# Patient Record
Sex: FEMALE | Race: WHITE | NOT HISPANIC OR LATINO | Employment: OTHER | URBAN - METROPOLITAN AREA
[De-identification: names, ages, dates, MRNs, and addresses within clinical notes are randomized per-mention and may not be internally consistent; named-entity substitution may affect disease eponyms.]

---

## 2017-01-16 ENCOUNTER — ALLSCRIPTS OFFICE VISIT (OUTPATIENT)
Dept: OTHER | Facility: OTHER | Age: 72
End: 2017-01-16

## 2017-04-26 ENCOUNTER — ALLSCRIPTS OFFICE VISIT (OUTPATIENT)
Dept: OTHER | Facility: OTHER | Age: 72
End: 2017-04-26

## 2017-06-15 ENCOUNTER — ALLSCRIPTS OFFICE VISIT (OUTPATIENT)
Dept: OTHER | Facility: OTHER | Age: 72
End: 2017-06-15

## 2017-07-21 ENCOUNTER — ALLSCRIPTS OFFICE VISIT (OUTPATIENT)
Dept: OTHER | Facility: OTHER | Age: 72
End: 2017-07-21

## 2017-09-08 ENCOUNTER — ALLSCRIPTS OFFICE VISIT (OUTPATIENT)
Dept: OTHER | Facility: OTHER | Age: 72
End: 2017-09-08

## 2017-09-08 ENCOUNTER — APPOINTMENT (OUTPATIENT)
Dept: RADIOLOGY | Facility: MEDICAL CENTER | Age: 72
End: 2017-09-08
Payer: COMMERCIAL

## 2017-09-08 DIAGNOSIS — M25.552 PAIN IN LEFT HIP: ICD-10-CM

## 2017-09-08 PROCEDURE — 73502 X-RAY EXAM HIP UNI 2-3 VIEWS: CPT

## 2017-10-03 ENCOUNTER — APPOINTMENT (OUTPATIENT)
Dept: LAB | Facility: HOSPITAL | Age: 72
End: 2017-10-03
Payer: COMMERCIAL

## 2017-10-03 DIAGNOSIS — D64.9 ANEMIA: ICD-10-CM

## 2017-10-03 DIAGNOSIS — I10 ESSENTIAL (PRIMARY) HYPERTENSION: ICD-10-CM

## 2017-10-03 DIAGNOSIS — E78.5 HYPERLIPIDEMIA: ICD-10-CM

## 2017-10-03 DIAGNOSIS — E03.9 HYPOTHYROIDISM: ICD-10-CM

## 2017-10-03 DIAGNOSIS — E87.6 HYPOKALEMIA: ICD-10-CM

## 2017-10-03 DIAGNOSIS — E11.21 TYPE 2 DIABETES MELLITUS WITH DIABETIC NEPHROPATHY (HCC): ICD-10-CM

## 2017-10-03 LAB
ALBUMIN SERPL BCP-MCNC: 3.7 G/DL (ref 3.5–5)
ALP SERPL-CCNC: 64 U/L (ref 46–116)
ALT SERPL W P-5'-P-CCNC: 15 U/L (ref 12–78)
ANION GAP SERPL CALCULATED.3IONS-SCNC: 9 MMOL/L (ref 4–13)
AST SERPL W P-5'-P-CCNC: 16 U/L (ref 5–45)
BASOPHILS # BLD AUTO: 0.04 THOUSANDS/ΜL (ref 0–0.1)
BASOPHILS NFR BLD AUTO: 1 % (ref 0–1)
BILIRUB SERPL-MCNC: 0.54 MG/DL (ref 0.2–1)
BUN SERPL-MCNC: 47 MG/DL (ref 5–25)
CALCIUM SERPL-MCNC: 9.2 MG/DL (ref 8.3–10.1)
CHLORIDE SERPL-SCNC: 97 MMOL/L (ref 100–108)
CHOLEST SERPL-MCNC: 172 MG/DL (ref 50–200)
CO2 SERPL-SCNC: 33 MMOL/L (ref 21–32)
CREAT SERPL-MCNC: 1.96 MG/DL (ref 0.6–1.3)
EOSINOPHIL # BLD AUTO: 0.48 THOUSAND/ΜL (ref 0–0.61)
EOSINOPHIL NFR BLD AUTO: 6 % (ref 0–6)
ERYTHROCYTE [DISTWIDTH] IN BLOOD BY AUTOMATED COUNT: 13.5 % (ref 11.6–15.1)
EST. AVERAGE GLUCOSE BLD GHB EST-MCNC: 163 MG/DL
GFR SERPL CREATININE-BSD FRML MDRD: 25 ML/MIN/1.73SQ M
GLUCOSE P FAST SERPL-MCNC: 121 MG/DL (ref 65–99)
HBA1C MFR BLD: 7.3 % (ref 4.2–6.3)
HCT VFR BLD AUTO: 40.8 % (ref 34.8–46.1)
HDLC SERPL-MCNC: 68 MG/DL (ref 40–60)
HGB BLD-MCNC: 13 G/DL (ref 11.5–15.4)
LDLC SERPL CALC-MCNC: 66 MG/DL (ref 0–100)
LYMPHOCYTES # BLD AUTO: 1.85 THOUSANDS/ΜL (ref 0.6–4.47)
LYMPHOCYTES NFR BLD AUTO: 22 % (ref 14–44)
MCH RBC QN AUTO: 30.7 PG (ref 26.8–34.3)
MCHC RBC AUTO-ENTMCNC: 31.9 G/DL (ref 31.4–37.4)
MCV RBC AUTO: 96 FL (ref 82–98)
MONOCYTES # BLD AUTO: 0.38 THOUSAND/ΜL (ref 0.17–1.22)
MONOCYTES NFR BLD AUTO: 5 % (ref 4–12)
NEUTROPHILS # BLD AUTO: 5.57 THOUSANDS/ΜL (ref 1.85–7.62)
NEUTS SEG NFR BLD AUTO: 66 % (ref 43–75)
NRBC BLD AUTO-RTO: 0 /100 WBCS
PLATELET # BLD AUTO: 264 THOUSANDS/UL (ref 149–390)
PMV BLD AUTO: 10.1 FL (ref 8.9–12.7)
POTASSIUM SERPL-SCNC: 3.8 MMOL/L (ref 3.5–5.3)
PROT SERPL-MCNC: 7.1 G/DL (ref 6.4–8.2)
RBC # BLD AUTO: 4.24 MILLION/UL (ref 3.81–5.12)
SODIUM SERPL-SCNC: 139 MMOL/L (ref 136–145)
T4 FREE SERPL-MCNC: 0.79 NG/DL (ref 0.76–1.46)
TRIGL SERPL-MCNC: 189 MG/DL
TSH SERPL DL<=0.05 MIU/L-ACNC: 5.09 UIU/ML (ref 0.36–3.74)
WBC # BLD AUTO: 8.34 THOUSAND/UL (ref 4.31–10.16)

## 2017-10-03 PROCEDURE — 80061 LIPID PANEL: CPT

## 2017-10-03 PROCEDURE — 85025 COMPLETE CBC W/AUTO DIFF WBC: CPT

## 2017-10-03 PROCEDURE — 83036 HEMOGLOBIN GLYCOSYLATED A1C: CPT

## 2017-10-03 PROCEDURE — 36415 COLL VENOUS BLD VENIPUNCTURE: CPT

## 2017-10-03 PROCEDURE — 80053 COMPREHEN METABOLIC PANEL: CPT

## 2017-10-03 PROCEDURE — 84439 ASSAY OF FREE THYROXINE: CPT

## 2017-10-03 PROCEDURE — 84443 ASSAY THYROID STIM HORMONE: CPT

## 2017-10-12 ENCOUNTER — ALLSCRIPTS OFFICE VISIT (OUTPATIENT)
Dept: OTHER | Facility: OTHER | Age: 72
End: 2017-10-12

## 2017-10-12 DIAGNOSIS — Z00.00 ENCOUNTER FOR GENERAL ADULT MEDICAL EXAMINATION WITHOUT ABNORMAL FINDINGS: ICD-10-CM

## 2017-10-12 DIAGNOSIS — E87.6 HYPOKALEMIA: ICD-10-CM

## 2017-10-13 NOTE — PROGRESS NOTES
Assessment  1  Hyperlipidemia (272 4) (E78 5)   2  Uncontrolled diabetes with stage 3 chronic kidney disease GFR 30-59 (250 42,585 3)   (E11 22,E11 65,N18 3)   3  Hypertension (401 9) (I10)   4  Fibromyalgia (729 1) (M79 7)   5  Chronic kidney disease, stage 3 (585 3) (N18 3)   6  Depression (311) (F32 9)   7  Encounter for preventive health examination (V70 0) (Z00 00)   8  Need for vaccination (V05 9) (Z23)   9  Subclinical hypothyroidism (244 8) (E03 9)    Plan  Anemia, Chronic gout, Hyperlipidemia, Hypertension, Hypothyroidism, Uncontrolled  diabetes with stage 3 chronic kidney disease GFR  30-59    · (1) CBC/PLT/DIFF; Status:Hold For - Exact Date; Requested for:After 18PKY3163;    · (1) COMPREHENSIVE METABOLIC PANEL; Status:Hold For - Exact Date; Requested  for:After 99AEB0908;    · (1) HEMOGLOBIN A1C; Status:Hold For - Exact Date; Requested for:After 65QWT3635;    · (1) LIPID PANEL FASTING W DIRECT LDL REFLEX; Status:Hold For - Exact Date; Requested for:After 28ZUS9830;    · (1) MICROALBUMIN CREATININE RATIO, RANDOM URINE; Status:Hold For - Exact Date; Requested for:After 03VAP3234;    · (1) TSH WITH FT4 REFLEX; Status:Hold For - Exact Date; Requested for:After  52EYR7788;    · (1) URIC ACID; Status:Hold For - Exact Date; Requested for:After 77UPI4988;   Depression    · Citalopram Hydrobromide 40 MG Oral Tablet; Take 1 tablet daily  Health Maintenance    · * DXA BONE DENSITY SPINE HIP AND PELVIS; Status:Hold For - Scheduling; Requested  for:12Oct2017;   Need for vaccination    · Fluzone High-Dose 0 5 ML Intramuscular Suspension Prefilled Syringe;  INJECT 0 5  ML Intramuscular; To Be Done: 09YOW3240  PMH: Colon cancer screening    · COLONOSCOPY; Status:Temporary Deferral - Pt requests deferral;    10/12/2018  Subclinical hypothyroidism    · Levothyroxine Sodium 50 MCG Oral Tablet; Take 1 tablet daily   · (1) TSH WITH FT4 REFLEX; Status:Hold For - Exact Date;  Requested for:After  [id number]; Discussion/Summary    --depression: Patient has been more depressed recently  Denies any suicidal ideation  Recommend increasing citalopram from 20 to 40 mg daily  Will also start levothyroxinehypothyroid: New  As above, will start low-dose levothyroxine 50 mcg daily to see if this improves her depressionDiabetic control has been improving, A1c now 7 3%, was 7 6  Patient would like to continue off medication at this time  Will agree to do so, as long as A1cs continue to improve  I also asked her to mention to her rheumatologist to consider reduction of prednisonePatient continues with multiple myalgias  Overall is stable on prednisone 5 mg daily and gabapentin 300 t i d  continue follow-up with rheumatologist, Dr Shu Leon on medication  2 months followed by appointment (labs ordered)6 months, fasting blood work prior (labs ordered)  Recheck TSH   Possible side effects of new medications were reviewed with the patient/guardian today  The treatment plan was reviewed with the patient/guardian  The patient/guardian understands and agrees with the treatment plan      Chief Complaint  Pt presents for a follow-up and to discuss blwk  Pt would like flu shot today  Pt defers colonoscopy  Patient is here today for follow up of chronic conditions described in HPI  History of Present Illness  Patient presents for recheck of chronic medical problems today  Patient has been more depressed recently  Patient has restarted simvastatin for cholesterol  Patient continues to have pain from fibromyalgia  She is still 1 prednisone 5 mg daily and gabapentin  Patient still sees rheumatologist regularly  Review of Systems    Constitutional: No fever, no chills, feels well, no tiredness, no recent weight gain or weight loss  ENT: no complaints of earache, no loss of hearing, no nose bleeds, no nasal discharge, no sore throat, no hoarseness     Cardiovascular: No complaints of slow heart rate, no fast heart rate, no chest pain, no palpitations, no leg claudication, no lower extremity edema  Respiratory: No complaints of shortness of breath, no wheezing, no cough, no SOB on exertion, no orthopnea, no PND  Gastrointestinal: No complaints of abdominal pain, no constipation, no nausea or vomiting, no diarrhea, no bloody stools  Genitourinary: No complaints of dysuria, no incontinence, no pelvic pain, no dysmenorrhea, no vaginal discharge or bleeding  Musculoskeletal: as noted in HPI  Active Problems  1  Anemia (285 9) (D64 9)   2  Chronic gout (274 02) (M1A 9XX0)   3  Depression (311) (F32 9)   4  Edema (782 3) (R60 9)   5  Fibromyalgia (729 1) (M79 7)   6  Homocysteinemia (270 4) (E72 11)   7  Hyperlipidemia (272 4) (E78 5)   8  Hypertension (401 9) (I10)   9  Hypokalemia (276 8) (E87 6)   10  Hypothyroidism (244 9) (E03 9)   11  Lumbago (724 2) (M54 5)   12  Pain of left hip (719 45) (M25 552)   13  Uncontrolled diabetes with stage 3 chronic kidney disease GFR 30-59 (250 42,585 3)    (E11 22,E11 65,N18 3)   14  Vitamin D deficiency (268 9) (E55 9)    Past Medical History  1  History of Abnormal blood chemistry (790 6) (R79 9)   2  History of Acute bronchitis, unspecified organism (466 0) (J20 9)   3  History of Acute low back pain (724 2) (M54 5)   4  History of Acute pharyngitis (462) (J02 9)   5  History of Asthma (493 90) (J45 909)   6  History of Diabetes mellitus with kidney disease (250 40,583 81) (E11 21)   7  History of Dizziness (780 4) (R42)   8  History of Encounter for screening colonoscopy (V76 51) (Z12 11)   9  History of Encounter for screening mammogram for malignant neoplasm of breast   (V76 12) (Z12 31)   10  History of Encounter for screening mammogram for malignant neoplasm of breast    (V76 12) (Z12 31)   11  History of Flu vaccine need (V04 81) (Z23)   12  History of acute sinusitis (V12 69) (Z87 09)   13  History of acute sinusitis (V12 69) (Z87 09)   14   History of angina pectoris (V12 59) (Z86 79)   15  History of angioedema (V13 89) (Z87 898)   16  History of arthritis (V13 4) (Z87 39)   17  History of athlete's foot (V12 09) (Z86 19)   18  History of cataract (V12 49) (Z86 69)   19  History of eczema (V13 3) (Z87 2)   20  History of gait disorder (V13 89) (Z87 898)   21  History of idiopathic thrombocytopenic purpura (V12 3) (Z86 2)   22  History of restless legs syndrome (V12 49) (Z86 69)   23  History of type 2 diabetes mellitus (V12 29) (Z86 39)   24  History of Lower back pain (724 2) (M54 5)   25  History of Nausea (787 02) (R11 0)   26  History of Neck pain (723 1) (M54 2)   27  History of Neck Strain (847 0)   28  History of Need for prophylactic vaccination and inoculation against influenza (V04 81)    (Z23)   29  History of Need for vaccination with 13-polyvalent pneumococcal conjugate vaccine    (V03 82) (Z23)   30  History of Otalgia, unspecified laterality (388 70) (H92 09)   31  History of Overactive bladder (596 51) (N32 81)   32  History of Post menopausal syndrome (627 9) (N95 1)   33  History of Pre-operative clearance (V72 84) (Z01 818)   34  History of Screening for breast cancer (V76 10) (Z12 31)   35  History of Screening for osteoporosis (V82 81) (Z13 820)   36  History of Skin lesion of face (709 9) (L98 9)   37  History of Symptoms involving urinary system (788 99) (R39 9)   38  History of Tinnitus, unspecified laterality (388 30) (H93 19)   39  History of Upper respiratory infection (465 9) (J06 9)   40  History of Urge incontinence of urine (788 31) (N39 41)   41  History of Urinary frequency (788 41) (R35 0)    The active problems and past medical history were reviewed and updated today  Surgical History  1  History of Knee Replacement    Family History  Mother    1  Family history of    2  Denied: Family history of substance abuse   3  Family history of Leukemia   4  Denied: Family history of Mental health problem  Father    5   Denied: Family history of substance abuse   6  Denied: Family history of Mental health problem  Family History    7  Family history of Eczema    Social History   · Being A Social Drinker   · Denied: History of Drug Use   · Former smoker (R62 11) (I10 737)  The social history was reviewed and updated today  The social history was reviewed and is unchanged  Current Meds   1  Amitriptyline HCl - 10 MG Oral Tablet; Therapy: 65MAF4674 to (Evaluate:88Xuy6046) Recorded   2  Aspirin Adult Low Dose 81 MG Oral Tablet Delayed Release; TAKE 1 TABLET DAILY; Therapy: 80DII5492 to Recorded   3  Cetirizine HCl - 10 MG Oral Tablet; TAKE 1 TABLET DAILY; Therapy: 06GWI4780 to Recorded   4  Citalopram Hydrobromide 20 MG Oral Tablet; take one tablet by mouth every day; Therapy: 99BRW7705 to (77 873 135)  Requested for: 83RIU7721; Last   Rx:18Zht9262; Status: ACTIVE - Transmit to Pharmacy - Awaiting Verification Ordered   5  D-400 400 UNIT Oral Tablet; take 2 tablet daily; Therapy: 15XJF4606 to Recorded   6  Furosemide 40 MG Oral Tablet; TAKE ONE TABLET BY MOUTH EVERY DAY AS   DIRECTED; Therapy: 52ZSQ6270 to (Eren Jurist)  Requested for: 53ZAA0473; Last   Rx:47Ksn6478 Ordered   7  Gabapentin 300 MG Oral Capsule; One tab tid Recorded   8  MetOLazone 2 5 MG Oral Tablet; TAKE 1 TABLET DAILY ON MONDAY, WEDNESDAY, AND   FRIDAY; Therapy: 87Zta8972 to (Evaluate:17Xwe6127)  Requested for: 75QHV7305; Last   Rx:75Knk1501; Status: ACTIVE - Transmit to [facility] Verification   Ordered   9  Metoprolol Tartrate 25 MG Oral Tablet; take one tablet by mouth every day; Therapy: 83VPV5895 to (26 774994)  Requested for: 81Kqc3761; Last   Rx:92Qeb6811 Ordered   10  PredniSONE 5 MG Oral Tablet; Take 1 tablet daily; Therapy: 41QNC0558 to Recorded   11  Simvastatin 10 MG Oral Tablet; take one tablet by mouth every day; Therapy: 30ZES9648 to (Evaluate:10Oct2017)  Requested for: 12Jun2017; Last    Rx:12Jun2017 Ordered   12  TraMADol HCl - 50 MG Oral Tablet; take 1 tablet by mouth every 8 hours if needed; Therapy: 71TMG8284 to (Evaluate:09Ioj1653) Recorded   13  Uloric 80 MG Oral Tablet; TAKE 1 TABLET DAILY; Therapy: 98JMB6926 to Recorded    The medication list was reviewed and updated today  Allergies  1  ACE Inhibitors   2  Angiotensin Receptor Blockers   3  Compazine TABS  Denied    4  PredniSONE TABS    Vitals  Vital Signs    Recorded: 99USI9676 10:34AM   Temperature 97 7 F, Tympanic   Heart Rate 78   Pulse Quality Normal   Respiration Quality Normal   Respiration 18   Systolic 532, LUE, Sitting   Diastolic 76, LUE, Sitting   BP CUFF SIZE Large   Height 5 ft 6 in   Weight 252 lb 3 oz   BMI Calculated 40 7   BSA Calculated 2 21   O2 Saturation 97   LMP postmenopausal     Physical Exam    Constitutional   General appearance: No acute distress, well appearing and well nourished  Pulmonary   Respiratory effort: No increased work of breathing or signs of respiratory distress  Auscultation of lungs: Clear to auscultation  Cardiovascular   Palpation of heart: Normal PMI, no thrills  Auscultation of heart: Normal rate and rhythm, normal S1 and S2, without murmurs  Examination of extremities for edema and/or varicosities: Normal     Lymphatic   Palpation of lymph nodes in neck: No lymphadenopathy  Psychiatric   Orientation to person, place, and time: Normal     Mood and affect: Normal        Health Management  History of Diabetes mellitus with kidney disease   (1) HEMOGLOBIN A1C; every 3 months; Last 14PXG3651; Next Due: 34VIC4627; Active  (every) MICROALBUMIN, RANDOM URINE (W/CREATININE); every 1 year; Next Due: 64YXZ8823; Overdue  History of Encounter for screening colonoscopy   COLONOSCOPY; every 10 years; Next Due: 51TFZ6483; Overdue  History of Screening for breast cancer   * MAMMO SCREENING BILATERAL W CAD; every 1 year; Next Due: 85HOA9112;  Overdue  Uncontrolled diabetes with stage 3 chronic kidney disease GFR 30-59   (1) HEMOGLOBIN A1C; every 3 months; Last 10GCD3615; Next Due: 86FXO9567; Active  (every) MICROALBUMIN, RANDOM URINE (W/CREATININE); every 1 year; Next Due: 01YAE1171; Overdue  Health Maintenance   (1) HEMOGLOBIN A1C; every 3 months; Last 40QDH6333; Next Due: 47DCA7082; Active  (every) MICROALBUMIN, RANDOM URINE (W/CREATININE); every 1 year; Next Due: 16DFP2746; Overdue  * MAMMO SCREENING BILATERAL W CAD; every 1 year; Next Due: 75HIV5327; Overdue  COLONOSCOPY; every 10 years; Next Due: 65KAS0327; Overdue  Medicare Annual Wellness Visit; every 1 year; Next Due: 98Cuf4279; Overdue    Signatures   Electronically signed by :  Dagoberto Greenfield DO; Oct 12 2017 11:16AM EST                       (Author)

## 2017-12-04 ENCOUNTER — LAB REQUISITION (OUTPATIENT)
Dept: LAB | Facility: HOSPITAL | Age: 72
End: 2017-12-04
Payer: COMMERCIAL

## 2017-12-04 DIAGNOSIS — E03.9 HYPOTHYROIDISM: ICD-10-CM

## 2017-12-04 PROCEDURE — 84443 ASSAY THYROID STIM HORMONE: CPT | Performed by: FAMILY MEDICINE

## 2017-12-05 LAB — TSH SERPL DL<=0.05 MIU/L-ACNC: 1.16 UIU/ML (ref 0.36–3.74)

## 2017-12-11 ENCOUNTER — ALLSCRIPTS OFFICE VISIT (OUTPATIENT)
Dept: OTHER | Facility: OTHER | Age: 72
End: 2017-12-11

## 2017-12-12 ENCOUNTER — GENERIC CONVERSION - ENCOUNTER (OUTPATIENT)
Dept: OTHER | Facility: OTHER | Age: 72
End: 2017-12-12

## 2017-12-12 LAB
BUN SERPL-MCNC: 39 MG/DL (ref 7–25)
BUN/CREA RATIO (HISTORICAL): 22 (CALC) (ref 6–22)
CALCIUM SERPL-MCNC: 9.4 MG/DL (ref 8.6–10.4)
CHLORIDE SERPL-SCNC: 96 MMOL/L (ref 98–110)
CO2 SERPL-SCNC: 32 MMOL/L (ref 20–31)
CREAT SERPL-MCNC: 1.81 MG/DL (ref 0.6–0.93)
EGFR AFRICAN AMERICAN (HISTORICAL): 31 ML/MIN/1.73M2
EGFR-AMERICAN CALC (HISTORICAL): 27 ML/MIN/1.73M2
GLUCOSE (HISTORICAL): 159 MG/DL (ref 65–99)
POTASSIUM SERPL-SCNC: 3.6 MMOL/L (ref 3.5–5.3)
SODIUM SERPL-SCNC: 139 MMOL/L (ref 135–146)

## 2017-12-12 NOTE — PROGRESS NOTES
Assessment    1  Chronic kidney disease, stage 3 (585 3) (N18 3)   2  Hypokalemia (276 8) (E87 6)   3  Depression (311) (F32 9)   4  Uncontrolled diabetes with stage 3 chronic kidney disease GFR 30-59 (250 42,585 3) (E11 22,E11 65,N18 3)   5  Chronic gout (274 02) (M1A 9XX0)   6  Fibromyalgia (729 1) (M79 7)   7  Hypothyroidism (244 9) (E03 9)    Plan  Chronic kidney disease, stage 3, Fibromyalgia, Hyperlipidemia, Hypertension,Hypokalemia, Hypothyroidism, PMH: Subclinical hypothyroidism, Uncontrolled diabeteswith stage 3 chronic kidney disease GFR 30-59    · (1) CBC/PLT/DIFF; Status:Hold For - Exact Date; Requested for:After 39HOD1099;    · (1) COMPREHENSIVE METABOLIC PANEL; Status:Hold For - Exact Date; Requestedfor:After 76KMW2766;    · (1) HEMOGLOBIN A1C; Status:Hold For - Exact Date; Requested for:After 32YFL4026;    · (1) LIPID PANEL FASTING W DIRECT LDL REFLEX; Status:Hold For - Exact Date; Requested for:After 92ILV6008;    · (1) TSH WITH FT4 REFLEX; Status:Hold For - Exact Date; Requested for:[id number];   Chronic kidney disease, stage 3, Hypokalemia, Uncontrolled diabetes with stage 3chronic kidney disease GFR 30-59    · (1) BASIC METABOLIC PROFILE; Status:Active; Requested for:29Egs2021;   Chronic kidney disease, stage 3, Uncontrolled diabetes with stage 3 chronic kidneydisease GFR 30-59    · Januvia 25 MG Oral Tablet; TAKE 1 TABLET DAILY    Discussion/Summary    --diabetes: A1cs 7 3, was 7 5%  Patient had previously declined medication, but is agreeable to start med at this time  Will start Januvia 25 mg daily (renally adjusted)  kidney disease; last creatinine 1 90  Will repeat BMP today  Consider referral to Nephrology if condition worsensChronic  Last potassium 2 8  Patient was started on potassium chloride 20 mEq daily  Will check electrolytes today  Thyroid normal  Patient doing well since starting levothyroxine 50 mcg dailyImproved since increasing citalopram to 40 mg dailyStable   Patient's rheumatologist, Dr Bert Holly, recently decreased her prednisone to 2 5 mg dailyReasonably controlled on metoprolol 25 mg dailyDoing well on Uloric without any recent flares  today    Will call with resultsmonths, fasting blood work prior  Possible side effects of new medications were reviewed with the patient/guardian today  The treatment plan was reviewed with the patient/guardian  The patient/guardian understands and agrees with the treatment plan      Chief Complaint  Pt presents for 2 month check up and to discuss blood work from 12/4/2017  Pt defers Colonoscopy  Pt will be scheduling eye exam and DEXA scan  Pt is UTD with flu shot and foot exam    Patient is here today for follow up of chronic conditions described in HPI  History of Present Illness  Patient presents for recheck of chronic medical problems today  Patient had labs drawn recently which showed creatinine of 1 9 and potassium of 2 8  She is doing well since starting potassium chloride 20 mEq daily  Depression has improved since increasing citalopram to 40 mg daily  Patient has been trying to watch her diet regarding her diabetes  Her prednisone has now been cut down to 1/2 tablet daily by Rheumatology  She is doing well since starting levothyroxine for underactive thyroid  Patient still sees rheumatologist regularly      Review of Systems   Constitutional: No fever, no chills, feels well, no tiredness, no recent weight gain or weight loss  Cardiovascular: No complaints of slow heart rate, no fast heart rate, no chest pain, no palpitations, no leg claudication, no lower extremity edema  Respiratory: No complaints of shortness of breath, no wheezing, no cough, no SOB on exertion, no orthopnea, no PND  Gastrointestinal: No complaints of abdominal pain, no constipation, no nausea or vomiting, no diarrhea, no bloody stools    Genitourinary: No complaints of dysuria, no incontinence, no pelvic pain, no dysmenorrhea, no vaginal discharge or bleeding  Active Problems    1  Anemia (285 9) (D64 9)   2  Chronic gout (274 02) (M1A 9XX0)   3  Chronic kidney disease, stage 3 (585 3) (N18 3)   4  Depression (311) (F32 9)   5  Depression screening (V79 0) (Z13 89)   6  Edema (782 3) (R60 9)   7  Encounter for screening mammogram for malignant neoplasm of breast (V76 12) (Z12 31)   8  Fibromyalgia (729 1) (M79 7)   9  Homocysteinemia (270 4) (E72 11)   10  Hyperlipidemia (272 4) (E78 5)   11  Hypertension (401 9) (I10)   12  Hypokalemia (276 8) (E87 6)   13  Hypothyroidism (244 9) (E03 9)   14  Lumbago (724 2) (M54 5)   15  Uncontrolled diabetes with stage 3 chronic kidney disease GFR 30-59 (250 42,585 3)  (E11 22,E11 65,N18 3)   16  Vitamin D deficiency (268 9) (E55 9)    Past Medical History    1  History of Abnormal blood chemistry (790 6) (R79 9)   2  History of Acute bronchitis, unspecified organism (466 0) (J20 9)   3  History of Acute low back pain (724 2) (M54 5)   4  History of Acute pharyngitis (462) (J02 9)   5  History of Asthma (493 90) (J45 909)   6  History of Diabetes mellitus with kidney disease (250 40,583 81) (E11 21)   7  History of Dizziness (780 4) (R42)   8  History of Encounter for screening colonoscopy (V76 51) (Z12 11)   9  History of Encounter for screening mammogram for malignant neoplasm of breast (V76 12) (Z12 31)   10  History of Flu vaccine need (V04 81) (Z23)   11  History of acute sinusitis (V12 69) (Z87 09)   12  History of acute sinusitis (V12 69) (Z87 09)   13  History of angina pectoris (V12 59) (Z86 79)   14  History of angioedema (V13 89) (Z87 898)   15  History of arthritis (V13 4) (Z87 39)   16  History of athlete's foot (V12 09) (Z86 19)   17  History of cataract (V12 49) (Z86 69)   18  History of eczema (V13 3) (Z87 2)   19  History of gait disorder (V13 89) (Z87 898)   20  History of idiopathic thrombocytopenic purpura (V12 3) (Z86 2)   21  History of restless legs syndrome (V12 49) (Z86 69)   22   Denied: History of substance abuse   23  History of type 2 diabetes mellitus (V12 29) (Z86 39)   24  History of Lower back pain (724 2) (M54 5)   25  History of Nausea (787 02) (R11 0)   26  History of Neck pain (723 1) (M54 2)   27  History of Neck Strain (847 0)   28  History of Need for prophylactic vaccination and inoculation against influenza (V04 81)  (Z23)   29  History of Need for vaccination (V05 9) (Z23)   30  History of Need for vaccination with 13-polyvalent pneumococcal conjugate vaccine  (V03 82) (Z23)   31  History of Otalgia, unspecified laterality (388 70) (H92 09)   32  History of Overactive bladder (596 51) (N32 81)   33  History of Pain of left hip (719 45) (M25 552)   34  History of Post menopausal syndrome (627 9) (N95 1)   35  History of Pre-operative clearance (V72 84) (Z01 818)   36  History of Screening for breast cancer (V76 10) (Z12 31)   37  History of Screening for osteoporosis (V82 81) (Z13 820)   38  History of Skin lesion of face (709 9) (L98 9)   39  History of Subclinical hypothyroidism (244 8) (E03 9)   40  History of Symptoms involving urinary system (788 99) (R39 9)   41  History of Tinnitus, unspecified laterality (388 30) (H93 19)   42  History of Upper respiratory infection (465 9) (J06 9)   43  History of Urge incontinence of urine (788 31) (N39 41)   44  History of Urinary frequency (788 41) (R35 0)    The active problems and past medical history were reviewed and updated today  Surgical History  1  History of Knee Replacement    Family History  Mother    1  Family history of    2  Denied: Family history of substance abuse   3  Family history of Leukemia   4  Denied: Family history of Mental health problem  Father    5  Denied: Family history of substance abuse   6  Denied: Family history of Mental health problem  Family History    7   Family history of Eczema    Social History     · Being A Social Drinker   · Denied: History of Drug Use   · Former smoker (V15 82) (M03 062)  The social history was reviewed and updated today  The social history was reviewed and is unchanged  Current Meds   1  Amitriptyline HCl - 10 MG Oral Tablet; Therapy: 26YRG9411 to (Evaluate:22Sep2014) Recorded   2  Aspirin Adult Low Dose 81 MG Oral Tablet Delayed Release; TAKE 1 TABLET DAILY; Therapy: 59MKM8482 to Recorded   3  Citalopram Hydrobromide 40 MG Oral Tablet; Take 1 tablet daily; Therapy: 47KCM6037 to (Evaluate:39Tha0182)  Requested for: 66EVN4579; Last Rx:12Oct2017 Ordered   4  D-400 400 UNIT Oral Tablet; take 2 tablet daily; Therapy: 68XBL6546 to Recorded   5  Furosemide 40 MG Oral Tablet; TAKE ONE TABLET BY MOUTH EVERY DAY AS DIRECTED; Therapy: 93FJQ7315 to (Manjula Maple)  Requested for: 26DKJ8660; Last Rx:26Nov2017; Status: ACTIVE - Transmit to Pharmacy - Awaiting Verification Ordered   6  Gabapentin 300 MG Oral Capsule; One tab tid Recorded   7  K-Tab 20 MEQ Oral Tablet Extended Release; Take 1 tablet daily; Therapy: 79BHO9307 to (Last Rx:31Oct2017)  Requested for: 31Oct2017; Status: ACTIVE - Transmit to Pharmacy - Awaiting Verification Ordered   8  Levothyroxine Sodium 50 MCG Oral Tablet; Take 1 tablet daily; Therapy: 28DZE9125 to (Evaluate:10Jan2018)  Requested for: 10RFA9571; Last Rx:12Oct2017 Ordered   9  MetOLazone 2 5 MG Oral Tablet; TAKE 1 TABLET DAILY ON MONDAY, WEDNESDAY, AND FRIDAY; Therapy: 90Oaf1357 to (Evaluate:75Bsn7010)  Requested for: 79RTT2531; Last Rx:74Ytx6232; Status: ACTIVE - Transmit to [facility] Verification Ordered   10  Metoprolol Tartrate 25 MG Oral Tablet; take one tablet by mouth every day; Therapy: 19NYI6446 to (Megan Blood)  Requested for: 00Olo4789; Last  Rx:08Sep2017 Ordered   11  PredniSONE 5 MG Oral Tablet; take 0 5 tablet daily; Therapy: 88KQP0565 to Recorded   12  Simvastatin 10 MG Oral Tablet; take one tablet by mouth every day;   Therapy: 08URV9621 to ()  Requested for: 46CUA6712; Last  958 6660; Status: ACTIVE - Transmit to Pharmacy - Awaiting Verification Ordered   13  TraMADol HCl - 50 MG Oral Tablet; take 1 tablet by mouth every 8 hours if needed; Therapy: 67XQH4384 to (Evaluate:03Kqz3811) Recorded   14  Uloric 80 MG Oral Tablet; TAKE 1 TABLET DAILY; Therapy: 02LIC9883 to Recorded    The medication list was reviewed and updated today  Allergies  1  ACE Inhibitors   2  Angiotensin Receptor Blockers   3  Compazine TABS  Denied    4  PredniSONE TABS    Vitals  Vital Signs    Recorded: 11Toh4972 10:11AM   Temperature 97 5 F, Tympanic   Heart Rate 60   Pulse Quality Normal   Respiration Quality Normal   Respiration 16   Systolic 703, LUE, Sitting   Diastolic 84, LUE, Sitting   BP CUFF SIZE Large   Height 5 ft 5 in   Weight 248 lb 6 oz   BMI Calculated 41 33   BSA Calculated 2 17   O2 Saturation 99, RA   LMP premenopause   Pain Scale 0       Physical Exam   Constitutional  General appearance: No acute distress, well appearing and well nourished  Pulmonary  Respiratory effort: No increased work of breathing or signs of respiratory distress  Auscultation of lungs: Clear to auscultation  Cardiovascular  Palpation of heart: Normal PMI, no thrills  Auscultation of heart: Normal rate and rhythm, normal S1 and S2, without murmurs  Examination of extremities for edema and/or varicosities: Normal    Carotid pulses: Normal    Lymphatic  Palpation of lymph nodes in neck: No lymphadenopathy  Psychiatric  Orientation to person, place, and time: Normal    Mood and affect: Normal          Health Management  History of Diabetes mellitus with kidney disease   (1) HEMOGLOBIN A1C; every 3 months; Last 18ULG6619; Next Due: 60OKQ0322; Active  (every) MICROALBUMIN, RANDOM URINE (W/CREATININE); every 1 year; Next Due: 98NCK8755; Overdue  History of Encounter for screening colonoscopy   COLONOSCOPY; every 10 years; Next Due: 16TGD8533;  Overdue  History of Screening for breast cancer   * MAMMO SCREENING BILATERAL W CAD; every 1 year; Next Due: 70WVU0030; Overdue  Uncontrolled diabetes with stage 3 chronic kidney disease GFR 30-59   (1) HEMOGLOBIN A1C; every 3 months; Last 09BCC5827; Next Due: 43IOJ7981; Active  (every) MICROALBUMIN, RANDOM URINE (W/CREATININE); every 1 year; Next Due: 83PUP6895; Overdue  Health Maintenance   (1) HEMOGLOBIN A1C; every 3 months; Last 02EOS7852; Next Due: 00NFK9725; Active  (every) MICROALBUMIN, RANDOM URINE (W/CREATININE); every 1 year; Next Due: 38HUZ1707; Overdue  * MAMMO SCREENING BILATERAL W CAD; every 1 year; Next Due: 43QFK0213; Overdue  COLONOSCOPY; every 10 years; Next Due: 12TXS6758; Overdue  Medicare Annual Wellness Visit; every 1 year; Next Due: 82Ehv0392; Overdue    Signatures   Electronically signed by :  General Jarret DO; Dec 11 2017 10:38AM EST                       (Author)

## 2017-12-29 ENCOUNTER — ALLSCRIPTS OFFICE VISIT (OUTPATIENT)
Dept: OTHER | Facility: OTHER | Age: 72
End: 2017-12-29

## 2017-12-29 LAB
FLUAV AG SPEC QL IA: POSITIVE
INFLUENZA B AG (HISTORICAL): NEGATIVE

## 2018-01-03 NOTE — PROGRESS NOTES
Assessment   1  Influenza (487 1) (J11 1)   2  Fever (780 60) (R50 9)   3  Cough (786 2) (R05)   4  Uncontrolled diabetes with stage 3 chronic kidney disease GFR 30-59 (250 42,585 3)     (E11 22,E11 65,N18 3)   5  Chronic kidney disease, stage 3 (585 3) (N18 3)   6  Hypertension (401 9) (I10)    Plan   Cough, Fever    · Rapid Flu A and B- POC; Source:Nose; Status:Complete;   Done: 11NPH4704 10:39AM  Cough, Fever, Influenza    · Benzonatate 200 MG Oral Capsule; TAKE 1 CAPSULE 3 TIMES DAILY AS    NEEDED  Cough, Influenza    · Advair Diskus 250-50 MCG/DOSE Inhalation Aerosol Powder Breath Activated;    INHALE 1 PUFF EVERY 12 HOURS   · Albuterol Sulfate (2 5 MG/3ML) 0 083% Inhalation Nebulization Solution   · MINI NEBULIZER- POC; Status:Complete;   Done: 36AWR7305 11:19AM  Influenza    · Oseltamivir Phosphate 75 MG Oral Capsule (Tamiflu); TAKE 1 CAPSULE TWICE    DAILY    Discussion/Summary      66-year-old female here today for severe constitutional symptoms, low-grade temp, harsh cough and runny nose since yesterday  Symptoms highly suspicious for influenza  Patient did have her flu vaccine this season  Rapid influenza test in the office confirms influenza A  Patient to start Tamiflu b i d  x5 days ASAP  Nebulizer with albuterol given in office as point of care with some mild subjective improvement  She remained with coarse breath sounds and coughing induced by deep inspiration though breath sounds were slightly more prominent  She will restart Advil as needed for temp and pain control  Recommended she continue Mucinex and benzonatate cough pills also prescribed for cough control  Advair sample provided today to help with wheezing and chest congestion and cough with 1st dose in office to ensure proper use of the device  She was advised to use 1 puff twice daily for 7 days and should rinse out mouth after every use   Patient unfortunately has multiple comorbid diseases including chronic kidney disease, type 2 diabetes and hypertension  She is to continue all prescription medications as planned and she will continue close follow-up with PCP with last follow-up in December 2017  Patient was advised to avoid syrups that may raise her blood sugar  Her systolic BP is mildly elevated today but could be secondary to the infection and she will continue close follow-ups  She is to call with any concerns or persistent symptoms  Possible side effects of new medications were reviewed with the patient/guardian today  The treatment plan was reviewed with the patient/guardian  The patient/guardian understands and agrees with the treatment plan      Chief Complaint   Pt c/o wheezing, headache, cough, and congestion x 1 day  History of Present Illness   HPI: 77y/o female here today for URI sxs for past day  Reports headache, chest congestion, coughing with yellow mucous, SOB  Body aches and weakness  dizziness  Runny nose, sore throat  Review of Systems        Constitutional: as noted in HPI       ENT: as noted in HPI  Cardiovascular: no complaints of slow or fast heart rate, no chest pain, no palpitations, no leg claudication or lower extremity edema  Respiratory: as noted in HPI  Gastrointestinal: no complaints of abdominal pain, no constipation, no nausea or diarrhea, no vomiting, no bloody stools  Genitourinary: no complaints of dysuria, no incontinence, no pelvic pain, no dysmenorrhea, no vaginal discharge or abnormal vaginal bleeding  Active Problems   1  Anemia (285 9) (D64 9)   2  Chronic gout (274 02) (M1A 9XX0)   3  Chronic kidney disease, stage 3 (585 3) (N18 3)   4  Depression (311) (F32 9)   5  Depression screening (V79 0) (Z13 89)   6  Edema (782 3) (R60 9)   7  Encounter for screening mammogram for malignant neoplasm of breast (V76 12)     (Z12 31)   8  Fibromyalgia (729 1) (M79 7)   9  Homocysteinemia (270 4) (E72 11)   10  Hyperlipidemia (272 4) (E78 5)   11  Hypertension (401 9) (I10)   12  Hypokalemia (276 8) (E87 6)   13  Hypothyroidism (244 9) (E03 9)   14  Lumbago (724 2) (M54 5)   15  Uncontrolled diabetes with stage 3 chronic kidney disease GFR 30-59 (250 42,585 3)      (E11 22,E11 65,N18 3)   16  Vitamin D deficiency (268 9) (E55 9)    Past Medical History   1  History of Abnormal blood chemistry (790 6) (R79 9)   2  History of Acute bronchitis, unspecified organism (466 0) (J20 9)   3  History of Acute low back pain (724 2) (M54 5)   4  History of Acute pharyngitis (462) (J02 9)   5  History of Asthma (493 90) (J45 909)   6  History of Diabetes mellitus with kidney disease (250 40,583 81) (E11 21)   7  History of Dizziness (780 4) (R42)   8  History of Encounter for screening colonoscopy (V76 51) (Z12 11)   9  History of Encounter for screening mammogram for malignant neoplasm of breast     (V76 12) (Z12 31)   10  History of Flu vaccine need (V04 81) (Z23)   11  History of acute sinusitis (V12 69) (Z87 09)   12  History of acute sinusitis (V12 69) (Z87 09)   13  History of angina pectoris (V12 59) (Z86 79)   14  History of angioedema (V13 89) (Z87 898)   15  History of arthritis (V13 4) (Z87 39)   16  History of athlete's foot (V12 09) (Z86 19)   17  History of cataract (V12 49) (Z86 69)   18  History of eczema (V13 3) (Z87 2)   19  History of gait disorder (V13 89) (Z87 898)   20  History of idiopathic thrombocytopenic purpura (V12 3) (Z86 2)   21  History of restless legs syndrome (V12 49) (Z86 69)   22  Denied: History of substance abuse   21  History of type 2 diabetes mellitus (V12 29) (Z86 39)   24  History of Lower back pain (724 2) (M54 5)   25  History of Nausea (787 02) (R11 0)   26  History of Neck pain (723 1) (M54 2)   27  History of Neck Strain (847 0)   28  History of Need for prophylactic vaccination and inoculation against influenza (V04 81)      (Z23)   29  History of Need for vaccination (V05 9) (Z23)   30   History of Need for vaccination with 13-polyvalent pneumococcal conjugate vaccine      (V03 82) (Z23)   31  History of Otalgia, unspecified laterality (388 70) (H92 09)   32  History of Overactive bladder (596 51) (N32 81)   33  History of Pain of left hip (719 45) (M25 552)   34  History of Post menopausal syndrome (627 9) (N95 1)   35  History of Pre-operative clearance (V72 84) (Z01 818)   36  History of Screening for breast cancer (V76 10) (Z12 31)   37  History of Screening for osteoporosis (V82 81) (Z13 820)   38  History of Screening for other and unspecified genitourinary condition (V81 6) (Z13 89)   39  History of Skin lesion of face (709 9) (L98 9)   40  History of Special screening for other neurological conditions (V80 09) (Z13 89)   41  History of Subclinical hypothyroidism (244 8) (E03 9)   42  History of Symptoms involving urinary system (788 99) (R39 9)   43  History of Tinnitus, unspecified laterality (388 30) (H93 19)   44  History of Upper respiratory infection (465 9) (J06 9)   45  History of Urge incontinence of urine (788 31) (N39 41)   46  History of Urinary frequency (788 41) (R35 0)    Family History   Mother    1  Family history of    2  Denied: Family history of substance abuse   3  Family history of Leukemia   4  Denied: Family history of Mental health problem  Father    5  Denied: Family history of substance abuse   6  Denied: Family history of Mental health problem  Family History    7  Family history of Eczema    Social History    · Being A Social Drinker   · Denied: History of Drug Use   · Former smoker (D14 10) (P20 198)  The social history was reviewed and updated today  Surgical History   1  History of Knee Replacement    Current Meds    1  Amitriptyline HCl - 10 MG Oral Tablet; Therapy: 28IDF5509 to (Evaluate:39Dbk3034) Recorded   2  Aspirin Adult Low Dose 81 MG Oral Tablet Delayed Release; TAKE 1 TABLET DAILY; Therapy: 81DKW5051 to Recorded   3  Citalopram Hydrobromide 40 MG Oral Tablet; Take 1 tablet daily;      Therapy: 33HAG3653 to (Evaluate:11Mnk0622)  Requested for: 95ECD9805; Last     Rx:02Rso9659 Ordered   4  D-400 400 UNIT Oral Tablet; take 2 tablet daily; Therapy: 23UKQ6705 to Recorded   5  Furosemide 40 MG Oral Tablet; TAKE ONE TABLET BY MOUTH EVERY DAY AS     DIRECTED; Therapy: 96YRZ8743 to (Vikki Velazquez)  Requested for: 34ZSE5759; Last     Rx:26Nov2017; Status: ACTIVE - Transmit to Pharmacy - Awaiting Verification Ordered   6  Gabapentin 300 MG Oral Capsule; One tab tid Recorded   7  Januvia 25 MG Oral Tablet; TAKE 1 TABLET DAILY; Therapy: 34MPN5742 to (Evaluate:09Jun2018)  Requested for: 07RHV0422; Last     Rx:73Uon3503; Status: ACTIVE - Transmit to Pharmacy - Awaiting Verification Ordered   8  K-Tab 20 MEQ Oral Tablet Extended Release; Take 1 tablet daily; Therapy: 28WBL2112 to (Last Rx:90Gjq8991)  Requested for: 31Oct2017; Status: ACTIVE     - Transmit to [facility] Verification Ordered   9  Levothyroxine Sodium 50 MCG Oral Tablet; Take 1 tablet daily; Therapy: 49BGJ8113 to (Evaluate:10Jan2018)  Requested for: 38DVA1280; Last     Rx:12Oct2017 Ordered   10  MetOLazone 2 5 MG Oral Tablet; TAKE 1 TABLET DAILY ON MONDAY, WEDNESDAY,      AND FRIDAY; Therapy: 85Oar6468 to (Evaluate:43Rai6304)  Requested for: 79YZV3199; Last      Rx:43Lqn6088; Status: ACTIVE - Transmit to [facility] Verification      Ordered   11  Metoprolol Tartrate 25 MG Oral Tablet; take one tablet by mouth every day; Therapy: 05LXW9107 to (Celestina Ortega)  Requested for: 27Sbu9810; Last      Rx:83Uln4862 Ordered   12  PredniSONE 5 MG Oral Tablet; take 0 5 tablet daily; Therapy: 56WBH4404 to Recorded   13  Simvastatin 10 MG Oral Tablet; take one tablet by mouth every day; Therapy: 21WBE9064 to (Evaluate:27Mar2018)  Requested for: 68PGV6430; Last      Rx:27Nov2017; Status: ACTIVE - Transmit to [facility] Verification Ordered   14   TraMADol HCl - 50 MG Oral Tablet; take 1 tablet by mouth every 8 hours if needed; Therapy: 93HAN6489 to (Evaluate:76Vzu5657) Recorded   15  Uloric 80 MG Oral Tablet; TAKE 1 TABLET DAILY; Therapy: 63AZH7455 to Recorded     The medication list was reviewed and updated today  Allergies   1  ACE Inhibitors   2  Angiotensin Receptor Blockers   3  Compazine TABS  Denied    4  PredniSONE TABS    Vitals    Recorded: 62Ksh9836 10:11AM   Temperature 99 6 F, Tympanic   Heart Rate 110   Respiration Quality Normal   Respiration 22   Systolic 205, LUE, Sitting   Diastolic 86, LUE, Sitting   Height 5 ft 5 in   Weight 256 lb    BMI Calculated 42 6   BSA Calculated 2 2   O2 Saturation 96, RA   Pain Scale 8     Physical Exam        Constitutional      General appearance: Abnormal   acutely ill,-- uncomfortable,-- obese,-- appears tired-- and-- appearance reflects stated age  Ears, Nose, Mouth, and Throat      External inspection of ears and nose: Normal        Otoscopic examination: Tympanic membranes translucent with normal light reflex  Canals patent without erythema  Nasal mucosa, septum, and turbinates: Abnormal   normal nasal turbinates  There was clear rhinorrhea from both nares  The bilateral nasal mucosa was boggy, but-- not edematous-- and-- not red  Oropharynx: Abnormal  -- PND  Pulmonary      Respiratory effort: Abnormal  -- excessive loose coughing  Auscultation of lungs: Abnormal  -- moderate decreased BS throughout, coarse  deep inspiration causing cough response  Cardiovascular      Auscultation of heart: Abnormal   The heart rate was tachycardic  Lymphatic      Palpation of lymph nodes in neck: Abnormal   bilateral submandibular node enlargement  Psychiatric      Orientation to person, place, and time: Normal        Mood and affect: Normal        Additional Exam:  vitals reviewed - elevated temp 65 9  systolic BP elevated  oxygen 96% RA           Results/Data   MINI NEBULIZER- POC 85PWZ1966 11:19AM Eulalia Pena [facility]      Test Name Result Flag Reference   MiniNebulizer 72TQW1287        Rapid Flu A and B- POC 99QUP2289 10:39AM She Skiff      Test Name Result Flag Reference   Rapid Flu A Positive     Rapid Flu B Negative          Procedure           Treatment #1 included Albuterol Sulfate 2 5 mg  After treatment #1, the patient noted symptomatic improvement  Air exchange was improved  Wheezes were heard diffusely                          Future Appointments      Date/Time Provider Specialty Site   04/18/2018 11:00 AM Marcos Campos DO Family Medicine [address]    Electronically signed by : Ramila Hurley [facility]; Dec 29 2017 11:03AM EST                       (Author)     Electronically signed by : Collette Lin, DO; Jan 2 2018  8:52PM EST                       (Co-author)

## 2018-01-07 ENCOUNTER — ALLSCRIPTS OFFICE VISIT (OUTPATIENT)
Dept: OTHER | Facility: OTHER | Age: 73
End: 2018-01-07

## 2018-01-07 LAB
BILIRUB UR QL STRIP: NEGATIVE
CLARITY UR: NORMAL
COLOR UR: YELLOW
GLUCOSE (HISTORICAL): NEGATIVE
HGB UR QL STRIP.AUTO: NORMAL
KETONES UR STRIP-MCNC: NEGATIVE MG/DL
LEUKOCYTE ESTERASE UR QL STRIP: NORMAL
NITRITE UR QL STRIP: NEGATIVE
PH UR STRIP.AUTO: 7 [PH]
PROT UR STRIP-MCNC: NEGATIVE MG/DL
SP GR UR STRIP.AUTO: 1.01
UROBILINOGEN UR QL STRIP.AUTO: 0.2

## 2018-01-08 NOTE — PROGRESS NOTES
Assessment   1  Urinary frequency (788 41) (R35 0)   2  Acute upper respiratory infection (465 9) (J06 9)    Plan   Urinary frequency    · LevoFLOXacin 500 MG Oral Tablet; Take 1 tablet daily   · (Q) CULTURE, URINE, SPECIAL; Status:Active; Requested TTY:12JBY3421;    · Urine Dip Automated- POC; Status:Complete;   Done: 84EIK9285 10:43AM    Chief Complaint   Patient presents with c/o urinary frequency and burning during urination that began this morning  History of Present Illness   HPI: Patient complains of urinary frequency and dysuria with suprapubic tenderness over the last 2-3 days getting worse  She also has persistent upper respiratory symptoms with productive cough  Was treated last week for presumptive influenza with Tamiflu but has finished that course of treatment  Review of Systems        Constitutional: feeling poorly-- and-- feeling tired  ENT: as noted in HPI  Cardiovascular: no complaints of slow or fast heart rate, no chest pain, no palpitations, no leg claudication or lower extremity edema  Respiratory: as noted in HPI  Genitourinary: as noted in HPI  Active Problems   1  Anemia (285 9) (D64 9)   2  Chronic gout (274 02) (M1A 9XX0)   3  Chronic kidney disease, stage 3 (585 3) (N18 3)   4  Cough (786 2) (R05)   5  Depression (311) (F32 9)   6  Edema (782 3) (R60 9)   7  Fever (780 60) (R50 9)   8  Fibromyalgia (729 1) (M79 7)   9  Homocysteinemia (270 4) (E72 11)   10  Hyperlipidemia (272 4) (E78 5)   11  Hypertension (401 9) (I10)   12  Hypokalemia (276 8) (E87 6)   13  Hypothyroidism (244 9) (E03 9)   14  Influenza (487 1) (J11 1)   15  Lumbago (724 2) (M54 5)   16  Uncontrolled diabetes with stage 3 chronic kidney disease GFR 30-59 (250 42,585 3)      (E11 22,E11 65,N18 3)   17  Vitamin D deficiency (268 9) (E55 9)    Past Medical History   1  History of Abnormal blood chemistry (790 6) (R79 9)   2   History of Acute bronchitis, unspecified organism (466 0) (J20 9)   3  History of Acute low back pain (724 2) (M54 5)   4  History of Acute pharyngitis (462) (J02 9)   5  History of Asthma (493 90) (J45 909)   6  History of Depression screening (V79 0) (Z13 89)   7  History of Diabetes mellitus with kidney disease (250 40,583 81) (E11 21)   8  History of Dizziness (780 4) (R42)   9  History of Encounter for screening colonoscopy (V76 51) (Z12 11)   10  History of Encounter for screening mammogram for malignant neoplasm of breast      (V76 12) (Z12 31)   11  History of Flu vaccine need (V04 81) (Z23)   12  History of acute sinusitis (V12 69) (Z87 09)   13  History of acute sinusitis (V12 69) (Z87 09)   14  History of angina pectoris (V12 59) (Z86 79)   15  History of angioedema (V13 89) (Z87 898)   16  History of arthritis (V13 4) (Z87 39)   17  History of athlete's foot (V12 09) (Z86 19)   18  History of cataract (V12 49) (Z86 69)   19  History of eczema (V13 3) (Z87 2)   20  History of gait disorder (V13 89) (Z87 898)   21  History of idiopathic thrombocytopenic purpura (V12 3) (Z86 2)   22  History of restless legs syndrome (V12 49) (Z86 69)   23  History of screening mammography (V15 89) (Z92 89)   24  Denied: History of substance abuse   25  History of type 2 diabetes mellitus (V12 29) (Z86 39)   26  History of Lower back pain (724 2) (M54 5)   27  History of Nausea (787 02) (R11 0)   28  History of Neck pain (723 1) (M54 2)   29  History of Neck Strain (847 0)   30  History of Need for prophylactic vaccination and inoculation against influenza (V04 81)      (Z23)   31  History of Need for vaccination (V05 9) (Z23)   32  History of Need for vaccination with 13-polyvalent pneumococcal conjugate vaccine      (V03 82) (Z23)   33  History of Otalgia, unspecified laterality (388 70) (H92 09)   34  History of Overactive bladder (596 51) (N32 81)   35  History of Pain of left hip (719 45) (M25 552)   36  History of Post menopausal syndrome (627 9) (N95 1)   37   History of Pre-operative clearance (V72 84) (Z01 818)   38  History of Screening for breast cancer (V76 10) (Z12 31)   39  History of Screening for osteoporosis (V82 81) (Z13 820)   40  History of Screening for other and unspecified genitourinary condition (V81 6) (Z13 89)   41  History of Skin lesion of face (709 9) (L98 9)   42  History of Special screening for other neurological conditions (V80 09) (Z13 89)   43  History of Subclinical hypothyroidism (244 8) (E03 9)   44  History of Symptoms involving urinary system (788 99) (R39 9)   45  History of Tinnitus, unspecified laterality (388 30) (H93 19)   46  History of Upper respiratory infection (465 9) (J06 9)   47  History of Urge incontinence of urine (788 31) (N39 41)   48  History of Urinary frequency (788 41) (R35 0)    Family History   Mother    1  Family history of    2  Denied: Family history of substance abuse   3  Family history of Leukemia   4  Denied: Family history of Mental health problem  Father    5  Denied: Family history of substance abuse   6  Denied: Family history of Mental health problem  Family History    7  Family history of Eczema    Social History    · Being A Social Drinker   · Denied: History of Drug Use   · Former smoker (M43 03) (P32 973)    Surgical History   1  History of Knee Replacement    Current Meds    1  Amitriptyline HCl - 10 MG Oral Tablet; Therapy: 28IAL0612 to (Evaluate:06Tgv1303) Recorded   2  Aspirin Adult Low Dose 81 MG Oral Tablet Delayed Release; TAKE 1 TABLET DAILY; Therapy: 93KQT3547 to Recorded   3  Benzonatate 200 MG Oral Capsule; TAKE 1 CAPSULE 3 TIMES DAILY AS NEEDED; Therapy: 53NIL0301 to (Evaluate:88Xaa8325)  Requested for: 87Kew0899; Last     Rx:31Lyt2346 Ordered   4  Citalopram Hydrobromide 40 MG Oral Tablet; Take 1 tablet daily; Therapy: 15OAV3403 to (Evaluate:57Vcw7744)  Requested for: 10IED7748; Last     Rx:37Reh7801 Ordered   5  D-400 400 UNIT Oral Tablet; take 2 tablet daily;      Therapy: 75WSE6812 to Recorded   6  Furosemide 40 MG Oral Tablet; TAKE ONE TABLET BY MOUTH EVERY DAY AS     DIRECTED; Therapy: 71YDO2911 to (Rito Sienna)  Requested for: 44NMO7605; Last     Rx:26Nov2017; Status: ACTIVE - Transmit to [facility] Verification Ordered   7  Gabapentin 300 MG Oral Capsule; One tab tid Recorded   8  Januvia 25 MG Oral Tablet; TAKE 1 TABLET DAILY; Therapy: 26XDF4142 to (Evaluate:09Jun2018)  Requested for: 50OKM1287; Last     Rx:74Jpx1646; Status: ACTIVE - Transmit to Pharmacy - Awaiting Verification Ordered   9  K-Tab 20 MEQ Oral Tablet Extended Release; Take 1 tablet daily; Therapy: 86FJY7713 to (Last Rx:31Oct2017)  Requested for: 31Oct2017; Status: ACTIVE     - Transmit to [facility] Verification Ordered   10  Levothyroxine Sodium 50 MCG Oral Tablet; Take 1 tablet daily; Therapy: 58GET0212 to (Evaluate:10Jan2018)  Requested for: 91WSC4445; Last      Rx:12Oct2017 Ordered   11  MetOLazone 2 5 MG Oral Tablet; TAKE 1 TABLET DAILY ON MONDAY, WEDNESDAY,      AND FRIDAY; Therapy: 63Ess7182 to (Evaluate:84Sfn5805)  Requested for: 53JAZ4040; Last      Rx:73Act1757; Status: ACTIVE - Transmit to [facility] Verification      Ordered   12  Metoprolol Tartrate 25 MG Oral Tablet; take one tablet by mouth every day; Therapy: 14LLP4403 to ([facility])  Requested for: 79Nsb7513; Last      Rx:21Ext4781 Ordered   13  PredniSONE 5 MG Oral Tablet; take 0 5 tablet daily; Therapy: 84UDP1134 to Recorded   14  Simvastatin 10 MG Oral Tablet; take one tablet by mouth every day; Therapy: 32BCQ9607 to (Evaluate:27Mar2018)  Requested for: 07ZKV5744; Last      Rx:27Nov2017; Status: ACTIVE - Transmit to [facility] Verification Ordered   15  TraMADol HCl - 50 MG Oral Tablet; take 1 tablet by mouth every 8 hours if needed; Therapy: 24XBF6289 to (Evaluate:18Sep2017) Recorded   16  Uloric 80 MG Oral Tablet; TAKE 1 TABLET DAILY;       Therapy: 37ZOV2714 to Recorded    Allergies   1  ACE Inhibitors   2  Angiotensin Receptor Blockers   3  Compazine TABS  Denied    4  PredniSONE TABS    Vitals    Recorded: 65XNK1104 10:37AM   Temperature 98 3 F, Tympanic   Heart Rate 113   Pulse Quality Normal   Systolic 841, LUE, Sitting   Diastolic 88, LUE, Sitting   BP CUFF SIZE Large   Height 5 ft 5 in   Weight 246 lb 8 oz   BMI Calculated 41 02   BSA Calculated 2 16   O2 Saturation 93, RA     Physical Exam        Ears, Nose, Mouth, and Throat      External inspection of ears and nose: Normal        Otoscopic examination: Tympanic membranes translucent with normal light reflex  Canals patent without erythema  Nasal mucosa, septum, and turbinates: Normal without edema or erythema  Oropharynx: Abnormal  -- Injected pharynx with postnasal drainage  Pulmonary      Respiratory effort: No increased work of breathing or signs of respiratory distress  Auscultation of lungs: Abnormal  -- Coarse breath sounds scattered rhonchi           Results/Data   Urine Dip Automated- POC 55KYR9569 10:43AM Diana Runner      Test Name Result Flag Reference   Color Yellow     Clarity Hazy     Leukocytes 1+     Nitrite NEGATIVE     Blood 2+     Bilirubin NEGATIVE     Urobilinogen 0 2     Protein NEGATIVE     Ph 7 0     Specific Gravity 1 015     Ketone NEGATIVE     Glucose NEGATIVE          Future Appointments      Date/Time Provider Specialty Site   04/18/2018 11:00 AM Jannie Velazquez DO Family Medicine [address]    Electronically signed by : Veronica Smith DO; Jan 7 2018 11:11AM EST                       (Author)

## 2018-01-10 ENCOUNTER — GENERIC CONVERSION - ENCOUNTER (OUTPATIENT)
Dept: OTHER | Facility: OTHER | Age: 73
End: 2018-01-10

## 2018-01-10 NOTE — PROGRESS NOTES
Assessment    1  Hyperlipidemia (272 4) (E78 5)   2  Uncontrolled diabetes with stage 3 chronic kidney disease GFR 30-59 (250 42,585 3)   (E11 22,E11 65,N18 3)   3  Hypertension (401 9) (I10)   4  Fibromyalgia (729 1) (M79 7)   5  Chronic kidney disease, stage 3 (585 3) (N18 3)   6  Depression (311) (F32 9)   7  Encounter for preventive health examination (V70 0) (Z00 00)   8  Need for vaccination (V05 9) (Z23)   9  Subclinical hypothyroidism (244 8) (E03 9)    Plan  Anemia, Chronic gout, Hyperlipidemia, Hypertension, Hypothyroidism, Uncontrolled  diabetes with stage 3 chronic kidney disease GFR  30-59    · (1) CBC/PLT/DIFF; Status:Hold For - Exact Date; Requested for:After 08KWJ7703;    · (1) COMPREHENSIVE METABOLIC PANEL; Status:Hold For - Exact Date; Requested  for:After 00RLV7706;    · (1) HEMOGLOBIN A1C; Status:Hold For - Exact Date; Requested for:After 27ZVZ4602;    · (1) LIPID PANEL FASTING W DIRECT LDL REFLEX; Status:Hold For - Exact Date; Requested for:After 03TWD5450;    · (1) MICROALBUMIN CREATININE RATIO, RANDOM URINE; Status:Hold For - Exact  Date; Requested for:After 78LHI4451;    · (1) TSH WITH FT4 REFLEX; Status:Hold For - Exact Date; Requested for:After  21ZEO5777;    · (1) URIC ACID; Status:Hold For - Exact Date; Requested for:After 08RYN6998;   Depression    · Citalopram Hydrobromide 40 MG Oral Tablet; Take 1 tablet daily  Health Maintenance    · * DXA BONE DENSITY SPINE HIP AND PELVIS; Status:Hold For - Scheduling;  Requested for:12Oct2017;   Need for vaccination    · Fluzone High-Dose 0 5 ML Intramuscular Suspension Prefilled Syringe;  INJECT 0 5  ML Intramuscular; To Be Done: 40JRS3074  PMH: Colon cancer screening    · COLONOSCOPY; Status:Temporary Deferral - Pt requests deferral;    10/12/2018  Subclinical hypothyroidism    · Levothyroxine Sodium 50 MCG Oral Tablet; Take 1 tablet daily   · (1) TSH WITH FT4 REFLEX; Status:Hold For - Exact Date;  Requested for:After  [id number]; Discussion/Summary    74year-old Medicare wellness visit, physical examination, med check today  Patient has been complaining of worsening depression recently (this was addressed during her office visit, see note)  Fall risk in urinary incontinence screens were negative  Patient does not have a living will or healthcare power of   I gave her paperwork so she can obtain these documents as well as a freezer packet today  Flu shot given today  Patient is current with pneumonia vaccine series  Patient declines colonoscopy and mammography at this time  Rx given for DEXA scan  Impression: Initial Annual Wellness Visit  Immunizations: influenza vaccine is up to date this year, the lifetime pneumococcal vaccine has been completed, the risks and benefits of the Zostavax vaccine were discussed with the patient and the risks and benefits of the Td vaccine were discussed with the patient  Advance Directive Planning: complete and up to date  Advice and education were given regarding nutrition (non-diabetic)  History of Present Illness  HPI: Patient presents for 74year-old physical examination, Medicare wellness visit, med check  Patient complains of recent exacerbation of depression, otherwise she is doing well  She is compliant with all prescribed medications without side effects  Welcome to [employer] and Wellness Visits: The patient is being seen for the subsequent annual wellness visit  Medicare Screening and Risk Factors   Hospitalizations: no previous hospitalizations  Medicare Screening Tests Risk Questions   Abdominal aortic aneurysm risk assessment: none indicated  Osteoporosis risk assessment: , female gender and over 48years of age  HIV risk assessment: none indicated  Drug and Alcohol Use: The patient is a former cigarette smoker  The patient reports occasional alcohol use  Alcohol concern:   The patient has no concerns about alcohol abuse   She has never used illicit drugs    Diet and Physical Activity: Current diet includes well balanced meals  She exercises infrequently  Exercise: walking  Mood Disorder and Cognitive Impairment Screening: PHQ-9 Depression Scale   Over the past 2 weeks, how often have you been bothered by the following problems? 1 ) Little interest or pleasure in doing things? Several days  2 ) Feeling down, depressed or hopeless? Several days  3 ) Trouble falling asleep or sleeping too much? Not at all    4 ) Feeling tired or having little energy? Several days  5 ) Poor appetite or overeating? Not at all    6 ) Feeling bad about yourself, or that you are a failure, or have let yourself or your family down? Several days  7 ) Trouble concentrating on things, such as reading a newspaper or watching television? Several days  8 ) Moving or speaking so slowly that other people could have noticed, or the opposite, moving or speaking faster than usual? Not at all    9 ) Thoughts that you would be off dead or of hurting yourself in some way? Not at all  TOTAL SCORE: 5, severity of depression is mild  Advance Directives: Advance directives: living will and durable power of  for health care directives  end of life decisions were reviewed with the patient  Co-Managers and Medical Equipment/Suppliers: See Patient Care Team      Patient Care Team    Care Team Member Role Specialty Office Number   Smitha DAVIS MD  Rheumatology [phone]   Sebastian Ennis MD  Cardiology [phone]   [address] [place] [address]  Hematology Oncology [phone]     Review of Systems    Constitutional: negative  ENT: negative  Cardiovascular: negative  Respiratory: negative  Gastrointestinal: negative  Genitourinary: negative  Active Problems    1  Anemia (285 9) (D64 9)   2  Chronic gout (274 02) (M1A 9XX0)   3  Chronic kidney disease, stage 3 (585 3) (N18 3)   4  Depression (311) (F32 9)   5  Edema (782 3) (R60 9)   6  Fibromyalgia (729 1) (M79 7)   7  Homocysteinemia (270 4) (E72 11)   8  Hyperlipidemia (272 4) (E78 5)   9  Hypertension (401 9) (I10)   10  Hypokalemia (276 8) (E87 6)   11  Hypothyroidism (244 9) (E03 9)   12  Lumbago (724 2) (M54 5)   13  Pain of left hip (719 45) (M25 552)   14  Uncontrolled diabetes with stage 3 chronic kidney disease GFR 30-59 (250 42,585 3)    (E11 22,E11 65,N18 3)   15   Vitamin D deficiency (268 9) (E55 9)    Past Medical History    · History of Abnormal blood chemistry (790 6) (R79 9)   · History of Acute bronchitis, unspecified organism (466 0) (J20 9)   · History of Acute low back pain (724 2) (M54 5)   · History of Acute pharyngitis (462) (J02 9)   · History of Asthma (493 90) (J45 909)   · History of Diabetes mellitus with kidney disease (250 40,583 81) (E11 21)   · History of Dizziness (780 4) (R42)   · History of Encounter for screening colonoscopy (V76 51) (Z12 11)   · History of Encounter for screening mammogram for malignant neoplasm of breast  (V76 12) (Z12 31)   · History of Encounter for screening mammogram for malignant neoplasm of breast  (V76 12) (Z12 31)   · History of Flu vaccine need (V04 81) (Z23)   · History of acute sinusitis (V12 69) (Z87 09)   · History of acute sinusitis (V12 69) (Z87 09)   · History of angina pectoris (V12 59) (Z86 79)   · History of angioedema (V13 89) (E43 692)   · History of arthritis (V13 4) (Z87 39)   · History of athlete's foot (V12 09) (Z86 19)   · History of cataract (V12 49) (Z86 69)   · History of eczema (V13 3) (Z87 2)   · History of gait disorder (V13 89) (N51 635)   · History of idiopathic thrombocytopenic purpura (V12 3) (Z86 2)   · History of restless legs syndrome (V12 49) (Z86 69)   · Denied: History of substance abuse   · History of type 2 diabetes mellitus (V12 29) (Z86 39)   · History of Lower back pain (724 2) (M54 5)   · History of Nausea (787 02) (R11 0)   · History of Neck pain (723 1) (M54 2)   · History of Neck Strain (847 0)   · History of Need for prophylactic vaccination and inoculation against influenza (V04 81)  (Z23)   · History of Need for vaccination with 13-polyvalent pneumococcal conjugate vaccine  (V03 82) (Z23)   · History of Otalgia, unspecified laterality (388 70) (H92 09)   · History of Overactive bladder (596 51) (N32 81)   · History of Post menopausal syndrome (627 9) (N95 1)   · History of Pre-operative clearance (V72 84) (Z01 818)   · History of Screening for breast cancer (V76 10) (Z12 31)   · History of Screening for osteoporosis (V82 81) (P20 716)   · History of Skin lesion of face (709 9) (L98 9)   · History of Symptoms involving urinary system (788 99) (R39 9)   · History of Tinnitus, unspecified laterality (388 30) (H93 19)   · History of Upper respiratory infection (465 9) (J06 9)   · History of Urge incontinence of urine (788 31) (N39 41)   · History of Urinary frequency (788 41) (R35 0)    The active problems and past medical history were reviewed and updated today  Surgical History    · History of Knee Replacement    Family History  Mother    · Family history of    · Denied: Family history of substance abuse   · Family history of Leukemia   · Denied: Family history of Mental health problem  Father    · Denied: Family history of substance abuse   · Denied: Family history of Mental health problem  Family History    · Family history of Eczema    Social History    · Being A Social Drinker   · Denied: History of Drug Use   · Former smoker (U71 12) (F34 718)  The social history was reviewed and updated today  The social history was reviewed and is unchanged  Current Meds   1  Amitriptyline HCl - 10 MG Oral Tablet; Therapy: 51EFF5264 to (Evaluate:94Qfy5033) Recorded   2  Aspirin Adult Low Dose 81 MG Oral Tablet Delayed Release; TAKE 1 TABLET DAILY; Therapy: 61MPG9296 to Recorded   3  Cetirizine HCl - 10 MG Oral Tablet; TAKE 1 TABLET DAILY; Therapy: 81UEB7521 to Recorded   4   Citalopram Hydrobromide 20 MG Oral Tablet; take one tablet by mouth every day; Therapy: 85HFD8950 to (03 17 74 30 53)  Requested for: 56XPT5965; Last   Rx:10Tbk5335; Status: ACTIVE - Transmit to Pharmacy - Awaiting Verification Ordered   5  D-400 400 UNIT Oral Tablet; take 2 tablet daily; Therapy: 20IVD2287 to Recorded   6  Furosemide 40 MG Oral Tablet; TAKE ONE TABLET BY MOUTH EVERY DAY AS   DIRECTED; Therapy: 19PSH4884 to (Jan Bowman)  Requested for: 14NGL0756; Last   Rx:31Usv6420 Ordered   7  Gabapentin 300 MG Oral Capsule; One tab tid Recorded   8  MetOLazone 2 5 MG Oral Tablet; TAKE 1 TABLET DAILY ON MONDAY, WEDNESDAY,   AND FRIDAY; Therapy: 91Dtd8893 to (Evaluate:27Xwq6343)  Requested for: 13YNA5167; Last   Rx:10Rwg2841; Status: ACTIVE - Transmit to [facility] Verification   Ordered   9  Metoprolol Tartrate 25 MG Oral Tablet; take one tablet by mouth every day; Therapy: 68GAU4601 to (Rosalba Lemme)  Requested for: 92Cbt3691; Last   Rx:28Etp6111 Ordered   10  PredniSONE 5 MG Oral Tablet; Take 1 tablet daily; Therapy: 40UMJ1763 to Recorded   11  Simvastatin 10 MG Oral Tablet; take one tablet by mouth every day; Therapy: 93CXE7807 to (Evaluate:10Oct2017)  Requested for: 12Jun2017; Last    Rx:13Tsp7762 Ordered   12  TraMADol HCl - 50 MG Oral Tablet; take 1 tablet by mouth every 8 hours if needed; Therapy: 57XRC0149 to (Evaluate:33Mqf3724) Recorded   13  Uloric 80 MG Oral Tablet; TAKE 1 TABLET DAILY; Therapy: 19ZUH5573 to Recorded    The medication list was reviewed and updated today  Allergies    1  ACE Inhibitors   2  Angiotensin Receptor Blockers   3  Compazine TABS  Denied    4  PredniSONE TABS    Immunizations   ** Printed in Appendix #1 below       Vitals  Signs    Temperature: 97 7 F, Tympanic  Heart Rate: 78  Pulse Quality: Normal  Respiration Quality: Normal  Respiration: 18  Systolic: 736, LUE, Sitting  Diastolic: 76, LUE, Sitting  BP Cuff Size: Large  Height: 5 ft 6 in  Weight: 252 lb 3 oz  BMI Calculated: 40 7  BSA Calculated: 2 21  O2 Saturation: 97  LMP: postmenopausal    Physical Exam    Constitutional   General appearance: No acute distress, well appearing and well nourished  Pulmonary   Respiratory effort: No increased work of breathing or signs of respiratory distress  Auscultation of lungs: Clear to auscultation  Cardiovascular   Palpation of heart: Normal PMI, no thrills  Auscultation of heart: Normal rate and rhythm, normal S1 and S2, without murmurs  Examination of extremities for edema and/or varicosities: Normal     Lymphatic   Palpation of lymph nodes in neck: No lymphadenopathy  Psychiatric   Orientation to person, place, and time: Normal     Mood and affect: Normal        Health Management  History of Diabetes mellitus with kidney disease   (1) HEMOGLOBIN A1C; every 3 months; Last 00TBB0104; Next Due: 25ANU9110; Active  (every) MICROALBUMIN, RANDOM URINE (W/CREATININE); every 1 year; Next Due:  17NPK3707; Overdue  History of Encounter for screening colonoscopy   COLONOSCOPY; every 10 years; Next Due: 76NCU6066; Overdue  History of Screening for breast cancer   * MAMMO SCREENING BILATERAL W CAD; every 1 year; Next Due: 83MWR6013; Overdue  Uncontrolled diabetes with stage 3 chronic kidney disease GFR 30-59   (1) HEMOGLOBIN A1C; every 3 months; Last 76HQM4625; Next Due: 75KKF4212; Active  (every) MICROALBUMIN, RANDOM URINE (W/CREATININE); every 1 year; Next Due:  95DTW9882; Overdue  Health Maintenance   (1) HEMOGLOBIN A1C; every 3 months; Last 37TZR9613; Next Due: 97FJN7591; Active  (every) MICROALBUMIN, RANDOM URINE (W/CREATININE); every 1 year; Next Due:  21DWV4437; Overdue  * MAMMO SCREENING BILATERAL W CAD; every 1 year; Next Due: 57UII9837; Overdue  COLONOSCOPY; every 10 years; Next Due: 46PFF9310; Overdue  Medicare Annual Wellness Visit; every 1 year; Next Due: 92Ktq8547; Overdue    Signatures   Electronically signed by :  Radha Lua DO; Oct 12 2017 11:19AM EST                       (Author)    Appendix #1     Braden Muñiz; : 1945; MRN: [id number]      1 2 3 4 5 6    Influenza  09-Sep-2011 15-Oct-2012 25-Sep-2013 2014 18-Nov-2015 25-Nov-2016    PCV  06-Aug-2015         PPSV  15-Oct-2008

## 2018-01-12 VITALS
SYSTOLIC BLOOD PRESSURE: 114 MMHG | WEIGHT: 252.56 LBS | DIASTOLIC BLOOD PRESSURE: 74 MMHG | OXYGEN SATURATION: 96 % | HEART RATE: 71 BPM | HEIGHT: 66 IN | BODY MASS INDEX: 40.59 KG/M2 | RESPIRATION RATE: 16 BRPM | TEMPERATURE: 98.9 F

## 2018-01-12 NOTE — PROGRESS NOTES
Assessment    1  Acute bronchitis, unspecified organism (466 0) (J20 9)   2  Acute sinusitis, recurrence not specified, unspecified location (461 9) (J01 90)    Plan  Acute bronchitis, unspecified organism, Acute sinusitis, recurrence not specified,  unspecified location    · Cefuroxime Axetil 500 MG Oral Tablet   · Benzonatate 200 MG Oral Capsule; TAKE 1 CAPSULE 3 TIMES DAILY AS NEEDED   · Sulfamethoxazole-Trimethoprim 800-160 MG Oral Tablet; TAKE 1 TABLET TWICE  DAILY UNTIL FINISHED with food   · Symbicort 160-4 5 MCG/ACT Inhalation Aerosol; INHALE 2 PUFFS TWICE DAILY  RINSE MOUTH AFTER USE    Discussion/Summary    Discussed condition with pt  She appears to have a persistent bronchitis and sinusitis  I will D/C Ceftin and switch her to Bactrim DS and add Symbicort as well as Doyce Mae and she may continue with OTC meds  Fever Care, ER instructions given  F/U if sx's continue to persist    Possible side effects of new medications were reviewed with the patient/guardian today  The treatment plan was reviewed with the patient/guardian  The patient/guardian understands and agrees with the treatment plan      Chief Complaint  Patient here for a f/u on her acute bronchitis (seen on 1/21/16) and given abx; c/o still having prod  cough (yellow), wheezing, SOB and intermittent dizziness  History of Present Illness  Pt  presents for F/U from 1/21/16  She is still taking Ceftin but is still having productive cough, SOB, wheezing, and lightheadedness  She is currently taking Delsym for the cough  Denies fever  She reports her nasal congestion is getting better  Review of Systems    Constitutional: No fever, no chills, feels well, no tiredness, no recent weight gain or weight loss  ENT: as noted in HPI  Cardiovascular: No complaints of slow heart rate, no fast heart rate, no chest pain, no palpitations, no leg claudication, no lower extremity edema  Respiratory: as noted in HPI     Gastrointestinal: No complaints of abdominal pain, no constipation, no nausea or vomiting, no diarrhea, no bloody stools  Genitourinary: No complaints of dysuria, no incontinence, no pelvic pain, no dysmenorrhea, no vaginal discharge or bleeding  Active Problems    1  Abnormal gait (781 2) (R26 9)   2  Acute bronchitis, unspecified organism (466 0) (J20 9)   3  Acute sinusitis, recurrence not specified, unspecified location (461 9) (J01 90)   4  Anemia (285 9) (D64 9)   5  Angina pectoris (413 9) (I20 9)   6  Angioedema (995 1) (T78 3XXA)   7  Arthritis (716 90) (M19 90)   8  Cataract (366 9) (H26 9)   9  Chronic gout (274 02) (M1A 9XX0)   10  Depression (311) (F32 9)   11  Diabetes mellitus with kidney disease (250 40,583 81) (E11 21)   12  Edema (782 3) (R60 9)   13  Homocysteinemia (270 4) (E72 11)   14  Hyperlipidemia (272 4) (E78 5)   15  Hypertension (401 9) (I10)   16  Hypokalemia (276 8) (E87 6)   17  Hypothyroidism (244 9) (E03 9)   18  Idiopathic thrombocytopenic purpura (287 31) (D69 3)   19  History of Need for prophylactic vaccination and inoculation against influenza (V04 81)    (Z23)   20  Restless legs syndrome (333 94) (G25 81)   21  Uncontrolled diabetes with stage 3 chronic kidney disease GFR 30-59 (250 42,585 3)    (E11 22,E11 65,N18 3)   22  Vitamin D deficiency (268 9) (E55 9)    Past Medical History    1  History of Abnormal blood chemistry (790 6) (R79 9)   2  History of Acute pharyngitis (462) (J02 9)   3  History of Asthma (493 90) (J45 909)   4  History of Dizziness (780 4) (R42)   5  History of acute sinusitis (V12 69) (Z87 09)   6  History of eczema (V13 3) (Z87 2)   7  History of type 2 diabetes mellitus (V12 29) (Z86 39)   8  History of Lower back pain (724 2) (M54 5)   9  History of Nausea (787 02) (R11 0)   10  History of Neck pain (723 1) (M54 2)   11  History of Neck Strain (847 0)   12  History of Need for prophylactic vaccination and inoculation against influenza (V04 81)    (Z23)   13  History of Overactive bladder (596 51) (N32 81)   14  History of Tinnitus, unspecified laterality (388 30) (H93 19)   15  History of Upper respiratory infection (465 9) (J06 9)   16  History of Urge incontinence of urine (788 31) (N39 41)   17  History of Urinary frequency (788 41) (R35 0)    Surgical History    1  History of Knee Replacement    Family History    1  Family history of    2  Family history of Leukemia    3  Family history of Eczema    Social History    · Being A Social Drinker   · Denied: History of Drug Use   · Former smoker (L34 57) (Z77 037)  The social history was reviewed and is unchanged  Current Meds   1  Amitriptyline HCl - 10 MG Oral Tablet; Therapy: 56RYB0715 to (Evaluate:77Wqy2969) Recorded   2  Cefuroxime Axetil 500 MG Oral Tablet; TAKE 1 TABLET 2 TIMES DAILY AFTER MEALS; Therapy: 2016 to (Complete:2016); Last Rx:2016 Ordered   3  Citalopram Hydrobromide 20 MG Oral Tablet; take one tablet by mouth every day; Therapy: 56DWF7853 to (Evaluate:2016)  Requested for: 11CAQ5383; Last   Rx:2015 Ordered   4  Colcrys 0 6 MG Oral Tablet; Therapy: 27UKV6939 to (Evaluate:2015) Recorded   5  Furosemide 40 MG Oral Tablet; TAKE ONE TABLET BY MOUTH EVERY DAY  AS   DIRECTED; Therapy: 44YFK2430 to (Evaluate:83Rph8185)  Requested for: 14CJY7981; Last   Rx:95Mjp7827 Ordered   6  Gabapentin 300 MG Oral Capsule; One tab tid Recorded   7  Metolazone 2 5 MG Oral Tablet; take 1 tablet daily on monday, wednesday, and friday; Therapy: 58QDN1385 to (Evaluate:2016)  Requested for: 12Yua4220; Last   Rx:63Due6704 Ordered   8  Metoprolol Tartrate 25 MG Oral Tablet; take one tablet by mouth every day; Therapy: 73DWG4127 to (Miranda Ennis)  Requested for: 39Foz4064; Last   Rx:01Wiq3395 Ordered   9  Simvastatin 10 MG Oral Tablet; take one tablet by mouth every day; Therapy: 68XTZ0387 to (Mirandagale Ennis)  Requested for: 26IGE8349;  Last   [id number] [id number] Ordered    The medication list was reviewed and updated today  Allergies    1  ACE Inhibitors   2  Angiotensin Receptor Blockers   3  Compazine TABS   4  PredniSONE TABS    Vitals  Vital Signs [Data Includes: Current Encounter]    Recorded: 03GAD9362 02:09PM   Temperature 98 7 F, Tympanic   Heart Rate 63   Pulse Quality Norm   Respiration 17   Respiration Quality Norm   Systolic 181, RUE, Sitting   Diastolic 88, RUE, Sitting   Height 5 ft 6 in   Weight 254 lb 4 00 oz   BMI Calculated 41 04   BSA Calculated 2 22   O2 Saturation 98     Physical Exam    Constitutional   General appearance: No acute distress, well appearing and well nourished  Ears, Nose, Mouth, and Throat   External inspection of ears and nose: Normal     Otoscopic examination: Tympanic membranes translucent with normal light reflex  Canals patent without erythema  Nasal mucosa, septum, and turbinates: Abnormal   B/L boggy turbinates  Oropharynx: Abnormal   PND; no exudates  Pulmonary   Respiratory effort: No increased work of breathing or signs of respiratory distress  Auscultation of lungs: Abnormal   B/L diffuse coarse decreased breath sounds with trace wheezes  Cardiovascular   Auscultation of heart: Normal rate and rhythm, normal S1 and S2, without murmurs  Lymphatic   Palpation of lymph nodes in neck: No lymphadenopathy  Psychiatric   Orientation to person, place, and time: Normal     Mood and affect: Normal          Health Management  Health Maintenance   Medicare Annual Wellness Visit; every 1 year; Next Due: 73Sdh3681; Overdue    Signatures   Electronically signed by :  Gregory Corado, [facility]; Jan 27 2016  5:23PM EST                       (Author)    Electronically signed by : Melina Morejon DO; Jan 27 2016  6:54PM EST

## 2018-01-13 VITALS
HEART RATE: 78 BPM | WEIGHT: 252.19 LBS | DIASTOLIC BLOOD PRESSURE: 76 MMHG | HEIGHT: 66 IN | SYSTOLIC BLOOD PRESSURE: 118 MMHG | BODY MASS INDEX: 40.53 KG/M2 | TEMPERATURE: 97.7 F | RESPIRATION RATE: 18 BRPM | OXYGEN SATURATION: 97 %

## 2018-01-13 VITALS
OXYGEN SATURATION: 99 % | HEART RATE: 86 BPM | BODY MASS INDEX: 39.7 KG/M2 | SYSTOLIC BLOOD PRESSURE: 118 MMHG | HEIGHT: 66 IN | RESPIRATION RATE: 18 BRPM | TEMPERATURE: 96.9 F | WEIGHT: 247.06 LBS | DIASTOLIC BLOOD PRESSURE: 80 MMHG

## 2018-01-13 VITALS
WEIGHT: 246.44 LBS | OXYGEN SATURATION: 97 % | DIASTOLIC BLOOD PRESSURE: 70 MMHG | TEMPERATURE: 98.3 F | HEIGHT: 66 IN | HEART RATE: 63 BPM | SYSTOLIC BLOOD PRESSURE: 126 MMHG | RESPIRATION RATE: 15 BRPM | BODY MASS INDEX: 39.6 KG/M2

## 2018-01-14 VITALS
WEIGHT: 245.13 LBS | BODY MASS INDEX: 39.4 KG/M2 | TEMPERATURE: 97.7 F | OXYGEN SATURATION: 91 % | HEART RATE: 86 BPM | HEIGHT: 66 IN | RESPIRATION RATE: 16 BRPM | DIASTOLIC BLOOD PRESSURE: 70 MMHG | SYSTOLIC BLOOD PRESSURE: 108 MMHG

## 2018-01-15 NOTE — PROGRESS NOTES
Assessment    1  Acute bronchitis, unspecified organism (466 0) (J20 9)   2  Acute sinusitis, recurrence not specified, unspecified location (461 9) (J01 90)    Plan  Acute bronchitis, unspecified organism, Acute sinusitis, recurrence not specified,  unspecified location    · Cefuroxime Axetil 500 MG Oral Tablet; TAKE 1 TABLET 2 TIMES DAILY AFTER  MEALS    Discussion/Summary    Discussed OTC cold meds (Mucinex, Delsym)  Fever Care, ER instructions given  F/U 5-7 days if not resolved  Possible side effects of new medications were reviewed with the patient/guardian today  The treatment plan was reviewed with the patient/guardian  The patient/guardian understands and agrees with the treatment plan      Chief Complaint    1  Cough   2  Headache  Pt c/o headache and cough yellow mucus noted when coughing for 4 days now  History of Present Illness  HPI: Pt  presents with a 4 day history of chest congestion, productive cough, SOB, head congestion, sinus pain/pressure; denies wheezing, nasal congestion, fever, N/V/D  She has taken Coricedin, Advil, and cough medicine  Denies hx of asthma/allergies  Does not smoke  She has gotten a flu shot  Review of Systems    Constitutional: No fever, no chills, feels well, no tiredness, no recent weight gain or loss  ENT: as noted in HPI  Cardiovascular: no complaints of slow or fast heart rate, no chest pain, no palpitations, no leg claudication or lower extremity edema  Respiratory: as noted in HPI  Gastrointestinal: no complaints of abdominal pain, no constipation, no nausea or diarrhea, no vomiting, no bloody stools  Genitourinary: no complaints of dysuria, no incontinence, no pelvic pain, no dysmenorrhea, no vaginal discharge or abnormal vaginal bleeding  Active Problems    1  Abnormal gait (781 2) (R26 9)   2  Anemia (285 9) (D64 9)   3  Angina pectoris (413 9) (I20 9)   4  Angioedema (995 1) (T78 3XXA)   5  Arthritis (716 90) (M19 90)   6   Cataract (366  9) (H26 9)   7  Chronic gout (274 02) (M1A 9XX0)   8  Depression (311) (F32 9)   9  Diabetes mellitus with kidney disease (250 40,583 81) (E11 21)   10  Edema (782 3) (R60 9)   11  Encounter for screening colonoscopy (V76 51) (Z12 11)   12  Encounter for screening mammogram for malignant neoplasm of breast (V76 12)    (Z12 31)   13  Flu vaccine need (V04 81) (Z23)   14  Homocysteinemia (270 4) (E72 11)   15  Hyperlipidemia (272 4) (E78 5)   16  Hypertension (401 9) (I10)   17  Hypokalemia (276 8) (E87 6)   18  Hypothyroidism (244 9) (E03 9)   19  Idiopathic thrombocytopenic purpura (287 31) (D69 3)   20  History of Need for prophylactic vaccination and inoculation against influenza (V04 81)    (Z23)   21  Need for vaccination with 13-polyvalent pneumococcal conjugate vaccine (V03 82) (Z23)   22  Otalgia, unspecified laterality (388 70) (H92 09)   23  Pre-operative clearance (V72 84) (Z01 818)   24  Restless legs syndrome (333 94) (G25 81)   25  Screening for osteoporosis (V82 81) (Z13 820)   26  Uncontrolled diabetes with stage 3 chronic kidney disease GFR 30-59 (250 42,585 3)    (E11 22,E11 65,N18 3)   27  Vitamin D deficiency (268 9) (E55 9)    Past Medical History    1  History of Abnormal blood chemistry (790 6) (R79 9)   2  History of Acute pharyngitis (462) (J02 9)   3  History of Asthma (493 90) (J45 909)   4  History of Dizziness (780 4) (R42)   5  History of acute sinusitis (V12 69) (Z87 09)   6  History of eczema (V13 3) (Z87 2)   7  History of type 2 diabetes mellitus (V12 29) (Z86 39)   8  History of Lower back pain (724 2) (M54 5)   9  History of Nausea (787 02) (R11 0)   10  History of Neck pain (723 1) (M54 2)   11  History of Neck Strain (847 0)   12  History of Need for prophylactic vaccination and inoculation against influenza (V04 81)    (Z23)   13  History of Overactive bladder (596 51) (N32 81)   14  History of Tinnitus, unspecified laterality (388 30) (H93 19)   15   History of Upper respiratory infection (465 9) (J06 9)   16  History of Urge incontinence of urine (788 31) (N39 41)   17  History of Urinary frequency (788 41) (R35 0)    Family History    1  Family history of    2  Family history of Leukemia    3  Family history of Eczema    Social History    · Being A Social Drinker   · Denied: History of Drug Use   · Former smoker (Y62 55) (B92 237)  The social history was reviewed and is unchanged  Surgical History    1  History of Knee Replacement    Current Meds   1  Amitriptyline HCl - 10 MG Oral Tablet; Therapy: 83BWH8633 to (Evaluate:2014) Recorded   2  Citalopram Hydrobromide 20 MG Oral Tablet; take one tablet by mouth every day; Therapy: 21CSX9215 to (Evaluate:2016)  Requested for: 13NRG3558; Last   Rx:2015 Ordered   3  Colcrys 0 6 MG Oral Tablet; Therapy: 54CBX4943 to (Evaluate:2015) Recorded   4  Furosemide 40 MG Oral Tablet; TAKE ONE TABLET BY MOUTH EVERY DAY  AS   DIRECTED; Therapy: 02KNR9282 to (Evaluate:2016)  Requested for: 95OER1671; Last   Rx:2015 Ordered   5  Gabapentin 300 MG Oral Capsule; One tab tid Recorded   6  Metolazone 2 5 MG Oral Tablet; take 1 tablet daily on monday, wednesday, and friday; Therapy: 26ISU4054 to (Evaluate:2016)  Requested for: 36Hum3563; Last   Rx:60Vco6606 Ordered   7  Metoprolol Tartrate 25 MG Oral Tablet; take one tablet by mouth every day; Therapy: 42KQM0941 to (Jyoti Maradiaga)  Requested for: 68Fpp0169; Last   Rx:18Tpr8941 Ordered   8  Simvastatin 10 MG Oral Tablet; take one tablet by mouth every day; Therapy: 97HUH6456 to (Evelio Marquez)  Requested for: 55NKT1235; Last   Rx:87Kwg9043 Ordered    The medication list was reviewed and updated today  Allergies    1  ACE Inhibitors   2  Angiotensin Receptor Blockers   3  Compazine TABS   4   PredniSONE TABS    Vitals   Recorded: 2016 05:14PM   Temperature 97 7 F   Heart Rate 64   Systolic 163   Diastolic 92   Height 5 ft 6 in   Weight 257 lb 2 08 oz   BMI Calculated 41 5   BSA Calculated 2 23   O2 Saturation 93     Physical Exam    Constitutional   General appearance: No acute distress, well appearing and well nourished  Ears, Nose, Mouth, and Throat   External inspection of ears and nose: Normal     Otoscopic examination: Tympanic membranes translucent with normal light reflex  Canals patent without erythema  Nasal mucosa, septum, and turbinates: Abnormal   B/L boggy pale turbinates  Oropharynx: Abnormal   PND and erythema; no exudates  Pulmonary   Respiratory effort: No increased work of breathing or signs of respiratory distress  Auscultation of lungs: Abnormal   B/L diffuse, mildlly coarse, decreased breath sounds; no wheezes  Cardiovascular   Auscultation of heart: Normal rate and rhythm, normal S1 and S2, without murmurs  Lymphatic   Palpation of lymph nodes in neck: No lymphadenopathy  Psychiatric   Orientation to person, place, and time: Normal     Mood and affect: Normal          Signatures   Electronically signed by : Osiris Restrepo, [facility]; Jan 21 2016  5:32PM EST                       (Author)    Electronically signed by :  Crystal Be DO; Jan 22 2016  7:54AM EST                       (Author)

## 2018-01-17 NOTE — RESULT NOTES
Verified Results  (1) URINE CULTURE [id number] 12:34PM Thomas Luna    Order Number: [id number]     Test Name Result Flag Reference   CLINICAL REPORT (Report)     Test:        Urine culture  Specimen Source:  Urine, Unspecified Source  Specimen Type:   Urine  Specimen Date:   8/22/2016 12:34 PM  Result Date:    8/24/2016 10:13 AM  Result Status:   Final result  Resulting Lab:   [place] [address]            Tel: [phone]      CULTURE                                       ------------------                                   >100,000 cfu/ml Escherichia coli      SUSCEPTIBILITY                                   ------------------                                                       Escherichia coli  METHOD                 SRINIVASAN  -------------------------------------  --------------------------  AMPICILLIN ($$)             >16 00 ug/ml Resistant  AMPICILLIN + SULBACTAM ($)       16/8 ug/ml  Intermediate  AZTREONAM ($$$)             <=8 ug/ml   Susceptible  CEFAZOLIN ($)              <=8 00 ug/ml Susceptible  CIPROFLOXACIN ($)            <=1 00 ug/ml Susceptible  GENTAMICIN ($$)             >8 ug/ml   Resistant  LEVOFLOXACIN ($)            <=2 00 ug/ml Susceptible  NITROFURANTOIN             <=32 ug/ml  Susceptible  PIPERACILLIN + TAZOBACTAM ($$$)     <=16 ug/ml  Susceptible  TETRACYCLINE              >8 ug/ml   Resistant  TOBRAMYCIN ($)             <=4 ug/ml   Susceptible  TRIMETHOPRIM + SULFAMETHOXAZOLE ($$$)  >2/38 ug/ml  Resistant

## 2018-01-22 VITALS
BODY MASS INDEX: 41.07 KG/M2 | DIASTOLIC BLOOD PRESSURE: 88 MMHG | HEART RATE: 113 BPM | OXYGEN SATURATION: 93 % | SYSTOLIC BLOOD PRESSURE: 128 MMHG | HEIGHT: 65 IN | WEIGHT: 246.5 LBS | TEMPERATURE: 98.3 F

## 2018-01-23 VITALS
HEART RATE: 60 BPM | DIASTOLIC BLOOD PRESSURE: 84 MMHG | OXYGEN SATURATION: 99 % | BODY MASS INDEX: 41.38 KG/M2 | RESPIRATION RATE: 16 BRPM | TEMPERATURE: 97.5 F | HEIGHT: 65 IN | SYSTOLIC BLOOD PRESSURE: 126 MMHG | WEIGHT: 248.38 LBS

## 2018-01-23 VITALS
RESPIRATION RATE: 22 BRPM | DIASTOLIC BLOOD PRESSURE: 86 MMHG | SYSTOLIC BLOOD PRESSURE: 150 MMHG | BODY MASS INDEX: 42.65 KG/M2 | WEIGHT: 256 LBS | TEMPERATURE: 99.6 F | HEIGHT: 65 IN | HEART RATE: 110 BPM | OXYGEN SATURATION: 96 %

## 2018-01-23 NOTE — RESULT NOTES
Verified Results  (1) BASIC METABOLIC PROFILE [id number] 12:00AM Bella Ramires     Test Name Result Flag Reference   GLUCOSE 159 mg/dL H 65-99   Fasting reference interval     For someone without known diabetes, a glucose  value >125 mg/dL indicates that they may have  diabetes and this should be confirmed with a  follow-up test    UREA NITROGEN (BUN) 39 mg/dL H 7-25   CREATININE 1 81 mg/dL H 0 60-0 93   For patients >52years of age, the reference limit  for Creatinine is approximately 13% higher for people  identified as -American  eGFR NON-AFR   AMERICAN 27 mL/min/1 73m2 L > OR = 60   eGFR AFRICAN AMERICAN 31 mL/min/1 73m2 L > OR = 60   BUN/CREATININE RATIO 22 (calc)  6-22   SODIUM 139 mmol/L  135-146   POTASSIUM 3 6 mmol/L  3 5-5 3   CHLORIDE 96 mmol/L L    CARBON DIOXIDE 32 mmol/L H 20-31   CALCIUM 9 4 mg/dL  8 6-10 4

## 2018-01-23 NOTE — RESULT NOTES
Verified Results  (Q) CULTURE, URINE, SPECIAL [id number] 12:00AM John Swanson     Test Name Result Flag Reference   CULTURE, URINE, SPECIAL (Report) A    CULTURE, URINE, SPECIAL       MICRO NUMBER:   [id number]   TEST STATUS:    FINAL   SPECIMEN SOURCE:  NOT GIVEN   SPECIMEN QUALITY: ADEQUATE   RESULT:      50,000-100,000 CFU/mL of Gram positive cocci isolated             multiple morphologic types  10,000-50,000 CFU/mL of Gram negative bacilli isolated   COMMENT:      May represent colonizers from external and             internal genitalia  No further testing (including             susceptibility) will be performed

## 2018-02-26 ENCOUNTER — OFFICE VISIT (OUTPATIENT)
Dept: FAMILY MEDICINE CLINIC | Facility: CLINIC | Age: 73
End: 2018-02-26
Payer: COMMERCIAL

## 2018-02-26 VITALS
SYSTOLIC BLOOD PRESSURE: 158 MMHG | DIASTOLIC BLOOD PRESSURE: 100 MMHG | WEIGHT: 238.4 LBS | RESPIRATION RATE: 17 BRPM | BODY MASS INDEX: 39.72 KG/M2 | HEART RATE: 94 BPM | OXYGEN SATURATION: 94 % | TEMPERATURE: 97.1 F | HEIGHT: 65 IN

## 2018-02-26 DIAGNOSIS — J45.21 MILD INTERMITTENT ASTHMATIC BRONCHITIS WITH ACUTE EXACERBATION: Primary | ICD-10-CM

## 2018-02-26 PROBLEM — M54.50 LOW BACK PAIN: Status: ACTIVE | Noted: 2017-06-15

## 2018-02-26 PROBLEM — R05.9 COUGH: Status: ACTIVE | Noted: 2017-12-29

## 2018-02-26 PROBLEM — N18.30 CHRONIC KIDNEY DISEASE, STAGE 3 (HCC): Status: ACTIVE | Noted: 2017-10-12

## 2018-02-26 PROBLEM — R35.0 INCREASED FREQUENCY OF URINATION: Status: ACTIVE | Noted: 2018-01-07

## 2018-02-26 PROBLEM — E03.8 SUBCLINICAL HYPOTHYROIDISM: Status: ACTIVE | Noted: 2017-10-12

## 2018-02-26 PROBLEM — M25.552 PAIN OF LEFT HIP: Status: ACTIVE | Noted: 2017-09-08

## 2018-02-26 PROCEDURE — 99213 OFFICE O/P EST LOW 20 MIN: CPT | Performed by: PHYSICIAN ASSISTANT

## 2018-02-26 PROCEDURE — 3725F SCREEN DEPRESSION PERFORMED: CPT | Performed by: PHYSICIAN ASSISTANT

## 2018-02-26 RX ORDER — METOLAZONE 2.5 MG/1
1 TABLET ORAL
COMMUNITY
Start: 2017-09-08 | End: 2018-04-05 | Stop reason: SDUPTHER

## 2018-02-26 RX ORDER — GABAPENTIN 100 MG/1
1 CAPSULE ORAL 3 TIMES DAILY
COMMUNITY

## 2018-02-26 RX ORDER — CITALOPRAM 40 MG/1
1 TABLET ORAL DAILY
COMMUNITY
Start: 2011-07-16 | End: 2019-07-24 | Stop reason: SDUPTHER

## 2018-02-26 RX ORDER — AMITRIPTYLINE HYDROCHLORIDE 10 MG/1
10 TABLET, FILM COATED ORAL DAILY
COMMUNITY
Start: 2014-03-03 | End: 2019-06-28

## 2018-02-26 RX ORDER — FEBUXOSTAT 80 MG/1
40 TABLET, FILM COATED ORAL DAILY
COMMUNITY
Start: 2017-09-08 | End: 2022-02-09

## 2018-02-26 RX ORDER — FUROSEMIDE 40 MG/1
40 TABLET ORAL
COMMUNITY
Start: 2014-10-06 | End: 2018-05-22 | Stop reason: SDUPTHER

## 2018-02-26 RX ORDER — ASPIRIN 81 MG/1
1 TABLET ORAL DAILY
COMMUNITY
Start: 2017-09-08

## 2018-02-26 RX ORDER — LEVOTHYROXINE SODIUM 0.05 MG/1
1 TABLET ORAL DAILY
COMMUNITY
Start: 2017-10-12 | End: 2018-04-18 | Stop reason: SDUPTHER

## 2018-02-26 RX ORDER — TRAMADOL HYDROCHLORIDE 50 MG/1
1 TABLET ORAL
COMMUNITY
Start: 2017-09-08 | End: 2020-12-08 | Stop reason: HOSPADM

## 2018-02-26 RX ORDER — CITALOPRAM 20 MG/1
20 TABLET ORAL
COMMUNITY
Start: 2014-10-06 | End: 2018-05-13 | Stop reason: SDUPTHER

## 2018-02-26 RX ORDER — ERGOCALCIFEROL (VITAMIN D2) 10 MCG
2 TABLET ORAL
COMMUNITY
Start: 2014-10-06 | End: 2018-10-15 | Stop reason: ALTCHOICE

## 2018-02-26 RX ORDER — BENZONATATE 100 MG/1
100 CAPSULE ORAL 3 TIMES DAILY PRN
Qty: 20 CAPSULE | Refills: 0 | Status: SHIPPED | OUTPATIENT
Start: 2018-02-26 | End: 2018-10-15 | Stop reason: ALTCHOICE

## 2018-02-26 RX ORDER — CEFUROXIME AXETIL 500 MG/1
500 TABLET ORAL EVERY 12 HOURS SCHEDULED
Qty: 20 TABLET | Refills: 0 | Status: SHIPPED | OUTPATIENT
Start: 2018-02-26 | End: 2018-03-08

## 2018-02-26 RX ORDER — PREDNISONE 1 MG/1
2.5 TABLET ORAL DAILY
COMMUNITY
Start: 2017-09-08 | End: 2018-11-20 | Stop reason: ALTCHOICE

## 2018-02-26 RX ORDER — MAGNESIUM GLUCONATE 30 MG(550)
30 TABLET ORAL
COMMUNITY
End: 2018-10-15 | Stop reason: ALTCHOICE

## 2018-02-26 RX ORDER — SIMVASTATIN 10 MG
1 TABLET ORAL DAILY
COMMUNITY
Start: 2011-08-16 | End: 2018-06-11 | Stop reason: SDUPTHER

## 2018-02-26 NOTE — PROGRESS NOTES
Assessment/Plan:         Diagnoses and all orders for this visit:    Mild intermittent asthmatic bronchitis with acute exacerbation  -     cefuroxime (CEFTIN) 500 mg tablet; Take 1 tablet (500 mg total) by mouth every 12 (twelve) hours for 10 days  -     benzonatate (TESSALON PERLES) 100 mg capsule; Take 1 capsule (100 mg total) by mouth 3 (three) times a day as needed for cough    Other orders  -     febuxostat (ULORIC) 80 MG TABS; Take 1 tablet by mouth daily  -     traMADol (ULTRAM) 50 mg tablet; Take 1 tablet by mouth  -     simvastatin (ZOCOR) 10 mg tablet; Take 1 tablet by mouth daily  -     predniSONE 5 mg tablet; Take 0 5 tablets by mouth daily  -     metoprolol tartrate (LOPRESSOR) 25 mg tablet; Take 1 tablet by mouth daily  -     metolazone (ZAROXOLYN) 2 5 mg tablet; Take 1 tablet by mouth  -     Magnesium Gluconate 550 MG TABS; Take 30 mg by mouth  -     levothyroxine 50 mcg tablet; Take 1 tablet by mouth daily  -     sitaGLIPtin (JANUVIA) 25 mg tablet; Take 1 tablet by mouth daily  -     gabapentin (NEURONTIN) 300 mg capsule; Take 1 tablet by mouth 3 (three) times a day  -     furosemide (LASIX) 40 mg tablet; Take 40 mg by mouth  -     citalopram (CeleXA) 40 mg tablet; Take 1 tablet by mouth daily  -     citalopram (CeleXA) 20 mg tablet; Take 20 mg by mouth  -     Cetirizine HCl 10 MG CAPS; Take 10 mg by mouth  -     aspirin (ASPIRIN ADULT LOW DOSE) 81 mg EC tablet; Take 1 tablet by mouth daily  -     amitriptyline (ELAVIL) 10 mg tablet; Take by mouth  -     Vitamin D, Cholecalciferol, 400 units TABS; Take 2 tablets by mouth      Discussed OTC cold meds  Continue Mucinex  Fever Care, ER instructions given  F/U 5-7 days if not resolved  Chief Complaint   Patient presents with    Cough         Subjective:      Patient ID: Momo Melendez is a 74 y o  female  Pt presents with what she reports is persistent cough which goes back to when she was diagnosed with Influenza in December 2017   She reports the cough never resolved  The cough is productive with white and yellow phlegm  She denies nasal congestion, ST   Reports PND and the cough is wearing her out  Denies fever, chills, N/V/D  She is taking cough medicine, Mucinex  Has asthma but no allergies  Does not smoke  The following portions of the patient's history were reviewed and updated as appropriate: allergies, current medications, past family history, past medical history, past social history, past surgical history and problem list     Review of Systems   Constitutional: Positive for fatigue  Negative for fever  HENT: Positive for postnasal drip  Negative for congestion and sore throat  Respiratory: Positive for cough  Cardiovascular: Negative  Gastrointestinal: Negative  Genitourinary: Negative  Objective:      /100 (BP Location: Left arm, Patient Position: Sitting, Cuff Size: Adult)   Pulse 94   Temp (!) 97 1 °F (36 2 °C) (Tympanic)   Resp 17   Ht 5' 5 3" (1 659 m)   Wt 108 kg (238 lb 6 4 oz)   SpO2 94%   BMI 39 31 kg/m²          Physical Exam   Constitutional: She is oriented to person, place, and time  She appears well-developed and well-nourished  No distress  HENT:   Right Ear: Hearing, tympanic membrane, external ear and ear canal normal    Left Ear: Hearing, tympanic membrane, external ear and ear canal normal    Nose: Nose normal    Mouth/Throat: Posterior oropharyngeal erythema (PND) present  No oropharyngeal exudate  Neck: Neck supple  Cardiovascular: Normal rate, regular rhythm and normal heart sounds  Pulmonary/Chest: Effort normal  No respiratory distress  She has no wheezes  She has rhonchi (B/L scattered rhonchi heard throughout jacob on exhalation)  Lymphadenopathy:     She has no cervical adenopathy  Neurological: She is alert and oriented to person, place, and time  Psychiatric: She has a normal mood and affect  Vitals reviewed

## 2018-03-12 DIAGNOSIS — I10 ESSENTIAL HYPERTENSION: Primary | ICD-10-CM

## 2018-04-05 DIAGNOSIS — R60.9 EDEMA, UNSPECIFIED TYPE: Primary | ICD-10-CM

## 2018-04-05 RX ORDER — METOLAZONE 2.5 MG/1
TABLET ORAL
Qty: 30 TABLET | Refills: 1 | Status: SHIPPED | OUTPATIENT
Start: 2018-04-05 | End: 2018-07-10 | Stop reason: SDUPTHER

## 2018-04-15 DIAGNOSIS — E87.6 HYPOKALEMIA: Primary | ICD-10-CM

## 2018-04-15 RX ORDER — POTASSIUM CHLORIDE 20 MEQ/1
20 TABLET, EXTENDED RELEASE ORAL DAILY
Qty: 30 TABLET | Refills: 5 | Status: SHIPPED | OUTPATIENT
Start: 2018-04-15 | End: 2018-10-16 | Stop reason: SDUPTHER

## 2018-04-15 NOTE — PROGRESS NOTES
I spoke to patient - her potassium was low - she has run out of potassium RX and did not realize it  I sent a new rx

## 2018-04-18 ENCOUNTER — OFFICE VISIT (OUTPATIENT)
Dept: FAMILY MEDICINE CLINIC | Facility: CLINIC | Age: 73
End: 2018-04-18
Payer: COMMERCIAL

## 2018-04-18 VITALS
WEIGHT: 248.1 LBS | TEMPERATURE: 97.5 F | SYSTOLIC BLOOD PRESSURE: 134 MMHG | HEART RATE: 75 BPM | HEIGHT: 65 IN | DIASTOLIC BLOOD PRESSURE: 80 MMHG | BODY MASS INDEX: 41.34 KG/M2 | RESPIRATION RATE: 16 BRPM | OXYGEN SATURATION: 90 %

## 2018-04-18 DIAGNOSIS — E03.9 HYPOTHYROIDISM, UNSPECIFIED TYPE: Primary | ICD-10-CM

## 2018-04-18 DIAGNOSIS — M79.7 FIBROMYALGIA: ICD-10-CM

## 2018-04-18 DIAGNOSIS — E11.22 UNCONTROLLED TYPE 2 DIABETES MELLITUS WITH STAGE 3 CHRONIC KIDNEY DISEASE, UNSPECIFIED WHETHER LONG TERM INSULIN USE: ICD-10-CM

## 2018-04-18 DIAGNOSIS — M1A.9XX1 CHRONIC GOUT WITH TOPHUS, UNSPECIFIED CAUSE, UNSPECIFIED SITE: ICD-10-CM

## 2018-04-18 DIAGNOSIS — N18.30 CHRONIC KIDNEY DISEASE, STAGE 3 (HCC): ICD-10-CM

## 2018-04-18 DIAGNOSIS — E11.65 UNCONTROLLED TYPE 2 DIABETES MELLITUS WITH STAGE 3 CHRONIC KIDNEY DISEASE, UNSPECIFIED WHETHER LONG TERM INSULIN USE: ICD-10-CM

## 2018-04-18 DIAGNOSIS — I10 ESSENTIAL HYPERTENSION: ICD-10-CM

## 2018-04-18 DIAGNOSIS — E78.2 MIXED HYPERLIPIDEMIA: ICD-10-CM

## 2018-04-18 DIAGNOSIS — N18.3 UNCONTROLLED TYPE 2 DIABETES MELLITUS WITH STAGE 3 CHRONIC KIDNEY DISEASE, UNSPECIFIED WHETHER LONG TERM INSULIN USE: ICD-10-CM

## 2018-04-18 DIAGNOSIS — F32.A DEPRESSION, UNSPECIFIED DEPRESSION TYPE: ICD-10-CM

## 2018-04-18 PROBLEM — E03.8 SUBCLINICAL HYPOTHYROIDISM: Status: RESOLVED | Noted: 2017-10-12 | Resolved: 2018-04-18

## 2018-04-18 PROCEDURE — 99214 OFFICE O/P EST MOD 30 MIN: CPT | Performed by: FAMILY MEDICINE

## 2018-04-18 PROCEDURE — 3066F NEPHROPATHY DOC TX: CPT | Performed by: FAMILY MEDICINE

## 2018-04-18 RX ORDER — LEVOTHYROXINE SODIUM 0.05 MG/1
50 TABLET ORAL DAILY
Qty: 30 TABLET | Refills: 11 | Status: SHIPPED | OUTPATIENT
Start: 2018-04-18 | End: 2018-10-16 | Stop reason: SDUPTHER

## 2018-04-18 NOTE — ASSESSMENT & PLAN NOTE
Still with ongoing pain     Patient being followed by rheumatologist, Dr Lees Shown    She is still on prednisone 2 5 mg daily

## 2018-04-18 NOTE — PROGRESS NOTES
Assessment/Plan:    Chronic gout  Doing well on Uloric    Chronic kidney disease, stage 3  Creatinine 1 59  Stable  Will continue to monitor    Hyperlipidemia  Cholesterol 207/113  Doing reasonably well  Will continue to monitor    Hypothyroidism  TSH normal   Doing well on levothyroxine 50 mcg daily    Uncontrolled diabetes with stage 3 chronic kidney disease GFR 30-59 ([facility])  Blood sugar 141  Will continue to monitor    Hypertension  Reasonably controlled on metoprolol 25 mg daily    Depression  Doing well on citalopram 40 mg daily    Fibromyalgia  Still with ongoing pain     Patient being followed by rheumatologist, Dr BRYAN HERNANDEZ  She is still on prednisone 2 5 mg daily       Diagnoses and all orders for this visit:    Hypothyroidism, unspecified type  -     levothyroxine 50 mcg tablet; Take 1 tablet (50 mcg total) by mouth daily  -     TSH, 3rd generation with T4 reflex; Future    Chronic kidney disease, stage 3  -     Comprehensive metabolic panel; Future    Chronic gout with tophus, unspecified cause, unspecified site    Depression, unspecified depression type    Mixed hyperlipidemia  -     Lipid Panel with Direct LDL reflex; Future    Essential hypertension    Uncontrolled type 2 diabetes mellitus with stage 3 chronic kidney disease, unspecified whether long term insulin use ([facility])  -     HEMOGLOBIN A1C W/ EAG ESTIMATION; Future    Fibromyalgia      4 months, fasting blood work prior  Next Medicare wellness visit October 2018    Subjective:      Patient ID: James Rueda is a 74 y o  female  Patient presents for recheck of chronic medical problems today  Overall she is feeling well  Doing well on levothyroxine for thyroid  Doing well on citalopram for depression  Patient still taking prednisone 2 5 mg daily but still having pain  She will be seeing her rheumatologist next month  Doing well on metoprolol for hypertension    Patient had labs drawn on April 12        The following portions of the patient's history were reviewed and updated as appropriate: allergies, past family history, past medical history, past social history, past surgical history and problem list     Review of Systems   Respiratory: Negative  Cardiovascular: Negative  Gastrointestinal: Negative  Genitourinary: Negative  Objective:      /80 (BP Location: Left arm, Patient Position: Sitting, Cuff Size: Adult)   Pulse 75   Temp 97 5 °F (36 4 °C) (Tympanic)   Resp 16   Ht 5' 5 16" (1 655 m)   Wt 113 kg (248 lb 1 6 oz)   SpO2 90%   BMI 41 09 kg/m²          Physical Exam   Cardiovascular: Normal rate, regular rhythm, normal heart sounds and intact distal pulses  Carotids: no bruits  Ext: no edema   Pulmonary/Chest: Effort normal  No respiratory distress  She has no wheezes  She has no rales  Psychiatric: She has a normal mood and affect   Her behavior is normal  Thought content normal

## 2018-05-13 DIAGNOSIS — F32.A DEPRESSION, UNSPECIFIED DEPRESSION TYPE: Primary | ICD-10-CM

## 2018-05-13 RX ORDER — CITALOPRAM 20 MG/1
TABLET ORAL
Qty: 90 TABLET | Refills: 0 | Status: SHIPPED | OUTPATIENT
Start: 2018-05-13 | End: 2018-07-17 | Stop reason: SDUPTHER

## 2018-05-22 DIAGNOSIS — R60.9 EDEMA, UNSPECIFIED TYPE: Primary | ICD-10-CM

## 2018-05-22 DIAGNOSIS — E11.9 TYPE 2 DIABETES MELLITUS WITHOUT COMPLICATION, WITHOUT LONG-TERM CURRENT USE OF INSULIN (HCC): Primary | ICD-10-CM

## 2018-05-23 RX ORDER — FUROSEMIDE 40 MG/1
TABLET ORAL
Qty: 30 TABLET | Refills: 4 | Status: SHIPPED | OUTPATIENT
Start: 2018-05-23 | End: 2018-06-11 | Stop reason: SDUPTHER

## 2018-05-24 RX ORDER — SITAGLIPTIN 25 MG/1
TABLET, FILM COATED ORAL
Qty: 30 TABLET | Refills: 0 | Status: SHIPPED | OUTPATIENT
Start: 2018-05-24 | End: 2018-06-13 | Stop reason: SDUPTHER

## 2018-06-11 DIAGNOSIS — R60.9 EDEMA, UNSPECIFIED TYPE: ICD-10-CM

## 2018-06-11 DIAGNOSIS — E78.2 MIXED HYPERLIPIDEMIA: Primary | ICD-10-CM

## 2018-06-11 RX ORDER — FUROSEMIDE 40 MG/1
40 TABLET ORAL DAILY
Qty: 30 TABLET | Refills: 5 | Status: SHIPPED | OUTPATIENT
Start: 2018-06-11 | End: 2018-11-28 | Stop reason: SDUPTHER

## 2018-06-11 RX ORDER — SIMVASTATIN 10 MG
TABLET ORAL
Qty: 30 TABLET | Refills: 2 | Status: SHIPPED | OUTPATIENT
Start: 2018-06-11 | End: 2018-08-20 | Stop reason: SDUPTHER

## 2018-06-13 DIAGNOSIS — E11.9 TYPE 2 DIABETES MELLITUS WITHOUT COMPLICATION, WITHOUT LONG-TERM CURRENT USE OF INSULIN (HCC): ICD-10-CM

## 2018-07-10 DIAGNOSIS — R60.9 EDEMA, UNSPECIFIED TYPE: ICD-10-CM

## 2018-07-10 DIAGNOSIS — I10 ESSENTIAL HYPERTENSION: ICD-10-CM

## 2018-07-10 RX ORDER — METOLAZONE 2.5 MG/1
TABLET ORAL
Qty: 30 TABLET | Refills: 0 | Status: SHIPPED | OUTPATIENT
Start: 2018-07-10 | End: 2018-08-20 | Stop reason: SDUPTHER

## 2018-07-17 DIAGNOSIS — F32.A DEPRESSION, UNSPECIFIED DEPRESSION TYPE: ICD-10-CM

## 2018-07-18 RX ORDER — CITALOPRAM 20 MG/1
20 TABLET ORAL DAILY
Qty: 90 TABLET | Refills: 0 | Status: SHIPPED | OUTPATIENT
Start: 2018-07-18 | End: 2018-10-15 | Stop reason: ALTCHOICE

## 2018-08-16 LAB
ALBUMIN SERPL-MCNC: 4 G/DL (ref 3.6–5.1)
ALBUMIN/GLOB SERPL: 1.4 (CALC) (ref 1–2.5)
ALP SERPL-CCNC: 67 U/L (ref 33–130)
ALT SERPL-CCNC: 9 U/L (ref 6–29)
AST SERPL-CCNC: 17 U/L (ref 10–35)
BILIRUB SERPL-MCNC: 0.6 MG/DL (ref 0.2–1.2)
BUN SERPL-MCNC: 48 MG/DL (ref 7–25)
BUN/CREAT SERPL: 24 (CALC) (ref 6–22)
CALCIUM SERPL-MCNC: 9.4 MG/DL (ref 8.6–10.4)
CHLORIDE SERPL-SCNC: 88 MMOL/L (ref 98–110)
CHOLEST SERPL-MCNC: 155 MG/DL
CHOLEST/HDLC SERPL: 2.6 (CALC)
CO2 SERPL-SCNC: 37 MMOL/L (ref 20–32)
CREAT SERPL-MCNC: 2 MG/DL (ref 0.6–0.93)
EST. AVERAGE GLUCOSE BLD GHB EST-MCNC: 148 (CALC)
EST. AVERAGE GLUCOSE BLD GHB EST-SCNC: 8.2 (CALC)
GLOBULIN SER CALC-MCNC: 2.9 G/DL (CALC) (ref 1.9–3.7)
GLUCOSE SERPL-MCNC: 146 MG/DL (ref 65–99)
HBA1C MFR BLD: 6.8 % OF TOTAL HGB
HDLC SERPL-MCNC: 60 MG/DL
LDLC SERPL CALC-MCNC: 76 MG/DL (CALC)
NONHDLC SERPL-MCNC: 95 MG/DL (CALC)
POTASSIUM SERPL-SCNC: 2.9 MMOL/L (ref 3.5–5.3)
PROT SERPL-MCNC: 6.9 G/DL (ref 6.1–8.1)
SL AMB EGFR AFRICAN AMERICAN: 28 ML/MIN/1.73M2
SL AMB EGFR NON AFRICAN AMERICAN: 24 ML/MIN/1.73M2
SODIUM SERPL-SCNC: 135 MMOL/L (ref 135–146)
TRIGL SERPL-MCNC: 102 MG/DL
TSH SERPL-ACNC: 2.44 MIU/L (ref 0.4–4.5)

## 2018-08-20 ENCOUNTER — OFFICE VISIT (OUTPATIENT)
Dept: FAMILY MEDICINE CLINIC | Facility: CLINIC | Age: 73
End: 2018-08-20
Payer: COMMERCIAL

## 2018-08-20 VITALS
HEART RATE: 60 BPM | BODY MASS INDEX: 40.75 KG/M2 | WEIGHT: 244.6 LBS | OXYGEN SATURATION: 96 % | RESPIRATION RATE: 16 BRPM | HEIGHT: 65 IN | DIASTOLIC BLOOD PRESSURE: 74 MMHG | SYSTOLIC BLOOD PRESSURE: 116 MMHG | TEMPERATURE: 96.8 F

## 2018-08-20 DIAGNOSIS — I83.90 VARICOSITIES OF LEG: ICD-10-CM

## 2018-08-20 DIAGNOSIS — E78.2 MIXED HYPERLIPIDEMIA: ICD-10-CM

## 2018-08-20 DIAGNOSIS — E11.22 UNCONTROLLED TYPE 2 DIABETES MELLITUS WITH STAGE 3 CHRONIC KIDNEY DISEASE, UNSPECIFIED WHETHER LONG TERM INSULIN USE: ICD-10-CM

## 2018-08-20 DIAGNOSIS — F32.A DEPRESSION, UNSPECIFIED DEPRESSION TYPE: Primary | ICD-10-CM

## 2018-08-20 DIAGNOSIS — E03.9 HYPOTHYROIDISM, UNSPECIFIED TYPE: ICD-10-CM

## 2018-08-20 DIAGNOSIS — N18.3 UNCONTROLLED TYPE 2 DIABETES MELLITUS WITH STAGE 3 CHRONIC KIDNEY DISEASE, UNSPECIFIED WHETHER LONG TERM INSULIN USE: ICD-10-CM

## 2018-08-20 DIAGNOSIS — R60.9 EDEMA, UNSPECIFIED TYPE: ICD-10-CM

## 2018-08-20 DIAGNOSIS — I10 ESSENTIAL HYPERTENSION: ICD-10-CM

## 2018-08-20 DIAGNOSIS — E11.65 UNCONTROLLED TYPE 2 DIABETES MELLITUS WITH STAGE 3 CHRONIC KIDNEY DISEASE, UNSPECIFIED WHETHER LONG TERM INSULIN USE: ICD-10-CM

## 2018-08-20 DIAGNOSIS — M79.7 FIBROMYALGIA: ICD-10-CM

## 2018-08-20 DIAGNOSIS — N18.30 CHRONIC KIDNEY DISEASE, STAGE 3 (HCC): ICD-10-CM

## 2018-08-20 LAB
LEFT EYE DIABETIC RETINOPATHY: NORMAL
LEFT EYE DIABETIC RETINOPATHY: NORMAL
LEFT EYE IMAGE QUALITY: NORMAL
RIGHT EYE DIABETIC RETINOPATHY: NORMAL
RIGHT EYE DIABETIC RETINOPATHY: NORMAL
RIGHT EYE IMAGE QUALITY: NORMAL
SEVERITY (EYE EXAM): NORMAL

## 2018-08-20 PROCEDURE — 3072F LOW RISK FOR RETINOPATHY: CPT | Performed by: PHYSICIAN ASSISTANT

## 2018-08-20 PROCEDURE — 92250 FUNDUS PHOTOGRAPHY W/I&R: CPT | Performed by: FAMILY MEDICINE

## 2018-08-20 PROCEDURE — 3074F SYST BP LT 130 MM HG: CPT | Performed by: FAMILY MEDICINE

## 2018-08-20 PROCEDURE — 99214 OFFICE O/P EST MOD 30 MIN: CPT | Performed by: FAMILY MEDICINE

## 2018-08-20 PROCEDURE — 3078F DIAST BP <80 MM HG: CPT | Performed by: FAMILY MEDICINE

## 2018-08-20 RX ORDER — METOLAZONE 2.5 MG/1
2.5 TABLET ORAL 3 TIMES WEEKLY
Qty: 30 TABLET | Refills: 2 | Status: SHIPPED | OUTPATIENT
Start: 2018-08-20 | End: 2019-05-10 | Stop reason: SDUPTHER

## 2018-08-20 RX ORDER — SIMVASTATIN 10 MG
10 TABLET ORAL DAILY
Qty: 30 TABLET | Refills: 5 | Status: SHIPPED | OUTPATIENT
Start: 2018-08-20 | End: 2019-02-26 | Stop reason: SDUPTHER

## 2018-08-20 NOTE — ASSESSMENT & PLAN NOTE
Creatinine has worsened since last visit  Was 1 59, now 2 0  No new urinary symptoms no change in meds    Will refer to [facility] for eval

## 2018-08-20 NOTE — ASSESSMENT & PLAN NOTE
Stable, doing reasonably well on prednisone 2 5 mg daily    This is being managed by her rheumatologist, Dr Lees Shown

## 2018-08-20 NOTE — PROGRESS NOTES
Assessment/Plan:    Chronic kidney disease, stage 3   Creatinine has worsened since last visit  Was 1 59, now 2 0  No new urinary symptoms no change in meds  Will refer to [facility] for eval    Depression   Stable on citalopram 40 mg daily without side effects    Fibromyalgia   Stable, doing reasonably well on prednisone 2 5 mg daily  This is being managed by her rheumatologist, Dr Susi Ellis    Hyperlipidemia   Cholesterol 155/76  Doing reasonably well on simvastatin 10 mg daily  I would still like to see her LDL below 70, but she is close  Will continue to monitor    Hypothyroidism   TSH normal   Doing well on levothyroxine 50 mcg daily    Uncontrolled diabetes with stage 3 chronic kidney disease GFR 30-59 ([facility])  Lab Results   Component Value Date    HGBA1C 6 8 (H) 08/15/2018       No results for input(s): POCGLU in the last 72 hours  Blood Sugar Average: Last 72 hrs:   A1c 6 8%, was 7 3%  Improved on diet control  Will continue to monitor  Will check IRIS exam today  Hypertension  Controlled on metoprolol 25 mg qd  Diagnoses and all orders for this visit:    Depression, unspecified depression type    Mixed hyperlipidemia  -     simvastatin (ZOCOR) 10 mg tablet; Take 1 tablet (10 mg total) by mouth daily  -     Comprehensive metabolic panel; Future  -     Lipid Panel with Direct LDL reflex; Future  -     Comprehensive metabolic panel  -     Lipid Panel with Direct LDL reflex    Edema, unspecified type  -     metolazone (ZAROXOLYN) 2 5 mg tablet; Take 1 tablet (2 5 mg total) by mouth 3 (three) times a week Monday, Wednesday, Friday  -     CBC and differential; Future  -     CBC and differential    Hypothyroidism, unspecified type  -     TSH, 3rd generation with Free T4 reflex; Future  -     TSH, 3rd generation with Free T4 reflex    Fibromyalgia    Chronic kidney disease, stage 3  -     Ambulatory referral to Nephrology; Future  -     Comprehensive metabolic panel;  Future  - Comprehensive metabolic panel    Uncontrolled type 2 diabetes mellitus with stage 3 chronic kidney disease, unspecified whether long term insulin use ([facility])  -     Comprehensive metabolic panel; Future  -     Hemoglobin A1C; Future  -     Microalbumin, Random Urine (W/Creatinine); Future  -     Comprehensive metabolic panel  -     Microalbumin, Random Urine (W/Creatinine)    Varicosities of leg  -     Ambulatory referral to Vascular Surgery; Future    Essential hypertension          Subjective:      Patient ID: Franko Fischer is a 74 y o  female  Recheck chronic medical problems today  Overall doing well  Doing well on thyroid medication levothyroxine  Doing well on metoprolol for hypertension  Doing well on citalopram   Doing reasonably well on low-dose prednisone for fibromyalgia  This is being managed by her rheumatologist   Patient had labs drawn at [facility] recently  The following portions of the patient's history were reviewed and updated as appropriate: allergies, current medications, past family history, past medical history, past social history, past surgical history and problem list     Review of Systems   Respiratory: Negative  Cardiovascular: Negative  Gastrointestinal: Negative  Genitourinary: Negative  Objective:      /74 (BP Location: Left arm, Patient Position: Sitting, Cuff Size: Adult)   Pulse 60   Temp (!) 96 8 °F (36 °C) (Tympanic)   Resp 16   Ht 5' 5 16" (1 655 m)   Wt 111 kg (244 lb 9 6 oz)   SpO2 96%   BMI 40 50 kg/m²          Physical Exam   Cardiovascular: Normal rate, regular rhythm, normal heart sounds and intact distal pulses  Carotids: no bruits  Ext: no edema   Pulmonary/Chest: Effort normal  No respiratory distress  She has no wheezes  She has no rales  Psychiatric: She has a normal mood and affect   Her behavior is normal  Thought content normal

## 2018-08-20 NOTE — ASSESSMENT & PLAN NOTE
Lab Results   Component Value Date    HGBA1C 6 8 (H) 08/15/2018       No results for input(s): POCGLU in the last 72 hours  Blood Sugar Average: Last 72 hrs:   A1c 6 8%, was 7 3%  Improved on diet control  Will continue to monitor  Will check IRIS exam today

## 2018-08-20 NOTE — ASSESSMENT & PLAN NOTE
Cholesterol 155/76  Doing reasonably well on simvastatin 10 mg daily  I would still like to see her LDL below 70, but she is close    Will continue to monitor

## 2018-10-08 ENCOUNTER — OFFICE VISIT (OUTPATIENT)
Dept: FAMILY MEDICINE CLINIC | Facility: CLINIC | Age: 73
End: 2018-10-08
Payer: COMMERCIAL

## 2018-10-08 VITALS
WEIGHT: 248.6 LBS | OXYGEN SATURATION: 94 % | DIASTOLIC BLOOD PRESSURE: 86 MMHG | SYSTOLIC BLOOD PRESSURE: 124 MMHG | BODY MASS INDEX: 41.17 KG/M2 | HEART RATE: 81 BPM | TEMPERATURE: 98.1 F

## 2018-10-08 DIAGNOSIS — Z23 NEED FOR VACCINATION: ICD-10-CM

## 2018-10-08 DIAGNOSIS — L73.9 FOLLICULITIS: Primary | ICD-10-CM

## 2018-10-08 DIAGNOSIS — I10 ESSENTIAL HYPERTENSION: ICD-10-CM

## 2018-10-08 PROCEDURE — G0008 ADMIN INFLUENZA VIRUS VAC: HCPCS

## 2018-10-08 PROCEDURE — 90662 IIV NO PRSV INCREASED AG IM: CPT

## 2018-10-08 PROCEDURE — 99213 OFFICE O/P EST LOW 20 MIN: CPT | Performed by: FAMILY MEDICINE

## 2018-10-08 RX ORDER — CEPHALEXIN 500 MG/1
500 CAPSULE ORAL EVERY 8 HOURS SCHEDULED
Qty: 30 CAPSULE | Refills: 0 | Status: SHIPPED | OUTPATIENT
Start: 2018-10-08 | End: 2018-10-18

## 2018-10-08 NOTE — PROGRESS NOTES
Assessment/Plan:    Folliculitis   Painful rash posterior scalp for 1 week  Upon examination, patient exhibits scattered erythematous papules bilaterally across her posterior scalp  Will give Rx for cephalexin 500 t i d  Times 10 days  Call further problems       Diagnoses and all orders for this visit:    Folliculitis  -     cephalexin (KEFLEX) 500 mg capsule; Take 1 capsule (500 mg total) by mouth every 8 (eight) hours for 10 days    Need for vaccination  -     influenza vaccine, 1460-3080, high-dose, PF 0 5 mL, for patients 65 yr+ (FLUZONE HIGH-DOSE)         flu shot today      Subjective:      Patient ID: Kinga Walls is a 74 y o  female  Rash on post neck x 1 week (painful)  The following portions of the patient's history were reviewed and updated as appropriate: allergies, current medications, past family history, past medical history, past social history, past surgical history and problem list     Review of Systems   Respiratory: Negative  Cardiovascular: Negative  Gastrointestinal: Negative  Genitourinary: Negative  Skin: Positive for rash           Objective:      /86 (BP Location: Left arm, Patient Position: Sitting)   Pulse 81   Temp 98 1 °F (36 7 °C) (Tympanic)   Wt 113 kg (248 lb 9 6 oz)   SpO2 94%   BMI 41 17 kg/m²          Physical Exam   Skin:    Erythematous papules posterior scalp bilateral

## 2018-10-08 NOTE — ASSESSMENT & PLAN NOTE
Painful rash posterior scalp for 1 week  Upon examination, patient exhibits scattered erythematous papules bilaterally across her posterior scalp  Will give Rx for cephalexin 500 t i d  Times 10 days    Call further problems

## 2018-10-15 ENCOUNTER — CONSULT (OUTPATIENT)
Dept: VASCULAR SURGERY | Facility: CLINIC | Age: 73
End: 2018-10-15
Payer: COMMERCIAL

## 2018-10-15 VITALS
SYSTOLIC BLOOD PRESSURE: 126 MMHG | HEART RATE: 72 BPM | DIASTOLIC BLOOD PRESSURE: 70 MMHG | HEIGHT: 66 IN | WEIGHT: 250 LBS | RESPIRATION RATE: 18 BRPM | BODY MASS INDEX: 40.18 KG/M2

## 2018-10-15 DIAGNOSIS — I83.893 SYMPTOMATIC VARICOSE VEINS OF BOTH LOWER EXTREMITIES: Primary | ICD-10-CM

## 2018-10-15 DIAGNOSIS — I83.90 VARICOSITIES OF LEG: ICD-10-CM

## 2018-10-15 DIAGNOSIS — IMO0002 UNCONTROLLED DIABETES WITH STAGE 3 CHRONIC KIDNEY DISEASE GFR 30-59: ICD-10-CM

## 2018-10-15 DIAGNOSIS — I73.9 INTERMITTENT CLAUDICATION (HCC): ICD-10-CM

## 2018-10-15 PROCEDURE — 4040F PNEUMOC VAC/ADMIN/RCVD: CPT | Performed by: NURSE PRACTITIONER

## 2018-10-15 PROCEDURE — 99204 OFFICE O/P NEW MOD 45 MIN: CPT | Performed by: NURSE PRACTITIONER

## 2018-10-15 RX ORDER — MELATONIN
1000 DAILY
COMMUNITY

## 2018-10-15 NOTE — ASSESSMENT & PLAN NOTE
-bilateral lower extremity spider and varicose veins with intermittent swelling and pain  -we discussed the pathophysiology of venous disease  -advised a trial of conservative therapy to include the daily use of 20-30 mmHg knee high gradient compression stockings  -we discussed the benefits of frequent elevation, regular physical activity and weight loss  -after a trial of conservative therapy she will have a venous study to assess for valvular incompetence  -followup with vascular surgeon after imaging for review of symptoms/ discussion of further possible treatment options

## 2018-10-15 NOTE — LETTER
October 15, 2018     DO Chalino Tracy[address]    Patient: Kinga Walls   YOB: 1945   Date of Visit: 10/15/2018       Dear Dr Buck Torres:    Thank you for referring Kinga Walls to me for evaluation  Below are my notes for this consultation  If you have questions, please do not hesitate to call me  I look forward to following your patient along with you           Sincerely,        NIKOLAS Sibley        CC: No Recipients

## 2018-10-15 NOTE — PROGRESS NOTES
Assessment/Plan:    73 y/o F with HTN, HLD, uncontrolled DM II w/ CKD III, hypothyroidism, and fibromyalgia, seen for evaluation of varicose veins  Symptomatic varicose veins of both lower extremities  -bilateral lower extremity spider and varicose veins with intermittent swelling and pain  -we discussed the pathophysiology of venous disease  -advised a trial of conservative therapy to include the daily use of 20-30 mmHg knee high gradient compression stockings  -we discussed the benefits of frequent elevation, regular physical activity and weight loss  -after a trial of conservative therapy she will have a venous study to assess for valvular incompetence  -followup with vascular surgeon after imaging for review of symptoms/ discussion of further possible treatment options    Intermittent claudication ([facility] [address] )  -describes classic claudication  Cramping pain to bilateral legs after 1 block relieved within 5 minutes with rest  -diminished pedal pulses  Risk factors to include uncontrolled diabetes, HTN and HLD  -will check VIKTOR        Diagnoses and all orders for this visit:    Symptomatic varicose veins of both lower extremities  -     Compression Stocking  -     VAS reflux lower limb venous duplex study with reflux assessment, complete bilateral; Future    Varicosities of leg  -     Ambulatory referral to Vascular Surgery    Intermittent claudication ([facility])  -     VAS lower limb arterial duplex, complete bilateral; Future    Uncontrolled diabetes with stage 3 chronic kidney disease GFR 30-59 ([facility])    Other orders  -     cholecalciferol (VITAMIN D3) 1,000 units tablet; Take 1,000 Units by mouth daily          Subjective:      Patient ID: Jeffy Chapman is a 74 y o  female  Patient is new to our practice referred by Dr Will Aguero)  She has n recent testing  Patient has pain DENNIS in her legs  She states it is worse when she lays down  Pain at night is about 4 times a week, she has to get up and walk   She has cramping in her legs thighs worse than her calves  She has swelling DENNIS legs worsening as day progresses  She wears OTC, most nights  Patient takers ASA 81 and Zocor  Patient referred by PCP for evaluation of varicose veins  She has bilateral lower extremity spider and LLE truncal varicosities  She states that her lower legs "always hurt "  She states that upon waking in the AM her legs feel achy, as the day progresses the aching increases  She is also experiencing swelling that worsens throughout the day  Elevating her legs help manage these symptoms  She complains of claudication type symptoms  Bilateral lower extremity cramping when walking more than 1 block  This pain is relieved within 5 minutes with rest   She also complains of cramping to the legs at night, relieved by walking  She has diminished, though palpable pedal pulses  Will check VIKTOR  The following portions of the patient's history were reviewed and updated as appropriate: allergies, current medications, past family history, past medical history, past social history, past surgical history and problem list     Review of Systems   Constitutional: Negative  HENT: Negative  Eyes: Negative  Respiratory: Negative  Cardiovascular: Negative  Gastrointestinal: Negative  Endocrine: Negative  Genitourinary: Negative  Musculoskeletal:        Leg pain   Skin: Negative  Allergic/Immunologic: Negative  Neurological: Negative  Hematological: Negative  Psychiatric/Behavioral: Negative  Objective:       Physical Exam   Constitutional: She is oriented to person, place, and time  No distress  HENT:   Head: Normocephalic and atraumatic  Eyes: EOM are normal  No scleral icterus  Neck: Neck supple  No JVD present  No carotid bruits   Cardiovascular: Normal rate, regular rhythm and normal heart sounds  Pulses:       Dorsalis pedis pulses are 1+ on the right side          Posterior tibial pulses are 0 on the right side, and 2+ on the left side  Bilateral lower extremity spider veins, left lower extremity truncal varicosity   Pulmonary/Chest: Effort normal and breath sounds normal    Abdominal: Soft  There is no tenderness  Obese   Musculoskeletal: She exhibits no edema  Neurological: She is alert and oriented to person, place, and time  Skin: Skin is warm and dry  Psychiatric: She has a normal mood and affect  Vitals:    10/15/18 1602   BP: 126/70   BP Location: Right arm   Patient Position: Sitting   Pulse: 72   Resp: 18   Weight: 113 kg (250 lb)   Height: 5' 6" (1 676 m)       Patient Active Problem List   Diagnosis    Anemia    Chronic gout    [facility] stage 3 [facility]    Combined form of senile cataract of left eye    Cough    Depression    Edema    Fibromyalgia    Homocysteinemia ([address] )    Hyperlipidemia    Hypothyroidism    Hypokalemia    Hypertension    Low back pain    Pain of left hip    Primary osteoarthritis of both hands    Uncontrolled diabetes with stage 3 chronic kidney disease GFR 30-59 ([facility])    Increased frequency of urination    Vitamin D deficiency    Folliculitis    Symptomatic varicose veins of both lower extremities    Intermittent claudication ([facility])       Past Surgical History:   Procedure Laterality Date    REPLACEMENT TOTAL KNEE         Family History   Problem Relation Age of Onset    Leukemia Mother     Eczema Father        Social History     Social History    Marital status: Single     Spouse name: N/A    Number of children: N/A    Years of education: N/A     Occupational History    Not on file       Social History Main Topics    Smoking status: Never Smoker    Smokeless tobacco: Never Used      Comment: per allscripts; former smoker    Alcohol use Yes      Comment: rare; social    Drug use: No    Sexual activity: Not on file     Other Topics Concern    Not on file     Social History Narrative    No narrative on file Allergies   Allergen Reactions    Prochlorperazine Anaphylaxis     lethargy, tongue and throat swelling    Ramipril Anaphylaxis     angioedema    Ace Inhibitors     Angiotensin Receptor Blockers          Current Outpatient Prescriptions:     amitriptyline (ELAVIL) 10 mg tablet, Take by mouth, Disp: , Rfl:     aspirin (ASPIRIN ADULT LOW DOSE) 81 mg EC tablet, Take 1 tablet by mouth daily, Disp: , Rfl:     cephalexin (KEFLEX) 500 mg capsule, Take 1 capsule (500 mg total) by mouth every 8 (eight) hours for 10 days, Disp: 30 capsule, Rfl: 0    Cetirizine HCl 10 MG CAPS, Take 10 mg by mouth, Disp: , Rfl:     cholecalciferol (VITAMIN D3) 1,000 units tablet, Take 1,000 Units by mouth daily, Disp: , Rfl:     citalopram (CeleXA) 40 mg tablet, Take 1 tablet by mouth daily, Disp: , Rfl:     febuxostat (ULORIC) 80 MG TABS, Take 1 tablet by mouth daily, Disp: , Rfl:     furosemide (LASIX) 40 mg tablet, Take 1 tablet (40 mg total) by mouth daily, Disp: 30 tablet, Rfl: 5    gabapentin (NEURONTIN) 300 mg capsule, Take 1 tablet by mouth 3 (three) times a day, Disp: , Rfl:     levothyroxine 50 mcg tablet, Take 1 tablet (50 mcg total) by mouth daily, Disp: 30 tablet, Rfl: 11    metolazone (ZAROXOLYN) 2 5 mg tablet, Take 1 tablet (2 5 mg total) by mouth 3 (three) times a week Monday, Wednesday, Friday, Disp: 30 tablet, Rfl: 2    metoprolol tartrate (LOPRESSOR) 25 mg tablet, TAKE ONE TABLET BY MOUTH EVERY DAY , Disp: 30 tablet, Rfl: 1    potassium chloride (K-DUR,KLOR-CON) 20 mEq tablet, Take 1 tablet (20 mEq total) by mouth daily, Disp: 30 tablet, Rfl: 5    predniSONE 5 mg tablet, Take 2 5 mg by mouth daily  , Disp: , Rfl:     simvastatin (ZOCOR) 10 mg tablet, Take 1 tablet (10 mg total) by mouth daily, Disp: 30 tablet, Rfl: 5    sitaGLIPtin (JANUVIA) 25 mg tablet, Take 1 tablet (25 mg total) by mouth daily, Disp: 30 tablet, Rfl: 3    traMADol (ULTRAM) 50 mg tablet, Take 1 tablet by mouth, Disp: , Rfl:

## 2018-10-15 NOTE — PATIENT INSTRUCTIONS
Symptomatic varicose veins to both legs  -we will initiate a 90 day trial of conservative therapy to include the daily use of 20-30 mm Hg knee-high compression stockings  -other things we discussed that may help manage her symptoms:  Periodic elevation, regular physical activity, and weight management  -after a trial of conservative therapy you will have a venous ultrasound to assess for venous insufficiency  -follow up with vascular surgeon after imaging for recheck of symptoms/to discuss further possible treatment options    Intermittent leg pain when walking  -you will have an ultrasound of the legs to look at your arteries to evaluate for peripheral arterial disease  -remain as active as possible

## 2018-10-15 NOTE — ASSESSMENT & PLAN NOTE
-describes classic claudication  Cramping pain to bilateral legs after 1 block relieved within 5 minutes with rest  -diminished pedal pulses    Risk factors to include uncontrolled diabetes, HTN and HLD  -will check VIKTOR

## 2018-10-16 DIAGNOSIS — F32.A DEPRESSION, UNSPECIFIED DEPRESSION TYPE: ICD-10-CM

## 2018-10-16 DIAGNOSIS — E87.6 HYPOKALEMIA: ICD-10-CM

## 2018-10-16 DIAGNOSIS — E03.9 HYPOTHYROIDISM, UNSPECIFIED TYPE: ICD-10-CM

## 2018-10-16 RX ORDER — CITALOPRAM 20 MG/1
TABLET ORAL
Qty: 90 TABLET | Refills: 0 | Status: SHIPPED | OUTPATIENT
Start: 2018-10-16 | End: 2019-01-14 | Stop reason: SDUPTHER

## 2018-10-16 RX ORDER — POTASSIUM CHLORIDE 20 MEQ/1
TABLET, EXTENDED RELEASE ORAL
Qty: 30 TABLET | Refills: 4 | Status: SHIPPED | OUTPATIENT
Start: 2018-10-16 | End: 2019-03-12 | Stop reason: SDUPTHER

## 2018-10-16 RX ORDER — LEVOTHYROXINE SODIUM 0.05 MG/1
TABLET ORAL
Qty: 30 TABLET | Refills: 10 | Status: SHIPPED | OUTPATIENT
Start: 2018-10-16 | End: 2019-05-10 | Stop reason: SDUPTHER

## 2018-11-05 ENCOUNTER — OFFICE VISIT (OUTPATIENT)
Dept: NEPHROLOGY | Facility: CLINIC | Age: 73
End: 2018-11-05
Payer: COMMERCIAL

## 2018-11-05 VITALS
BODY MASS INDEX: 40.92 KG/M2 | HEIGHT: 66 IN | WEIGHT: 254.6 LBS | DIASTOLIC BLOOD PRESSURE: 80 MMHG | HEART RATE: 70 BPM | SYSTOLIC BLOOD PRESSURE: 138 MMHG

## 2018-11-05 DIAGNOSIS — N18.30 CHRONIC KIDNEY DISEASE, STAGE 3 (HCC): ICD-10-CM

## 2018-11-05 LAB
SL AMB  POCT GLUCOSE, UA: ABNORMAL
SL AMB  POCT GLUCOSE, UA: NEGATIVE
SL AMB LEUKOCYTE ESTERASE,UA: ABNORMAL
SL AMB LEUKOCYTE ESTERASE,UA: NEGATIVE
SL AMB POCT BILIRUBIN,UA: ABNORMAL
SL AMB POCT BILIRUBIN,UA: NEGATIVE
SL AMB POCT BLOOD,UA: ABNORMAL
SL AMB POCT BLOOD,UA: ABNORMAL
SL AMB POCT CLARITY,UA: CLEAR
SL AMB POCT CLARITY,UA: CLEAR
SL AMB POCT COLOR,UA: YELLOW
SL AMB POCT COLOR,UA: YELLOW
SL AMB POCT KETONES,UA: ABNORMAL
SL AMB POCT KETONES,UA: NEGATIVE
SL AMB POCT NITRITE,UA: ABNORMAL
SL AMB POCT NITRITE,UA: NEGATIVE
SL AMB POCT PH,UA: 7.5
SL AMB POCT PH,UA: 7.5
SL AMB POCT SPECIFIC GRAVITY,UA: 1
SL AMB POCT SPECIFIC GRAVITY,UA: 1
SL AMB POCT URINE PROTEIN: ABNORMAL
SL AMB POCT URINE PROTEIN: ABNORMAL
SL AMB POCT UROBILINOGEN: 0.2
SL AMB POCT UROBILINOGEN: 0.2

## 2018-11-05 PROCEDURE — 99204 OFFICE O/P NEW MOD 45 MIN: CPT | Performed by: INTERNAL MEDICINE

## 2018-11-05 PROCEDURE — 3066F NEPHROPATHY DOC TX: CPT | Performed by: INTERNAL MEDICINE

## 2018-11-05 PROCEDURE — 81002 URINALYSIS NONAUTO W/O SCOPE: CPT | Performed by: INTERNAL MEDICINE

## 2018-11-05 NOTE — PROGRESS NOTES
Consultation - Nephrology   Donnell De Los Santos 74 y o  female MRN: [id number]  Unit/Bed#:  Encounter: [id number]      Assessment/Plan     Assessment / Plan:  1  Renal  The patient appears to have chronic renal insufficiency as I reviewed labs going back 3 years and it seems to fluctuate creatinine between 1 5 and 2 2  These fluctuations may be due to fluctuations in effective volume status related to her diuretic use as she is on these not truly for hypervolemic state but just to control peripheral edema  This can lead to some effective volume depletion from time to time  She also used to use a lot of nonsteroidals for aches and pains so she may have a little bit of nephrosclerosis  There was a urinalysis in January that showed no protein and today there was trace but I do not get the sense that she has significant proteinuria  I am going to check a BMP SPEP UPEP and urine protein estimation  I will check a renal ultrasound and then contact the patient with these results  If there is no significant proteinuria the other tests are normal she likely has underlying nephrosclerosis with fluctuations related to diuretic use the we will monitor this  I will contact her with the results  History of Present Illness   Physician Requesting Consult: No att  providers found  Reason for Consult / Principal Problem:   Renal insufficiency  Hx and PE limited by:   HPI: Donnell De Los Santos is a 74y o  year old female who presents for evaluation of elevated creatinine  The patient was in her usual state of health had some routine blood work done with her visit to her family physician was found have a creatinine that was elevated and given the fluctuations she is here for evaluation  History obtained from chart review and the patient  Consults    Review of Systems   Constitutional: Negative for appetite change, chills and fever  HENT: Negative  No history of diabetic retinopathy  Eyes: Negative  Respiratory: Negative  Negative for cough, chest tightness and shortness of breath  Cardiovascular: Negative for chest pain  At times she has leg swelling  Gastrointestinal: Negative  Negative for abdominal distention, abdominal pain, diarrhea, nausea and vomiting  Genitourinary: Negative for difficulty urinating, dysuria and hematuria  Musculoskeletal: Positive for arthralgias and myalgias  Skin: Negative for rash  Neurological: Negative          Historical Information   Patient Active Problem List   Diagnosis    Anemia    Chronic gout    Chronic kidney disease, stage 3 ([facility] [address] )    Combined form of senile cataract of left eye    Cough    Depression    Edema    Fibromyalgia    [facility]    Hyperlipidemia    Hypothyroidism    Hypokalemia    Hypertension    Low back pain    Pain of left hip    Primary osteoarthritis of both hands    Uncontrolled diabetes with stage 3 chronic kidney disease GFR 30-59 ([facility]    Increased frequency of urination    Vitamin D deficiency    Folliculitis    Symptomatic varicose veins of both lower extremities    Intermittent claudication ([facility] )     Past Medical History:   Diagnosis Date    Abnormal blood chemistry     last assessed 11/13/2013    Angina pectoris ([facility] )     last assessed 11/012/13    Arthritis     last assessed 11/13/2013    Asthma     Athlete's foot     resolved 10/11/17    Cataract     last assessed 03/18/2016    Diabetes mellitus with kidney disease ([facility] )     last assessed 10/03/17    Eczema     Gait disorder     last assessed 11/12/2013    Idiopathic thrombocytopenia purpura ([facility] [address] )     last assessed 08/06/15    Overactive bladder     Post menopausal syndrome     resolved 10/11/17    Restless legs syndrome     last assessed 10/24/2013    Subclinical hypothyroidism     last assessed 12/11/17    Urinary frequency     resolved 12/18/2014     Past Surgical History:   Procedure Laterality Date    REPLACEMENT TOTAL KNEE       Social History   History   Alcohol Use    Yes     Comment: rare; social     History   Drug Use No     History   Smoking Status    Never Smoker   Smokeless Tobacco    Never Used     Comment: per allscripts; former smoker     Family History   Problem Relation Age of Onset    Leukemia Mother     Eczema Father        Meds/Allergies   current meds:   No current facility-administered medications for this visit  Allergies   Allergen Reactions    Prochlorperazine Anaphylaxis     lethargy, tongue and throat swelling    Ramipril Anaphylaxis     angioedema    Ace Inhibitors     Angiotensin Receptor Blockers        Objective   [unfilled]  Body mass index is 41 09 kg/m²  Invasive Devices:        PHYSICAL EXAM:  /80 (BP Location: Right arm, Patient Position: Sitting, Cuff Size: Standard)   Pulse 70   Ht 5' 6" (1 676 m)   Wt 115 kg (254 lb 9 6 oz)   BMI 41 09 kg/m²     Physical Exam   Constitutional: She is oriented to person, place, and time  She appears well-nourished  No distress  HENT:   Head: Atraumatic  Mouth/Throat: No oropharyngeal exudate  Eyes: Conjunctivae are normal  No scleral icterus  Neck: Normal range of motion  Neck supple  No JVD present  Cardiovascular: Normal rate and regular rhythm  Exam reveals no friction rub  No significant pretibial edema  Pulmonary/Chest: Effort normal and breath sounds normal  No respiratory distress  She has no wheezes  She has no rales  Abdominal: Soft  Bowel sounds are normal  She exhibits no distension  There is no tenderness  There is no rebound  Neurological: She is alert and oriented to person, place, and time           Current Weight: Weight - Scale: 115 kg (254 lb 9 6 oz)  First Weight: Weight - Scale: 115 kg (254 lb 9 6 oz)    Lab Results:              Invalid input(s): LABGLOM        Invalid input(s): LABALBU

## 2018-11-05 NOTE — PATIENT INSTRUCTIONS
You are here for your initial evaluation for abnormal kidney function  The creatinine which is the blood test for the kidney function seems to fluctuate between 1 5 and 2 over the last 3 years  I do not see signs of aggressive kidney disease lacking protein in the urine and you also have only had diabetes for 2 years so that would not be the cause of this which is a good thing  Your blood pressure is controlled  I suspect the fluctuations are related to the diuretic use for swelling in her legs  We will continue to monitor this  I am going to check a few tests and kidney ultrasound I will contact you if there is any significant abnormality  Obtain lab work and ultrasound as discussed  Follow-up as scheduled

## 2018-11-05 NOTE — LETTER
November 5, 2018     Harjinder AlmarazDO billy  [address]    Patient: Donnell De Los Santos   YOB: 1945   Date of Visit: 11/5/2018       Dear Dr Nathalie Jay:    Thank you for referring Donnell De Los Santos to me for evaluation  Below are my notes for this consultation  If you have questions, please do not hesitate to call me  I look forward to following your patient along with you  Sincerely,        Mirian Fragoso MD        CC: No Recipients  Mirian Fragoso MD  11/5/2018  1:57 PM  Sign at close encounter  Consultation - Nephrology   Donnell De Los Santos 74 y o  female MRN: [id number]  Unit/Bed#:  Encounter: [id number]      Assessment/Plan     Assessment / Plan:  1  Renal  The patient appears to have chronic renal insufficiency as I reviewed labs going back 3 years and it seems to fluctuate creatinine between 1 5 and 2 2  These fluctuations may be due to fluctuations in effective volume status related to her diuretic use as she is on these not truly for hypervolemic state but just to control peripheral edema  This can lead to some effective volume depletion from time to time  She also used to use a lot of nonsteroidals for aches and pains so she may have a little bit of nephrosclerosis  There was a urinalysis in January that showed no protein and today there was trace but I do not get the sense that she has significant proteinuria  I am going to check a BMP SPEP UPEP and urine protein estimation  I will check a renal ultrasound and then contact the patient with these results  If there is no significant proteinuria the other tests are normal she likely has underlying nephrosclerosis with fluctuations related to diuretic use the we will monitor this  I will contact her with the results      History of Present Illness   Physician Requesting Consult: No att  providers found  Reason for Consult / Principal Problem:   Renal insufficiency  Hx and PE limited by:   HPI: [place] Jeannette Haro is a 74y o  year old female who presents for evaluation of elevated creatinine  The patient was in her usual state of health had some routine blood work done with her visit to her family physician was found have a creatinine that was elevated and given the fluctuations she is here for evaluation  History obtained from chart review and the patient  Consults    Review of Systems   Constitutional: Negative for appetite change, chills and fever  HENT: Negative  No history of diabetic retinopathy  Eyes: Negative  Respiratory: Negative  Negative for cough, chest tightness and shortness of breath  Cardiovascular: Negative for chest pain  At times she has leg swelling  Gastrointestinal: Negative  Negative for abdominal distention, abdominal pain, diarrhea, nausea and vomiting  Genitourinary: Negative for difficulty urinating, dysuria and hematuria  Musculoskeletal: Positive for arthralgias and myalgias  Skin: Negative for rash  Neurological: Negative          Historical Information   Patient Active Problem List   Diagnosis    Anemia    Chronic gout    Chronic kidney disease, stage 3 ([address] )    Combined form of senile cataract of left eye    Cough    Depression    Edema    Fibromyalgia    Homocysteinemia ([address] )    Hyperlipidemia    Hypothyroidism    Hypokalemia    Hypertension    Low back pain    Pain of left hip    Primary osteoarthritis of both hands    Uncontrolled diabetes with stage 3 chronic kidney disease GFR 30-59 ([address] )    Increased frequency of urination    Vitamin D deficiency    Folliculitis    Symptomatic varicose veins of both lower extremities    Intermittent claudication ([address] )     Past Medical History:   Diagnosis Date    Abnormal blood chemistry     last assessed 11/13/2013    Angina pectoris ([address] )     last assessed 11/012/13    Arthritis     last assessed 11/13/2013    Asthma     Athlete's foot     resolved 10/11/17    Cataract last assessed 03/18/2016    Diabetes mellitus with kidney disease ([facility] [address] )     last assessed 10/03/17    Eczema     Gait disorder     last assessed 11/12/2013    Idiopathic thrombocytopenia purpura ([facility] [address] )     last assessed 08/06/15    Overactive bladder     Post menopausal syndrome     resolved 10/11/17    Restless legs syndrome     last assessed 10/24/2013    Subclinical hypothyroidism     last assessed 12/11/17    Urinary frequency     resolved 12/18/2014     Past Surgical History:   Procedure Laterality Date    REPLACEMENT TOTAL KNEE       Social History   History   Alcohol Use    Yes     Comment: rare; social     History   Drug Use No     History   Smoking Status    Never Smoker   Smokeless Tobacco    Never Used     Comment: per allscripts; former smoker     Family History   Problem Relation Age of Onset    Leukemia Mother     Eczema Father        Meds/Allergies   current meds:   No current facility-administered medications for this visit  Allergies   Allergen Reactions    Prochlorperazine Anaphylaxis     lethargy, tongue and throat swelling    Ramipril Anaphylaxis     angioedema    Ace Inhibitors     Angiotensin Receptor Blockers        Objective   [unfilled]  Body mass index is 41 09 kg/m²  Invasive Devices:        PHYSICAL EXAM:  /80 (BP Location: Right arm, Patient Position: Sitting, Cuff Size: Standard)   Pulse 70   Ht 5' 6" (1 676 m)   Wt 115 kg (254 lb 9 6 oz)   BMI 41 09 kg/m²      Physical Exam   Constitutional: She is oriented to person, place, and time  She appears well-nourished  No distress  HENT:   Head: Atraumatic  Mouth/Throat: No oropharyngeal exudate  Eyes: Conjunctivae are normal  No scleral icterus  Neck: Normal range of motion  Neck supple  No JVD present  Cardiovascular: Normal rate and regular rhythm  Exam reveals no friction rub  No significant pretibial edema  Pulmonary/Chest: Effort normal and breath sounds normal  No respiratory distress  She has no wheezes  She has no rales  Abdominal: Soft  Bowel sounds are normal  She exhibits no distension  There is no tenderness  There is no rebound  Neurological: She is alert and oriented to person, place, and time           Current Weight: Weight - Scale: 115 kg (254 lb 9 6 oz)  First Weight: Weight - Scale: 115 kg (254 lb 9 6 oz)    Lab Results:              Invalid input(s): LABGLOM        Invalid input(s): LABALBU

## 2018-11-09 DIAGNOSIS — E11.9 TYPE 2 DIABETES MELLITUS WITHOUT COMPLICATION, WITHOUT LONG-TERM CURRENT USE OF INSULIN (HCC): ICD-10-CM

## 2018-11-20 ENCOUNTER — OFFICE VISIT (OUTPATIENT)
Dept: FAMILY MEDICINE CLINIC | Facility: CLINIC | Age: 73
End: 2018-11-20
Payer: COMMERCIAL

## 2018-11-20 VITALS
OXYGEN SATURATION: 98 % | SYSTOLIC BLOOD PRESSURE: 112 MMHG | DIASTOLIC BLOOD PRESSURE: 64 MMHG | TEMPERATURE: 98 F | RESPIRATION RATE: 18 BRPM | WEIGHT: 251.6 LBS | HEART RATE: 68 BPM | BODY MASS INDEX: 40.43 KG/M2 | HEIGHT: 66 IN

## 2018-11-20 DIAGNOSIS — J40 BRONCHITIS: ICD-10-CM

## 2018-11-20 DIAGNOSIS — J01.90 ACUTE SINUSITIS, RECURRENCE NOT SPECIFIED, UNSPECIFIED LOCATION: Primary | ICD-10-CM

## 2018-11-20 DIAGNOSIS — J45.20 MILD INTERMITTENT ASTHMA WITHOUT COMPLICATION: ICD-10-CM

## 2018-11-20 PROCEDURE — 1036F TOBACCO NON-USER: CPT | Performed by: NURSE PRACTITIONER

## 2018-11-20 PROCEDURE — 1160F RVW MEDS BY RX/DR IN RCRD: CPT | Performed by: NURSE PRACTITIONER

## 2018-11-20 PROCEDURE — 99213 OFFICE O/P EST LOW 20 MIN: CPT | Performed by: NURSE PRACTITIONER

## 2018-11-20 PROCEDURE — 3008F BODY MASS INDEX DOCD: CPT | Performed by: NURSE PRACTITIONER

## 2018-11-20 RX ORDER — LEVOFLOXACIN 500 MG/1
500 TABLET, FILM COATED ORAL EVERY 24 HOURS
Qty: 7 TABLET | Refills: 0 | Status: SHIPPED | OUTPATIENT
Start: 2018-11-20 | End: 2018-11-27

## 2018-11-20 RX ORDER — ALBUTEROL SULFATE 90 UG/1
2 AEROSOL, METERED RESPIRATORY (INHALATION) EVERY 6 HOURS PRN
Qty: 8.5 G | Refills: 0 | Status: SHIPPED | OUTPATIENT
Start: 2018-11-20 | End: 2019-06-28 | Stop reason: SDUPTHER

## 2018-11-20 NOTE — PROGRESS NOTES
[facility]    ASSESSMENT AND PLAN     1  Acute sinusitis, recurrence not specified, unspecified location  77-year-old woman presents today with an 8 day history of sinus pain and pressure, congestion and a productive cough  She is generally ill appearing  She has trialed 3 OTC medications but none have been effective  Physical assessment today supports a diagnosis of acute sinusitis/bronchitis  She does have some expiratory wheeze in all fields  She was recently seen by Dr Kassidy Hill and he discontinued her daily prednisone  She was taking 5 mg a day for her arthritis and fibromyalgia  She would prefer to not go back on any steroid taper  She does have asthma by history but does not have a current albuterol inhaler  I will prescribe that today for her to use as needed  Symptom management was reviewed:  increased rest, increase fluids, may use OTC cough suppressant as needed  She is to call the office if her symptoms persist or worsen  - levofloxacin (LEVAQUIN) 500 mg tablet; Take 1 tablet (500 mg total) by mouth every 24 hours for 7 days  Dispense: 7 tablet; Refill: 0    2  Bronchitis  - levofloxacin (LEVAQUIN) 500 mg tablet; Take 1 tablet (500 mg total) by mouth every 24 hours for 7 days  Dispense: 7 tablet; Refill: 0  - albuterol (PROAIR HFA) 90 mcg/act inhaler; Inhale 2 puffs every 6 (six) hours as needed for wheezing  Dispense: 8 5 g; Refill: 0    3  Mild intermittent asthma without complication  - albuterol (PROAIR HFA) 90 mcg/act inhaler; Inhale 2 puffs every 6 (six) hours as needed for wheezing  Dispense: 8 5 g; Refill: 0        SUBJECTIVE       Patient ID: Nadir Montague is a 74 y o  female  Chief Complaint   Patient presents with    Cold Like Symptoms     x 1week, cough, post nasal drip, brown/yellow  sore throat,        HISTORY OF PRESENT ILLNESS    Patient presents today with an 8 day history of sinus pain/pressure, cough and congestion    She has trialed 3 different OTC medications but her symptoms have continued to worsen  She states her cough worsens at night  Is very productive and her mucus is now turning a deep brown  She believe she may have been running a fever, but never took her temperature  States she feels really bad  The following portions of the patient's history were reviewed and updated as appropriate: allergies, current medications, past medical history and problem list     REVIEW OF SYSTEMS  Review of Systems   Constitutional: Positive for activity change, chills, fatigue and fever  HENT: Positive for ear pain, sinus pain, sinus pressure and sore throat  Negative for ear discharge and trouble swallowing  Eyes: Negative  Respiratory: Positive for cough ( productive for thick yellow/brown mucus), chest tightness, shortness of breath and wheezing  Cardiovascular: Negative  Gastrointestinal: Positive for nausea  Negative for abdominal distention, abdominal pain, constipation, diarrhea and vomiting  Endocrine: Negative  Genitourinary: Negative  Musculoskeletal: Positive for myalgias  Psychiatric/Behavioral: Negative  OBJECTIVE      VITAL SIGNS  /64 (BP Location: Left arm, Patient Position: Sitting, Cuff Size: Large)   Pulse 68   Temp 98 °F (36 7 °C) (Tympanic)   Resp 18   Ht 5' 6" (1 676 m)   Wt 114 kg (251 lb 9 6 oz)   SpO2 98%   Breastfeeding? No   BMI 40 61 kg/m²       PHYSICAL EXAMINATION   Physical Exam   Constitutional: She is oriented to person, place, and time  She appears well-developed and well-nourished  She appears ill  HENT:   Head: Normocephalic  Right Ear: Hearing, tympanic membrane, external ear and ear canal normal  No middle ear effusion  Left Ear: Hearing, tympanic membrane, external ear and ear canal normal   No middle ear effusion  Nose: No mucosal edema  Right sinus exhibits maxillary sinus tenderness and frontal sinus tenderness   Left sinus exhibits maxillary sinus tenderness and frontal sinus tenderness  Mouth/Throat: Posterior oropharyngeal erythema present  Mucosa dry   Eyes: Conjunctivae are normal  Right eye exhibits no discharge  Left eye exhibits no discharge  Neck: Normal range of motion  Cardiovascular: Normal rate and regular rhythm  Pulmonary/Chest: Effort normal  No respiratory distress  She has wheezes (Throughout)  Abdominal: Soft  Normal appearance and bowel sounds are normal    Musculoskeletal: Normal range of motion  Lymphadenopathy:        Head (right side): No submental and no submandibular adenopathy present  Head (left side): No submental and no submandibular adenopathy present  She has cervical adenopathy  Neurological: She is alert and oriented to person, place, and time  Skin: Skin is warm, dry and intact  Psychiatric: She has a normal mood and affect  Her behavior is normal  Judgment and thought content normal  Cognition and memory are normal    Nursing note and vitals reviewed

## 2018-11-28 DIAGNOSIS — R60.9 EDEMA, UNSPECIFIED TYPE: ICD-10-CM

## 2018-11-28 RX ORDER — FUROSEMIDE 40 MG/1
TABLET ORAL
Qty: 30 TABLET | Refills: 4 | Status: SHIPPED | OUTPATIENT
Start: 2018-11-28 | End: 2019-05-27 | Stop reason: SDUPTHER

## 2018-12-07 DIAGNOSIS — I10 ESSENTIAL HYPERTENSION: ICD-10-CM

## 2018-12-20 LAB
ALBUMIN SERPL-MCNC: 4.2 G/DL (ref 3.6–5.1)
ALBUMIN/CREAT UR: 3 MCG/MG CREAT
ALBUMIN/GLOB SERPL: 1.6 (CALC) (ref 1–2.5)
ALP SERPL-CCNC: 61 U/L (ref 33–130)
ALT SERPL-CCNC: 8 U/L (ref 6–29)
AST SERPL-CCNC: 15 U/L (ref 10–35)
BASOPHILS # BLD AUTO: 73 CELLS/UL (ref 0–200)
BASOPHILS NFR BLD AUTO: 1 %
BILIRUB SERPL-MCNC: 0.6 MG/DL (ref 0.2–1.2)
BUN SERPL-MCNC: 61 MG/DL (ref 7–25)
BUN/CREAT SERPL: 26 (CALC) (ref 6–22)
CALCIUM SERPL-MCNC: 9.5 MG/DL (ref 8.6–10.4)
CHLORIDE SERPL-SCNC: 94 MMOL/L (ref 98–110)
CHOLEST SERPL-MCNC: 170 MG/DL
CHOLEST/HDLC SERPL: 3.8 (CALC)
CO2 SERPL-SCNC: 32 MMOL/L (ref 20–32)
CREAT SERPL-MCNC: 2.36 MG/DL (ref 0.6–0.93)
CREAT UR-MCNC: 102 MG/DL (ref 20–275)
EOSINOPHIL # BLD AUTO: 891 CELLS/UL (ref 15–500)
EOSINOPHIL NFR BLD AUTO: 12.2 %
ERYTHROCYTE [DISTWIDTH] IN BLOOD BY AUTOMATED COUNT: 12.2 % (ref 11–15)
GLOBULIN SER CALC-MCNC: 2.7 G/DL (CALC) (ref 1.9–3.7)
GLUCOSE SERPL-MCNC: 145 MG/DL (ref 65–99)
HCT VFR BLD AUTO: 38.6 % (ref 35–45)
HDLC SERPL-MCNC: 45 MG/DL
HGB BLD-MCNC: 12.7 G/DL (ref 11.7–15.5)
LDLC SERPL CALC-MCNC: 98 MG/DL (CALC)
LYMPHOCYTES # BLD AUTO: 1285 CELLS/UL (ref 850–3900)
LYMPHOCYTES NFR BLD AUTO: 17.6 %
MCH RBC QN AUTO: 30.4 PG (ref 27–33)
MCHC RBC AUTO-ENTMCNC: 32.9 G/DL (ref 32–36)
MCV RBC AUTO: 92.3 FL (ref 80–100)
MICROALBUMIN UR-MCNC: 0.3 MG/DL
MONOCYTES # BLD AUTO: 504 CELLS/UL (ref 200–950)
MONOCYTES NFR BLD AUTO: 6.9 %
NEUTROPHILS # BLD AUTO: 4548 CELLS/UL (ref 1500–7800)
NEUTROPHILS NFR BLD AUTO: 62.3 %
NONHDLC SERPL-MCNC: 125 MG/DL (CALC)
PLATELET # BLD AUTO: 237 THOUSAND/UL (ref 140–400)
PMV BLD REES-ECKER: 10 FL (ref 7.5–12.5)
POTASSIUM SERPL-SCNC: 3.3 MMOL/L (ref 3.5–5.3)
PROT SERPL-MCNC: 6.9 G/DL (ref 6.1–8.1)
RBC # BLD AUTO: 4.18 MILLION/UL (ref 3.8–5.1)
SL AMB EGFR AFRICAN AMERICAN: 23 ML/MIN/1.73M2
SL AMB EGFR NON AFRICAN AMERICAN: 19 ML/MIN/1.73M2
SODIUM SERPL-SCNC: 139 MMOL/L (ref 135–146)
TRIGL SERPL-MCNC: 171 MG/DL
TSH SERPL-ACNC: 2.43 MIU/L (ref 0.4–4.5)
WBC # BLD AUTO: 7.3 THOUSAND/UL (ref 3.8–10.8)

## 2018-12-27 ENCOUNTER — OFFICE VISIT (OUTPATIENT)
Dept: FAMILY MEDICINE CLINIC | Facility: CLINIC | Age: 73
End: 2018-12-27
Payer: COMMERCIAL

## 2018-12-27 VITALS
WEIGHT: 246.38 LBS | HEART RATE: 74 BPM | TEMPERATURE: 98 F | HEIGHT: 66 IN | BODY MASS INDEX: 39.6 KG/M2 | DIASTOLIC BLOOD PRESSURE: 80 MMHG | SYSTOLIC BLOOD PRESSURE: 126 MMHG | OXYGEN SATURATION: 97 % | RESPIRATION RATE: 16 BRPM

## 2018-12-27 DIAGNOSIS — E03.9 HYPOTHYROIDISM, UNSPECIFIED TYPE: ICD-10-CM

## 2018-12-27 DIAGNOSIS — N18.30 CHRONIC KIDNEY DISEASE, STAGE 3 (HCC): ICD-10-CM

## 2018-12-27 DIAGNOSIS — Z23 NEED FOR VACCINATION: Primary | ICD-10-CM

## 2018-12-27 DIAGNOSIS — E78.2 MIXED HYPERLIPIDEMIA: ICD-10-CM

## 2018-12-27 DIAGNOSIS — IMO0002 UNCONTROLLED DIABETES WITH STAGE 3 CHRONIC KIDNEY DISEASE GFR 30-59: ICD-10-CM

## 2018-12-27 DIAGNOSIS — Z00.00 ENCOUNTER FOR MEDICARE ANNUAL WELLNESS EXAM: ICD-10-CM

## 2018-12-27 DIAGNOSIS — M79.7 FIBROMYALGIA: ICD-10-CM

## 2018-12-27 DIAGNOSIS — F32.A DEPRESSION, UNSPECIFIED DEPRESSION TYPE: ICD-10-CM

## 2018-12-27 LAB — SL AMB POCT HEMOGLOBIN AIC: 7.1

## 2018-12-27 PROCEDURE — 99214 OFFICE O/P EST MOD 30 MIN: CPT | Performed by: FAMILY MEDICINE

## 2018-12-27 PROCEDURE — G0439 PPPS, SUBSEQ VISIT: HCPCS | Performed by: FAMILY MEDICINE

## 2018-12-27 PROCEDURE — 83036 HEMOGLOBIN GLYCOSYLATED A1C: CPT | Performed by: FAMILY MEDICINE

## 2018-12-27 NOTE — PROGRESS NOTES
[facility]      NAME: Indira Osman  AGE: 74 y o  SEX: female  : 1945   MRN: [id number]    DATE: 2018  TIME: 10:01 AM    Assessment and Plan     Problem List Items Addressed This Visit     Chronic kidney disease, stage 3 (HCC)      Creatinine 2 36  Recommend continue regular follow-up with Nephrology as directed         Relevant Orders    Comprehensive metabolic panel    Depression      Doing well on citalopram 40 mg daily without side effects         Fibromyalgia      Patient is still seeing rheumatologist, Dr Lizeth Yusuf regularly  No longer on prednisone  Doing reasonably well on gabapentin 300 t i d  Along with rare use of tramadol  No side effects on med         Hyperlipidemia      Cholesterol 170/98  Discussed LDL goal of less than 70 in diabetics  Patient would prefer to stay at simvastatin 10 mg for now, but is agreeable to increasing dose if LDL is not improved by next visit         Relevant Orders    Comprehensive metabolic panel    Lipid Panel with Direct LDL reflex    Hypothyroidism      Doing well on levothyroxine 50 mcg daily  TSH from  was normal         Relevant Orders    TSH, 3rd generation with Free T4 reflex    Uncontrolled diabetes with stage 3 chronic kidney disease GFR 30-59 (HCC)     Lab Results   Component Value Date    HGBA1C 6 8 (H) 08/15/2018       No results for input(s): POCGLU in the last 72 hours  Blood Sugar Average: Last 72 hrs:    Rapid A1c 7 1% today  Was 6 8%  Will maintain Januvia 25 mg daily for now  Discussed improving diet decreasing carbs increasing exercise    Will continue to monitor and consider additional medication if not improving         Relevant Orders    POCT hemoglobin A1c    Hemoglobin A1c (w/out EAG)    Microalbumin, Random Urine (W/Creatinine)      Other Visit Diagnoses     Need for vaccination    -  Primary    Relevant Medications    Zoster Vac Recomb Adjuvanted ([address]) 50 MCG/0 5ML SUSR Encounter for Medicare annual wellness exam                  Return to office in:  6 months, FB W prior at [facility]     Chief Complaint   Patient presents with    Follow-up     4m check up to chronic conditions and to discuss blood work from 12/19/18    Medicare Wellness Visit     Sub AWV       History of Present Illness      Patient presents for recheck of chronic medical problems today  Overall she is feeling well without complaints  Still sees Nephrology for her kidney problems  Doing well on citalopram for depression  No longer on prednisone for fibromyalgia  She is currently on gabapentin  Still sees Rheumatology  Doing well on simvastatin for cholesterol        The following portions of the patient's history were reviewed and updated as appropriate: allergies, current medications, past family history, past medical history, past social history, past surgical history and problem list     Review of Systems   Review of Systems   Respiratory: Negative  Cardiovascular: Negative  Gastrointestinal: Negative  Genitourinary: Negative          Active Problem List     Patient Active Problem List   Diagnosis    Anemia    Chronic gout    Chronic kidney disease, stage 3 (HCC)    Combined form of senile cataract of left eye    Cough    Depression    Edema    Fibromyalgia    Homocysteinemia ([address] )    Hyperlipidemia    Hypothyroidism    Hypokalemia    Hypertension    Low back pain    Pain of left hip    Primary osteoarthritis of both hands    Uncontrolled diabetes with stage 3 chronic kidney disease GFR 30-59 (HCC)    Increased frequency of urination    Vitamin D deficiency    Folliculitis    Symptomatic varicose veins of both lower extremities    Intermittent claudication (HCC)       Objective   /80 (BP Location: Left arm, Patient Position: Sitting, Cuff Size: Large)   Pulse 74   Temp 98 °F (36 7 °C) (Tympanic)   Resp 16   Ht 5' 6 14" (1 68 m)   Wt 112 kg (246 lb 6 oz)   SpO2 97%   Breastfeeding? No   BMI 39 60 kg/m²     Physical Exam   Cardiovascular: Normal rate, regular rhythm, normal heart sounds and intact distal pulses  Carotids: no bruits  Ext: no edema   Pulmonary/Chest: Effort normal  No respiratory distress  She has no wheezes  She has no rales  Psychiatric: She has a normal mood and affect   Her behavior is normal  Thought content normal        Pertinent Laboratory/Diagnostic Studies:   labs from December 19th    Current Medications     Current Outpatient Prescriptions:     albuterol (PROAIR HFA) 90 mcg/act inhaler, Inhale 2 puffs every 6 (six) hours as needed for wheezing, Disp: 8 5 g, Rfl: 0    amitriptyline (ELAVIL) 10 mg tablet, Take 10 mg by mouth daily  , Disp: , Rfl:     aspirin (ASPIRIN ADULT LOW DOSE) 81 mg EC tablet, Take 1 tablet by mouth daily, Disp: , Rfl:     Cetirizine HCl 10 MG CAPS, Take 10 mg by mouth daily  , Disp: , Rfl:     cholecalciferol (VITAMIN D3) 1,000 units tablet, Take 1,000 Units by mouth daily, Disp: , Rfl:     citalopram (CeleXA) 40 mg tablet, Take 1 tablet by mouth daily, Disp: , Rfl:     febuxostat (ULORIC) 80 MG TABS, Take 1 tablet by mouth daily, Disp: , Rfl:     furosemide (LASIX) 40 mg tablet, TAKE ONE TABLET BY MOUTH EVERY DAY , Disp: 30 tablet, Rfl: 4    gabapentin (NEURONTIN) 300 mg capsule, Take 1 tablet by mouth 3 (three) times a day, Disp: , Rfl:     levothyroxine 50 mcg tablet, TAKE ONE TABLET BY MOUTH EVERY DAY , Disp: 30 tablet, Rfl: 10    metolazone (ZAROXOLYN) 2 5 mg tablet, Take 1 tablet (2 5 mg total) by mouth 3 (three) times a week Monday, Wednesday, Friday, Disp: 30 tablet, Rfl: 2    metoprolol tartrate (LOPRESSOR) 25 mg tablet, TAKE ONE TABLET BY MOUTH EVERY DAY , Disp: 30 tablet, Rfl: 0    potassium chloride (K-DUR,KLOR-CON) 20 mEq tablet, TAKE ONE TABLET BY MOUTH EVERY DAY , Disp: 30 tablet, Rfl: 4    simvastatin (ZOCOR) 10 mg tablet, Take 1 tablet (10 mg total) by mouth daily, Disp: 30 tablet, Rfl: 5    sitaGLIPtin (JANUVIA) 25 mg tablet, Take 1 tablet (25 mg total) by mouth daily, Disp: 30 tablet, Rfl: 5    traMADol (ULTRAM) 50 mg tablet, Take 1 tablet by mouth, Disp: , Rfl:     citalopram (CeleXA) 20 mg tablet, TAKE ONE TABLET BY MOUTH EVERY DAY  (Patient not taking: Reported on 12/27/2018), Disp: 90 tablet, Rfl: 0    Zoster Vac Recomb Adjuvanted (SHINGRIX) 50 MCG/0 5ML SUSR, Inject 1 Syringe into a muscle once for 1 dose, Disp: 1 each, Rfl: 1    Health Maintenance     Health Maintenance   Topic Date Due    Medicare Annual Wellness Visit (AWV)  1945    SLP PLAN OF CARE  1945    Fall Risk  02/09/2008    Urinary Incontinence Screening  02/09/2008    Diabetic Foot Exam  11/11/2016    HEMOGLOBIN A1C  11/15/2018    DM Eye Exam  08/20/2019    URINE MICROALBUMIN  12/19/2019    CRC Screening: Colonoscopy  11/11/2023    DTaP,Tdap,and Td Vaccines (2 - Td) 08/06/2025    INFLUENZA VACCINE  Completed    Pneumococcal PPSV23/PCV13 65+ Years / Low and Medium Risk  Completed     Immunization History   Administered Date(s) Administered    Influenza 11/14/2012, 03/18/2013, 01/01/2014, 03/03/2014, 11/19/2014, 11/18/2015, 11/25/2016, 10/12/2017    Influenza Split High Dose Preservative Free IM 11/18/2015, 11/25/2016, 10/12/2017    Influenza TIV (IM) 09/09/2011, 10/15/2012, 09/25/2013, 01/01/2014    Influenza, high dose seasonal 0 5 mL 10/08/2018    Pneumococcal Conjugate 13-Valent 08/06/2015    Pneumococcal Polysaccharide PPV23 10/15/2008    Tdap 08/06/2015       Crystal Be DO  [facility]

## 2018-12-27 NOTE — ASSESSMENT & PLAN NOTE
Lab Results   Component Value Date    HGBA1C 6 8 (H) 08/15/2018       No results for input(s): POCGLU in the last 72 hours  Blood Sugar Average: Last 72 hrs:    Rapid A1c 7 1% today  Was 6 8%  Will maintain Januvia 25 mg daily for now  Discussed improving diet decreasing carbs increasing exercise    Will continue to monitor and consider additional medication if not improving

## 2018-12-27 NOTE — ASSESSMENT & PLAN NOTE
Cholesterol 170/98  Discussed LDL goal of less than 70 in diabetics     Patient would prefer to stay at simvastatin 10 mg for now, but is agreeable to increasing dose if LDL is not improved by next visit

## 2018-12-27 NOTE — ASSESSMENT & PLAN NOTE
Patient is still seeing rheumatologist, Dr Lizeth Yusuf regularly  No longer on prednisone  Doing reasonably well on gabapentin 300 t i d  Along with rare use of tramadol    No side effects on med

## 2018-12-27 NOTE — PROGRESS NOTES
Assessment and Plan:  Problem List Items Addressed This Visit     Chronic kidney disease, stage 3 ([address] )    Depression    Fibromyalgia    Hyperlipidemia    Hypothyroidism    Uncontrolled diabetes with stage 3 chronic kidney disease GFR 30-59 ([address] )      Other Visit Diagnoses     Need for vaccination    -  Primary    Encounter for Medicare annual wellness exam            Health Maintenance Due   Topic Date Due    Medicare Annual Wellness Visit (AWV)  1945    SLP PLAN OF CARE  1945    Fall Risk  02/09/2008    Urinary Incontinence Screening  02/09/2008    Diabetic Foot Exam  11/11/2016    HEMOGLOBIN A1C  11/15/2018      MEDICARE WELLNESS VISIT  PATIENT HAS A LIVING WILL AND HEALTHCARE POWER OF   DEPRESSION SCREEN WAS NEGATIVE, FALL RISK WAS NEGATIVE  PATIENT DOES HAVE SOME URINARY ISSUES, THESE WERE ADDRESSED DURING HER OFFICE VISIT NOTE TODAY  PATIENT IS CURRENT WITH AGE APPROPRIATE VACCINATIONS  RX GIVEN FOR SHINGRIX VACCINE    PATIENT IS CURRENT WITH COLONOSCOPY, MAMMOGRAPHY, DEXA    HPI:  Patient Active Problem List   Diagnosis    Anemia    Chronic gout    Chronic kidney disease, stage 3 ([address] )    Combined form of senile cataract of left eye    Cough    Depression    Edema    Fibromyalgia    Homocysteinemia ([address] )    Hyperlipidemia    Hypothyroidism    Hypokalemia    Hypertension    Low back pain    Pain of left hip    Primary osteoarthritis of both hands    Uncontrolled diabetes with stage 3 chronic kidney disease GFR 30-59 ([facility]    Increased frequency of urination    Vitamin D deficiency    Folliculitis    Symptomatic varicose veins of both lower extremities    Intermittent claudication ([address] )     Past Medical History:   Diagnosis Date    Abnormal blood chemistry     last assessed 11/13/2013    Angina pectoris ([address] )     last assessed 11/012/13    Arthritis     last assessed 11/13/2013    Asthma     Athlete's foot     resolved 10/11/17    Cataract     last assessed 03/18/2016    Diabetes mellitus with kidney disease ([facility] )     last assessed 10/03/17    Eczema     Gait disorder     last assessed 11/12/2013    Idiopathic thrombocytopenia purpura ([facility] )     last assessed 08/06/15    Overactive bladder     Post menopausal syndrome     resolved 10/11/17    Restless legs syndrome     last assessed 10/24/2013    Subclinical hypothyroidism     last assessed 12/11/17    Urinary frequency     resolved 12/18/2014     Past Surgical History:   Procedure Laterality Date    REPLACEMENT TOTAL KNEE       Family History   Problem Relation Age of Onset    Leukemia Mother     Eczema Father      History   Smoking Status    Never Smoker   Smokeless Tobacco    Never Used     Comment: per allscripts; former smoker     History   Alcohol Use    Yes     Comment: rare; social      History   Drug Use No         Current Outpatient Prescriptions   Medication Sig Dispense Refill    albuterol (PROAIR HFA) 90 mcg/act inhaler Inhale 2 puffs every 6 (six) hours as needed for wheezing 8 5 g 0    amitriptyline (ELAVIL) 10 mg tablet Take 10 mg by mouth daily        aspirin (ASPIRIN ADULT LOW DOSE) 81 mg EC tablet Take 1 tablet by mouth daily      Cetirizine HCl 10 MG CAPS Take 10 mg by mouth daily        cholecalciferol (VITAMIN D3) 1,000 units tablet Take 1,000 Units by mouth daily      citalopram (CeleXA) 40 mg tablet Take 1 tablet by mouth daily      febuxostat (ULORIC) 80 MG TABS Take 1 tablet by mouth daily      furosemide (LASIX) 40 mg tablet TAKE ONE TABLET BY MOUTH EVERY DAY  30 tablet 4    gabapentin (NEURONTIN) 300 mg capsule Take 1 tablet by mouth 3 (three) times a day      levothyroxine 50 mcg tablet TAKE ONE TABLET BY MOUTH EVERY DAY  30 tablet 10    metolazone (ZAROXOLYN) 2 5 mg tablet Take 1 tablet (2 5 mg total) by mouth 3 (three) times a week Monday, Wednesday, Friday 30 tablet 2    metoprolol tartrate (LOPRESSOR) 25 mg tablet TAKE ONE TABLET BY MOUTH EVERY DAY  30 tablet 0    potassium chloride (K-DUR,KLOR-CON) 20 mEq tablet TAKE ONE TABLET BY MOUTH EVERY DAY  30 tablet 4    simvastatin (ZOCOR) 10 mg tablet Take 1 tablet (10 mg total) by mouth daily 30 tablet 5    sitaGLIPtin (JANUVIA) 25 mg tablet Take 1 tablet (25 mg total) by mouth daily 30 tablet 5    traMADol (ULTRAM) 50 mg tablet Take 1 tablet by mouth      citalopram (CeleXA) 20 mg tablet TAKE ONE TABLET BY MOUTH EVERY DAY  (Patient not taking: Reported on 12/27/2018) 90 tablet 0     No current facility-administered medications for this visit  Allergies   Allergen Reactions    Prochlorperazine Anaphylaxis     lethargy, tongue and throat swelling    Ramipril Anaphylaxis     angioedema    Ace Inhibitors     Angiotensin Receptor Blockers      Immunization History   Administered Date(s) Administered    Influenza 11/14/2012, 03/18/2013, 01/01/2014, 03/03/2014, 11/19/2014, 11/18/2015, 11/25/2016, 10/12/2017    Influenza Split High Dose Preservative Free IM 11/18/2015, 11/25/2016, 10/12/2017    Influenza TIV (IM) 09/09/2011, 10/15/2012, 09/25/2013, 01/01/2014    Influenza, high dose seasonal 0 5 mL 10/08/2018    Pneumococcal Conjugate 13-Valent 08/06/2015    Pneumococcal Polysaccharide PPV23 10/15/2008    Tdap 08/06/2015       Patient Care Team:  Rico Domínguez,  as PCP - General  Rebecca Avila DO    Medicare Screening Tests and Risk Assessments:      Health Risk Assessment:  Patient rates overall health as fair  Patient feels that their physical health rating is Same  Eyesight was rated as Same  Hearing was rated as Same  Patient feels that their emotional and mental health rating is Same  Pain experienced by patient in the last 7 days has been None  Patient states that she has experienced no weight loss or gain in last 6 months  Emotional/Mental Health:  Patient has been feeling nervous/anxious  PHQ-9 Depression Screening:    Frequency of the following problems over the past two weeks:      2  Feeling down, depressed, or hopeless: 0 - not at all  PHQ-2 Score: 0          Broken Bones/Falls: Fall Risk Assessment:    In the past year, patient has experienced: No history of falling in past year          Bladder/Bowel:  Patient has leaked urine accidently in the last six months  Patient reports no loss of bowel control  Immunizations:  Patient has had a flu vaccination within the last year  Patient has received a pneumonia shot  Patient has not received a shingles shot  Patient has received tetanus/diphtheria shot  Date of tetanus/diphtheria shot: 8/6/2015    Home Safety:  Patient has trouble with stairs inside or outside of their home  Patient currently reports that there are safety hazards present in home , working smoke alarms, working carbon monoxide detectors  Preventative Screenings:   No breast cancer screening performed, colon cancer screen completed, 11/11/2013  cholesterol screen completed, 12/19/2018  glaucoma eye exam completed, 12/1/2018      Nutrition:  Current diet: Regular with servings of the following:    Medications:  Patient is currently taking over-the-counter supplements  Patient is able to manage medications  Lifestyle Choices:  Patient reports no tobacco use  Patient has not smoked or used tobacco in the past   Patient reports alcohol use  Alcohol use per week: 1  Patient drives a vehicle  Patient wears seat belt  Current level of exercise of physical activity described by patient as: Daily-rides bike  Activities of Daily Living:  Can get out of bed by his or her self, able to dress self, able to make own meals, able to do own shopping, able to bathe self, can do own laundry/housekeeping, can manage own money, pay bills and track expenses    Previous Hospitalizations:  No hospitalization or ED visit in past 12 months        Advanced Directives:  Patient has decided on a power of   Patient has spoken to designated power of     Patient has completed advanced directive  Preventative Screening/Counseling:      Cardiovascular:      General: Risks and Benefits Discussed          Diabetes:      General: Risks and Benefits Discussed          Colorectal Cancer:      General: Risks and Benefits Discussed          Breast Cancer:      General: Risks and Benefits Discussed          Cervical Cancer:      General: Risks and Benefits Discussed          Osteoporosis:      General: Risks and Benefits Discussed          AAA:      General: Risks and Benefits Discussed          Glaucoma:      General: Risks and Benefits Discussed          HIV:      General: Risks and Benefits Discussed          Hepatitis C:      General: Risks and Benefits Discussed        Advanced Directives:   Patient has living will for healthcare, Information on ACP and/or AD provided  End of life assessment reviewed with patient  Immunizations:      Influenza: Risks & Benefits Discussed and Influenza UTD This Year      Pneumococcal: Risks & Benefits Discussed and Lifetime Vaccine Completed      Shingrix: Risks & Benefits Discussed      TD: Risks & Benefits Discussed      Other Preventative Counseling (Non-Medicare):  Nutrition Counseling          No exam data present    Physical Exam:  Review of Systems   Gastrointestinal: Negative for bowel incontinence  Psychiatric/Behavioral: The patient is not nervous/anxious  Vitals:    12/27/18 0922   BP: 126/80   BP Location: Left arm   Patient Position: Sitting   Cuff Size: Large   Pulse: 74   Resp: 16   Temp: 98 °F (36 7 °C)   TempSrc: Tympanic   SpO2: 97%   Weight: 112 kg (246 lb 6 oz)   Height: 5' 6 14" (1 68 m)   Body mass index is 39 6 kg/m²      Physical Exam

## 2019-01-06 DIAGNOSIS — I10 ESSENTIAL HYPERTENSION: ICD-10-CM

## 2019-01-14 DIAGNOSIS — F32.A DEPRESSION, UNSPECIFIED DEPRESSION TYPE: ICD-10-CM

## 2019-01-14 RX ORDER — CITALOPRAM 20 MG/1
TABLET ORAL
Qty: 90 TABLET | Refills: 0 | Status: SHIPPED | OUTPATIENT
Start: 2019-01-14 | End: 2019-03-07 | Stop reason: CLARIF

## 2019-01-15 ENCOUNTER — HOSPITAL ENCOUNTER (OUTPATIENT)
Dept: ULTRASOUND IMAGING | Facility: MEDICAL CENTER | Age: 74
Discharge: HOME | End: 2019-01-15
Payer: COMMERCIAL

## 2019-01-15 DIAGNOSIS — N18.30 CHRONIC KIDNEY DISEASE, STAGE 3 (HCC): ICD-10-CM

## 2019-01-15 PROCEDURE — 76770 US EXAM ABDO BACK WALL COMP: CPT

## 2019-01-23 ENCOUNTER — HOSPITAL ENCOUNTER (OUTPATIENT)
Dept: NON INVASIVE DIAGNOSTICS | Facility: CLINIC | Age: 74
Discharge: HOME | End: 2019-01-23
Payer: COMMERCIAL

## 2019-01-23 DIAGNOSIS — I83.893 SYMPTOMATIC VARICOSE VEINS OF BOTH LOWER EXTREMITIES: ICD-10-CM

## 2019-01-23 PROCEDURE — 93970 EXTREMITY STUDY: CPT

## 2019-01-25 PROCEDURE — 93970 EXTREMITY STUDY: CPT | Performed by: SURGERY

## 2019-01-26 DIAGNOSIS — I10 ESSENTIAL HYPERTENSION: ICD-10-CM

## 2019-02-04 NOTE — PROGRESS NOTES
Symptomatic varicose veins of both lower extremities  75 y/o F with HTN, HLD, uncontrolled DM II, CKD III, hypothyroidism, and fibromyalgia, initially referred for evaluation of varicose veins  -BLE superficial varicosities with calf heaviness and chronic edema  Symptoms L > R  -LEVDR with deep and superficial venous incompetence left popliteal and calf  No RLE deep or superficial venous incompetence  -etiology of symptoms unclear and previously recommended VIKTOR not obtained although palpable femoral and DP pulses  No rest pain or tissue loss  -will obtain VIKTOR and re-evaluated in office final treatment recommendations  -continue compression stockings, lower extremity elevation, low-sodium diet and skin moisturization  -instructed to contact the office in the interim with any questions, concerns or new symptoms    Intermittent claudication ([facility] [address] )  -previously ordered VIKTOR not obtained  -will obtain VIKTOR and re-evaluate in office  -continue statin therapy    Uncontrolled diabetes with stage 3 chronic kidney disease GFR 30-59 ([address] )  Lab Results   Component Value Date    HGBA1C 7 1 12/27/2018   -continue to optimize BS control for prevention of disease  -management per PCP      Assessment/Plan   Diagnoses and all orders for this visit:    Symptomatic varicose veins of both lower extremities    Intermittent claudication ([address] )    Uncontrolled diabetes with stage 3 chronic kidney disease GFR 30-59 ([facility] [address] )        No chief complaint on file  Subjective   Patient ID: Luisa Weiss is a 74 y o  female  Chief complaint: pt is here to review LEVDR done 1/23/19   Pt c/o bilateral VV  Pt c/o cramping at night, heaviness and pain with swelling  L>R  Pt is wearing compression daily  Pt is elevating  Pt denies open wounds or sores  Pt is on asa      75 y/o F with HTN, HLD, uncontrolled DM II, CKD III, hypothyroidism, fibromyalgia and bilateral symptomatic varicose veins who returns for review of LEVDR    At last appointment, the patient also complained of bilateral 1 block calf claudication with palpable pulses and VIKTOR was recommended  No VIKTOR performed  LEVDR demonstrates no evidence of RLE deep or superficial venous incompetence but L popliteal deep venous incompetence and L mid calf GSV incompetence  The patient complains of bilateral lower extremity heaviness with standing or sitting for prolonged periods of times  She also complains of bilateral calf fatigue and heaviness with ambulating short distances  She presently denies calf pain  She denies buttocks or thigh claudication  She denies rest pain or tissue loss  Patient has no prior history of DVT, PE or hypercoagulable disorder  She denies history of recurrent superficial thrombophlebitis, cellulitis, bleeding varicosities or venous ulceration  The following portions of the patient's history were reviewed and updated as appropriate: allergies, current medications, past family history, past medical history, past social history, past surgical history and problem list     Review of Systems   Constitutional: Negative  HENT: Positive for hearing loss  Eyes: Negative  Respiratory: Positive for wheezing  Cardiovascular: Positive for leg swelling  Painful veins     Gastrointestinal: Negative  Endocrine: Negative  Genitourinary: Negative  Musculoskeletal: Negative  Leg cramping   Skin: Positive for color change  Allergic/Immunologic: Negative  Neurological: Negative  Hematological: Bruises/bleeds easily  Psychiatric/Behavioral: Positive for sleep disturbance  I have personally reviewed the ROS entered by MA and agree as documented      Patient Active Problem List   Diagnosis    Anemia    Chronic gout    Chronic kidney disease, stage 3 (HCC)    Combined form of senile cataract of left eye    Cough    Depression    Edema    Fibromyalgia    Homocysteinemia ([facility] [address] )    Hyperlipidemia    Hypothyroidism    Hypokalemia    Hypertension    Low back pain    Pain of left hip    Primary osteoarthritis of both hands    Uncontrolled diabetes with stage 3 chronic kidney disease GFR 30-59 (HCC)    Increased frequency of urination    Vitamin D deficiency    Folliculitis    Symptomatic varicose veins of both lower extremities    Intermittent claudication (HCC)       Past Surgical History:   Procedure Laterality Date    REPLACEMENT TOTAL KNEE         Family History   Problem Relation Age of Onset    Leukemia Mother     Eczema Father        Social History     Social History    Marital status: Single     Spouse name: N/A    Number of children: N/A    Years of education: N/A     Occupational History    Not on file       Social History Main Topics    Smoking status: Never Smoker    Smokeless tobacco: Never Used      Comment: per allscripts; former smoker    Alcohol use Yes      Comment: rare; social    Drug use: No    Sexual activity: Not on file     Other Topics Concern    Not on file     Social History Narrative    No narrative on file       Allergies   Allergen Reactions    Prochlorperazine Anaphylaxis     lethargy, tongue and throat swelling    Ramipril Anaphylaxis     angioedema    Ace Inhibitors     Angiotensin Receptor Blockers          Current Outpatient Prescriptions:     albuterol (PROAIR HFA) 90 mcg/act inhaler, Inhale 2 puffs every 6 (six) hours as needed for wheezing, Disp: 8 5 g, Rfl: 0    amitriptyline (ELAVIL) 10 mg tablet, Take 10 mg by mouth daily  , Disp: , Rfl:     aspirin (ASPIRIN ADULT LOW DOSE) 81 mg EC tablet, Take 1 tablet by mouth daily, Disp: , Rfl:     Cetirizine HCl 10 MG CAPS, Take 10 mg by mouth daily  , Disp: , Rfl:     cholecalciferol (VITAMIN D3) 1,000 units tablet, Take 1,000 Units by mouth daily, Disp: , Rfl:     citalopram (CeleXA) 20 mg tablet, TAKE ONE TABLET BY MOUTH EVERY DAY , Disp: 90 tablet, Rfl: 0    citalopram (CeleXA) 40 mg tablet, Take 1 tablet by mouth daily, Disp: , Rfl:     febuxostat (ULORIC) 80 MG TABS, Take 1 tablet by mouth daily, Disp: , Rfl:     furosemide (LASIX) 40 mg tablet, TAKE ONE TABLET BY MOUTH EVERY DAY , Disp: 30 tablet, Rfl: 4    gabapentin (NEURONTIN) 300 mg capsule, Take 1 tablet by mouth 3 (three) times a day, Disp: , Rfl:     levothyroxine 50 mcg tablet, TAKE ONE TABLET BY MOUTH EVERY DAY , Disp: 30 tablet, Rfl: 10    metolazone (ZAROXOLYN) 2 5 mg tablet, Take 1 tablet (2 5 mg total) by mouth 3 (three) times a week Monday, Wednesday, Friday, Disp: 30 tablet, Rfl: 2    metoprolol tartrate (LOPRESSOR) 25 mg tablet, TAKE ONE TABLET BY MOUTH EVERY DAY , Disp: 30 tablet, Rfl: 5    potassium chloride (K-DUR,KLOR-CON) 20 mEq tablet, TAKE ONE TABLET BY MOUTH EVERY DAY , Disp: 30 tablet, Rfl: 4    simvastatin (ZOCOR) 10 mg tablet, Take 1 tablet (10 mg total) by mouth daily, Disp: 30 tablet, Rfl: 5    sitaGLIPtin (JANUVIA) 25 mg tablet, Take 1 tablet (25 mg total) by mouth daily, Disp: 30 tablet, Rfl: 5    traMADol (ULTRAM) 50 mg tablet, Take 1 tablet by mouth, Disp: , Rfl:     Objective      Imaging study:  LEVDR 1/23/2019:  Imaging study reviewed and as described above    See full report below:   FINDINGS:  Right           Impression       Diameter AP    CFV             Normal (Patent)                 GSV Inguinal                            10 0    GSV Prox Thigh                           5 2    GSV Mid Thigh                            3 9    GSV Dist Thigh                           3 9    GSV Knee                                 4 3    GSV Ankle                                2 5    GSV Mid Calf                             2 1    SSV Mid Calf                             2 3    SSV Knee                                 2 8    SSV Ankle                                1 9       Left            Impression       Diameter AP  Valve   Reflux Time    CFV             Normal (Patent)                                      GSV Inguinal                            10 1 GSV Prox Thigh                           3 8                         GSV Mid Thigh                            4 4                         GSV Dist Thigh                           4 6                         GSV Knee                                 4 6                         GSV Ankle                                2 7                         GSV Mid Calf                             2 2  Reflux      0 36419    SSV Mid Calf                             2 7                         SSV Knee                                 2 9                         SSV Ankle                                2 2                         Popliteal                                     Reflux      1 88008             CONCLUSION:     Impression:  RIGHT LIMB:  No evidence of deep venous incompetence  The great saphenous vein is competent  The great saphenous vein remains within the saphenous compartment in the thigh  The small saphenous vein is competent and does not communicate with the  popliteal vein  There is no evidence of incompetent perforators in the thigh or calf  There is no evidence of deep vein thrombosis in the common femoral vein, the  proximal deep femoral vein, the femoral vein and the popliteal vein  LEFT LIMB:  Deep venous incompetence is noted in the popliteal vein  The great saphenous vein is incompetent at the mid calf level  The great saphenous vein remains within the saphenous compartment in the thigh  The small saphenous vein is competent and does not communicate with the  popliteal vein  There is no evidence of incompetent perforators in the thigh or calf    There is no evidence of deep vein thrombosis in the common femoral vein, the  proximal deep femoral vein, the femoral vein and the popliteal vein    Physical Exam:    General appearance: alert and oriented, in no acute distress  Skin: Skin color, texture, turgor normal  No rashes or lesions  Neurologic: Grossly normal  Head: Normocephalic, without obvious abnormality, atraumatic  Eyes: PERRL, EOMI, sclerae nonicteric  Throat: lips, mucosa, and tongue normal; teeth and gums normal  Neck: no adenopathy, no carotid bruit, no JVD, supple, symmetrical, trachea midline and thyroid not enlarged, symmetric, no tenderness/mass/nodules  Back: symmetric, no curvature  ROM normal  No CVA tenderness  Lungs: clear to auscultation bilaterally  Chest wall: no tenderness  Heart: regular rate and rhythm, S1, S2 normal, no murmur, click, rub or gallop  Abdomen: soft, non-tender; bowel sounds normal; no masses,  no organomegaly and Obese but nondistended  No abdominal bruits  No femoral bruits  Extremities: no ulcers, gangrene or trophic changes, varicose veins noted and Superficial varicosities noted of the anterior tibial area but no evidence of truncal varicosities  Well-healed surgical scar right knee  1+ edema bilateral anterior tibial area  No tissue loss  Bilateral lower extremities warm, pink and well perfused  Bilateral lower extremities motor and sensory intact    Palpable DP pulses    Pulse exam:  Radial: Right: 2+ Left[de-identified] 2+  Femoral: Right: 1+ Left: 1+  DP: Right: 2+ Left: 2+

## 2019-02-06 ENCOUNTER — OFFICE VISIT (OUTPATIENT)
Dept: VASCULAR SURGERY | Facility: CLINIC | Age: 74
End: 2019-02-06
Payer: COMMERCIAL

## 2019-02-06 VITALS
HEIGHT: 66 IN | SYSTOLIC BLOOD PRESSURE: 134 MMHG | DIASTOLIC BLOOD PRESSURE: 82 MMHG | WEIGHT: 243 LBS | BODY MASS INDEX: 39.05 KG/M2 | TEMPERATURE: 97.4 F

## 2019-02-06 DIAGNOSIS — I73.9 INTERMITTENT CLAUDICATION (HCC): ICD-10-CM

## 2019-02-06 DIAGNOSIS — I83.893 SYMPTOMATIC VARICOSE VEINS OF BOTH LOWER EXTREMITIES: Primary | ICD-10-CM

## 2019-02-06 DIAGNOSIS — IMO0002 UNCONTROLLED DIABETES WITH STAGE 3 CHRONIC KIDNEY DISEASE GFR 30-59: ICD-10-CM

## 2019-02-06 PROCEDURE — 99214 OFFICE O/P EST MOD 30 MIN: CPT | Performed by: PHYSICIAN ASSISTANT

## 2019-02-06 NOTE — ASSESSMENT & PLAN NOTE
Lab Results   Component Value Date    HGBA1C 7 1 12/27/2018   -continue to optimize BS control for prevention of disease  -management per PCP

## 2019-02-06 NOTE — ASSESSMENT & PLAN NOTE
73 y/o F with HTN, HLD, uncontrolled DM II, CKD III, hypothyroidism, and fibromyalgia, initially referred for evaluation of varicose veins  -BLE superficial varicosities with calf heaviness and chronic edema  Symptoms L > R  -LEVDR with deep and superficial venous incompetence left popliteal and calf  No RLE deep or superficial venous incompetence  -etiology of symptoms unclear and previously recommended VIKTOR not obtained although palpable femoral and DP pulses    No rest pain or tissue loss  -will obtain VIKTOR and re-evaluated in office final treatment recommendations  -continue compression stockings, lower extremity elevation, low-sodium diet and skin moisturization  -instructed to contact the office in the interim with any questions, concerns or new symptoms

## 2019-02-06 NOTE — PATIENT INSTRUCTIONS
-we will order a lower extremity arterial ultrasound to evaluate the arteries in your legs to rule out arterial source for your symptoms  -your venous reflux study shows normal vein function on the right leg with venous incompetence of the left calf   -continue to wear compression stockings, elevate your legs, low-sodium diet and skin moisturization  -return to office with Dr Silvia Farr after VIKTOR for final role treatment recommendations  -please contact the office in the interim with any questions, concerns or new symptoms

## 2019-02-06 NOTE — ASSESSMENT & PLAN NOTE
-previously ordered VIKTOR not obtained  -will obtain VIKTOR and re-evaluate in office  -continue statin therapy

## 2019-02-26 DIAGNOSIS — E78.2 MIXED HYPERLIPIDEMIA: ICD-10-CM

## 2019-02-26 RX ORDER — SIMVASTATIN 10 MG
TABLET ORAL
Qty: 30 TABLET | Refills: 4 | Status: SHIPPED | OUTPATIENT
Start: 2019-02-26 | End: 2019-08-09 | Stop reason: SDUPTHER

## 2019-02-28 LAB
BUN SERPL-MCNC: 61 MG/DL (ref 7–25)
BUN/CREAT SERPL: 29 (CALC) (ref 6–22)
CALCIUM SERPL-MCNC: 9.4 MG/DL (ref 8.6–10.4)
CHLORIDE SERPL-SCNC: 96 MMOL/L (ref 98–110)
CO2 SERPL-SCNC: 33 MMOL/L (ref 20–32)
CREAT SERPL-MCNC: 2.07 MG/DL (ref 0.6–0.93)
GLUCOSE SERPL-MCNC: 134 MG/DL (ref 65–99)
POTASSIUM SERPL-SCNC: 3.5 MMOL/L (ref 3.5–5.3)
SL AMB EGFR AFRICAN AMERICAN: 26 ML/MIN/1.73M2
SL AMB EGFR NON AFRICAN AMERICAN: 23 ML/MIN/1.73M2
SODIUM SERPL-SCNC: 141 MMOL/L (ref 135–146)

## 2019-03-06 ENCOUNTER — TELEPHONE (OUTPATIENT)
Dept: NEPHROLOGY | Facility: CLINIC | Age: 74
End: 2019-03-06

## 2019-03-07 ENCOUNTER — OFFICE VISIT (OUTPATIENT)
Dept: NEPHROLOGY | Facility: CLINIC | Age: 74
End: 2019-03-07
Payer: COMMERCIAL

## 2019-03-07 VITALS
BODY MASS INDEX: 40.82 KG/M2 | DIASTOLIC BLOOD PRESSURE: 78 MMHG | HEART RATE: 80 BPM | WEIGHT: 254 LBS | SYSTOLIC BLOOD PRESSURE: 120 MMHG | HEIGHT: 66 IN

## 2019-03-07 DIAGNOSIS — I10 ESSENTIAL HYPERTENSION: Primary | ICD-10-CM

## 2019-03-07 DIAGNOSIS — N18.9 CHRONIC RENAL IMPAIRMENT, UNSPECIFIED CKD STAGE: ICD-10-CM

## 2019-03-07 PROCEDURE — 99213 OFFICE O/P EST LOW 20 MIN: CPT | Performed by: INTERNAL MEDICINE

## 2019-03-07 NOTE — LETTER
March 7, 2019     DO [address]  [address] [facility]    Patient: Judi Mixon   YOB: 1945   Date of Visit: 3/7/2019       Dear Dr Juan Jose Monsivais:    Thank you for referring Judi Mixon to me for evaluation  Below are my notes for this consultation  If you have questions, please do not hesitate to call me  I look forward to following your patient along with you  Sincerely,        Eric Friend MD        CC: No Recipients  Eric Friend MD  3/7/2019  9:39 AM  Sign at close encounter  [address] [facility] 66 y o  female MRN: [id number]  Unit/Bed#:  Encounter: [id number]  Reason for Consult:  Chronic renal insufficiency    The patient is here for routine follow-up she states that her health been doing well she was stop from Prednisone she feels she has worse arthritis but other than that no intercurrent illnesses changes in medications or hospitalizations  ASSESSMENT/PLAN:  1  Renal  Patient's chronic renal insufficiency due to nephrosclerosis given lack of any significant proteinuria  Repeatedly microalbumin screens have been negative so this is not diabetic nephropathy  Creatinine remained stable at 2 which is approximately her baseline with no progression  For now blood pressure is under good control I encouraged continued good glycemic control and will monitor every 6 months with current medications  SUBJECTIVE:  Review of Systems   Constitution: Negative  HENT: Negative  Eyes: Negative  Cardiovascular: Negative  Negative for chest pain, leg swelling and orthopnea  Respiratory: Negative  Negative for cough and shortness of breath  Musculoskeletal: Positive for arthritis  Mostly in the hands  Gastrointestinal: Negative  Genitourinary: Negative  Neurological: Negative          OBJECTIVE:  Current Weight: Weight - Scale: 115 kg (254 lb)  [email or web address]     Blood pressure 120/78, pulse 80, height 5' 6" (1 676 m), weight 115 kg (254 lb), not currently breastfeeding  , Body mass index is 41 kg/m²  [unfilled]    Physical Exam: /78 (BP Location: Left arm, Patient Position: Sitting, Cuff Size: Standard)   Pulse 80   Ht 5' 6" (1 676 m)   Wt 115 kg (254 lb)   BMI 41 00 kg/m²    Physical Exam   Constitutional: She is oriented to person, place, and time  She appears well-nourished  No distress  HENT:   Head: Atraumatic  Eyes: No scleral icterus  Neck: Neck supple  No JVD present  Cardiovascular: Normal rate and regular rhythm  Exam reveals no friction rub  Mild edema with compression stockings  Pulmonary/Chest: Effort normal and breath sounds normal  No respiratory distress  She has no wheezes  She has no rales  Abdominal: Soft  Bowel sounds are normal  She exhibits no distension  There is no tenderness  Neurological: She is alert and oriented to person, place, and time         Medications:    Current Outpatient Medications:     albuterol (PROAIR HFA) 90 mcg/act inhaler, Inhale 2 puffs every 6 (six) hours as needed for wheezing, Disp: 8 5 g, Rfl: 0    amitriptyline (ELAVIL) 10 mg tablet, Take 10 mg by mouth daily  , Disp: , Rfl:     aspirin (ASPIRIN ADULT LOW DOSE) 81 mg EC tablet, Take 1 tablet by mouth daily, Disp: , Rfl:     Cetirizine HCl 10 MG CAPS, Take 10 mg by mouth daily  , Disp: , Rfl:     cholecalciferol (VITAMIN D3) 1,000 units tablet, Take 1,000 Units by mouth daily, Disp: , Rfl:     citalopram (CeleXA) 40 mg tablet, Take 1 tablet by mouth daily, Disp: , Rfl:     febuxostat (ULORIC) 80 MG TABS, Take 1 tablet by mouth daily, Disp: , Rfl:     furosemide (LASIX) 40 mg tablet, TAKE ONE TABLET BY MOUTH EVERY DAY , Disp: 30 tablet, Rfl: 4    gabapentin (NEURONTIN) 300 mg capsule, Take 1 tablet by mouth 3 (three) times a day, Disp: , Rfl:     levothyroxine 50 mcg tablet, TAKE ONE TABLET BY MOUTH EVERY DAY , Disp: 30 tablet, Rfl: 10    metolazone (ZAROXOLYN) 2 5 mg tablet, Take 1 tablet (2 5 mg total) by mouth 3 (three) times a week Monday, Wednesday, Friday, Disp: 30 tablet, Rfl: 2    metoprolol tartrate (LOPRESSOR) 25 mg tablet, TAKE ONE TABLET BY MOUTH EVERY DAY , Disp: 30 tablet, Rfl: 5    potassium chloride (K-DUR,KLOR-CON) 20 mEq tablet, TAKE ONE TABLET BY MOUTH EVERY DAY , Disp: 30 tablet, Rfl: 4    simvastatin (ZOCOR) 10 mg tablet, TAKE ONE TABLET BY MOUTH EVERY DAY , Disp: 30 tablet, Rfl: 4    sitaGLIPtin (JANUVIA) 25 mg tablet, Take 1 tablet (25 mg total) by mouth daily, Disp: 30 tablet, Rfl: 5    traMADol (ULTRAM) 50 mg tablet, Take 1 tablet by mouth, Disp: , Rfl:     Laboratory Results:  Lab Results   Component Value Date    WBC 7 3 12/19/2018    HGB 12 7 12/19/2018    HCT 38 6 12/19/2018    MCV 92 3 12/19/2018     12/19/2018     Lab Results   Component Value Date    GLUCOSE 167 (H) 08/06/2015    CALCIUM 9 4 02/27/2019     12/11/2017    K 3 5 02/27/2019    CO2 33 (H) 02/27/2019    CL 96 (L) 02/27/2019    BUN 61 (H) 02/27/2019    CREATININE 1 81 (H) 12/11/2017     Lab Results   Component Value Date    CALCIUM 9 4 02/27/2019     No results found for: LABPROT

## 2019-03-07 NOTE — LETTER
March 7, 2019     DO [address]  [address] [facility]    Patient: Kinga Walls   YOB: 1945   Date of Visit: 3/7/2019       Dear Dr Buck Torres:    Thank you for referring Kinga Walls to me for evaluation  Below are my notes for this consultation  If you have questions, please do not hesitate to call me  I look forward to following your patient along with you  Sincerely,        Siddharth Maldonado MD        CC: No Recipients  Siddharth Maldonado MD  3/7/2019  9:39 AM  Sign at close encounter  [address] [facility] 66 y o  female MRN: [id number]  Unit/Bed#:  Encounter: [id number]  Reason for Consult:  Chronic renal insufficiency    The patient is here for routine follow-up she states that her health been doing well she was stop from Prednisone she feels she has worse arthritis but other than that no intercurrent illnesses changes in medications or hospitalizations  ASSESSMENT/PLAN:  1  Renal  Patient's chronic renal insufficiency due to nephrosclerosis given lack of any significant proteinuria  Repeatedly microalbumin screens have been negative so this is not diabetic nephropathy  Creatinine remained stable at 2 which is approximately her baseline with no progression  For now blood pressure is under good control I encouraged continued good glycemic control and will monitor every 6 months with current medications  SUBJECTIVE:  Review of Systems   Constitution: Negative  HENT: Negative  Eyes: Negative  Cardiovascular: Negative  Negative for chest pain, leg swelling and orthopnea  Respiratory: Negative  Negative for cough and shortness of breath  Musculoskeletal: Positive for arthritis  Mostly in the hands  Gastrointestinal: Negative  Genitourinary: Negative  Neurological: Negative          OBJECTIVE:  Current Weight: Weight - Scale: 115 kg (254 lb)  [email or web address]:     Blood pressure 120/78, pulse 80, height 5' 6" (1 676 m), weight 115 kg (254 lb), not currently breastfeeding  , Body mass index is 41 kg/m²  [unfilled]    Physical Exam: /78 (BP Location: Left arm, Patient Position: Sitting, Cuff Size: Standard)   Pulse 80   Ht 5' 6" (1 676 m)   Wt 115 kg (254 lb)   BMI 41 00 kg/m²    Physical Exam   Constitutional: She is oriented to person, place, and time  She appears well-nourished  No distress  HENT:   Head: Atraumatic  Eyes: No scleral icterus  Neck: Neck supple  No JVD present  Cardiovascular: Normal rate and regular rhythm  Exam reveals no friction rub  Mild edema with compression stockings  Pulmonary/Chest: Effort normal and breath sounds normal  No respiratory distress  She has no wheezes  She has no rales  Abdominal: Soft  Bowel sounds are normal  She exhibits no distension  There is no tenderness  Neurological: She is alert and oriented to person, place, and time         Medications:    Current Outpatient Medications:     albuterol (PROAIR HFA) 90 mcg/act inhaler, Inhale 2 puffs every 6 (six) hours as needed for wheezing, Disp: 8 5 g, Rfl: 0    amitriptyline (ELAVIL) 10 mg tablet, Take 10 mg by mouth daily  , Disp: , Rfl:     aspirin (ASPIRIN ADULT LOW DOSE) 81 mg EC tablet, Take 1 tablet by mouth daily, Disp: , Rfl:     Cetirizine HCl 10 MG CAPS, Take 10 mg by mouth daily  , Disp: , Rfl:     cholecalciferol (VITAMIN D3) 1,000 units tablet, Take 1,000 Units by mouth daily, Disp: , Rfl:     citalopram (CeleXA) 40 mg tablet, Take 1 tablet by mouth daily, Disp: , Rfl:     febuxostat (ULORIC) 80 MG TABS, Take 1 tablet by mouth daily, Disp: , Rfl:     furosemide (LASIX) 40 mg tablet, TAKE ONE TABLET BY MOUTH EVERY DAY , Disp: 30 tablet, Rfl: 4    gabapentin (NEURONTIN) 300 mg capsule, Take 1 tablet by mouth 3 (three) times a day, Disp: , Rfl:     levothyroxine 50 mcg tablet, TAKE ONE TABLET BY MOUTH EVERY DAY , Disp: 30 tablet, Rfl: 10    metolazone (ZAROXOLYN) 2 5 mg tablet, Take 1 tablet (2 5 mg total) by mouth 3 (three) times a week Monday, Wednesday, Friday, Disp: 30 tablet, Rfl: 2    metoprolol tartrate (LOPRESSOR) 25 mg tablet, TAKE ONE TABLET BY MOUTH EVERY DAY , Disp: 30 tablet, Rfl: 5    potassium chloride (K-DUR,KLOR-CON) 20 mEq tablet, TAKE ONE TABLET BY MOUTH EVERY DAY , Disp: 30 tablet, Rfl: 4    simvastatin (ZOCOR) 10 mg tablet, TAKE ONE TABLET BY MOUTH EVERY DAY , Disp: 30 tablet, Rfl: 4    sitaGLIPtin (JANUVIA) 25 mg tablet, Take 1 tablet (25 mg total) by mouth daily, Disp: 30 tablet, Rfl: 5    traMADol (ULTRAM) 50 mg tablet, Take 1 tablet by mouth, Disp: , Rfl:     Laboratory Results:  Lab Results   Component Value Date    WBC 7 3 12/19/2018    HGB 12 7 12/19/2018    HCT 38 6 12/19/2018    MCV 92 3 12/19/2018     12/19/2018     Lab Results   Component Value Date    GLUCOSE 167 (H) 08/06/2015    CALCIUM 9 4 02/27/2019     12/11/2017    K 3 5 02/27/2019    CO2 33 (H) 02/27/2019    CL 96 (L) 02/27/2019    BUN 61 (H) 02/27/2019    CREATININE 1 81 (H) 12/11/2017     Lab Results   Component Value Date    CALCIUM 9 4 02/27/2019     No results found for: LABPROT

## 2019-03-07 NOTE — PROGRESS NOTES
NEPHROLOGY PROGRESS NOTE    Luisa Weiss 66 y o  female MRN: [id number]  Unit/Bed#:  Encounter: [id number]  Reason for Consult:  Chronic renal insufficiency    The patient is here for routine follow-up she states that her health been doing well she was stop from Prednisone she feels she has worse arthritis but other than that no intercurrent illnesses changes in medications or hospitalizations  ASSESSMENT/PLAN:  1  Renal  Patient's chronic renal insufficiency due to nephrosclerosis given lack of any significant proteinuria  Repeatedly microalbumin screens have been negative so this is not diabetic nephropathy  Creatinine remained stable at 2 which is approximately her baseline with no progression  For now blood pressure is under good control I encouraged continued good glycemic control and will monitor every 6 months with current medications  SUBJECTIVE:  Review of Systems   Constitution: Negative  HENT: Negative  Eyes: Negative  Cardiovascular: Negative  Negative for chest pain, leg swelling and orthopnea  Respiratory: Negative  Negative for cough and shortness of breath  Musculoskeletal: Positive for arthritis  Mostly in the hands  Gastrointestinal: Negative  Genitourinary: Negative  Neurological: Negative  OBJECTIVE:  Current Weight: Weight - Scale: 115 kg (254 lb)  [email or web address] com:     Blood pressure 120/78, pulse 80, height 5' 6" (1 676 m), weight 115 kg (254 lb), not currently breastfeeding  , Body mass index is 41 kg/m²  [unfilled]    Physical Exam: /78 (BP Location: Left arm, Patient Position: Sitting, Cuff Size: Standard)   Pulse 80   Ht 5' 6" (1 676 m)   Wt 115 kg (254 lb)   BMI 41 00 kg/m²   Physical Exam   Constitutional: She is oriented to person, place, and time  She appears well-nourished  No distress  HENT:   Head: Atraumatic  Eyes: No scleral icterus  Neck: Neck supple  No JVD present     Cardiovascular: Normal rate and regular rhythm  Exam reveals no friction rub  Mild edema with compression stockings  Pulmonary/Chest: Effort normal and breath sounds normal  No respiratory distress  She has no wheezes  She has no rales  Abdominal: Soft  Bowel sounds are normal  She exhibits no distension  There is no tenderness  Neurological: She is alert and oriented to person, place, and time         Medications:    Current Outpatient Medications:     albuterol (PROAIR HFA) 90 mcg/act inhaler, Inhale 2 puffs every 6 (six) hours as needed for wheezing, Disp: 8 5 g, Rfl: 0    amitriptyline (ELAVIL) 10 mg tablet, Take 10 mg by mouth daily  , Disp: , Rfl:     aspirin (ASPIRIN ADULT LOW DOSE) 81 mg EC tablet, Take 1 tablet by mouth daily, Disp: , Rfl:     Cetirizine HCl 10 MG CAPS, Take 10 mg by mouth daily  , Disp: , Rfl:     cholecalciferol (VITAMIN D3) 1,000 units tablet, Take 1,000 Units by mouth daily, Disp: , Rfl:     citalopram (CeleXA) 40 mg tablet, Take 1 tablet by mouth daily, Disp: , Rfl:     febuxostat (ULORIC) 80 MG TABS, Take 1 tablet by mouth daily, Disp: , Rfl:     furosemide (LASIX) 40 mg tablet, TAKE ONE TABLET BY MOUTH EVERY DAY , Disp: 30 tablet, Rfl: 4    gabapentin (NEURONTIN) 300 mg capsule, Take 1 tablet by mouth 3 (three) times a day, Disp: , Rfl:     levothyroxine 50 mcg tablet, TAKE ONE TABLET BY MOUTH EVERY DAY , Disp: 30 tablet, Rfl: 10    metolazone (ZAROXOLYN) 2 5 mg tablet, Take 1 tablet (2 5 mg total) by mouth 3 (three) times a week Monday, Wednesday, Friday, Disp: 30 tablet, Rfl: 2    metoprolol tartrate (LOPRESSOR) 25 mg tablet, TAKE ONE TABLET BY MOUTH EVERY DAY , Disp: 30 tablet, Rfl: 5    potassium chloride (K-DUR,KLOR-CON) 20 mEq tablet, TAKE ONE TABLET BY MOUTH EVERY DAY , Disp: 30 tablet, Rfl: 4    simvastatin (ZOCOR) 10 mg tablet, TAKE ONE TABLET BY MOUTH EVERY DAY , Disp: 30 tablet, Rfl: 4    sitaGLIPtin (JANUVIA) 25 mg tablet, Take 1 tablet (25 mg total) by mouth daily, Disp: 30 tablet, Rfl: 5    traMADol (ULTRAM) 50 mg tablet, Take 1 tablet by mouth, Disp: , Rfl:     Laboratory Results:  Lab Results   Component Value Date    WBC 7 3 12/19/2018    HGB 12 7 12/19/2018    HCT 38 6 12/19/2018    MCV 92 3 12/19/2018     12/19/2018     Lab Results   Component Value Date    GLUCOSE 167 (H) 08/06/2015    CALCIUM 9 4 02/27/2019     12/11/2017    K 3 5 02/27/2019    CO2 33 (H) 02/27/2019    CL 96 (L) 02/27/2019    BUN 61 (H) 02/27/2019    CREATININE 1 81 (H) 12/11/2017     Lab Results   Component Value Date    CALCIUM 9 4 02/27/2019     No results found for: LABPROT

## 2019-03-07 NOTE — PATIENT INSTRUCTIONS
You are here for follow-up to monitor your kidney function  Your creatinine which is the blood test for the kidney is a 2 which is stable going back close to 2 years  Sometimes it will fluctuate a little bit likely due to the water pill but overall it is stable there is no evidence of aggressive kidney disease including diabetes because there was no protein in the urine  Her blood pressure is excellent continue health maintenance and I will see him in 6 months with repeat labs

## 2019-03-08 ENCOUNTER — HOSPITAL ENCOUNTER (OUTPATIENT)
Dept: NON INVASIVE DIAGNOSTICS | Facility: CLINIC | Age: 74
Discharge: HOME | End: 2019-03-08
Payer: COMMERCIAL

## 2019-03-08 DIAGNOSIS — I73.9 INTERMITTENT CLAUDICATION (HCC): ICD-10-CM

## 2019-03-08 PROCEDURE — 93922 UPR/L XTREMITY ART 2 LEVELS: CPT | Performed by: SURGERY

## 2019-03-08 PROCEDURE — 93925 LOWER EXTREMITY STUDY: CPT

## 2019-03-08 PROCEDURE — 93925 LOWER EXTREMITY STUDY: CPT | Performed by: SURGERY

## 2019-03-08 PROCEDURE — 93923 UPR/LXTR ART STDY 3+ LVLS: CPT

## 2019-03-12 DIAGNOSIS — E87.6 HYPOKALEMIA: ICD-10-CM

## 2019-03-13 ENCOUNTER — OFFICE VISIT (OUTPATIENT)
Dept: VASCULAR SURGERY | Facility: CLINIC | Age: 74
End: 2019-03-13
Payer: COMMERCIAL

## 2019-03-13 VITALS
DIASTOLIC BLOOD PRESSURE: 82 MMHG | TEMPERATURE: 98 F | SYSTOLIC BLOOD PRESSURE: 134 MMHG | HEIGHT: 66 IN | BODY MASS INDEX: 40.02 KG/M2 | WEIGHT: 249 LBS

## 2019-03-13 DIAGNOSIS — I83.893 SYMPTOMATIC VARICOSE VEINS OF BOTH LOWER EXTREMITIES: Primary | ICD-10-CM

## 2019-03-13 DIAGNOSIS — M79.7 FIBROMYALGIA: ICD-10-CM

## 2019-03-13 PROCEDURE — 99214 OFFICE O/P EST MOD 30 MIN: CPT | Performed by: PHYSICIAN ASSISTANT

## 2019-03-13 NOTE — ASSESSMENT & PLAN NOTE
-continue medical regimen  -continue aerobic activity  -likely major etiology of lower extremity symptoms

## 2019-03-13 NOTE — ASSESSMENT & PLAN NOTE
75 y/o F with HTN, HLD, uncontrolled DM II, CKD III, hypothyroidism, and fibromyalgia, initially referred for evaluation of varicose veins  -BLE superficial varicosities with calf heaviness and chronic edema  Symptoms L > R however only deep and superficial venous incompetence left popliteal and calf without RLE venous incompetence    -etiology of symptoms unclear and likely multifactorial to include fibromyalgia  -no vascular intervention recommended  -continue compression stockings, lower extremity elevation, low-sodium diet and skin moisturization  -return to office p r n   -discussed with Dr Mariposa Diaz

## 2019-03-13 NOTE — PROGRESS NOTES
Symptomatic varicose veins of both lower extremities  75 y/o F with HTN, HLD, uncontrolled DM II, CKD III, hypothyroidism, and fibromyalgia, initially referred for evaluation of varicose veins  -BLE superficial varicosities with calf heaviness and chronic edema  Symptoms L > R  however only deep and superficial venous incompetence left popliteal and calf without RLE venous incompetence  -etiology of symptoms unclear and likely multifactorial to include fibromyalgia  -no vascular intervention recommended  -continue compression stockings, lower extremity elevation, low-sodium diet and skin moisturization  -return to office p r n   -discussed with Dr Perez Land  -continue medical regimen  -continue aerobic activity  -likely major etiology of lower extremity symptoms      Assessment/Plan   Diagnoses and all orders for this visit:    Symptomatic varicose veins of both lower extremities    Fibromyalgia        No chief complaint on file  Subjective   Patient ID: Julieth Diaz is a 66 y o  female  Chief complaint: Pt is here to review VIKTOR done 3/4/19  Pt is c/o intermittent bilateral claudication and heaviness after walking one block  Pt is c/o cramping pain at night  Pt denies open wounds or sores  Pt denies numbness and tingling  Pt is on asa and statin  67 y/o F with HTN, HLD, uncontrolled DM II, CKD III, hypothyroidism, fibromyalgia and bilateral symptomatic varicose veins with left popliteal deep venous incompetence and left GSV make calf superficial venous incompetence who returns for review of recent VIKTOR obtain secondary to possible 1 block claudication symptoms  VIKTOR reviewed demonstrates no evidence arterial occlusive disease bilaterally with normal ABIs and healing pressures  The patient continues to complains of bilateral lower extremity heaviness with standing or sitting for prolonged periods of times not particularly relieved with compression stockings    She also complains of bilateral calf fatigue and heaviness with ambulating short distances  She presently denies calf pain  She denies buttocks or thigh claudication  She denies rest pain or tissue loss  Patient has no prior history of DVT, PE or hypercoagulable disorder  She denies history of recurrent superficial thrombophlebitis, cellulitis, bleeding varicosities or venous ulceration  The following portions of the patient's history were reviewed and updated as appropriate: allergies, current medications, past family history, past medical history, past social history, past surgical history and problem list     Review of Systems   Constitutional: Negative  HENT: Negative  Eyes: Negative  Respiratory: Negative  Cardiovascular: Negative  Gastrointestinal: Negative  Endocrine: Negative  Genitourinary: Negative  Musculoskeletal: Positive for back pain  Leg pain   leg cramping   Skin: Negative  Allergic/Immunologic: Negative  Neurological: Negative  Hematological: Negative  Psychiatric/Behavioral: Positive for sleep disturbance  I have personally reviewed the ROS entered by MA and agree as documented      Patient Active Problem List   Diagnosis    Anemia    Chronic gout    [facility] 3 [facility]    Combined form of senile cataract of left eye    Cough    Depression    Edema    Fibromyalgia    Homocysteinemia ([address] )    Hyperlipidemia    Hypothyroidism    Hypokalemia    Hypertension    Low back pain    Pain of left hip    Primary osteoarthritis of both hands    Uncontrolled diabetes with stage 3 chronic kidney disease GFR 30-59 ([facility])    Increased frequency of urination    Vitamin D deficiency    Folliculitis    Symptomatic varicose veins of both lower extremities    Intermittent claudication ([facility])    CRI (chronic renal insufficiency)       Past Surgical History:   Procedure Laterality Date    REPLACEMENT TOTAL KNEE         Family History   Problem Relation Age of Onset    Leukemia Mother     Eczema Father        Social History     Socioeconomic History    Marital status: Single     Spouse name: Not on file    Number of children: Not on file    Years of education: Not on file    Highest education level: Not on file   Occupational History    Not on file   Social Needs    Financial resource strain: Not on file    Food insecurity:     Worry: Not on file     Inability: Not on file    Transportation needs:     Medical: Not on file     Non-medical: Not on file   Tobacco Use    Smoking status: Never Smoker    Smokeless tobacco: Never Used    Tobacco comment: per allscripts; former smoker   Substance and Sexual Activity    Alcohol use: Yes     Comment: rare; social    Drug use: No    Sexual activity: Not on file   Lifestyle    Physical activity:     Days per week: Not on file     Minutes per session: Not on file    Stress: Not on file   Relationships    Social connections:     Talks on phone: Not on file     Gets together: Not on file     Attends L[employer] day service: Not on file     Active member of club or organization: Not on file     Attends meetings of clubs or organizations: Not on file     Relationship status: Not on file    Intimate partner violence:     Fear of current or ex partner: Not on file     Emotionally abused: Not on file     Physically abused: Not on file     Forced sexual activity: Not on file   Other Topics Concern    Not on file   Social History Narrative    Not on file       Allergies   Allergen Reactions    Prochlorperazine Anaphylaxis     lethargy, tongue and throat swelling    Ramipril Anaphylaxis     angioedema    Ace Inhibitors     Angiotensin Receptor Blockers          Current Outpatient Medications:     albuterol (PROAIR HFA) 90 mcg/act inhaler, Inhale 2 puffs every 6 (six) hours as needed for wheezing, Disp: 8 5 g, Rfl: 0    amitriptyline (ELAVIL) 10 mg tablet, Take 10 mg by mouth daily  , Disp: , Rfl:    aspirin (ASPIRIN ADULT LOW DOSE) 81 mg EC tablet, Take 1 tablet by mouth daily, Disp: , Rfl:     Cetirizine HCl 10 MG CAPS, Take 10 mg by mouth daily  , Disp: , Rfl:     cholecalciferol (VITAMIN D3) 1,000 units tablet, Take 1,000 Units by mouth daily, Disp: , Rfl:     citalopram (CeleXA) 40 mg tablet, Take 1 tablet by mouth daily, Disp: , Rfl:     febuxostat (ULORIC) 80 MG TABS, Take 1 tablet by mouth daily, Disp: , Rfl:     furosemide (LASIX) 40 mg tablet, TAKE ONE TABLET BY MOUTH EVERY DAY , Disp: 30 tablet, Rfl: 4    gabapentin (NEURONTIN) 300 mg capsule, Take 1 tablet by mouth 3 (three) times a day, Disp: , Rfl:     levothyroxine 50 mcg tablet, TAKE ONE TABLET BY MOUTH EVERY DAY , Disp: 30 tablet, Rfl: 10    metolazone (ZAROXOLYN) 2 5 mg tablet, Take 1 tablet (2 5 mg total) by mouth 3 (three) times a week Monday, Wednesday, Friday, Disp: 30 tablet, Rfl: 2    metoprolol tartrate (LOPRESSOR) 25 mg tablet, TAKE ONE TABLET BY MOUTH EVERY DAY , Disp: 30 tablet, Rfl: 5    potassium chloride (K-DUR,KLOR-CON) 20 mEq tablet, TAKE ONE TABLET BY MOUTH EVERY DAY , Disp: 30 tablet, Rfl: 4    simvastatin (ZOCOR) 10 mg tablet, TAKE ONE TABLET BY MOUTH EVERY DAY , Disp: 30 tablet, Rfl: 4    sitaGLIPtin (JANUVIA) 25 mg tablet, Take 1 tablet (25 mg total) by mouth daily, Disp: 30 tablet, Rfl: 5    traMADol (ULTRAM) 50 mg tablet, Take 1 tablet by mouth, Disp: , Rfl:     Objective      Imaging studies:  VIKTOR 3/8/2019:  Imaging study reviewed and as described above    No evidence of arterial occlusive disease bilaterally with normal ABIs    Physical Exam:    General appearance: alert and oriented, in no acute distress  Skin: Skin color, texture, turgor normal  No rashes or lesions  Neurologic: Grossly normal  Head: Normocephalic, without obvious abnormality, atraumatic  Eyes: PERRL, EOMI, sclerae nonicteric  Throat: lips, mucosa, and tongue normal; teeth and gums normal  Neck: no adenopathy, no carotid bruit, no JVD, supple, symmetrical, trachea midline and thyroid not enlarged, symmetric, no tenderness/mass/nodules  Back: symmetric, no curvature  ROM normal  No CVA tenderness    Lungs: clear to auscultation bilaterally  Chest wall: no tenderness  Heart: regular rate and rhythm, S1, S2 normal, no murmur, click, rub or gallop  Abdomen: soft, non-tender; bowel sounds normal; no masses,  no organomegaly  Extremities: extremities normal, warm and well-perfused; no cyanosis, clubbing, or edema and Reticular superficial varicosities noted without venous ulceration hemosiderin staining    Pulse exam:  Radial: Right: 2+ Left[de-identified] 2+  DP: Right: 2+ Left: 2+  PT: Right: 2+ Left: 2+

## 2019-03-13 NOTE — PATIENT INSTRUCTIONS
Varicose Veins   WHAT YOU NEED TO KNOW:   What are varicose veins? Varicose veins are veins that become large, twisted, and swollen  They are common on the back of the calves, knees, and thighs  Varicose veins are caused by valves in your veins that do not work properly  This causes blood to collect and increase pressure in the veins of your legs  The increased pressure causes your veins to stretch, get larger, swell, and twist        What increases my risk for varicose veins? · Pregnancy    · A family history of varicose veins    · Being overweight or obese    · Age 48 years or older    · Sitting or standing for long periods of time    · Wearing tight clothing  What are the signs and symptoms of varicose veins? Your symptoms may be worse after you stand or sit for long periods of time  You may have any of the following:  · Blue, purple, or bulging veins in your legs     · Pain, swelling, or muscle cramps in your legs    · Feeling of fatigue or heaviness in your legs  How are varicose veins diagnosed? Your healthcare provider will examine your legs and ask about your medical history  You may need tests, such as a Doppler ultrasound or duplex scan  These tests show your veins and valves, and how your blood is flowing through them  These tests may also show if there is a blockage or blood clot  How are varicose veins treated? The goal of treatment is to decrease symptoms, improve appearance, and prevent further problems  Treatment will depend on which veins are affected and how severe your condition is  You may need procedures to treat or remove your varicose veins  For example, your healthcare provider may inject a solution or use a laser to close the varicose veins  Surgery to remove long veins may also be done  Ask your healthcare provider for more information about procedures used to treat varicose veins  What can I do to manage my symptoms? · Do not sit or stand for long periods of time    This can cause the blood to collect in your legs and make your symptoms worse  Bend or rotate your ankles several times every hour  Walk around for a few minutes every hour to get blood moving in your legs  · Do not cross your legs when you sit  This decreases blood flow to your feet and can make your symptoms worse  · Do not wear tight clothing or shoes  Do not wear high-heeled shoes  Do not wear clothes that are tight around the waist or knees  · Maintain a healthy weight  Being overweight or obese can make your varicose veins worse  Ask your healthcare provider how much you should weigh  Ask him or her to help you create a weight loss plan if you are overweight  · Wear pressure stockings as directed  The stockings are tight and put pressure on your legs  They improve blood flow and help prevent clots  · Elevate your legs  Keep them above the level of your heart for 15 to 30 minutes several times a day  You can also prop the end of your bed up slightly to elevate your legs while you sleep  This will help blood to flow back to your heart  · Get regular exercise  Talk to your healthcare provider about the best exercise plan for you  Exercise can improve blood flow to your legs and feet  When should I seek immediate care? · You have a wound that does not heal or is infected  · You have an injury that has broken your skin and caused your varicose veins to bleed  · Your leg is swollen and hard  · You notice that your legs or feet are turning blue or black  · Your leg feels warm, tender, and painful  It may look swollen and red  When should I contact my healthcare provider? · You have pain in your leg that does not go away or gets worse  · You notice sudden large bruising on your legs  · You have a rash on your leg  · Your symptoms keep you from doing your daily activities  · You have questions or concerns about your condition or care    CARE AGREEMENT:   You have the right to help plan your care  Learn about your health condition and how it may be treated  Discuss treatment options with your caregivers to decide what care you want to receive  You always have the right to refuse treatment  The above information is an  only  It is not intended as medical advice for individual conditions or treatments  Talk to your doctor, nurse or pharmacist before following any medical regimen to see if it is safe and effective for you  © 2017 [facility] Information is for End User's use only and may not be sold, redistributed or otherwise used for commercial purposes  All illustrations and images included in CareNotes® are the copyrighted property of [employer]  or [employer]  -continue use of compression stockings, lower extremity elevation, aerobic activity weight management and skin moisturization  -do not recommend any surgical intervention  -return to office as needed  Please contact the office in future if we can be of any further assistance

## 2019-03-14 RX ORDER — POTASSIUM CHLORIDE 20 MEQ/1
20 TABLET, EXTENDED RELEASE ORAL DAILY
Qty: 30 TABLET | Refills: 4 | Status: SHIPPED | OUTPATIENT
Start: 2019-03-14 | End: 2019-07-24 | Stop reason: SDUPTHER

## 2019-04-02 LAB
LEFT EYE DIABETIC RETINOPATHY: NORMAL
RIGHT EYE DIABETIC RETINOPATHY: NORMAL

## 2019-04-18 DIAGNOSIS — F32.A DEPRESSION, UNSPECIFIED DEPRESSION TYPE: ICD-10-CM

## 2019-04-18 RX ORDER — CITALOPRAM 20 MG/1
TABLET ORAL
Qty: 90 TABLET | Refills: 0 | Status: SHIPPED | OUTPATIENT
Start: 2019-04-18 | End: 2019-06-28

## 2019-05-10 DIAGNOSIS — E11.9 TYPE 2 DIABETES MELLITUS WITHOUT COMPLICATION, WITHOUT LONG-TERM CURRENT USE OF INSULIN (HCC): ICD-10-CM

## 2019-05-10 DIAGNOSIS — E03.9 HYPOTHYROIDISM, UNSPECIFIED TYPE: ICD-10-CM

## 2019-05-10 DIAGNOSIS — R60.9 EDEMA, UNSPECIFIED TYPE: ICD-10-CM

## 2019-05-10 RX ORDER — METOLAZONE 2.5 MG/1
TABLET ORAL
Qty: 30 TABLET | Refills: 1 | Status: SHIPPED | OUTPATIENT
Start: 2019-05-10 | End: 2019-10-07 | Stop reason: SDUPTHER

## 2019-05-10 RX ORDER — LEVOTHYROXINE SODIUM 0.05 MG/1
TABLET ORAL
Qty: 30 TABLET | Refills: 9 | Status: SHIPPED | OUTPATIENT
Start: 2019-05-10 | End: 2019-12-08 | Stop reason: SDUPTHER

## 2019-05-13 RX ORDER — SITAGLIPTIN 25 MG/1
TABLET, FILM COATED ORAL
Qty: 30 TABLET | Refills: 4 | Status: SHIPPED | OUTPATIENT
Start: 2019-05-13 | End: 2019-10-07 | Stop reason: SDUPTHER

## 2019-05-27 DIAGNOSIS — R60.9 EDEMA, UNSPECIFIED TYPE: ICD-10-CM

## 2019-05-28 RX ORDER — FUROSEMIDE 40 MG/1
TABLET ORAL
Qty: 30 TABLET | Refills: 3 | Status: SHIPPED | OUTPATIENT
Start: 2019-05-28 | End: 2019-09-19 | Stop reason: SDUPTHER

## 2019-06-24 PROBLEM — F32.0 MAJOR DEPRESSIVE DISORDER, SINGLE EPISODE, MILD (HCC): Status: ACTIVE | Noted: 2019-06-24

## 2019-06-25 LAB
ALBUMIN SERPL-MCNC: 3.9 G/DL (ref 3.6–5.1)
ALBUMIN/CREAT UR: 3 MCG/MG CREAT
ALBUMIN/GLOB SERPL: 1.5 (CALC) (ref 1–2.5)
ALP SERPL-CCNC: 57 U/L (ref 33–130)
ALT SERPL-CCNC: 10 U/L (ref 6–29)
AST SERPL-CCNC: 17 U/L (ref 10–35)
BILIRUB SERPL-MCNC: 0.5 MG/DL (ref 0.2–1.2)
BUN SERPL-MCNC: 58 MG/DL (ref 7–25)
BUN/CREAT SERPL: 34 (CALC) (ref 6–22)
CALCIUM SERPL-MCNC: 9.1 MG/DL (ref 8.6–10.4)
CHLORIDE SERPL-SCNC: 90 MMOL/L (ref 98–110)
CHOLEST SERPL-MCNC: 175 MG/DL
CHOLEST/HDLC SERPL: 3 (CALC)
CO2 SERPL-SCNC: 32 MMOL/L (ref 20–32)
CREAT SERPL-MCNC: 1.7 MG/DL (ref 0.6–0.93)
CREAT UR-MCNC: 60 MG/DL (ref 20–275)
GLOBULIN SER CALC-MCNC: 2.6 G/DL (CALC) (ref 1.9–3.7)
GLUCOSE SERPL-MCNC: 147 MG/DL (ref 65–99)
HBA1C MFR BLD: 7.5 % OF TOTAL HGB
HDLC SERPL-MCNC: 59 MG/DL
LDLC SERPL CALC-MCNC: 93 MG/DL (CALC)
MICROALBUMIN UR-MCNC: 0.2 MG/DL
NONHDLC SERPL-MCNC: 116 MG/DL (CALC)
POTASSIUM SERPL-SCNC: 2.8 MMOL/L (ref 3.5–5.3)
PROT SERPL-MCNC: 6.5 G/DL (ref 6.1–8.1)
SL AMB EGFR AFRICAN AMERICAN: 33 ML/MIN/1.73M2
SL AMB EGFR NON AFRICAN AMERICAN: 29 ML/MIN/1.73M2
SODIUM SERPL-SCNC: 134 MMOL/L (ref 135–146)
TRIGL SERPL-MCNC: 136 MG/DL
TSH SERPL-ACNC: 1.31 MIU/L (ref 0.4–4.5)

## 2019-06-26 ENCOUNTER — APPOINTMENT (OUTPATIENT)
Dept: RADIOLOGY | Facility: CLINIC | Age: 74
End: 2019-06-26
Payer: COMMERCIAL

## 2019-06-26 ENCOUNTER — OFFICE VISIT (OUTPATIENT)
Dept: FAMILY MEDICINE CLINIC | Facility: CLINIC | Age: 74
End: 2019-06-26
Payer: COMMERCIAL

## 2019-06-26 VITALS
HEART RATE: 76 BPM | TEMPERATURE: 99.1 F | DIASTOLIC BLOOD PRESSURE: 72 MMHG | SYSTOLIC BLOOD PRESSURE: 122 MMHG | RESPIRATION RATE: 16 BRPM | WEIGHT: 250.4 LBS | OXYGEN SATURATION: 91 % | BODY MASS INDEX: 40.42 KG/M2

## 2019-06-26 DIAGNOSIS — J01.90 ACUTE BACTERIAL SINUSITIS: ICD-10-CM

## 2019-06-26 DIAGNOSIS — B96.89 ACUTE BACTERIAL SINUSITIS: ICD-10-CM

## 2019-06-26 DIAGNOSIS — J40 BRONCHITIS: ICD-10-CM

## 2019-06-26 DIAGNOSIS — J40 BRONCHITIS: Primary | ICD-10-CM

## 2019-06-26 PROCEDURE — 99213 OFFICE O/P EST LOW 20 MIN: CPT | Performed by: FAMILY MEDICINE

## 2019-06-26 PROCEDURE — 71046 X-RAY EXAM CHEST 2 VIEWS: CPT

## 2019-06-26 PROCEDURE — 1160F RVW MEDS BY RX/DR IN RCRD: CPT | Performed by: FAMILY MEDICINE

## 2019-06-26 RX ORDER — PREDNISONE 10 MG/1
TABLET ORAL
Qty: 20 TABLET | Refills: 0 | Status: SHIPPED | OUTPATIENT
Start: 2019-06-26 | End: 2019-08-31 | Stop reason: ALTCHOICE

## 2019-06-26 RX ORDER — CEFUROXIME AXETIL 500 MG/1
500 TABLET ORAL EVERY 12 HOURS SCHEDULED
Qty: 20 TABLET | Refills: 0 | Status: SHIPPED | OUTPATIENT
Start: 2019-06-26 | End: 2019-07-06

## 2019-06-26 RX ORDER — BENZONATATE 200 MG/1
200 CAPSULE ORAL 3 TIMES DAILY PRN
Qty: 20 CAPSULE | Refills: 0 | Status: SHIPPED | OUTPATIENT
Start: 2019-06-26 | End: 2020-07-20

## 2019-06-28 ENCOUNTER — OFFICE VISIT (OUTPATIENT)
Dept: FAMILY MEDICINE CLINIC | Facility: CLINIC | Age: 74
End: 2019-06-28
Payer: COMMERCIAL

## 2019-06-28 VITALS
OXYGEN SATURATION: 93 % | DIASTOLIC BLOOD PRESSURE: 80 MMHG | BODY MASS INDEX: 38.89 KG/M2 | WEIGHT: 242 LBS | TEMPERATURE: 99 F | RESPIRATION RATE: 18 BRPM | HEIGHT: 66 IN | SYSTOLIC BLOOD PRESSURE: 120 MMHG | HEART RATE: 69 BPM

## 2019-06-28 DIAGNOSIS — IMO0002 UNCONTROLLED DIABETES WITH STAGE 3 CHRONIC KIDNEY DISEASE GFR 30-59: ICD-10-CM

## 2019-06-28 DIAGNOSIS — F32.A DEPRESSION, UNSPECIFIED DEPRESSION TYPE: ICD-10-CM

## 2019-06-28 DIAGNOSIS — J45.20 MILD INTERMITTENT ASTHMA WITHOUT COMPLICATION: ICD-10-CM

## 2019-06-28 DIAGNOSIS — J40 BRONCHITIS: ICD-10-CM

## 2019-06-28 DIAGNOSIS — M79.7 FIBROMYALGIA: ICD-10-CM

## 2019-06-28 DIAGNOSIS — N18.30 CHRONIC KIDNEY DISEASE, STAGE 3 (HCC): Primary | ICD-10-CM

## 2019-06-28 DIAGNOSIS — E78.2 MIXED HYPERLIPIDEMIA: ICD-10-CM

## 2019-06-28 DIAGNOSIS — E03.9 HYPOTHYROIDISM, UNSPECIFIED TYPE: ICD-10-CM

## 2019-06-28 PROBLEM — F32.0 MAJOR DEPRESSIVE DISORDER, SINGLE EPISODE, MILD (HCC): Status: RESOLVED | Noted: 2019-06-24 | Resolved: 2019-06-28

## 2019-06-28 PROCEDURE — 99214 OFFICE O/P EST MOD 30 MIN: CPT | Performed by: FAMILY MEDICINE

## 2019-06-28 PROCEDURE — 1036F TOBACCO NON-USER: CPT | Performed by: FAMILY MEDICINE

## 2019-06-28 RX ORDER — ALBUTEROL SULFATE 90 UG/1
2 AEROSOL, METERED RESPIRATORY (INHALATION) EVERY 6 HOURS PRN
Qty: 8.5 G | Refills: 3 | Status: SHIPPED | OUTPATIENT
Start: 2019-06-28 | End: 2022-01-05 | Stop reason: SDUPTHER

## 2019-07-02 ENCOUNTER — TELEPHONE (OUTPATIENT)
Dept: NEPHROLOGY | Facility: CLINIC | Age: 74
End: 2019-07-02

## 2019-07-09 ENCOUNTER — TELEPHONE (OUTPATIENT)
Dept: FAMILY MEDICINE CLINIC | Facility: CLINIC | Age: 74
End: 2019-07-09

## 2019-07-09 NOTE — TELEPHONE ENCOUNTER
----- Message from Estrella Villa DO sent at 7/7/2019  5:05 PM EDT -----  Call pt  I was re-reviewing her labs from 6/28  Her potassium level was still low  She has hx of this being low, but it is a little lower than usual  Has she been taking her potassium supplement? If so, rec: increasing to 2 tabs daily

## 2019-07-10 ENCOUNTER — OFFICE VISIT (OUTPATIENT)
Dept: PODIATRY | Facility: CLINIC | Age: 74
End: 2019-07-10
Payer: COMMERCIAL

## 2019-07-10 VITALS
HEART RATE: 70 BPM | HEIGHT: 66 IN | DIASTOLIC BLOOD PRESSURE: 80 MMHG | WEIGHT: 245 LBS | SYSTOLIC BLOOD PRESSURE: 117 MMHG | BODY MASS INDEX: 39.37 KG/M2

## 2019-07-10 DIAGNOSIS — I73.9 PERIPHERAL VASCULAR DISEASE, UNSPECIFIED (HCC): Primary | ICD-10-CM

## 2019-07-10 DIAGNOSIS — L60.3 NAIL DYSTROPHY: ICD-10-CM

## 2019-07-10 PROCEDURE — 99212 OFFICE O/P EST SF 10 MIN: CPT | Performed by: PODIATRIST

## 2019-07-10 NOTE — PROGRESS NOTES
Patient presents for palliative foot care  All toenails are elongated and dystrophic  No palpable pedal pulses  Treatment consisted of nail trimming  Patient is rescheduled in 10 weeks

## 2019-07-23 DIAGNOSIS — E87.6 HYPOKALEMIA: ICD-10-CM

## 2019-07-23 DIAGNOSIS — F32.A DEPRESSION, UNSPECIFIED DEPRESSION TYPE: Primary | ICD-10-CM

## 2019-07-23 NOTE — TELEPHONE ENCOUNTER
Corazon Velasquez needs refills on the following meds:    Potassium Chloride 20 mEq tablet #60  She said Dr Savannah Rubin increased the dose to 2x a day, which is why she needs 60 tablets      Citalopram 20 mg #90    Please call to [facility]    Thank you

## 2019-07-24 DIAGNOSIS — F32.A DEPRESSION, UNSPECIFIED DEPRESSION TYPE: ICD-10-CM

## 2019-07-24 RX ORDER — POTASSIUM CHLORIDE 20 MEQ/1
20 TABLET, EXTENDED RELEASE ORAL 2 TIMES DAILY
Qty: 60 TABLET | Refills: 1 | Status: SHIPPED | OUTPATIENT
Start: 2019-07-24 | End: 2019-10-16 | Stop reason: SDUPTHER

## 2019-07-24 RX ORDER — CITALOPRAM 20 MG/1
TABLET ORAL
Qty: 90 TABLET | Refills: 0 | Status: SHIPPED | OUTPATIENT
Start: 2019-07-24 | End: 2020-01-20

## 2019-07-24 RX ORDER — CITALOPRAM 40 MG/1
40 TABLET ORAL DAILY
Qty: 90 TABLET | Refills: 1 | Status: SHIPPED | OUTPATIENT
Start: 2019-07-24 | End: 2020-01-14 | Stop reason: SDUPTHER

## 2019-07-24 NOTE — TELEPHONE ENCOUNTER
Last follow up 6/28/19    Last refill: Potassium 3/14/19        Citalopram 1/22/18 12/30/19: 6m check up

## 2019-07-30 DIAGNOSIS — I10 ESSENTIAL HYPERTENSION: ICD-10-CM

## 2019-08-09 DIAGNOSIS — E78.2 MIXED HYPERLIPIDEMIA: ICD-10-CM

## 2019-08-09 RX ORDER — SIMVASTATIN 10 MG
TABLET ORAL
Qty: 30 TABLET | Refills: 3 | Status: SHIPPED | OUTPATIENT
Start: 2019-08-09 | End: 2020-01-06 | Stop reason: SDUPTHER

## 2019-08-31 ENCOUNTER — OFFICE VISIT (OUTPATIENT)
Dept: FAMILY MEDICINE CLINIC | Facility: CLINIC | Age: 74
End: 2019-08-31
Payer: COMMERCIAL

## 2019-08-31 VITALS
HEART RATE: 72 BPM | SYSTOLIC BLOOD PRESSURE: 130 MMHG | OXYGEN SATURATION: 96 % | WEIGHT: 257 LBS | TEMPERATURE: 98.2 F | BODY MASS INDEX: 41.3 KG/M2 | HEIGHT: 66 IN | DIASTOLIC BLOOD PRESSURE: 72 MMHG

## 2019-08-31 DIAGNOSIS — S01.01XA LACERATION OF SCALP, INITIAL ENCOUNTER: Primary | ICD-10-CM

## 2019-08-31 PROCEDURE — 99213 OFFICE O/P EST LOW 20 MIN: CPT | Performed by: FAMILY MEDICINE

## 2019-08-31 RX ORDER — AMITRIPTYLINE HYDROCHLORIDE 10 MG/1
TABLET, FILM COATED ORAL
COMMUNITY
Start: 2019-07-30 | End: 2020-12-08 | Stop reason: HOSPADM

## 2019-08-31 NOTE — PROGRESS NOTES
[facility]      NAME: Josephine Charles  AGE: 66 y o  SEX: female  : 1945   MRN: [id number]    DATE: 2019  TIME: 11:15 AM    Assessment and Plan     Problem List Items Addressed This Visit     Scalp laceration - Primary     Patient presents to have 8 staples removed from her scalp today  She tripped on  hitting the corner of her door causing a laceration to her scalp  Denies any loss of consciousness  Patient was seen and evaluated at [facility] Emergency Department  Eight staples were placed at that time  Patient was given a tetanus shot  Patient has been doing well without complaints  Upon examination, wound is well approximated  No signs of infection  Eight staples were removed without problem today  Local wound care instructions given  Call further problems                     Return to office in:prn    Chief Complaint     Chief Complaint   Patient presents with    Suture / Staple Removal     forehead placed 2019 [facility]        History of Present Illness     Patient presents to have 8 staples removed from her scalp today  She tripped on  hitting the corner of her door causing a laceration to her scalp  Denies any loss of consciousness  Patient was seen and evaluated at [facility] Emergency Department  Eight staples were placed at that time  Patient was given a tetanus shot  Patient has been doing well without complaints      The following portions of the patient's history were reviewed and updated as appropriate: allergies, current medications, past family history, past medical history, past social history, past surgical history and problem list     Review of Systems   Review of Systems   Respiratory: Negative  Cardiovascular: Negative  Gastrointestinal: Negative  Genitourinary: Negative  Skin: Positive for wound  Neurological: Negative          Active Problem List     Patient Active Problem List Diagnosis    Anemia    Chronic gout    Chronic kidney disease, stage 3 (HCC)    Combined form of senile cataract of left eye    Cough    Depression    Edema    Fibromyalgia    Homocysteinemia ([address] )    Hyperlipidemia    Hypothyroidism    Hypokalemia    Hypertension    Low back pain    Pain of left hip    Primary osteoarthritis of both hands    Uncontrolled diabetes with stage 3 chronic kidney disease GFR 30-59 ([facility])    Increased frequency of urination    Vitamin D deficiency    Folliculitis    Symptomatic varicose veins of both lower extremities    Intermittent claudication (HCC)    CRI (chronic renal insufficiency)    Scalp laceration       Objective   /72 (BP Location: Left arm, Patient Position: Sitting)   Pulse 72   Temp 98 2 °F (36 8 °C) (Tympanic)   Ht 5' 6" (1 676 m)   Wt 117 kg (257 lb)   SpO2 96%   BMI 41 48 kg/m²     Physical Exam   Constitutional: She is oriented to person, place, and time  Neurological: She is alert and oriented to person, place, and time  She displays normal reflexes  No cranial nerve deficit or sensory deficit  She exhibits normal muscle tone  Coordination normal    Skin:   8 staples in place  Wound appears well healed   No signs of infection       Pertinent Laboratory/Diagnostic Studies:  [facility] ED records    Current Medications     Current Outpatient Medications:     albuterol (PROAIR HFA) 90 mcg/act inhaler, Inhale 2 puffs every 6 (six) hours as needed for wheezing, Disp: 8 5 g, Rfl: 3    amitriptyline (ELAVIL) 10 mg tablet, TAKE ONE TABLET BY MOUTH AT BEDTIME, Disp: , Rfl:     aspirin (ASPIRIN ADULT LOW DOSE) 81 mg EC tablet, Take 1 tablet by mouth daily, Disp: , Rfl:     benzonatate (TESSALON) 200 MG capsule, Take 1 capsule (200 mg total) by mouth 3 (three) times a day as needed for cough, Disp: 20 capsule, Rfl: 0    Cetirizine HCl 10 MG CAPS, Take 10 mg by mouth daily  , Disp: , Rfl:     cholecalciferol (VITAMIN D3) 1,000 units tablet, Take 1,000 Units by mouth daily, Disp: , Rfl:     citalopram (CeleXA) 20 mg tablet, TAKE ONE TABLET BY MOUTH EVERY DAY , Disp: 90 tablet, Rfl: 0    citalopram (CeleXA) 40 mg tablet, Take 1 tablet (40 mg total) by mouth daily, Disp: 90 tablet, Rfl: 1    febuxostat (ULORIC) 80 MG TABS, Take 1 tablet by mouth daily, Disp: , Rfl:     furosemide (LASIX) 40 mg tablet, TAKE ONE TABLET BY MOUTH EVERY DAY , Disp: 30 tablet, Rfl: 3    gabapentin (NEURONTIN) 300 mg capsule, Take 1 tablet by mouth 3 (three) times a day, Disp: , Rfl:     JANUVIA 25 MG tablet, TAKE ONE TABLET BY MOUTH EVERY DAY , Disp: 30 tablet, Rfl: 4    levothyroxine 50 mcg tablet, TAKE ONE TABLET BY MOUTH EVERY DAY , Disp: 30 tablet, Rfl: 9    metolazone (ZAROXOLYN) 2 5 mg tablet, TAKE ONE TABLET BY MOUTH THREE TIMES A WEEK monday, wednesday, friday, Disp: 30 tablet, Rfl: 1    metoprolol tartrate (LOPRESSOR) 25 mg tablet, TAKE ONE TABLET BY MOUTH EVERY DAY , Disp: 30 tablet, Rfl: 4    potassium chloride (K-DUR,KLOR-CON) 20 mEq tablet, Take 1 tablet (20 mEq total) by mouth 2 (two) times a day, Disp: 60 tablet, Rfl: 1    simvastatin (ZOCOR) 10 mg tablet, TAKE ONE TABLET BY MOUTH EVERY DAY , Disp: 30 tablet, Rfl: 3    traMADol (ULTRAM) 50 mg tablet, Take 1 tablet by mouth, Disp: , Rfl:     Health Maintenance     Health Maintenance   Topic Date Due    SLP PLAN OF CARE  1945    BMI: Followup Plan  02/09/1961    HEPATITIS B VACCINES (1 of 3 - Risk 3-dose series) 02/09/1962    Diabetic Foot Exam  11/11/2016    INFLUENZA VACCINE  07/01/2019    HEMOGLOBIN A1C  09/24/2019    Fall Risk  12/27/2019    Urinary Incontinence Screening  12/27/2019    Medicare Annual Wellness Visit (AWV)  12/27/2019    DM Eye Exam  04/02/2020    URINE MICROALBUMIN  06/24/2020    BMI: Adult  08/31/2020    CRC Screening: Colonoscopy  11/11/2023    DTaP,Tdap,and Td Vaccines (3 - Td) 08/23/2029    Pneumococcal Vaccine: 65+ Years  Completed    Pneumococcal Vaccine: Pediatrics (0 to 5 Years) and At-Risk Patients (6 to 59 Years)  Aged [employer] History   Administered Date(s) Administered    INFLUENZA 11/14/2012, 03/18/2013, 01/01/2014, 03/03/2014, 11/19/2014, 11/18/2015, 11/25/2016, 10/12/2017    Influenza Split High Dose Preservative Free IM 11/18/2015, 11/25/2016, 10/12/2017    Influenza TIV (IM) 09/09/2011, 10/15/2012, 09/25/2013, 01/01/2014    Influenza, high dose seasonal 0 5 mL 10/08/2018    Pneumococcal Conjugate 13-Valent 08/06/2015    Pneumococcal Polysaccharide PPV23 10/15/2008    Tdap 08/06/2015, 08/23/2019       Dagoberto Greenfield DO  [facility]

## 2019-08-31 NOTE — ASSESSMENT & PLAN NOTE
Patient presents to have 8 staples removed from her scalp today  She tripped on August 23rd hitting the corner of her door causing a laceration to her scalp  Denies any loss of consciousness  Patient was seen and evaluated at [facility] Emergency Department  Eight staples were placed at that time  Patient was given a tetanus shot  Patient has been doing well without complaints  Upon examination, wound is well approximated  No signs of infection  Eight staples were removed without problem today  Local wound care instructions given    Call further problems

## 2019-09-05 LAB
BUN SERPL-MCNC: 38 MG/DL (ref 7–25)
BUN/CREAT SERPL: 21 (CALC) (ref 6–22)
CALCIUM SERPL-MCNC: 9.3 MG/DL (ref 8.6–10.4)
CHLORIDE SERPL-SCNC: 95 MMOL/L (ref 98–110)
CO2 SERPL-SCNC: 33 MMOL/L (ref 20–32)
CREAT SERPL-MCNC: 1.85 MG/DL (ref 0.6–0.93)
GLUCOSE SERPL-MCNC: 106 MG/DL (ref 65–99)
POTASSIUM SERPL-SCNC: 3.7 MMOL/L (ref 3.5–5.3)
SL AMB EGFR AFRICAN AMERICAN: 30 ML/MIN/1.73M2
SL AMB EGFR NON AFRICAN AMERICAN: 26 ML/MIN/1.73M2
SODIUM SERPL-SCNC: 140 MMOL/L (ref 135–146)

## 2019-09-07 ENCOUNTER — APPOINTMENT (EMERGENCY)
Dept: RADIOLOGY | Facility: HOSPITAL | Age: 74
End: 2019-09-07
Payer: COMMERCIAL

## 2019-09-07 ENCOUNTER — HOSPITAL ENCOUNTER (OUTPATIENT)
Facility: HOSPITAL | Age: 74
Setting detail: OBSERVATION
Discharge: HOME | End: 2019-09-08
Attending: EMERGENCY MEDICINE | Admitting: EMERGENCY MEDICINE
Payer: COMMERCIAL

## 2019-09-07 DIAGNOSIS — R07.9 CHEST PAIN: Primary | ICD-10-CM

## 2019-09-07 PROBLEM — E11.9 TYPE 2 DIABETES MELLITUS WITHOUT COMPLICATION, WITHOUT LONG-TERM CURRENT USE OF INSULIN (HCC): Status: ACTIVE | Noted: 2019-09-07

## 2019-09-07 PROBLEM — D72.829 LEUKOCYTOSIS: Status: ACTIVE | Noted: 2019-09-07

## 2019-09-07 LAB
ALBUMIN SERPL BCP-MCNC: 3.4 G/DL (ref 3.5–5)
ALP SERPL-CCNC: 72 U/L (ref 46–116)
ALT SERPL W P-5'-P-CCNC: 15 U/L (ref 12–78)
ANION GAP SERPL CALCULATED.3IONS-SCNC: 8 MMOL/L (ref 4–13)
APTT PPP: 25 SECONDS (ref 23–37)
AST SERPL W P-5'-P-CCNC: 17 U/L (ref 5–45)
BASOPHILS # BLD AUTO: 0.07 THOUSANDS/ΜL (ref 0–0.1)
BASOPHILS NFR BLD AUTO: 1 % (ref 0–1)
BILIRUB SERPL-MCNC: 0.48 MG/DL (ref 0.2–1)
BUN SERPL-MCNC: 38 MG/DL (ref 5–25)
CALCIUM SERPL-MCNC: 9 MG/DL (ref 8.3–10.1)
CHLORIDE SERPL-SCNC: 99 MMOL/L (ref 100–108)
CO2 SERPL-SCNC: 30 MMOL/L (ref 21–32)
CREAT SERPL-MCNC: 1.68 MG/DL (ref 0.6–1.3)
EOSINOPHIL # BLD AUTO: 0.12 THOUSAND/ΜL (ref 0–0.61)
EOSINOPHIL NFR BLD AUTO: 1 % (ref 0–6)
ERYTHROCYTE [DISTWIDTH] IN BLOOD BY AUTOMATED COUNT: 13 % (ref 11.6–15.1)
GFR SERPL CREATININE-BSD FRML MDRD: 29 ML/MIN/1.73SQ M
GLUCOSE SERPL-MCNC: 139 MG/DL (ref 65–140)
GLUCOSE SERPL-MCNC: 154 MG/DL (ref 65–140)
GLUCOSE SERPL-MCNC: 158 MG/DL (ref 65–140)
HCT VFR BLD AUTO: 39.5 % (ref 34.8–46.1)
HGB BLD-MCNC: 12.3 G/DL (ref 11.5–15.4)
IMM GRANULOCYTES # BLD AUTO: 0.1 THOUSAND/UL (ref 0–0.2)
IMM GRANULOCYTES NFR BLD AUTO: 1 % (ref 0–2)
INR PPP: 0.97 (ref 0.84–1.19)
LYMPHOCYTES # BLD AUTO: 0.98 THOUSANDS/ΜL (ref 0.6–4.47)
LYMPHOCYTES NFR BLD AUTO: 7 % (ref 14–44)
MCH RBC QN AUTO: 30.1 PG (ref 26.8–34.3)
MCHC RBC AUTO-ENTMCNC: 31.1 G/DL (ref 31.4–37.4)
MCV RBC AUTO: 97 FL (ref 82–98)
MONOCYTES # BLD AUTO: 0.65 THOUSAND/ΜL (ref 0.17–1.22)
MONOCYTES NFR BLD AUTO: 5 % (ref 4–12)
NEUTROPHILS # BLD AUTO: 11.88 THOUSANDS/ΜL (ref 1.85–7.62)
NEUTS SEG NFR BLD AUTO: 85 % (ref 43–75)
NRBC BLD AUTO-RTO: 0 /100 WBCS
PLATELET # BLD AUTO: 242 THOUSANDS/UL (ref 149–390)
PMV BLD AUTO: 9.5 FL (ref 8.9–12.7)
POTASSIUM SERPL-SCNC: 4 MMOL/L (ref 3.5–5.3)
PROT SERPL-MCNC: 7.3 G/DL (ref 6.4–8.2)
PROTHROMBIN TIME: 13 SECONDS (ref 11.6–14.5)
RBC # BLD AUTO: 4.08 MILLION/UL (ref 3.81–5.12)
SODIUM SERPL-SCNC: 137 MMOL/L (ref 136–145)
TROPONIN I SERPL-MCNC: <0.02 NG/ML
WBC # BLD AUTO: 13.8 THOUSAND/UL (ref 4.31–10.16)

## 2019-09-07 PROCEDURE — 80053 COMPREHEN METABOLIC PANEL: CPT | Performed by: EMERGENCY MEDICINE

## 2019-09-07 PROCEDURE — 84484 ASSAY OF TROPONIN QUANT: CPT | Performed by: INTERNAL MEDICINE

## 2019-09-07 PROCEDURE — 99285 EMERGENCY DEPT VISIT HI MDM: CPT | Performed by: EMERGENCY MEDICINE

## 2019-09-07 PROCEDURE — 85610 PROTHROMBIN TIME: CPT | Performed by: EMERGENCY MEDICINE

## 2019-09-07 PROCEDURE — 82948 REAGENT STRIP/BLOOD GLUCOSE: CPT

## 2019-09-07 PROCEDURE — 93005 ELECTROCARDIOGRAM TRACING: CPT

## 2019-09-07 PROCEDURE — 99220 PR INITIAL OBSERVATION CARE/DAY 70 MINUTES: CPT | Performed by: INTERNAL MEDICINE

## 2019-09-07 PROCEDURE — 85025 COMPLETE CBC W/AUTO DIFF WBC: CPT | Performed by: EMERGENCY MEDICINE

## 2019-09-07 PROCEDURE — 36415 COLL VENOUS BLD VENIPUNCTURE: CPT | Performed by: EMERGENCY MEDICINE

## 2019-09-07 PROCEDURE — 85730 THROMBOPLASTIN TIME PARTIAL: CPT | Performed by: EMERGENCY MEDICINE

## 2019-09-07 PROCEDURE — 99285 EMERGENCY DEPT VISIT HI MDM: CPT

## 2019-09-07 PROCEDURE — 84484 ASSAY OF TROPONIN QUANT: CPT | Performed by: EMERGENCY MEDICINE

## 2019-09-07 PROCEDURE — 71046 X-RAY EXAM CHEST 2 VIEWS: CPT

## 2019-09-07 RX ORDER — LEVOTHYROXINE SODIUM 0.05 MG/1
50 TABLET ORAL
Status: DISCONTINUED | OUTPATIENT
Start: 2019-09-08 | End: 2019-09-08 | Stop reason: HOSPADM

## 2019-09-07 RX ORDER — ACETAMINOPHEN 325 MG/1
650 TABLET ORAL EVERY 6 HOURS PRN
Status: DISCONTINUED | OUTPATIENT
Start: 2019-09-07 | End: 2019-09-08 | Stop reason: HOSPADM

## 2019-09-07 RX ORDER — GABAPENTIN 100 MG/1
100 CAPSULE ORAL 2 TIMES DAILY
Status: DISCONTINUED | OUTPATIENT
Start: 2019-09-07 | End: 2019-09-08 | Stop reason: HOSPADM

## 2019-09-07 RX ORDER — FEBUXOSTAT 80 MG/1
80 TABLET, FILM COATED ORAL DAILY
Status: DISCONTINUED | OUTPATIENT
Start: 2019-09-08 | End: 2019-09-08 | Stop reason: CLARIF

## 2019-09-07 RX ORDER — POTASSIUM CHLORIDE 20 MEQ/1
20 TABLET, EXTENDED RELEASE ORAL 2 TIMES DAILY
Status: DISCONTINUED | OUTPATIENT
Start: 2019-09-07 | End: 2019-09-08 | Stop reason: HOSPADM

## 2019-09-07 RX ORDER — ASPIRIN 81 MG/1
81 TABLET ORAL DAILY
Status: DISCONTINUED | OUTPATIENT
Start: 2019-09-08 | End: 2019-09-08 | Stop reason: HOSPADM

## 2019-09-07 RX ORDER — ASPIRIN 81 MG/1
324 TABLET, CHEWABLE ORAL ONCE
Status: DISCONTINUED | OUTPATIENT
Start: 2019-09-07 | End: 2019-09-07

## 2019-09-07 RX ORDER — PREDNISONE 1 MG/1
5 TABLET ORAL DAILY
COMMUNITY

## 2019-09-07 RX ORDER — CITALOPRAM 20 MG/1
20 TABLET ORAL DAILY
Status: DISCONTINUED | OUTPATIENT
Start: 2019-09-08 | End: 2019-09-08 | Stop reason: HOSPADM

## 2019-09-07 RX ORDER — FUROSEMIDE 40 MG/1
40 TABLET ORAL DAILY
Status: DISCONTINUED | OUTPATIENT
Start: 2019-09-08 | End: 2019-09-08 | Stop reason: HOSPADM

## 2019-09-07 RX ORDER — HEPARIN SODIUM 5000 [USP'U]/ML
5000 INJECTION, SOLUTION INTRAVENOUS; SUBCUTANEOUS EVERY 8 HOURS SCHEDULED
Status: DISCONTINUED | OUTPATIENT
Start: 2019-09-07 | End: 2019-09-08 | Stop reason: HOSPADM

## 2019-09-07 RX ORDER — PRAVASTATIN SODIUM 10 MG
10 TABLET ORAL
Status: DISCONTINUED | OUTPATIENT
Start: 2019-09-07 | End: 2019-09-08 | Stop reason: HOSPADM

## 2019-09-07 RX ORDER — PREDNISONE 1 MG/1
5 TABLET ORAL DAILY
Status: DISCONTINUED | OUTPATIENT
Start: 2019-09-08 | End: 2019-09-08 | Stop reason: HOSPADM

## 2019-09-07 RX ORDER — AMITRIPTYLINE HYDROCHLORIDE 10 MG/1
10 TABLET, FILM COATED ORAL
Status: DISCONTINUED | OUTPATIENT
Start: 2019-09-07 | End: 2019-09-08 | Stop reason: HOSPADM

## 2019-09-07 RX ORDER — ALBUTEROL SULFATE 90 UG/1
2 AEROSOL, METERED RESPIRATORY (INHALATION) EVERY 6 HOURS PRN
Status: DISCONTINUED | OUTPATIENT
Start: 2019-09-07 | End: 2019-09-08 | Stop reason: HOSPADM

## 2019-09-07 RX ORDER — MELATONIN
1000 DAILY
Status: DISCONTINUED | OUTPATIENT
Start: 2019-09-08 | End: 2019-09-08 | Stop reason: HOSPADM

## 2019-09-07 RX ADMIN — POTASSIUM CHLORIDE 20 MEQ: 1500 TABLET, EXTENDED RELEASE ORAL at 17:14

## 2019-09-07 RX ADMIN — PRAVASTATIN SODIUM 10 MG: 10 TABLET ORAL at 17:14

## 2019-09-07 RX ADMIN — GABAPENTIN 100 MG: 100 CAPSULE ORAL at 17:14

## 2019-09-07 RX ADMIN — HEPARIN SODIUM 5000 UNITS: 5000 INJECTION INTRAVENOUS; SUBCUTANEOUS at 21:31

## 2019-09-07 RX ADMIN — AMITRIPTYLINE HYDROCHLORIDE 10 MG: 10 TABLET, FILM COATED ORAL at 21:31

## 2019-09-07 RX ADMIN — HEPARIN SODIUM 5000 UNITS: 5000 INJECTION INTRAVENOUS; SUBCUTANEOUS at 17:14

## 2019-09-07 NOTE — ED PROVIDER NOTES
History  Chief Complaint   Patient presents with    Chest Pain     Pt reports that she began having CP that was feeling like spasms to the center of the chest radiating into the L side  Reports SOB with ambulation  Reports that pain is worse with movement     C/o substernal chest "spasms" today, associated with sob  No cough, no fevers  Pt  Has no h/o CAD  She has a hx of diabetes, HTN, hypercholesterolemia  Cp is not exertional            Prior to Admission Medications   Prescriptions Last Dose Informant Patient Reported? Taking?    Cetirizine HCl 10 MG CAPS 9/7/2019 at Unknown time Self Yes Yes   Sig: Take 10 mg by mouth daily     Cholecalciferol (D 400) 400 units TABS   Yes Yes   Sig: Take 400 Units by mouth daily 2 tabs   JANUVIA 25 MG tablet   No No   Sig: TAKE ONE TABLET BY MOUTH EVERY DAY    albuterol (PROAIR HFA) 90 mcg/act inhaler   No No   Sig: Inhale 2 puffs every 6 (six) hours as needed for wheezing   amitriptyline (ELAVIL) 10 mg tablet 9/6/2019 at Unknown time  Yes Yes   Sig: TAKE ONE TABLET BY MOUTH AT BEDTIME   aspirin (ASPIRIN ADULT LOW DOSE) 81 mg EC tablet 9/6/2019 at Unknown time Self Yes Yes   Sig: Take 1 tablet by mouth daily   benzonatate (TESSALON) 200 MG capsule   No No   Sig: Take 1 capsule (200 mg total) by mouth 3 (three) times a day as needed for cough   cholecalciferol (VITAMIN D3) 1,000 units tablet  Self Yes No   Sig: Take 1,000 Units by mouth daily   citalopram (CeleXA) 20 mg tablet 9/7/2019 at Unknown time  No Yes   Sig: TAKE ONE TABLET BY MOUTH EVERY DAY    citalopram (CeleXA) 40 mg tablet   No No   Sig: Take 1 tablet (40 mg total) by mouth daily   febuxostat (ULORIC) 80 MG TABS 9/7/2019 at Unknown time Self Yes Yes   Sig: Take 1 tablet by mouth daily   furosemide (LASIX) 40 mg tablet 9/7/2019 at Unknown time  No Yes   Sig: TAKE ONE TABLET BY MOUTH EVERY DAY    gabapentin (NEURONTIN) 300 mg capsule 9/7/2019 at Unknown time Self Yes Yes   Sig: Take 1 tablet by mouth 3 (three) times a day   levothyroxine 50 mcg tablet   No No   Sig: TAKE ONE TABLET BY MOUTH EVERY DAY    metolazone (ZAROXOLYN) 2 5 mg tablet 9/6/2019 at Unknown time  No Yes   Sig: TAKE ONE TABLET BY MOUTH THREE TIMES A WEEK monday, wednesday, friday   metoprolol tartrate (LOPRESSOR) 25 mg tablet 9/7/2019 at Unknown time  No Yes   Sig: TAKE ONE TABLET BY MOUTH EVERY DAY    potassium chloride (K-DUR,KLOR-CON) 20 mEq tablet   No No   Sig: Take 1 tablet (20 mEq total) by mouth 2 (two) times a day   predniSONE 5 mg tablet   Yes Yes   Sig: Take 5 mg by mouth daily   simvastatin (ZOCOR) 10 mg tablet 9/7/2019 at Unknown time  No Yes   Sig: TAKE ONE TABLET BY MOUTH EVERY DAY    traMADol (ULTRAM) 50 mg tablet Past Week at Unknown time Self Yes Yes   Sig: Take 1 tablet by mouth      Facility-Administered Medications: None       Past Medical History:   Diagnosis Date    Abnormal blood chemistry     last assessed 11/13/2013    Angina pectoris ([facility] )     last assessed 11/012/13    Arthritis     last assessed 11/13/2013    Asthma     Athlete's foot     resolved 10/11/17    Cataract     last assessed 03/18/2016    Diabetes mellitus with kidney disease ([facility] )     last assessed 10/03/17    Eczema     Gait disorder     last assessed 11/12/2013    Idiopathic thrombocytopenia purpura ([facility] )     last assessed 08/06/15    Overactive bladder     Post menopausal syndrome     resolved 10/11/17    Restless legs syndrome     last assessed 10/24/2013    Subclinical hypothyroidism     last assessed 12/11/17    Urinary frequency     resolved 12/18/2014       Past Surgical History:   Procedure Laterality Date    REPLACEMENT TOTAL KNEE         Family History   Problem Relation Age of Onset    Leukemia Mother     Eczema Father      I have reviewed and agree with the history as documented      Social History     Tobacco Use    Smoking status: Never Smoker    Smokeless tobacco: Never Used    Tobacco comment: per allscripts; former smoker Substance Use Topics    Alcohol use: Yes     Comment: occasional    Drug use: No        Review of Systems   Constitutional: Negative for appetite change, fatigue and fever  HENT: Negative for rhinorrhea and sore throat  Respiratory: Positive for shortness of breath  Negative for cough and wheezing  Cardiovascular: Positive for chest pain  Negative for leg swelling  Gastrointestinal: Negative for abdominal pain, diarrhea and vomiting  Genitourinary: Negative for dysuria and flank pain  Musculoskeletal: Negative for back pain and neck pain  Skin: Negative for rash  Neurological: Negative for syncope and headaches  Psychiatric/Behavioral:        Mood normal       Physical Exam  Physical Exam   Constitutional: She is oriented to person, place, and time  She appears well-developed and well-nourished  HENT:   Head: Normocephalic and atraumatic  Mouth/Throat: Oropharynx is clear and moist    Neck: Normal range of motion  Neck supple  Cardiovascular: Normal rate and regular rhythm  Pulmonary/Chest: Effort normal and breath sounds normal  No respiratory distress  Abdominal: Soft  There is no tenderness  Musculoskeletal: Normal range of motion  Neurological: She is alert and oriented to person, place, and time  Skin: Skin is warm and dry  Nursing note and vitals reviewed        Vital Signs  ED Triage Vitals   Temperature Pulse Respirations Blood Pressure SpO2   09/07/19 1355 09/07/19 1349 09/07/19 1349 09/07/19 1349 09/07/19 1349   98 2 °F (36 8 °C) 78 18 145/81 96 %      Temp Source Heart Rate Source Patient Position - Orthostatic VS BP Location FiO2 (%)   09/07/19 1355 09/07/19 1349 09/07/19 1349 09/07/19 1349 --   Oral Monitor Lying Left arm       Pain Score       09/07/19 1407       No Pain           Vitals:    09/07/19 1349   BP: 145/81   Pulse: 78   Patient Position - Orthostatic VS: Lying         Visual Acuity      ED Medications  Medications - No data to display    Diagnostic Studies  Results Reviewed     Procedure Component Value Units Date/Time    Protime-INR [[id number]]  (Normal) Collected:  09/07/19 1413    Lab Status:  Final result Specimen:  Blood from Arm, Left Updated:  09/07/19 1437     Protime 13 0 seconds      INR 0 97    APTT [[id number]]  (Normal) Collected:  09/07/19 1413    Lab Status:  Final result Specimen:  Blood from Arm, Left Updated:  09/07/19 1437     PTT 25 seconds     Troponin I [[id number]]  (Normal) Collected:  09/07/19 1403    Lab Status:  Final result Specimen:  Blood from Arm, Left Updated:  09/07/19 1428     Troponin I <0 02 ng/mL     Comprehensive metabolic panel [[id number]]  (Abnormal) Collected:  09/07/19 1403    Lab Status:  Final result Specimen:  Blood from Arm, Left Updated:  09/07/19 1426     Sodium 137 mmol/L      Potassium 4 0 mmol/L      Chloride 99 mmol/L      CO2 30 mmol/L      ANION GAP 8 mmol/L      BUN 38 mg/dL      Creatinine 1 68 mg/dL      Glucose 158 mg/dL      Calcium 9 0 mg/dL      AST 17 U/L      ALT 15 U/L      Alkaline Phosphatase 72 U/L      Total Protein 7 3 g/dL      Albumin 3 4 g/dL      Total Bilirubin 0 48 mg/dL      eGFR 29 ml/min/1 73sq m     Narrative:       [place] guidelines for Chronic Kidney Disease (CKD):     Stage 1 with normal or high GFR (GFR > 90 mL/min/1 73 square meters)    Stage 2 Mild CKD (GFR = 60-89 mL/min/1 73 square meters)    Stage 3A Moderate CKD (GFR = 45-59 mL/min/1 73 square meters)    Stage 3B Moderate CKD (GFR = 30-44 mL/min/1 73 square meters)    Stage 4 Severe CKD (GFR = 15-29 mL/min/1 73 square meters)    Stage 5 End Stage CKD (GFR <15 mL/min/1 73 square meters)  Note: GFR calculation is accurate only with a steady state creatinine    CBC and differential [[id number]]  (Abnormal) Collected:  09/07/19 1403    Lab Status:  Final result Specimen:  Blood from Arm, Left Updated:  09/07/19 1410     WBC 13 80 Thousand/uL      RBC 4 08 Million/uL      Hemoglobin 12 3 g/dL Hematocrit 39 5 %      MCV 97 fL      MCH 30 1 pg      MCHC 31 1 g/dL      RDW 13 0 %      MPV 9 5 fL      Platelets 768 Thousands/uL      nRBC 0 /100 WBCs      Neutrophils Relative 85 %      Immat GRANS % 1 %      Lymphocytes Relative 7 %      Monocytes Relative 5 %      Eosinophils Relative 1 %      Basophils Relative 1 %      Neutrophils Absolute 11 88 Thousands/µL      Immature Grans Absolute 0 10 Thousand/uL      Lymphocytes Absolute 0 98 Thousands/µL      Monocytes Absolute 0 65 Thousand/µL      Eosinophils Absolute 0 12 Thousand/µL      Basophils Absolute 0 07 Thousands/µL                  XR chest 2 views    (Results Pending)              Procedures  ECG 12 Lead Documentation Only  Date/Time: 9/7/2019 1:55 PM  Performed by: Santana Busch MD  Authorized by: Santana Busch MD     Rate:     ECG rate:  71    ECG rate assessment: normal    ST segments:     ST segments:  Non-specific  Comments:      RBBB (old Ekg showed incomplete RBBB)           ED Course                               MDM  Number of Diagnoses or Management Options  Chest pain:      Amount and/or Complexity of Data Reviewed  Clinical lab tests: ordered and reviewed  Tests in the radiology section of CPT®: ordered and reviewed    Risk of Complications, Morbidity, and/or Mortality  Presenting problems: moderate  General comments: CXR - nad    Pt  Had 324mg of asa prehospital    Admitted for further workup/management    No cp on admission        Disposition  Final diagnoses:   Chest pain     Time reflects when diagnosis was documented in both MDM as applicable and the Disposition within this note     Time User Action Codes Description Comment    9/7/2019  3:06 PM Junior Chung Add [R07 9] Chest pain       ED Disposition     ED Disposition Condition Date/Time Comment    Admit Stable Sat Sep 7, 2019  3:07 PM Case was discussed with MARLON  and the patient's admission status was agreed to be obs /tele      Follow-up Information    None         Patient's Medications   Discharge Prescriptions    No medications on file     No discharge procedures on file      ED Provider  Electronically Signed by           Thao Kim MD  09/07/19 8946

## 2019-09-07 NOTE — ASSESSMENT & PLAN NOTE
Atypical chest pain, onset 3 hrs ago  First trop negative, EKG with non specific ST/T changes  No prior cardiac history  Patient is on obs for two more sets of troponin, morning EKG before discharge

## 2019-09-07 NOTE — ASSESSMENT & PLAN NOTE
Mild leukocytosis  Patient on prednisolone 5 mg daily for gout  Leukocytosis most likely related to his prednisone  Denies fever, chills, cough, recent URI

## 2019-09-07 NOTE — ASSESSMENT & PLAN NOTE
Lab Results   Component Value Date    HGBA1C 7 5 (H) 06/24/2019       No results for input(s): POCGLU in the last 72 hours      Blood Sugar Average: Last 72 hrs:    DM 2   Continue home medications  RICC and adjustment Humalog as per protocol

## 2019-09-07 NOTE — PLAN OF CARE
Problem: Potential for Falls  Goal: Patient will remain free of falls  Description  INTERVENTIONS:  - Assess patient frequently for physical needs  -  Identify cognitive and physical deficits and behaviors that affect risk of falls    -  [place] fall precautions as indicated by assessment   - Educate patient/family on patient safety including physical limitations  - Instruct patient to call for assistance with activity based on assessment  - Modify environment to reduce risk of injury  - Consider OT/PT consult to assist with strengthening/mobility  Outcome: Progressing     Problem: CARDIOVASCULAR - ADULT  Goal: Maintains optimal cardiac output and hemodynamic stability  Description  INTERVENTIONS:  - Monitor I/O, vital signs and rhythm  - Monitor for S/S and trends of decreased cardiac output  - Administer and titrate ordered vasoactive medications to optimize hemodynamic stability  - Assess quality of pulses, skin color and temperature  - Assess for signs of decreased coronary artery perfusion  - Instruct patient to report change in severity of symptoms  Outcome: Progressing  Goal: Absence of cardiac dysrhythmias or at baseline rhythm  Description  INTERVENTIONS:  - Continuous cardiac monitoring, vital signs, obtain 12 lead EKG if ordered  - Administer antiarrhythmic and heart rate control medications as ordered  - Monitor electrolytes and administer replacement therapy as ordered  Outcome: Progressing     Problem: SKIN/TISSUE INTEGRITY - ADULT  Goal: Skin integrity remains intact  Description  INTERVENTIONS  - Identify patients at risk for skin breakdown  - Assess and monitor skin integrity  - Assess and monitor nutrition and hydration status  - Monitor labs (i e  albumin)  - Assess for incontinence   - Turn and reposition patient  - Assist with mobility/ambulation  - Relieve pressure over bony prominences  - Avoid friction and shearing  - Provide appropriate hygiene as needed including keeping skin clean and dry  - Evaluate need for skin moisturizer/barrier cream  - Collaborate with interdisciplinary team (i e  Nutrition, Rehabilitation, etc )   - Patient/family teaching  Outcome: Progressing     Problem: PAIN - ADULT  Goal: Verbalizes/displays adequate comfort level or baseline comfort level  Description  Interventions:  - Encourage patient to monitor pain and request assistance  - Assess pain using appropriate pain scale  - Administer analgesics based on type and severity of pain and evaluate response  - Implement non-pharmacological measures as appropriate and evaluate response  - Consider cultural and social influences on pain and pain management  - Notify physician/advanced practitioner if interventions unsuccessful or patient reports new pain  Outcome: Progressing     Problem: SAFETY ADULT  Goal: Maintain or return to baseline ADL function  Description  INTERVENTIONS:  -  Assess patient's ability to carry out ADLs; assess patient's baseline for ADL function and identify physical deficits which impact ability to perform ADLs (bathing, care of mouth/teeth, toileting, grooming, dressing, etc )  - Assess/evaluate cause of self-care deficits   - Assess range of motion  - Assess patient's mobility; develop plan if impaired  - Assess patient's need for assistive devices and provide as appropriate  - Encourage maximum independence but intervene and supervise when necessary  - Involve family in performance of ADLs  - Assess for home care needs following discharge   - Consider OT consult to assist with ADL evaluation and planning for discharge  - Provide patient education as appropriate  Outcome: Progressing  Goal: Maintain or return mobility status to optimal level  Description  INTERVENTIONS:  - Assess patient's baseline mobility status (ambulation, transfers, stairs, etc )    - Identify cognitive and physical deficits and behaviors that affect mobility  - Identify mobility aids required to assist with transfers and/or ambulation (gait belt, sit-to-stand, lift, walker, cane, etc )  - [place] fall precautions as indicated by assessment  - Record patient progress and toleration of activity level on Mobility SBAR; progress patient to next Phase/Stage  - Instruct patient to call for assistance with activity based on assessment  - Consider rehabilitation consult to assist with strengthening/weightbearing, etc   Outcome: Progressing     Problem: Knowledge Deficit  Goal: Patient/family/caregiver demonstrates understanding of disease process, treatment plan, medications, and discharge instructions  Description  Complete learning assessment and assess knowledge base    Interventions:  - Provide teaching at level of understanding  - Provide teaching via preferred learning methods  Outcome: Progressing

## 2019-09-07 NOTE — PROGRESS NOTES
Upon arrival to [place] [id number] patient was placed on telemetry monitoring, patient did have a brief, 15 second bout of chest pain without any alarms or obvious changes  Pain passed without any interventions  Patient will make staff aware of any future chest pain episodes

## 2019-09-07 NOTE — H&P
H&P- Lamont Scott 1945, 66 y o  female MRN: [id number]    Unit/Bed#: E5 -01 Encounter: [id number]    Primary Care Provider: Darrion Austin DO   Date and time admitted to hospital: 9/7/2019  1:47 PM        * Chest pain  Assessment & Plan  Atypical chest pain, onset 3 hrs ago  First trop negative, EKG with non specific ST/T changes  No prior cardiac history  Patient is on obs for two more sets of troponin, morning EKG before discharge    Leukocytosis  Assessment & Plan  Mild leukocytosis  Patient on prednisolone 5 mg daily for gout  Leukocytosis most likely related to his prednisone  Denies fever, chills, cough, recent URI    Chronic kidney disease, stage 3 (HCC)  Assessment & Plan  CKD 3, baseline Cr 1 6-2 0  Stable Cr on admission    Hypertension  Assessment & Plan  BP controlled  Continue home medications    Type 2 diabetes mellitus without complication, without long-term current use of insulin (HCC)  Assessment & Plan  Lab Results   Component Value Date    HGBA1C 7 5 (H) 06/24/2019       No results for input(s): POCGLU in the last 72 hours  Blood Sugar Average: Last 72 hrs:    DM 2   Continue home medications  RICC and adjustment Humalog as per protocol        VTE Prophylaxis: Heparin  / sequential compression device   Code Status: full code  POLST: There is no POLST form on file for this patient (pre-hospital)    Anticipated Length of Stay:  Patient will be admitted on an Observation basis with an anticipated length of stay of  [place] than 2 midnights  Justification for Hospital Stay: chest pain to r/o cardiac origin    Total Time for Visit, including Counseling / Coordination of Care: 45 minutes  Greater than 50% of this total time spent on direct patient counseling and coordination of care  History of Present Illness:    Lamont Scott is a 66 y o  female who presents with chest pain which started 3 hours ago    Chest pain started while patient was sitted in a chair, described as left-sided which comes and goes and stayed for 1 minutes, sharp, no significant radiation  No prior significant cardiac history  Denies cough, shortness of breath, palpitation, fever, chills, epigastric pain, urinary or bowel habit change  PMH is significant for CKD 3, DM, Gout, Fibromyalgia  Review of Systems:    Review of Systems    Past Medical and Surgical History:     Past Medical History:   Diagnosis Date    Abnormal blood chemistry     last assessed 11/13/2013    Angina pectoris ([facility] [address] )     last assessed 11/012/13    Arthritis     last assessed 11/13/2013    Asthma     Athlete's foot     resolved 10/11/17    Cataract     last assessed 03/18/2016    Diabetes mellitus with kidney disease ([facility] [address] )     last assessed 10/03/17    Eczema     Gait disorder     last assessed 11/12/2013    Idiopathic thrombocytopenia purpura ([facility] )     last assessed 08/06/15    Overactive bladder     Post menopausal syndrome     resolved 10/11/17    Restless legs syndrome     last assessed 10/24/2013    Subclinical hypothyroidism     last assessed 12/11/17    Urinary frequency     resolved 12/18/2014       Past Surgical History:   Procedure Laterality Date    REPLACEMENT TOTAL KNEE         Meds/Allergies:    Prior to Admission medications    Medication Sig Start Date End Date Taking?  Authorizing Provider   amitriptyline (ELAVIL) 10 mg tablet TAKE ONE TABLET BY MOUTH AT BEDTIME 7/30/19  Yes Historical Provider, MD   aspirin (ASPIRIN ADULT LOW DOSE) 81 mg EC tablet Take 1 tablet by mouth daily 9/8/17  Yes Historical Provider, MD   Cetirizine HCl 10 MG CAPS Take 10 mg by mouth daily   10/6/14  Yes Historical Provider, MD   Cholecalciferol (D 400) 400 units TABS Take 400 Units by mouth daily 2 tabs   Yes Historical Provider, MD   citalopram (CeleXA) 20 mg tablet TAKE ONE TABLET BY MOUTH EVERY DAY  7/24/19  Yes Cash See, DO   febuxostat (ULORIC) 80 MG TABS Take 1 tablet by mouth daily 9/8/17  Yes Historical Provider, MD furosemide (LASIX) 40 mg tablet TAKE ONE TABLET BY MOUTH EVERY DAY  5/28/19  Yes Zoe Christy DO   gabapentin (NEURONTIN) 300 mg capsule Take 1 tablet by mouth 3 (three) times a day   Yes Historical Provider, MD   metolazone (ZAROXOLYN) 2 5 mg tablet TAKE ONE TABLET BY MOUTH THREE TIMES A WEEK monday, wednesday, friday 5/10/19  Yes Zoe Christy DO   metoprolol tartrate (LOPRESSOR) 25 mg tablet TAKE ONE TABLET BY MOUTH EVERY DAY  7/30/19  Yes Yohannes Beavers,    predniSONE 5 mg tablet Take 5 mg by mouth daily   Yes Historical Provider, MD   simvastatin (ZOCOR) 10 mg tablet TAKE ONE TABLET BY MOUTH EVERY DAY  8/9/19  Yes Zoe Christy DO   traMADol Katie Osvaldo) 50 mg tablet Take 1 tablet by mouth 9/8/17  Yes Historical Provider, MD   albuterol (PROAIR HFA) 90 mcg/act inhaler Inhale 2 puffs every 6 (six) hours as needed for wheezing 6/28/19   Zoe Christy DO   benzonatate (TESSALON) 200 MG capsule Take 1 capsule (200 mg total) by mouth 3 (three) times a day as needed for cough 6/26/19   Julian Zheng, DO   cholecalciferol (VITAMIN D3) 1,000 units tablet Take 1,000 Units by mouth daily    Historical Provider, MD   citalopram (CeleXA) 40 mg tablet Take 1 tablet (40 mg total) by mouth daily 7/24/19   Zoe Christy DO   JANUVIA 25 MG tablet TAKE ONE TABLET BY MOUTH EVERY DAY  5/13/19   Zoe Christy DO   levothyroxine 50 mcg tablet TAKE ONE TABLET BY MOUTH EVERY DAY  5/10/19   Zoe Christy DO   potassium chloride (K-DUR,KLOR-CON) 20 mEq tablet Take 1 tablet (20 mEq total) by mouth 2 (two) times a day 7/24/19   Zoe Christy DO     I have reviewed home medications with patient personally  Allergies:    Allergies   Allergen Reactions    Ace Inhibitors Angioedema    Prochlorperazine Anaphylaxis     lethargy, tongue and throat swelling    Ramipril Anaphylaxis     angioedema    Angiotensin Receptor Blockers        Social History:     Substance Use History:   Social History     Substance and Sexual Activity   Alcohol Use Yes    Comment: occasional     Social History     Tobacco Use   Smoking Status Never Smoker   Smokeless Tobacco Never Used   Tobacco Comment    per allscripts; former smoker     Social History     Substance and Sexual Activity   Drug Use No       Family History:    Family History   Problem Relation Age of Onset    Leukemia Mother     Eczema Father        Physical Exam:     Vitals:   Blood Pressure: 157/70 (09/07/19 1610)  Pulse: 79 (09/07/19 1610)  Temperature: 97 9 °F (36 6 °C) (09/07/19 1610)  Temp Source: Temporal (09/07/19 1610)  Respirations: 18 (09/07/19 1610)  Weight - Scale: 118 kg (260 lb 9 3 oz) (09/07/19 1349)  SpO2: 94 % (09/07/19 1610)    Physical Exam    General appearance: alert, appears stated age and cooperative  Skin: Skin color, texture, turgor normal  No rashes or lesions  Head: Normocephalic, without obvious abnormality, atraumatic  Eyes: conjunctivae/corneas clear  PERRL, EOM's intact  Lungs: clear to auscultation bilaterally  Heart: regular rate and rhythm, S1, S2 normal, no murmur, click, rub or gallop  Abdomen: soft, non-tender; bowel sounds normal; no masses,  no organomegaly  Back: symmetric, no curvature  ROM normal  No CVA tenderness  Extremities: extremities normal, atraumatic, no cyanosis or edema  Neurologic: Grossly normal  Psychiatric: appropriate mood    Additional Data:     Lab Results: I have personally reviewed pertinent reports        Results from last 7 days   Lab Units 09/07/19  1403   WBC Thousand/uL 13 80*   HEMOGLOBIN g/dL 12 3   HEMATOCRIT % 39 5   PLATELETS Thousands/uL 242   NEUTROS PCT % 85*   LYMPHS PCT % 7*   MONOS PCT % 5   EOS PCT % 1     Results from last 7 days   Lab Units 09/07/19  1403   SODIUM mmol/L 137   POTASSIUM mmol/L 4 0   CHLORIDE mmol/L 99*   CO2 mmol/L 30   BUN mg/dL 38*   CREATININE mg/dL 1 68*   ANION GAP mmol/L 8   CALCIUM mg/dL 9 0   ALBUMIN g/dL 3 4*   TOTAL BILIRUBIN mg/dL 0 48   ALK PHOS U/L 72   ALT U/L 15   AST U/L 17   GLUCOSE RANDOM mg/dL 158*     Results from last 7 days   Lab Units 09/07/19  1413   INR  0 97                   Imaging: I have personally reviewed pertinent reports  XR chest 2 views    (Results Pending)       EKG, Pathology, and Other Studies Reviewed on Admission: yes    Allscripts / Epic Records Reviewed: Yes     ** Please Note: This note has been constructed using a voice recognition system   **

## 2019-09-07 NOTE — PROGRESS NOTES
Progress Note - Franko Fischer 1945, 66 y o  female MRN: [id number]    Unit/Bed#: E5 -01 Encounter: [id number]    Primary Care Provider: Arjun Lweis DO   Date and time admitted to hospital: 9/7/2019  1:47 PM        * Chest pain  Assessment & Plan  Atypical chest pain, onset 3 hrs ago  First trop negative, EKG with non specific ST/T changes  No prior cardiac history  Patient is on obs for two more sets of troponin, morning EKG before discharge    Leukocytosis  Assessment & Plan  Mild leukocytosis  Patient on prednisolone 5 mg daily for gout  Leukocytosis most likely related to his prednisone  Denies fever, chills, cough, recent URI    Chronic kidney disease, stage 3 (HCC)  Assessment & Plan  CKD 3, baseline Cr 1 6-2 0  Stable Cr on admission    Hypertension  Assessment & Plan  BP controlled  Continue home medications    Type 2 diabetes mellitus without complication, without long-term current use of insulin (HCC)  Assessment & Plan  Lab Results   Component Value Date    HGBA1C 7 5 (H) 06/24/2019       No results for input(s): POCGLU in the last 72 hours  Blood Sugar Average: Last 72 hrs:    DM 2   Continue home medications  RICC and adjustment Humalog as per protocol      VTE Prophylaxis: Heparin  / sequential compression device   Code Status: full code  POLST: There is no POLST form on file for this patient (pre-hospital)    Anticipated Length of Stay:  Patient will be admitted on an Observation basis with an anticipated length of stay of  Less than 2 midnights  Justification for Hospital Stay: chest pain to r/o cardiac origin    Total Time for Visit, including Counseling / Coordination of Care: 45 minutes  Greater than 50% of this total time spent on direct patient counseling and coordination of care  History of Present Illness:    Franko Fischer is a 66 y o  female who presents with chest pain which started 3 hours ago    Chest pain started while patient was sitted in a chair, described as left-sided which comes and goes and stayed for 1 minutes, sharp, no significant radiation  No prior significant cardiac history  Denies cough, shortness of breath, palpitation, fever, chills, epigastric pain, urinary or bowel habit change  PMH is significant for CKD 3, DM, Gout, Fibromyalgia  Review of Systems:    Review of Systems    Past Medical and Surgical History:     Past Medical History:   Diagnosis Date    Abnormal blood chemistry     last assessed 11/13/2013    Angina pectoris ([facility] )     last assessed 11/012/13    Arthritis     last assessed 11/13/2013    Asthma     Athlete's foot     resolved 10/11/17    Cataract     last assessed 03/18/2016    Diabetes mellitus with kidney disease ([facility] [address] )     last assessed 10/03/17    Eczema     Gait disorder     last assessed 11/12/2013    Idiopathic thrombocytopenia purpura ([facility] )     last assessed 08/06/15    Overactive bladder     Post menopausal syndrome     resolved 10/11/17    Restless legs syndrome     last assessed 10/24/2013    Subclinical hypothyroidism     last assessed 12/11/17    Urinary frequency     resolved 12/18/2014       Past Surgical History:   Procedure Laterality Date    REPLACEMENT TOTAL KNEE         Meds/Allergies:    Prior to Admission medications    Medication Sig Start Date End Date Taking?  Authorizing Provider   amitriptyline (ELAVIL) 10 mg tablet TAKE ONE TABLET BY MOUTH AT BEDTIME 7/30/19  Yes Historical Provider, MD   aspirin (ASPIRIN ADULT LOW DOSE) 81 mg EC tablet Take 1 tablet by mouth daily 9/8/17  Yes Historical Provider, MD   Cetirizine HCl 10 MG CAPS Take 10 mg by mouth daily   10/6/14  Yes Historical Provider, MD   Cholecalciferol (D 400) 400 units TABS Take 400 Units by mouth daily 2 tabs   Yes Historical Provider, MD   citalopram (CeleXA) 20 mg tablet TAKE ONE TABLET BY MOUTH EVERY DAY  7/24/19  Yes Avery Mckeon DO   febuxostat (ULORIC) 80 MG TABS Take 1 tablet by mouth daily 9/8/17  Yes Historical Provider, MD   furosemide (LASIX) 40 mg tablet TAKE ONE TABLET BY MOUTH EVERY DAY  5/28/19  Yes Ryanne Rae DO   gabapentin (NEURONTIN) 300 mg capsule Take 1 tablet by mouth 3 (three) times a day   Yes Historical Provider, MD   metolazone (ZAROXOLYN) 2 5 mg tablet TAKE ONE TABLET BY MOUTH THREE TIMES A WEEK monday, wednesday, friday 5/10/19  Yes Ryanne Rae DO   metoprolol tartrate (LOPRESSOR) 25 mg tablet TAKE ONE TABLET BY MOUTH EVERY DAY  7/30/19  Yes Mustapha Leal,    predniSONE 5 mg tablet Take 5 mg by mouth daily   Yes Historical Provider, MD   simvastatin (ZOCOR) 10 mg tablet TAKE ONE TABLET BY MOUTH EVERY DAY  8/9/19  Yes Ryanne Rae,    traMADol Louis Carbon) 50 mg tablet Take 1 tablet by mouth 9/8/17  Yes Historical Provider, MD   albuterol (PROAIR HFA) 90 mcg/act inhaler Inhale 2 puffs every 6 (six) hours as needed for wheezing 6/28/19   Ryanne Rae DO   benzonatate (TESSALON) 200 MG capsule Take 1 capsule (200 mg total) by mouth 3 (three) times a day as needed for cough 6/26/19   Fariba Coombs, DO   cholecalciferol (VITAMIN D3) 1,000 units tablet Take 1,000 Units by mouth daily    Historical Provider, MD   citalopram (CeleXA) 40 mg tablet Take 1 tablet (40 mg total) by mouth daily 7/24/19   Ryanne Rae DO   JANUVIA 25 MG tablet TAKE ONE TABLET BY MOUTH EVERY DAY  5/13/19   Ryanne Rae DO   levothyroxine 50 mcg tablet TAKE ONE TABLET BY MOUTH EVERY DAY  5/10/19   Ryanne Rae DO   potassium chloride (K-DUR,KLOR-CON) 20 mEq tablet Take 1 tablet (20 mEq total) by mouth 2 (two) times a day 7/24/19   Ryanne Rae DO     I have reviewed home medications with patient personally  Allergies:    Allergies   Allergen Reactions    Prochlorperazine Anaphylaxis     lethargy, tongue and throat swelling    Ramipril Anaphylaxis     angioedema    Ace Inhibitors     Angiotensin Receptor Blockers        Social History:     Substance Use History:   Social History     Substance and Sexual Activity   Alcohol Use Yes    Comment: occasional     Social History     Tobacco Use   Smoking Status Never Smoker   Smokeless Tobacco Never Used   Tobacco Comment    per allscripts; former smoker     Social History     Substance and Sexual Activity   Drug Use No       Family History:    Family History   Problem Relation Age of Onset    Leukemia Mother     Eczema Father        Physical Exam:     Vitals:   Blood Pressure: 127/59 (09/07/19 1508)  Pulse: 65 (09/07/19 1508)  Temperature: 98 2 °F (36 8 °C) (09/07/19 1355)  Temp Source: Oral (09/07/19 1355)  Respirations: 14 (09/07/19 1508)  Weight - Scale: 118 kg (260 lb 9 3 oz) (09/07/19 1349)  SpO2: 93 % (09/07/19 1508)    Physical Exam    General appearance: alert, appears stated age and cooperative  Skin: Skin color, texture, turgor normal  No rashes or lesions  Head: Normocephalic, without obvious abnormality, atraumatic  Eyes: conjunctivae/corneas clear  PERRL, EOM's intact  Lungs: clear to auscultation bilaterally  Heart: S1, S2 normal  Abdomen: soft, non-tender; bowel sounds normal; no masses,  no organomegaly  Back: symmetric, no curvature  ROM normal  No CVA tenderness  Extremities: extremities normal, atraumatic, no cyanosis or edema  Neurologic: Grossly normal  Psychiatric: mood appropriate    Additional Data:     Lab Results: I have personally reviewed pertinent reports        Results from last 7 days   Lab Units 09/07/19  1403   WBC Thousand/uL 13 80*   HEMOGLOBIN g/dL 12 3   HEMATOCRIT % 39 5   PLATELETS Thousands/uL 242   NEUTROS PCT % 85*   LYMPHS PCT % 7*   MONOS PCT % 5   EOS PCT % 1     Results from last 7 days   Lab Units 09/07/19  1403   SODIUM mmol/L 137   POTASSIUM mmol/L 4 0   CHLORIDE mmol/L 99*   CO2 mmol/L 30   BUN mg/dL 38*   CREATININE mg/dL 1 68*   ANION GAP mmol/L 8   CALCIUM mg/dL 9 0   ALBUMIN g/dL 3 4*   TOTAL BILIRUBIN mg/dL 0 48   ALK PHOS U/L 72   ALT U/L 15   AST U/L 17   GLUCOSE RANDOM mg/dL 158*     Results from last 7 days   Lab Units 09/07/19  1413   INR  0 97                   Imaging: I have personally reviewed pertinent reports  XR chest 2 views    (Results Pending)       EKG, Pathology, and Other Studies Reviewed on Admission: yes    Allscripts / Epic Records Reviewed: Yes     ** Please Note: This note has been constructed using a voice recognition system   **

## 2019-09-08 VITALS
TEMPERATURE: 97.3 F | DIASTOLIC BLOOD PRESSURE: 65 MMHG | BODY MASS INDEX: 42.06 KG/M2 | RESPIRATION RATE: 18 BRPM | WEIGHT: 260.58 LBS | HEART RATE: 61 BPM | SYSTOLIC BLOOD PRESSURE: 148 MMHG | OXYGEN SATURATION: 94 %

## 2019-09-08 LAB
ANION GAP SERPL CALCULATED.3IONS-SCNC: 9 MMOL/L (ref 4–13)
ATRIAL RATE: 71 BPM
BASOPHILS # BLD AUTO: 0.06 THOUSANDS/ΜL (ref 0–0.1)
BASOPHILS NFR BLD AUTO: 1 % (ref 0–1)
BUN SERPL-MCNC: 36 MG/DL (ref 5–25)
CALCIUM SERPL-MCNC: 9 MG/DL (ref 8.3–10.1)
CHLORIDE SERPL-SCNC: 101 MMOL/L (ref 100–108)
CO2 SERPL-SCNC: 30 MMOL/L (ref 21–32)
CREAT SERPL-MCNC: 1.48 MG/DL (ref 0.6–1.3)
EOSINOPHIL # BLD AUTO: 0.24 THOUSAND/ΜL (ref 0–0.61)
EOSINOPHIL NFR BLD AUTO: 2 % (ref 0–6)
ERYTHROCYTE [DISTWIDTH] IN BLOOD BY AUTOMATED COUNT: 13.2 % (ref 11.6–15.1)
GFR SERPL CREATININE-BSD FRML MDRD: 34 ML/MIN/1.73SQ M
GLUCOSE SERPL-MCNC: 103 MG/DL (ref 65–140)
GLUCOSE SERPL-MCNC: 107 MG/DL (ref 65–140)
HCT VFR BLD AUTO: 36.7 % (ref 34.8–46.1)
HGB BLD-MCNC: 11.6 G/DL (ref 11.5–15.4)
IMM GRANULOCYTES # BLD AUTO: 0.06 THOUSAND/UL (ref 0–0.2)
IMM GRANULOCYTES NFR BLD AUTO: 1 % (ref 0–2)
LYMPHOCYTES # BLD AUTO: 2.28 THOUSANDS/ΜL (ref 0.6–4.47)
LYMPHOCYTES NFR BLD AUTO: 23 % (ref 14–44)
MCH RBC QN AUTO: 30.9 PG (ref 26.8–34.3)
MCHC RBC AUTO-ENTMCNC: 31.6 G/DL (ref 31.4–37.4)
MCV RBC AUTO: 98 FL (ref 82–98)
MONOCYTES # BLD AUTO: 0.53 THOUSAND/ΜL (ref 0.17–1.22)
MONOCYTES NFR BLD AUTO: 5 % (ref 4–12)
NEUTROPHILS # BLD AUTO: 6.73 THOUSANDS/ΜL (ref 1.85–7.62)
NEUTS SEG NFR BLD AUTO: 68 % (ref 43–75)
NRBC BLD AUTO-RTO: 0 /100 WBCS
P AXIS: 91 DEGREES
PLATELET # BLD AUTO: 214 THOUSANDS/UL (ref 149–390)
PMV BLD AUTO: 9.2 FL (ref 8.9–12.7)
POTASSIUM SERPL-SCNC: 3.3 MMOL/L (ref 3.5–5.3)
PR INTERVAL: 248 MS
QRS AXIS: -69 DEGREES
QRSD INTERVAL: 170 MS
QT INTERVAL: 452 MS
QTC INTERVAL: 491 MS
RBC # BLD AUTO: 3.76 MILLION/UL (ref 3.81–5.12)
SODIUM SERPL-SCNC: 140 MMOL/L (ref 136–145)
T WAVE AXIS: 36 DEGREES
VENTRICULAR RATE: 71 BPM
WBC # BLD AUTO: 9.9 THOUSAND/UL (ref 4.31–10.16)

## 2019-09-08 PROCEDURE — 85025 COMPLETE CBC W/AUTO DIFF WBC: CPT | Performed by: INTERNAL MEDICINE

## 2019-09-08 PROCEDURE — 82948 REAGENT STRIP/BLOOD GLUCOSE: CPT

## 2019-09-08 PROCEDURE — 93010 ELECTROCARDIOGRAM REPORT: CPT | Performed by: INTERNAL MEDICINE

## 2019-09-08 PROCEDURE — 99217 PR OBSERVATION CARE DISCHARGE MANAGEMENT: CPT | Performed by: INTERNAL MEDICINE

## 2019-09-08 PROCEDURE — 80048 BASIC METABOLIC PNL TOTAL CA: CPT | Performed by: INTERNAL MEDICINE

## 2019-09-08 RX ORDER — POTASSIUM CHLORIDE 20 MEQ/1
40 TABLET, EXTENDED RELEASE ORAL ONCE
Status: DISCONTINUED | OUTPATIENT
Start: 2019-09-08 | End: 2019-09-08 | Stop reason: HOSPADM

## 2019-09-08 RX ADMIN — GABAPENTIN 100 MG: 100 CAPSULE ORAL at 08:36

## 2019-09-08 RX ADMIN — POTASSIUM CHLORIDE 20 MEQ: 1500 TABLET, EXTENDED RELEASE ORAL at 08:36

## 2019-09-08 RX ADMIN — METOPROLOL TARTRATE 25 MG: 25 TABLET, FILM COATED ORAL at 08:36

## 2019-09-08 RX ADMIN — SITAGLIPTIN 25 MG: 25 TABLET, FILM COATED ORAL at 08:36

## 2019-09-08 RX ADMIN — VITAMIN D, TAB 1000IU (100/BT) 1000 UNITS: 25 TAB at 08:36

## 2019-09-08 RX ADMIN — PREDNISONE 5 MG: 5 TABLET ORAL at 08:36

## 2019-09-08 RX ADMIN — FUROSEMIDE 40 MG: 40 TABLET ORAL at 08:36

## 2019-09-08 RX ADMIN — LEVOTHYROXINE SODIUM 50 MCG: 50 TABLET ORAL at 05:59

## 2019-09-08 RX ADMIN — HEPARIN SODIUM 5000 UNITS: 5000 INJECTION INTRAVENOUS; SUBCUTANEOUS at 05:59

## 2019-09-08 RX ADMIN — ASPIRIN 81 MG: 81 TABLET, COATED ORAL at 08:36

## 2019-09-08 RX ADMIN — CITALOPRAM HYDROBROMIDE 20 MG: 20 TABLET ORAL at 08:36

## 2019-09-08 NOTE — ASSESSMENT & PLAN NOTE
Atypical chest pain most likely costochondritis responding to pain medication  Take Tylenol 650 mg Q6 hr PRN for pain  Trop 3x neg, EKG with non specific ST/T changes  No prior cardiac history  Patient observed overnight, Telemetry showed no significant abnormality  Patient free of chest pain and has no compliant   Advised to follow up with PCP  Patient is medically stable for discharge

## 2019-09-08 NOTE — UTILIZATION REVIEW
Initial Clinical Review    Admission: Date/Time/Statement:   09-07-19  @ 1506  Orders Placed This Encounter   Procedures    Place in Observation (expected length of stay for this patient is less than two midnights)     Standing Status:   Standing     Number of Occurrences:   1     Order Specific Question:   Admitting Physician     Answer:   Rosalva Ponce [[id number]]     Order Specific Question:   Level of Care     Answer:   Med Surg [[id number]]     ED Arrival Information     Expected Arrival Acuity Means of Arrival Escorted By Service Admission Type    - 9/7/2019 13:47 Urgent [address] [facility] General Medicine Urgent    Arrival Complaint    Chest Pain        Chief Complaint   Patient presents with    Chest Pain     Pt reports that she began having CP that was feeling like spasms to the center of the chest radiating into the L side  Reports SOB with ambulation  Reports that pain is worse with movement     Assessment/Plan: 66 y  o  female who presents with chest pain which started 3 hours ago   Chest pain started while patient was sitted in a chair, described as left-sided which comes and goes and stayed for 1 minutes, sharp, no significant radiation  No prior significant cardiac history  Denies cough, shortness of breath, palpitation, fever, chills, epigastric pain, urinary or bowel habit change   PMH is significant for CKD 3, DM, Gout, Fibromyalgia    ED Triage Vitals   Temperature Pulse Respirations Blood Pressure SpO2   09/07/19 1355 09/07/19 1349 09/07/19 1349 09/07/19 1349 09/07/19 1349   98 2 °F (36 8 °C) 78 18 145/81 96 %      Temp Source Heart Rate Source Patient Position - Orthostatic VS BP Location FiO2 (%)   09/07/19 1355 09/07/19 1349 09/07/19 1349 09/07/19 1349 --   Oral Monitor Lying Left arm       Pain Score       09/07/19 1407       No Pain        Wt Readings from Last 1 Encounters:   09/07/19 118 kg (260 lb 9 3 oz)     Additional Vital Signs:   09/08/19 0715  97 3 °F (36 3 °C)Abnormal   61  18 148/65  94 %    09/07/19 2305  97 1 °F (36 2 °C)Abnormal   57  18  128/63  95 %    09/07/19 1610  97 9 °F (36 6 °C)  79  18  157/70  94 %        Pertinent Labs/Diagnostic Test Results:   Results from last 7 days   Lab Units 09/08/19  0545 09/07/19  1403   WBC Thousand/uL 9 90 13 80*   HEMOGLOBIN g/dL 11 6 12 3   HEMATOCRIT % 36 7 39 5   PLATELETS Thousands/uL 214 242   NEUTROS ABS Thousands/µL 6 73 11 88*     Results from last 7 days   Lab Units 09/08/19  0545 09/07/19  1403 09/04/19  0801   SODIUM mmol/L 140 137 140   POTASSIUM mmol/L 3 3* 4 0 3 7   CHLORIDE mmol/L 101 99* 95*   CO2 mmol/L 30 30 33*   ANION GAP mmol/L 9 8  --    BUN mg/dL 36* 38* 38*   CREATININE mg/dL 1 48* 1 68* 1 85*   EGFR ml/min/1 73sq m 34 29  --    SL AMB CALCIUM mg/dL  --   --  9 3   CALCIUM mg/dL 9 0 9 0  --      Results from last 7 days   Lab Units 09/07/19  1403   AST U/L 17   ALT U/L 15   ALK PHOS U/L 72   TOTAL PROTEIN g/dL 7 3   ALBUMIN g/dL 3 4*   TOTAL BILIRUBIN mg/dL 0 48     Results from last 7 days   Lab Units 09/08/19  0716 09/07/19  2122 09/07/19  1620   POC GLUCOSE mg/dl 103 154* 139     Results from last 7 days   Lab Units 09/08/19  0545 09/07/19  1403 09/04/19  0801   GLUCOSE RANDOM mg/dL 107 158* 106*     Results from last 7 days   Lab Units 09/07/19  2101 09/07/19  1721 09/07/19  1403   TROPONIN I ng/mL <0 02 <0 02 <0 02     Results from last 7 days   Lab Units 09/07/19  1413   PROTIME seconds 13 0   INR  0 97   PTT seconds 25            cxr 09-07-19  nad        ED Treatment:   Medication Administration from 09/07/2019 1347 to 09/07/2019 1608       Date/Time Order Dose Route Action Action by Comments     09/07/2019 1421 aspirin chewable tablet 324 mg   Oral Canceled Entry Stella Burgess         Past Medical History:   Diagnosis Date    Abnormal blood chemistry     last assessed 11/13/2013    [facility] p[facility] [address] [facility]     last assessed 11/012/13    Arthritis     last assessed 11/13/2013    Asthma     Athlete's foot resolved 10/11/17    Cataract     last assessed 03/18/2016    Diabetes mellitus with kidney disease ([facility] [address] )     last assessed 10/03/17    Eczema     Gait disorder     last assessed 11/12/2013    Idiopathic thrombocytopenia purpura (HCC)     last assessed 08/06/15    Overactive bladder     Post menopausal syndrome     resolved 10/11/17    Restless legs syndrome     last assessed 10/24/2013    Subclinical hypothyroidism     last assessed 12/11/17    Urinary frequency     resolved 12/18/2014     Present on Admission:   Chronic kidney disease, stage 3 ([facility]   Leukocytosis   Hypertension   Type 2 diabetes mellitus without complication, without long-term current use of insulin ([facility])      Admitting Diagnosis: Chest pain [R07 9]  Age/Sex: 66 y o  female  Admission Orders:    Current Facility-Administered Medications:  acetaminophen 650 mg Oral Q6H PRN   albuterol 2 puff Inhalation Q6H PRN   amitriptyline 10 mg Oral HS   aspirin 81 mg Oral Daily   cholecalciferol 1,000 Units Oral Daily   citalopram 20 mg Oral Daily   febuxostat 80 mg Oral Daily   furosemide 40 mg Oral Daily   gabapentin 100 mg Oral BID   heparin (porcine) 5,000 Units Subcutaneous Q8H [facility]   insulin lispro 1-5 Units Subcutaneous TID AC   levothyroxine 50 mcg Oral Early Morning   metoprolol tartrate 25 mg Oral Daily   potassium chloride 20 mEq Oral BID   potassium chloride 40 mEq Oral Once   pravastatin 10 mg Oral Daily With Dinner   predniSONE 5 mg Oral Daily   sitaGLIPtin 25 mg Oral Daily       Telemetry  scd  Blood sugars ac        Network Utilization Review Department  Phone: [phone]; Fax [phone]  [email or web address]  ATTENTION: Please call with any questions or concerns to [phone]  and carefully listen to the prompts so that you are directed to the right person  Send all requests for admission clinical reviews, approved or denied determinations and any other requests to fax [phone]   All voicemails are confidential

## 2019-09-08 NOTE — DISCHARGE SUMMARY
Discharge- Abhi Titi 1945, 66 y o  female MRN: [id number]    Unit/Bed#: E5 -01 Encounter: [id number]    Primary Care Provider: Cash Aviles DO   Date and time admitted to hospital: 9/7/2019  1:47 PM        * Chest pain  Assessment & Plan  Atypical chest pain most likely costochondritis responding to pain medication  Take Tylenol 650 mg Q6 hr PRN for pain  Trop 3x neg, EKG with non specific ST/T changes  No prior cardiac history  Patient observed overnight, Telemetry showed no significant abnormality  Patient free of chest pain and has no compliant   Advised to follow up with PCP  Patient is medically stable for discharge    Leukocytosis  Assessment & Plan  Mild leukocytosis which is normal today  Patient on prednisolone 5 mg daily for gout  Leukocytosis most likely related to his prednisone  Denies fever, chills, cough, recent URI    Chronic kidney disease, stage 3 (HCC)  Assessment & Plan  CKD 3, baseline Cr 1 6-2 0  Stable Cr on admission    Hypertension  Assessment & Plan  BP controlled  Continue home medications    Type 2 diabetes mellitus without complication, without long-term current use of insulin [facility])  Assessment & Plan  Lab Results   Component Value Date    HGBA1C 7 5 (H) 06/24/2019       Recent Labs     09/07/19  1620 09/07/19  2122 09/08/19  0716   POCGLU 139 154* 103       Blood Sugar Average: Last 72 hrs:    DM 2   Continue home medications            Discharging Physician / Practitioner: Alivia Machuca MD  PCP: Cash Aviles DO  Admission Date:   Admission Orders (From admission, onward)     Ordered        09/07/19 1506  Place in Observation (expected length of stay for this patient is less than two midnights)  Once                   Discharge Date: 09/08/19    Disposition:      Other: home    For Discharges to [facility]:   · Not Applicable to this Patient - Not Applicable to this Patient    Reason for Admission: chest pain    Discharge Diagnoses:     Please see assessment and plan section above for further details regarding discharge diagnoses  Resolved Problems  Date Reviewed: 9/8/2019    None          Consultations During Hospital Stay:  None     Procedures Performed:   * No surgery found *      Xr Chest 2 Views    Result Date: 9/8/2019  Narrative: CHEST INDICATION:   Chest Pain  COMPARISON:  6/26/2019 EXAM PERFORMED/VIEWS:  XR CHEST PA & LATERAL FINDINGS: Cardiomediastinal silhouette appears unremarkable  The lungs are clear  No pneumothorax or pleural effusion  Osseous structures appear within normal limits for patient age  Impression: No acute cardiopulmonary disease  Workstation performed: [id number]        Medication Adjustments and Discharge Medications:  · Summary of Medication Adjustments made as a result of this hospitalization: none  · Medication Dosing Tapers - Please refer to Discharge Medication List for details on any medication dosing tapers (if applicable to patient)  · Discharge Medication List: See after visit summary for reconciled discharge medications  Wound Care Recommendations:  When applicable, please see wound care section of After Visit Summary  Diet Recommendations at Discharge:  Diet -        Diet Orders   (From admission, onward)             Start     Ordered    09/07/19 1610  Diet Jose Angel/CHO Controlled; Consistent Carbohydrate Diet Level 1 (4 carb servings/60 grams CHO/meal)  Diet effective now     Question Answer Comment   Diet Type Jose Angel/CHO Controlled    Jose Angel/CHO Controlled Consistent Carbohydrate Diet Level 1 (4 carb servings/60 grams CHO/meal)    RD to adjust diet per protocol? Yes        09/07/19 1609                  Incidental Findings:   · none     Test Results Pending at Discharge (will require follow up):   · none         Hospital Course:     Lindsey Esparza is a 66 y o  female patient who originally presented to the hospital on 9/7/2019 due to left-sided chest pain    Chest pain is atypical in nature which is sharp, started at rest and showed short lived recurs every minute  It was observed overnight with telemetry which showed no significant abnormality, troponins 3x negative, EKG showed no significant ST or T abnormalities  On the day of discharge the patient has no complaint free of her symptom and advised to follow up with her primary care physician and suggested she might need stress test along the line  Patient is medically stable for discharge  Condition at Discharge: stable     Discharge Day Visit / Exam:     Subjective:  No compliant  Vitals: Blood Pressure: 148/65 (09/08/19 0715)  Pulse: 61 (09/08/19 0715)  Temperature: (!) 97 3 °F (36 3 °C) (09/08/19 0715)  Temp Source: Tympanic (09/08/19 0715)  Respirations: 18 (09/08/19 0715)  Weight - Scale: 118 kg (260 lb 9 3 oz) (09/07/19 1349)  SpO2: 94 % (09/08/19 0715)  Exam:     General appearance: alert, appears stated age and cooperative  Head: Normocephalic, without obvious abnormality, atraumatic  Lungs: clear to auscultation bilaterally  Heart: regular rate and rhythm and S1, S2 normal  Abdomen: soft, non-tender, positive bowel sounds   Back: negative  Extremities: extremities atraumatic, no cyanosis or edema  Neurologic: Grossly normal      Discharge instructions/Information to patient and family:   See after visit summary section titled Discharge Instructions for information provided to patient and family  Planned Readmission: no      Discharge Statement:  I spent 35 minutes discharging the patient  This time was spent on the day of discharge  I had direct contact with the patient on the day of discharge  Greater than 50% of the total time was spent examining patient, answering all patient questions, arranging and discussing plan of care with patient as well as directly providing post-discharge instructions  Additional time then spent on discharge activities      ** Please Note: This note has been constructed using a voice recognition system **

## 2019-09-08 NOTE — ASSESSMENT & PLAN NOTE
Lab Results   Component Value Date    HGBA1C 7 5 (H) 06/24/2019       Recent Labs     09/07/19  1620 09/07/19  2122 09/08/19  0716   POCGLU 139 154* 103       Blood Sugar Average: Last 72 hrs:    DM 2   Continue home medications

## 2019-09-08 NOTE — NURSING NOTE
Reviewed discharge instructions with patient including appointments and medication times, no new scripts provided  IV sites removed, belongings packed, patient walked out with PCA via wheelchair to be picked up by her daughter

## 2019-09-08 NOTE — ASSESSMENT & PLAN NOTE
Mild leukocytosis which is normal today  Patient on prednisolone 5 mg daily for gout  Leukocytosis most likely related to his prednisone  Denies fever, chills, cough, recent URI

## 2019-09-08 NOTE — PLAN OF CARE
Problem: Potential for Falls  Goal: Patient will remain free of falls  Description  INTERVENTIONS:  - Assess patient frequently for physical needs  -  Identify cognitive and physical deficits and behaviors that affect risk of falls    -  [place] fall precautions as indicated by assessment   - Educate patient/family on patient safety including physical limitations  - Instruct patient to call for assistance with activity based on assessment  - Modify environment to reduce risk of injury  - Consider OT/PT consult to assist with strengthening/mobility  Outcome: Progressing     Problem: CARDIOVASCULAR - ADULT  Goal: Maintains optimal cardiac output and hemodynamic stability  Description  INTERVENTIONS:  - Monitor I/O, vital signs and rhythm  - Monitor for S/S and trends of decreased cardiac output  - Administer and titrate ordered vasoactive medications to optimize hemodynamic stability  - Assess quality of pulses, skin color and temperature  - Assess for signs of decreased coronary artery perfusion  - Instruct patient to report change in severity of symptoms  Outcome: Progressing  Goal: Absence of cardiac dysrhythmias or at baseline rhythm  Description  INTERVENTIONS:  - Continuous cardiac monitoring, vital signs, obtain 12 lead EKG if ordered  - Administer antiarrhythmic and heart rate control medications as ordered  - Monitor electrolytes and administer replacement therapy as ordered  Outcome: Progressing     Problem: SKIN/TISSUE INTEGRITY - ADULT  Goal: Skin integrity remains intact  Description  INTERVENTIONS  - Identify patients at risk for skin breakdown  - Assess and monitor skin integrity  - Assess and monitor nutrition and hydration status  - Monitor labs (i e  albumin)  - Assess for incontinence   - Turn and reposition patient  - Assist with mobility/ambulation  - Relieve pressure over bony prominences  - Avoid friction and shearing  - Provide appropriate hygiene as needed including keeping skin clean and dry  - Evaluate need for skin moisturizer/barrier cream  - Collaborate with interdisciplinary team (i e  Nutrition, Rehabilitation, etc )   - Patient/family teaching  Outcome: Progressing     Problem: PAIN - ADULT  Goal: Verbalizes/displays adequate comfort level or baseline comfort level  Description  Interventions:  - Encourage patient to monitor pain and request assistance  - Assess pain using appropriate pain scale  - Administer analgesics based on type and severity of pain and evaluate response  - Implement non-pharmacological measures as appropriate and evaluate response  - Consider cultural and social influences on pain and pain management  - Notify physician/advanced practitioner if interventions unsuccessful or patient reports new pain  Outcome: Progressing     Problem: SAFETY ADULT  Goal: Maintain or return to baseline ADL function  Description  INTERVENTIONS:  -  Assess patient's ability to carry out ADLs; assess patient's baseline for ADL function and identify physical deficits which impact ability to perform ADLs (bathing, care of mouth/teeth, toileting, grooming, dressing, etc )  - Assess/evaluate cause of self-care deficits   - Assess range of motion  - Assess patient's mobility; develop plan if impaired  - Assess patient's need for assistive devices and provide as appropriate  - Encourage maximum independence but intervene and supervise when necessary  - Involve family in performance of ADLs  - Assess for home care needs following discharge   - Consider OT consult to assist with ADL evaluation and planning for discharge  - Provide patient education as appropriate  Outcome: Progressing  Goal: Maintain or return mobility status to optimal level  Description  INTERVENTIONS:  - Assess patient's baseline mobility status (ambulation, transfers, stairs, etc )    - Identify cognitive and physical deficits and behaviors that affect mobility  - Identify mobility aids required to assist with transfers and/or ambulation (gait belt, sit-to-stand, lift, walker, cane, etc )  - [place] fall precautions as indicated by assessment  - Record patient progress and toleration of activity level on Mobility SBAR; progress patient to next Phase/Stage  - Instruct patient to call for assistance with activity based on assessment  - Consider rehabilitation consult to assist with strengthening/weightbearing, etc   Outcome: Progressing     Problem: Knowledge Deficit  Goal: Patient/family/caregiver demonstrates understanding of disease process, treatment plan, medications, and discharge instructions  Description  Complete learning assessment and assess knowledge base    Interventions:  - Provide teaching at level of understanding  - Provide teaching via preferred learning methods  Outcome: Progressing

## 2019-09-11 ENCOUNTER — OFFICE VISIT (OUTPATIENT)
Dept: NEPHROLOGY | Facility: CLINIC | Age: 74
End: 2019-09-11
Payer: COMMERCIAL

## 2019-09-11 VITALS
BODY MASS INDEX: 41.24 KG/M2 | WEIGHT: 256.6 LBS | SYSTOLIC BLOOD PRESSURE: 120 MMHG | HEIGHT: 66 IN | DIASTOLIC BLOOD PRESSURE: 74 MMHG | HEART RATE: 70 BPM

## 2019-09-11 DIAGNOSIS — I10 ESSENTIAL HYPERTENSION: Primary | ICD-10-CM

## 2019-09-11 DIAGNOSIS — N18.9 CHRONIC RENAL IMPAIRMENT, UNSPECIFIED CKD STAGE: ICD-10-CM

## 2019-09-11 PROCEDURE — 99213 OFFICE O/P EST LOW 20 MIN: CPT | Performed by: INTERNAL MEDICINE

## 2019-09-11 PROCEDURE — 3078F DIAST BP <80 MM HG: CPT | Performed by: INTERNAL MEDICINE

## 2019-09-11 PROCEDURE — 3074F SYST BP LT 130 MM HG: CPT | Performed by: INTERNAL MEDICINE

## 2019-09-11 NOTE — PATIENT INSTRUCTIONS
You are here for follow-up your doing great until a couple weeks ago when he fell and got a cut and then you went in for chest pain and thank Goodness it was not related to her heart  You look great  Your creatinine which is the blood test for the kidney is 1 5 which I showed you is the best level it has been in a while so there has been no worsening  Also there is no signs of aggressive kidney disease cause there is no protein in that is a good thing  You likely have little aging or nephrosclerosis for now just continue current medications with blood pressure control your sugar controls are excellent and again your kidney function is better than it was last visit so continue what you are doing

## 2019-09-11 NOTE — PROGRESS NOTES
NEPHROLOGY PROGRESS NOTE    Lisa Nova 66 y o  female MRN: [id number]  Unit/Bed#:  Encounter: [id number]  Reason for Consult:  Renal insufficiency    Patient is here for routine follow-up she has been doing well the last 6 months until a few weeks ago when she fell and cut her head and needed some stitches then she was admitted with chest pain but was just musculoskeletal so she is now feeling well and here for routine follow-up  ASSESSMENT/PLAN:  1  Renal  The patient has chronic renal insufficiency due to hypertensive nephrosclerosis given lack of proteinuria  Her latest creatinine is 1 5 which looking back a year has been 1 of the lowest it is so there has been no progression and she is likely at her baseline  She does have mild hypokalemia but she is on supplementations with her diuretic and her volume status is well controlled  Continue with blood pressure control on current medications and I told her call me if there is any problems between visits  Her blood pressure is under excellent control no changes  We can monitor for microalbumin yearly but in the past these were negative  SUBJECTIVE:  Review of Systems   Constitution: Negative for chills and fever  HENT: Negative  Eyes: Negative  Cardiovascular: Negative  Negative for chest pain, dyspnea on exertion, leg swelling and orthopnea  Respiratory: Negative  Negative for cough, shortness of breath and wheezing  Gastrointestinal: Negative  Negative for abdominal pain, diarrhea, nausea and vomiting  Genitourinary: Negative for dysuria and hematuria  Neurological: Negative  OBJECTIVE:  Current Weight: Weight - Scale: 116 kg (256 lb 9 6 oz)  [email or web address] com:     Blood pressure 120/74, pulse 70, height 5' 6" (1 676 m), weight 116 kg (256 lb 9 6 oz), not currently breastfeeding  , Body mass index is 41 42 kg/m²      [unfilled]    Physical Exam: /74 (BP Location: Left arm, Patient Position: Sitting, Cuff Size: Standard)   Pulse 70   Ht 5' 6" (1 676 m)   Wt 116 kg (256 lb 9 6 oz)   BMI 41 42 kg/m²   Physical Exam   Constitutional: She is oriented to person, place, and time  No distress  Eyes: No scleral icterus  Neck: Neck supple  No JVD present  Cardiovascular: Normal rate and regular rhythm  Exam reveals no friction rub  Pulmonary/Chest: Effort normal and breath sounds normal  No respiratory distress  She has no wheezes  She has no rales  Abdominal: Soft  Bowel sounds are normal  She exhibits no distension  There is no tenderness  Neurological: She is alert and oriented to person, place, and time         Medications:    Current Outpatient Medications:     albuterol (PROAIR HFA) 90 mcg/act inhaler, Inhale 2 puffs every 6 (six) hours as needed for wheezing, Disp: 8 5 g, Rfl: 3    amitriptyline (ELAVIL) 10 mg tablet, TAKE ONE TABLET BY MOUTH AT BEDTIME, Disp: , Rfl:     aspirin (ASPIRIN ADULT LOW DOSE) 81 mg EC tablet, Take 1 tablet by mouth daily, Disp: , Rfl:     Cetirizine HCl 10 MG CAPS, Take 10 mg by mouth daily  , Disp: , Rfl:     cholecalciferol (VITAMIN D3) 1,000 units tablet, Take 1,000 Units by mouth daily, Disp: , Rfl:     citalopram (CeleXA) 40 mg tablet, Take 1 tablet (40 mg total) by mouth daily, Disp: 90 tablet, Rfl: 1    febuxostat (ULORIC) 80 MG TABS, Take 1 tablet by mouth daily, Disp: , Rfl:     furosemide (LASIX) 40 mg tablet, TAKE ONE TABLET BY MOUTH EVERY DAY , Disp: 30 tablet, Rfl: 3    gabapentin (NEURONTIN) 300 mg capsule, Take 1 tablet by mouth 3 (three) times a day, Disp: , Rfl:     JANUVIA 25 MG tablet, TAKE ONE TABLET BY MOUTH EVERY DAY , Disp: 30 tablet, Rfl: 4    levothyroxine 50 mcg tablet, TAKE ONE TABLET BY MOUTH EVERY DAY , Disp: 30 tablet, Rfl: 9    metolazone (ZAROXOLYN) 2 5 mg tablet, TAKE ONE TABLET BY MOUTH THREE TIMES A WEEK monday, wednesday, friday, Disp: 30 tablet, Rfl: 1    metoprolol tartrate (LOPRESSOR) 25 mg tablet, TAKE ONE TABLET BY MOUTH EVERY DAY , Disp: 30 tablet, Rfl: 4    potassium chloride (K-DUR,KLOR-CON) 20 mEq tablet, Take 1 tablet (20 mEq total) by mouth 2 (two) times a day, Disp: 60 tablet, Rfl: 1    predniSONE 5 mg tablet, Take 5 mg by mouth daily, Disp: , Rfl:     simvastatin (ZOCOR) 10 mg tablet, TAKE ONE TABLET BY MOUTH EVERY DAY , Disp: 30 tablet, Rfl: 3    traMADol (ULTRAM) 50 mg tablet, Take 1 tablet by mouth, Disp: , Rfl:     benzonatate (TESSALON) 200 MG capsule, Take 1 capsule (200 mg total) by mouth 3 (three) times a day as needed for cough (Patient not taking: Reported on 9/11/2019), Disp: 20 capsule, Rfl: 0    Cholecalciferol (D 400) 400 units TABS, Take 400 Units by mouth daily 2 tabs, Disp: , Rfl:     citalopram (CeleXA) 20 mg tablet, TAKE ONE TABLET BY MOUTH EVERY DAY  (Patient not taking: No sig reported), Disp: 90 tablet, Rfl: 0    Laboratory Results:  Lab Results   Component Value Date    WBC 9 90 09/08/2019    HGB 11 6 09/08/2019    HCT 36 7 09/08/2019    MCV 98 09/08/2019     09/08/2019     Lab Results   Component Value Date    SODIUM 140 09/08/2019    K 3 3 (L) 09/08/2019     09/08/2019    CO2 30 09/08/2019    BUN 36 (H) 09/08/2019    CREATININE 1 48 (H) 09/08/2019    GLUC 107 09/08/2019    CALCIUM 9 0 09/08/2019     Lab Results   Component Value Date    CALCIUM 9 0 09/08/2019     No results found for: LABPROT

## 2019-09-19 DIAGNOSIS — R60.9 EDEMA, UNSPECIFIED TYPE: ICD-10-CM

## 2019-09-19 RX ORDER — FUROSEMIDE 40 MG/1
TABLET ORAL
Qty: 30 TABLET | Refills: 2 | Status: SHIPPED | OUTPATIENT
Start: 2019-09-19 | End: 2020-01-14 | Stop reason: SDUPTHER

## 2019-10-07 DIAGNOSIS — E11.9 TYPE 2 DIABETES MELLITUS WITHOUT COMPLICATION, WITHOUT LONG-TERM CURRENT USE OF INSULIN (HCC): ICD-10-CM

## 2019-10-07 DIAGNOSIS — R60.9 EDEMA, UNSPECIFIED TYPE: ICD-10-CM

## 2019-10-07 RX ORDER — SITAGLIPTIN 25 MG/1
TABLET, FILM COATED ORAL
Qty: 30 TABLET | Refills: 3 | Status: SHIPPED | OUTPATIENT
Start: 2019-10-07 | End: 2020-02-12 | Stop reason: SDUPTHER

## 2019-10-07 RX ORDER — METOLAZONE 2.5 MG/1
TABLET ORAL
Qty: 12 TABLET | Refills: 0 | Status: SHIPPED | OUTPATIENT
Start: 2019-10-07 | End: 2019-11-12 | Stop reason: SDUPTHER

## 2019-10-08 ENCOUNTER — IMMUNIZATIONS (OUTPATIENT)
Dept: FAMILY MEDICINE CLINIC | Facility: CLINIC | Age: 74
End: 2019-10-08
Payer: COMMERCIAL

## 2019-10-08 VITALS — TEMPERATURE: 98.1 F

## 2019-10-08 DIAGNOSIS — Z23 NEED FOR IMMUNIZATION AGAINST INFLUENZA: Primary | ICD-10-CM

## 2019-10-08 PROCEDURE — G0008 ADMIN INFLUENZA VIRUS VAC: HCPCS | Performed by: FAMILY MEDICINE

## 2019-10-08 PROCEDURE — 90662 IIV NO PRSV INCREASED AG IM: CPT | Performed by: FAMILY MEDICINE

## 2019-10-16 DIAGNOSIS — E87.6 HYPOKALEMIA: ICD-10-CM

## 2019-10-16 RX ORDER — POTASSIUM CHLORIDE 20 MEQ/1
TABLET, EXTENDED RELEASE ORAL
Qty: 60 TABLET | Refills: 0 | Status: SHIPPED | OUTPATIENT
Start: 2019-10-16 | End: 2019-11-12 | Stop reason: SDUPTHER

## 2019-10-30 ENCOUNTER — OFFICE VISIT (OUTPATIENT)
Dept: PODIATRY | Facility: CLINIC | Age: 74
End: 2019-10-30
Payer: COMMERCIAL

## 2019-10-30 VITALS
SYSTOLIC BLOOD PRESSURE: 117 MMHG | BODY MASS INDEX: 41.85 KG/M2 | HEART RATE: 70 BPM | DIASTOLIC BLOOD PRESSURE: 80 MMHG | WEIGHT: 260.4 LBS | HEIGHT: 66 IN

## 2019-10-30 DIAGNOSIS — I73.9 PERIPHERAL VASCULAR DISEASE, UNSPECIFIED (HCC): Primary | ICD-10-CM

## 2019-10-30 PROCEDURE — 99212 OFFICE O/P EST SF 10 MIN: CPT | Performed by: PODIATRIST

## 2019-10-30 NOTE — PROGRESS NOTES
Patient presents for palliative diabetic foot care  Treatment consisted of nail lesion trimming  No palpable pedal pulses  No acute disorder noted at this time  Patient is rescheduled in 10 weeks

## 2019-11-12 DIAGNOSIS — E87.6 HYPOKALEMIA: ICD-10-CM

## 2019-11-12 DIAGNOSIS — R60.9 EDEMA, UNSPECIFIED TYPE: ICD-10-CM

## 2019-11-13 RX ORDER — POTASSIUM CHLORIDE 20 MEQ/1
TABLET, EXTENDED RELEASE ORAL
Qty: 60 TABLET | Refills: 0 | Status: SHIPPED | OUTPATIENT
Start: 2019-11-13 | End: 2019-12-16 | Stop reason: SDUPTHER

## 2019-11-13 RX ORDER — METOLAZONE 2.5 MG/1
TABLET ORAL
Qty: 12 TABLET | Refills: 0 | Status: SHIPPED | OUTPATIENT
Start: 2019-11-13 | End: 2019-12-17 | Stop reason: SDUPTHER

## 2019-12-08 DIAGNOSIS — E03.9 HYPOTHYROIDISM, UNSPECIFIED TYPE: ICD-10-CM

## 2019-12-08 RX ORDER — LEVOTHYROXINE SODIUM 0.05 MG/1
TABLET ORAL
Qty: 30 TABLET | Refills: 8 | Status: SHIPPED | OUTPATIENT
Start: 2019-12-08 | End: 2020-06-28

## 2019-12-16 DIAGNOSIS — E87.6 HYPOKALEMIA: ICD-10-CM

## 2019-12-16 RX ORDER — POTASSIUM CHLORIDE 20 MEQ/1
20 TABLET, EXTENDED RELEASE ORAL 2 TIMES DAILY
Qty: 60 TABLET | Refills: 2 | Status: SHIPPED | OUTPATIENT
Start: 2019-12-16 | End: 2020-03-10

## 2019-12-17 DIAGNOSIS — R60.9 EDEMA, UNSPECIFIED TYPE: ICD-10-CM

## 2019-12-17 RX ORDER — METOLAZONE 2.5 MG/1
2.5 TABLET ORAL 3 TIMES WEEKLY
Qty: 12 TABLET | Refills: 1 | Status: SHIPPED | OUTPATIENT
Start: 2019-12-18 | End: 2020-02-12 | Stop reason: SDUPTHER

## 2020-01-06 DIAGNOSIS — E78.2 MIXED HYPERLIPIDEMIA: ICD-10-CM

## 2020-01-06 RX ORDER — SIMVASTATIN 10 MG
10 TABLET ORAL DAILY
Qty: 30 TABLET | Refills: 3 | Status: SHIPPED | OUTPATIENT
Start: 2020-01-06 | End: 2020-05-05

## 2020-01-11 DIAGNOSIS — I10 ESSENTIAL HYPERTENSION: ICD-10-CM

## 2020-01-14 DIAGNOSIS — F32.A DEPRESSION, UNSPECIFIED DEPRESSION TYPE: ICD-10-CM

## 2020-01-14 DIAGNOSIS — R60.9 EDEMA, UNSPECIFIED TYPE: ICD-10-CM

## 2020-01-15 RX ORDER — FUROSEMIDE 40 MG/1
40 TABLET ORAL DAILY
Qty: 30 TABLET | Refills: 2 | Status: SHIPPED | OUTPATIENT
Start: 2020-01-15 | End: 2020-04-04

## 2020-01-15 RX ORDER — CITALOPRAM 40 MG/1
40 TABLET ORAL DAILY
Qty: 90 TABLET | Refills: 1 | Status: SHIPPED | OUTPATIENT
Start: 2020-01-15 | End: 2020-07-06

## 2020-01-16 LAB
ALBUMIN SERPL-MCNC: 3.9 G/DL (ref 3.6–5.1)
ALBUMIN/GLOB SERPL: 1.4 (CALC) (ref 1–2.5)
ALP SERPL-CCNC: 61 U/L (ref 33–130)
ALT SERPL-CCNC: 10 U/L (ref 6–29)
AST SERPL-CCNC: 16 U/L (ref 10–35)
BASOPHILS # BLD AUTO: 58 CELLS/UL (ref 0–200)
BASOPHILS NFR BLD AUTO: 0.6 %
BILIRUB SERPL-MCNC: 0.5 MG/DL (ref 0.2–1.2)
BUN SERPL-MCNC: 41 MG/DL (ref 7–25)
BUN/CREAT SERPL: 27 (CALC) (ref 6–22)
CALCIUM SERPL-MCNC: 9.5 MG/DL (ref 8.6–10.4)
CHLORIDE SERPL-SCNC: 99 MMOL/L (ref 98–110)
CO2 SERPL-SCNC: 29 MMOL/L (ref 20–32)
CREAT SERPL-MCNC: 1.54 MG/DL (ref 0.6–0.93)
EOSINOPHIL # BLD AUTO: 250 CELLS/UL (ref 15–500)
EOSINOPHIL NFR BLD AUTO: 2.6 %
ERYTHROCYTE [DISTWIDTH] IN BLOOD BY AUTOMATED COUNT: 12 % (ref 11–15)
GLOBULIN SER CALC-MCNC: 2.7 G/DL (CALC) (ref 1.9–3.7)
GLUCOSE SERPL-MCNC: 121 MG/DL (ref 65–99)
HBA1C MFR BLD: 7.7 % OF TOTAL HGB
HCT VFR BLD AUTO: 39.5 % (ref 35–45)
HGB BLD-MCNC: 12.8 G/DL (ref 11.7–15.5)
LYMPHOCYTES # BLD AUTO: 1978 CELLS/UL (ref 850–3900)
LYMPHOCYTES NFR BLD AUTO: 20.6 %
MCH RBC QN AUTO: 30.1 PG (ref 27–33)
MCHC RBC AUTO-ENTMCNC: 32.4 G/DL (ref 32–36)
MCV RBC AUTO: 92.9 FL (ref 80–100)
MONOCYTES # BLD AUTO: 605 CELLS/UL (ref 200–950)
MONOCYTES NFR BLD AUTO: 6.3 %
NEUTROPHILS # BLD AUTO: 6710 CELLS/UL (ref 1500–7800)
NEUTROPHILS NFR BLD AUTO: 69.9 %
PLATELET # BLD AUTO: 264 THOUSAND/UL (ref 140–400)
PMV BLD REES-ECKER: 9.9 FL (ref 7.5–12.5)
POTASSIUM SERPL-SCNC: 3.5 MMOL/L (ref 3.5–5.3)
PROT SERPL-MCNC: 6.6 G/DL (ref 6.1–8.1)
RBC # BLD AUTO: 4.25 MILLION/UL (ref 3.8–5.1)
SL AMB EGFR AFRICAN AMERICAN: 38 ML/MIN/1.73M2
SL AMB EGFR NON AFRICAN AMERICAN: 32 ML/MIN/1.73M2
SODIUM SERPL-SCNC: 142 MMOL/L (ref 135–146)
WBC # BLD AUTO: 9.6 THOUSAND/UL (ref 3.8–10.8)

## 2020-01-20 ENCOUNTER — OFFICE VISIT (OUTPATIENT)
Dept: FAMILY MEDICINE CLINIC | Facility: CLINIC | Age: 75
End: 2020-01-20
Payer: COMMERCIAL

## 2020-01-20 VITALS
TEMPERATURE: 98 F | DIASTOLIC BLOOD PRESSURE: 86 MMHG | HEART RATE: 83 BPM | RESPIRATION RATE: 18 BRPM | SYSTOLIC BLOOD PRESSURE: 126 MMHG | OXYGEN SATURATION: 97 % | HEIGHT: 66 IN | BODY MASS INDEX: 41.14 KG/M2 | WEIGHT: 256 LBS

## 2020-01-20 DIAGNOSIS — R26.9 GAIT ABNORMALITY: ICD-10-CM

## 2020-01-20 DIAGNOSIS — E03.9 HYPOTHYROIDISM, UNSPECIFIED TYPE: ICD-10-CM

## 2020-01-20 DIAGNOSIS — N18.30 CHRONIC KIDNEY DISEASE, STAGE 3 (HCC): ICD-10-CM

## 2020-01-20 DIAGNOSIS — E78.2 MIXED HYPERLIPIDEMIA: ICD-10-CM

## 2020-01-20 DIAGNOSIS — F32.A DEPRESSION, UNSPECIFIED DEPRESSION TYPE: ICD-10-CM

## 2020-01-20 DIAGNOSIS — Z00.00 ENCOUNTER FOR MEDICARE ANNUAL WELLNESS EXAM: Primary | ICD-10-CM

## 2020-01-20 DIAGNOSIS — M79.7 FIBROMYALGIA: ICD-10-CM

## 2020-01-20 DIAGNOSIS — IMO0002 UNCONTROLLED DIABETES WITH STAGE 3 CHRONIC KIDNEY DISEASE GFR 30-59: ICD-10-CM

## 2020-01-20 DIAGNOSIS — E66.01 MORBID OBESITY WITH BMI OF 40.0-44.9, ADULT (HCC): ICD-10-CM

## 2020-01-20 PROCEDURE — 1101F PT FALLS ASSESS-DOCD LE1/YR: CPT | Performed by: FAMILY MEDICINE

## 2020-01-20 PROCEDURE — 99214 OFFICE O/P EST MOD 30 MIN: CPT | Performed by: FAMILY MEDICINE

## 2020-01-20 PROCEDURE — 1160F RVW MEDS BY RX/DR IN RCRD: CPT | Performed by: FAMILY MEDICINE

## 2020-01-20 PROCEDURE — 1170F FXNL STATUS ASSESSED: CPT | Performed by: FAMILY MEDICINE

## 2020-01-20 PROCEDURE — 3725F SCREEN DEPRESSION PERFORMED: CPT | Performed by: FAMILY MEDICINE

## 2020-01-20 PROCEDURE — G0439 PPPS, SUBSEQ VISIT: HCPCS | Performed by: FAMILY MEDICINE

## 2020-01-20 PROCEDURE — 1125F AMNT PAIN NOTED PAIN PRSNT: CPT | Performed by: FAMILY MEDICINE

## 2020-01-20 NOTE — ASSESSMENT & PLAN NOTE
Continue follow-up with her rheumatologist, Dr Samantha Bowling  No longer on prednisone  Stable on gabapentin 300 t i d  Has tramadol, but has not needed a in a while

## 2020-01-20 NOTE — PATIENT INSTRUCTIONS
Obesity   AMBULATORY CARE:   Obesity  is when your body mass index (BMI) is greater than 30  Your healthcare provider will use your height and weight to measure your BMI  The risks of obesity include  many health problems, such as injuries or physical disability  You may need tests to check for the following:  · Diabetes     · High blood pressure or high cholesterol     · Heart disease     · Gallbladder or liver disease     · Cancer of the colon, breast, prostate, liver, or kidney     · Sleep apnea     · Arthritis or gout  Seek care immediately if:   · You have a severe headache, confusion, or difficulty speaking  · You have weakness on one side of your body  · You have chest pain, sweating, or shortness of breath  Contact your healthcare provider if:   · You have symptoms of gallbladder or liver disease, such as pain in your upper abdomen  · You have knee or hip pain and discomfort while walking  · You have symptoms of diabetes, such as intense hunger and thirst, and frequent urination  · You have symptoms of sleep apnea, such as snoring or daytime sleepiness  · You have questions or concerns about your condition or care  Treatment for obesity  focuses on helping you lose weight to improve your health  Even a small decrease in BMI can reduce the risk for many health problems  Your healthcare provider will help you set a weight-loss goal   · Lifestyle changes  are the first step in treating obesity  These include making healthy food choices and getting regular physical activity  Your healthcare provider may suggest a weight-loss program that involves coaching, education, and therapy  · Medicine  may help you lose weight when it is used with a healthy diet and physical activity  · Surgery  can help you lose weight if you are very obese and have other health problems  There are several types of weight-loss surgery  Ask your healthcare provider for more information    Be successful losing weight:   · Set small, realistic goals  An example of a small goal is to walk for 20 minutes 5 days a week  Anther goal is to lose 5% of your body weight  · Tell friends, family members, and coworkers about your goals  and ask for their support  Ask a friend to lose weight with you, or join a weight-loss support group  · Identify foods or triggers that may cause you to overeat , and find ways to avoid them  Remove tempting high-calorie foods from your home and workplace  Place a bowl of fresh fruit on your kitchen counter  If stress causes you to eat, then find other ways to cope with stress  · Keep a diary to track what you eat and drink  Also write down how many minutes of physical activity you do each day  Weigh yourself once a week and record it in your diary  Eating changes: You will need to eat 500 to 1,000 fewer calories each day than you currently eat to lose 1 to 2 pounds a week  The following changes will help you cut calories:  · Eat smaller portions  Use small plates, no larger than 9 inches in diameter  Fill your plate half full of fruits and vegetables  Measure your food using measuring cups until you know what a serving size looks like  · Eat 3 meals and 1 or 2 snacks each day  Plan your meals in advance  Jeremie Mustache and eat at home most of the time  Eat slowly  · Eat fruits and vegetables at every meal   They are low in calories and high in fiber, which makes you feel full  Do not add butter, margarine, or cream sauce to vegetables  Use herbs to season steamed vegetables  · Eat less fat and fewer fried foods  Eat more baked or grilled chicken and fish  These protein sources are lower in calories and fat than red meat  Limit fast food  Dress your salads with olive oil and vinegar instead of bottled dressing  · Limit the amount of sugar you eat  Do not drink sugary beverages  Limit alcohol  Activity changes:  Physical activity is good for your body in many ways   It helps you burn calories and build strong muscles  It decreases stress and depression, and improves your mood  It can also help you sleep better  Talk to your healthcare provider before you begin an exercise program   · Exercise for at least 30 minutes 5 days a week  Start slowly  Set aside time each day for physical activity that you enjoy and that is convenient for you  It is best to do both weight training and an activity that increases your heart rate, such as walking, bicycling, or swimming  · Find ways to be more active  Do yard work and housecleaning  Walk up the stairs instead of using elevators  Spend your leisure time going to events that require walking, such as outdoor festivals or fairs  This extra physical activity can help you lose weight and keep it off  Follow up with your healthcare provider as directed: You may need to meet with a dietitian  Write down your questions so you remember to ask them during your visits  © 2017 [address] [employer] Information is for End User's use only and may not be sold, redistributed or otherwise used for commercial purposes  All illustrations and images included in CareNotes® are the copyrighted property of [employer] , [employer]  or Syed Aponte  The above information is an  only  It is not intended as medical advice for individual conditions or treatments  Talk to your doctor, nurse or pharmacist before following any medical regimen to see if it is safe and effective for you  Medicare Preventive Visit Patient Instructions  Thank you for completing your Welcome to Medicare Visit or Medicare Annual Wellness Visit today  Your next wellness visit will be due in one year (1/20/2021)  The screening/preventive services that you may require over the next 5-10 years are detailed below  Some tests may not apply to you based off risk factors and/or age  Screening tests ordered at today's visit but not completed yet may show as past due  Also, please note that scanned in results may not display below  Preventive Screenings:  Service Recommendations Previous Testing/Comments   Colorectal Cancer Screening  * Colonoscopy    * Fecal Occult Blood Test (FOBT)/Fecal Immunochemical Test (FIT)  * Fecal DNA/Cologuard Test  * Flexible Sigmoidoscopy Age: 54-65 years old   Colonoscopy: every 10 years (may be performed more frequently if at higher risk)  OR  FOBT/FIT: every 1 year  OR  Cologuard: every 3 years  OR  Sigmoidoscopy: every 5 years  Screening may be recommended earlier than age 48 if at higher risk for colorectal cancer  Also, an individualized decision between you and your healthcare provider will decide whether screening between the ages of 74-80 would be appropriate  Colonoscopy: 11/11/2013  FOBT/FIT: 11/23/2018  Cologuard: Not on file  Sigmoidoscopy: Not on file    Screening Current     Breast Cancer Screening Age: 36 years old  Frequency: every 1-2 years  Not required if history of left and right mastectomy Mammogram: Not on file       Cervical Cancer Screening Between the ages of 21-29, pap smear recommended once every 3 years  Between the ages of 33-67, can perform pap smear with HPV co-testing every 5 years  Recommendations may differ for women with a history of total hysterectomy, cervical cancer, or abnormal pap smears in past  Pap Smear: Not on file    Screening Not Indicated   Hepatitis C Screening Once for adults born between 1945 and 1965  More frequently in patients at high risk for Hepatitis C Hep C Antibody: Not on file       Diabetes Screening 1-2 times per year if you're at risk for diabetes or have pre-diabetes Fasting glucose: 121 mg/dL   A1C: 7 7 % of total Hgb    Screening Not Indicated  History Diabetes   Cholesterol Screening Once every 5 years if you don't have a lipid disorder  May order more often based on risk factors   Lipid panel: 06/24/2019    Screening Not Indicated  History Lipid Disorder     Other Preventive Screenings Covered by Medicare:  1  Abdominal Aortic Aneurysm (AAA) Screening: covered once if your at risk  You're considered to be at risk if you have a family history of AAA  2  Lung Cancer Screening: covers low dose CT scan once per year if you meet all of the following conditions: (1) Age 50-69; (2) No signs or symptoms of lung cancer; (3) Current smoker or have quit smoking within the last 15 years; (4) You have a tobacco smoking history of at least 30 pack years (packs per day multiplied by number of years you smoked); (5) You get a written order from a healthcare provider  3  Glaucoma Screening: covered annually if you're considered high risk: (1) You have diabetes OR (2) Family history of glaucoma OR (3)  aged 48 and older OR (3)  American aged 72 and older  3  Osteoporosis Screening: covered every 2 years if you meet one of the following conditions: (1) You're estrogen deficient and at risk for osteoporosis based off medical history and other findings; (2) Have a vertebral abnormality; (3) On glucocorticoid therapy for more than 3 months; (4) Have primary hyperparathyroidism; (5) On osteoporosis medications and need to assess response to drug therapy  · Last bone density test (DXA Scan): Not on file  5  HIV Screening: covered annually if you're between the age of 12-76  Also covered annually if you are younger than 13 and older than 72 with risk factors for HIV infection  For pregnant patients, it is covered up to 3 times per pregnancy      Immunizations:  Immunization Recommendations   Influenza Vaccine Annual influenza vaccination during flu season is recommended for all persons aged >= 6 months who do not have contraindications   Pneumococcal Vaccine (Prevnar and Pneumovax)  * Prevnar = PCV13  * Pneumovax = PPSV23   Adults 25-60 years old: 1-3 doses may be recommended based on certain risk factors  Adults 72 years old: Prevnar (PCV13) vaccine recommended followed by Pneumovax (PPSV23) vaccine  If already received PPSV23 since turning 65, then PCV13 recommended at least one year after PPSV23 dose  Hepatitis B Vaccine 3 dose series if at intermediate or high risk (ex: diabetes, end stage renal disease, liver disease)   Tetanus (Td) Vaccine - COST NOT COVERED BY MEDICARE PART B Following completion of primary series, a booster dose should be given every 10 years to maintain immunity against tetanus  Td may also be given as tetanus wound prophylaxis  Tdap Vaccine - COST NOT COVERED BY MEDICARE PART B Recommended at least once for all adults  For pregnant patients, recommended with each pregnancy  Shingles Vaccine (Shingrix) - COST NOT COVERED BY MEDICARE PART B  2 shot series recommended in those aged 48 and above     Health Maintenance Due:      Topic Date Due    CRC Screening: Colonoscopy  11/11/2023     Immunizations Due:      Topic Date Due    Hepatitis B Vaccine (1 of 3 - Risk 3-dose series) 02/09/1964     Advance Directives   What are advance directives? Advance directives are legal documents that state your wishes and plans for medical care  These plans are made ahead of time in case you lose your ability to make decisions for yourself  Advance directives can apply to any medical decision, such as the treatments you want, and if you want to donate organs  What are the types of advance directives? There are many types of advance directives, and each state has rules about how to use them  You may choose a combination of any of the following:  · Living will: This is a written record of the treatment you want  You can also choose which treatments you do not want, which to limit, and which to stop at a certain time  This includes surgery, medicine, IV fluid, and tube feedings  · Durable power of  for healthcare [facility]): This is a written record that states who you want to make healthcare choices for you when you are unable to make them for yourself   This person, called a proxy, is usually a family member or a friend  You may choose more than 1 proxy  · Do not resuscitate (DNR) order:  A DNR order is used in case your heart stops beating or you stop breathing  It is a request not to have certain forms of treatment, such as CPR  A DNR order may be included in other types of advance directives  · Medical directive: This covers the care that you want if you are in a coma, near death, or unable to make decisions for yourself  You can list the treatments you want for each condition  Treatment may include pain medicine, surgery, blood transfusions, dialysis, IV or tube feedings, and a ventilator (breathing machine)  · Values history: This document has questions about your views, beliefs, and how you feel and think about life  This information can help others choose the care that you would choose  Why are advance directives important? An advance directive helps you control your care  Although spoken wishes may be used, it is better to have your wishes written down  Spoken wishes can be misunderstood, or not followed  Treatments may be given even if you do not want them  An advance directive may make it easier for your family to make difficult choices about your care  Fall Prevention    Fall prevention  includes ways to make your home and other areas safer  It also includes ways you can move more carefully to prevent a fall  Health conditions that cause changes in your blood pressure, vision, or muscle strength and coordination may increase your risk for falls  Medicines may also increase your risk for falls if they make you dizzy, weak, or sleepy  Fall prevention tips:   · Stand or sit up slowly  · Use assistive devices as directed  · Wear shoes that fit well and have soles that   · Wear a personal alarm  · Stay active  · Manage your medical conditions  Home Safety Tips:  · Add items to prevent falls in the bathroom  · Keep paths clear  · Install bright lights in your home  · Keep items you use often on shelves within reach  · Paint or place reflective tape on the edges of your stairs  Weight Management   Why it is important to manage your weight:  Being overweight increases your risk of health conditions such as heart disease, high blood pressure, type 2 diabetes, and certain types of cancer  It can also increase your risk for osteoarthritis, sleep apnea, and other respiratory problems  Aim for a slow, steady weight loss  Even a small amount of weight loss can lower your risk of health problems  How to lose weight safely:  A safe and healthy way to lose weight is to eat fewer calories and get regular exercise  You can lose up about 1 pound a week by decreasing the number of calories you eat by 500 calories each day  Healthy meal plan for weight management:  A healthy meal plan includes a variety of foods, contains fewer calories, and helps you stay healthy  A healthy meal plan includes the following:  · Eat whole-grain foods more often  A healthy meal plan should contain fiber  Fiber is the part of grains, fruits, and vegetables that is not broken down by your body  Whole-grain foods are healthy and provide extra fiber in your diet  Some examples of whole-grain foods are whole-wheat breads and pastas, oatmeal, brown rice, and bulgur  · Eat a variety of vegetables every day  Include dark, leafy greens such as spinach, kale, thiago greens, and mustard greens  Eat yellow and orange vegetables such as carrots, sweet potatoes, and winter squash  · Eat a variety of fruits every day  Choose fresh or canned fruit (canned in its own juice or light syrup) instead of juice  Fruit juice has very little or no fiber  · Eat low-fat dairy foods  Drink fat-free (skim) milk or 1% milk  Eat fat-free yogurt and low-fat cottage cheese  Try low-fat cheeses such as mozzarella and other reduced-fat cheeses    · Choose meat and other protein foods that are low in fat  Choose beans or other legumes such as split peas or lentils  Choose fish, skinless poultry (chicken or turkey), or lean cuts of red meat (beef or pork)  Before you cook meat or poultry, cut off any visible fat  · Use less fat and oil  Try baking foods instead of frying them  Add less fat, such as margarine, sour cream, regular salad dressing and mayonnaise to foods  Eat fewer high-fat foods  Some examples of high-fat foods include french fries, doughnuts, ice cream, and cakes  · Eat fewer sweets  Limit foods and drinks that are high in sugar  This includes candy, cookies, regular soda, and sweetened drinks  Exercise:  Exercise at least 30 minutes per day on most days of the week  Some examples of exercise include walking, biking, dancing, and swimming  You can also fit in more physical activity by taking the stairs instead of the elevator or parking farther away from stores  Ask your healthcare provider about the best exercise plan for you  © Copyright [employer] 2018 Information is for End User's use only and may not be sold, redistributed or otherwise used for commercial purposes   All illustrations and images included in CareNotes® are the copyrighted property of [employer]  or [address] [place] [address] [facility]

## 2020-01-20 NOTE — ASSESSMENT & PLAN NOTE
Patient states she has had a few falls recently, 1 resulting in sutures  No hospitalizations  Discussed referral to physical therapy for gait training  Patient refuses at this time due to high [employer] pays  I watched her ambulate in the office today  She is stable with the use of a cane    Will continue to monitor

## 2020-01-20 NOTE — ASSESSMENT & PLAN NOTE
Lab Results   Component Value Date    HGBA1C 7 7 (H) 01/15/2020    current A1c 7 7%, was 7 5%  Patient is on reduced dosages of Januvia   ( 25 mg daily)  due to renal function  Will refer to endocrinology at this time

## 2020-01-20 NOTE — PROGRESS NOTES
Assessment and Plan:    PATIENT PRESENTS FOR MEDICARE WELLNESS VISIT  SHE DOES NOT HAVE A LIVING WILL OR HEALTHCARE POWER OF   WE DISCUSSED WAYS FOR HER TO OBTAIN THESE DOCUMENTS  DEPRESSION SCREEN IS NEGATIVE  URINARY INCONTINENCE SCREEN WAS NEGATIVE  PATIENT IS SET 2 FALLS RECENTLY 1 RESULTING IN SUTURES  NO HOSPITALIZATIONS  WE DISCUSSED THIS DURING HER OFFICE VISIT TODAY (SEE OTHER NOTE)  PATIENT CURRENT WITH AGE APPROPRIATE VACCINATIONS  CURRENT WITH COLONOSCOPY, MAMMOGRAPHY, AND DEXA      Problem List Items Addressed This Visit     Chronic kidney disease, stage 3 ([facility] )      Kidney function stable  Creatinine 1 54 with GFR of 32  Will continue to monitor         Depression      Stable on citalopram 40 mg daily         Fibromyalgia      Continue follow-up with her rheumatologist, Dr Zenia Linda  No longer on prednisone  Stable on gabapentin 300 t i d  Has tramadol, but has not needed a in a while  Hyperlipidemia      Cholesterol doing well on simvastatin 10 mg daily  Will continue to monitor         Hypothyroidism      Doing well on levothyroxine 50 mcg daily  Will continue to monitor         Uncontrolled diabetes with stage 3 chronic kidney disease GFR 30-59 (HCC)       Lab Results   Component Value Date    HGBA1C 7 7 (H) 01/15/2020    current A1c 7 7%, was 7 5%  Patient is on reduced dosages of Januvia   ( 25 mg daily)  due to renal function  Will refer to endocrinology at this time  Gait abnormality       Patient states she has had a few falls recently, 1 resulting in sutures  No hospitalizations  Discussed referral to physical therapy for gait training  Patient refuses at this time due to high Co pays  I watched her ambulate in the office today  She is stable with the use of a cane    Will continue to monitor           Other Visit Diagnoses     Encounter for Medicare annual wellness exam    -  Primary    Morbid obesity with BMI of 40 0-44 9, adult ([facility] ) Preventive health issues were discussed with patient, and age appropriate screening tests were ordered as noted in patient's After Visit Summary  Personalized health advice and appropriate referrals for health education or preventive services given if needed, as noted in patient's After Visit Summary  History of Present Illness:     Patient presents for Welcome to Medicare visit       Patient Care Team:  Lashawn Singh DO as PCP - DO Cleve Rice MD (Nephrology)  [facility] (Podiatry)     Review of Systems:     Review of Systems   Problem List:     Patient Active Problem List   Diagnosis    Anemia    Chronic gout    Chronic kidney disease, stage 3 ([address] )    Combined form of senile cataract of left eye    Cough    Depression    Edema    Fibromyalgia    Homocysteinemia ([address] )    Hyperlipidemia    Hypothyroidism    Hypokalemia    Hypertension    Low back pain    Pain of left hip    Primary osteoarthritis of both hands    Uncontrolled diabetes with stage 3 chronic kidney disease GFR 30-59 ([address] )    Increased frequency of urination    Vitamin D deficiency    Folliculitis    Symptomatic varicose veins of both lower extremities    Intermittent claudication ([address] )    CRI (chronic renal insufficiency)    Scalp laceration    Chest pain    Leukocytosis    Type 2 diabetes mellitus without complication, without long-term current use of insulin ([address] )    Gait abnormality      Past Medical and Surgical History:     Past Medical History:   Diagnosis Date    Abnormal blood chemistry     last assessed 11/13/2013    Angina pectoris ([address] )     last assessed 11/012/13    Arthritis     last assessed 11/13/2013    Asthma     Athlete's foot     resolved 10/11/17    Cataract     last assessed 03/18/2016    Diabetes mellitus with kidney disease ([address] )     last assessed 10/03/17    Eczema     Gait disorder     last assessed 11/12/2013    Idiopathic thrombocytopenia purpura ([address] ) last assessed 08/06/15    Overactive bladder     Post menopausal syndrome     resolved 10/11/17    Restless legs syndrome     last assessed 10/24/2013    Subclinical hypothyroidism     last assessed 12/11/17    Urinary frequency     resolved 12/18/2014     Past Surgical History:   Procedure Laterality Date    REPLACEMENT TOTAL KNEE        Family History:     Family History   Problem Relation Age of Onset    Leukemia Mother     Eczema Father       Social History:     Social History     Socioeconomic History    Marital status: Single     Spouse name: None    Number of children: None    Years of education: None    Highest education level: None   Occupational History    None   Social Needs    Financial resource strain: None    Food insecurity:     Worry: None     Inability: None    Transportation needs:     Medical: None     Non-medical: None   Tobacco Use    Smoking status: Never Smoker    Smokeless tobacco: Never Used    Tobacco comment: per allscripts; former smoker   Substance and Sexual Activity    Alcohol use: Yes     Frequency: 2-3 times a week     Drinks per session: 1 or 2     Binge frequency: Never     Comment: occasional    Drug use: No    Sexual activity: None   Lifestyle    Physical activity:     Days per week: None     Minutes per session: None    Stress: None   Relationships    Social connections:     Talks on phone: None     Gets together: None     Attends Taoist service: None     Active member of club or organization: None     Attends meetings of clubs or organizations: None     Relationship status: None    Intimate partner violence:     Fear of current or ex partner: None     Emotionally abused: None     Physically abused: None     Forced sexual activity: None   Other Topics Concern    None   Social History Narrative    None      Medications and Allergies:     Current Outpatient Medications   Medication Sig Dispense Refill    albuterol (PROAIR HFA) 90 mcg/act inhaler Inhale 2 puffs every 6 (six) hours as needed for wheezing 8 5 g 3    amitriptyline (ELAVIL) 10 mg tablet TAKE ONE TABLET BY MOUTH AT BEDTIME      aspirin (ASPIRIN ADULT LOW DOSE) 81 mg EC tablet Take 1 tablet by mouth daily      Cetirizine HCl 10 MG CAPS Take 10 mg by mouth daily        Cholecalciferol (D 400) 400 units TABS Take 400 Units by mouth daily 2 tabs      cholecalciferol (VITAMIN D3) 1,000 units tablet Take 1,000 Units by mouth daily      citalopram (CeleXA) 40 mg tablet Take 1 tablet (40 mg total) by mouth daily 90 tablet 1    febuxostat (ULORIC) 80 MG TABS Take 1 tablet by mouth daily      furosemide (LASIX) 40 mg tablet Take 1 tablet (40 mg total) by mouth daily 30 tablet 2    gabapentin (NEURONTIN) 300 mg capsule Take 1 tablet by mouth 3 (three) times a day      JANUVIA 25 MG tablet TAKE ONE TABLET BY MOUTH EVERY DAY  30 tablet 3    levothyroxine 50 mcg tablet TAKE ONE TABLET BY MOUTH EVERY DAY  30 tablet 8    metolazone (ZAROXOLYN) 2 5 mg tablet Take 1 tablet (2 5 mg total) by mouth 3 (three) times a week Take 1 tablet Monday, Wednesday, and Friday  12 tablet 1    metoprolol tartrate (LOPRESSOR) 25 mg tablet TAKE ONE TABLET BY MOUTH EVERY DAY  30 tablet 0    potassium chloride (K-DUR,KLOR-CON) 20 mEq tablet Take 1 tablet (20 mEq total) by mouth 2 (two) times a day 60 tablet 2    predniSONE 5 mg tablet Take 5 mg by mouth daily      simvastatin (ZOCOR) 10 mg tablet Take 1 tablet (10 mg total) by mouth daily 30 tablet 3    traMADol (ULTRAM) 50 mg tablet Take 1 tablet by mouth      benzonatate (TESSALON) 200 MG capsule Take 1 capsule (200 mg total) by mouth 3 (three) times a day as needed for cough (Patient not taking: Reported on 9/11/2019) 20 capsule 0     No current facility-administered medications for this visit        Allergies   Allergen Reactions    Ace Inhibitors Angioedema    Prochlorperazine Anaphylaxis     lethargy, tongue and throat swelling    Ramipril Anaphylaxis angioedema    Angiotensin Receptor Blockers       Immunizations:     Immunization History   Administered Date(s) Administered    INFLUENZA 11/14/2012, 03/18/2013, 01/01/2014, 03/03/2014, 11/19/2014, 11/18/2015, 11/25/2016, 10/12/2017    Influenza Split High Dose Preservative Free IM 11/18/2015, 11/25/2016, 10/12/2017    Influenza TIV (IM) 09/09/2011, 10/15/2012, 09/25/2013, 01/01/2014    Influenza, high dose seasonal 0 5 mL 10/08/2018, 10/08/2019    Pneumococcal Conjugate 13-Valent 08/06/2015    Pneumococcal Polysaccharide PPV23 10/15/2008    Tdap 08/06/2015, 08/23/2019      Health Maintenance:         Topic Date Due    CRC Screening: Colonoscopy  11/11/2023         Topic Date Due    Hepatitis B Vaccine (1 of 3 - Risk 3-dose series) 02/09/1964      Medicare Screening Tests and Risk Assessments:     Daniel Pugh is here for her Subsequent Wellness visit  Last Medicare Wellness visit information reviewed, patient interviewed and updates made to the record today  Health Risk Assessment:   Patient rates overall health as good  Patient feels that their physical health rating is same  Eyesight was rated as same  Hearing was rated as same  Patient feels that their emotional and mental health rating is same  Pain experienced in the last 7 days has been none  Patient states that she has experienced no weight loss or gain in last 6 months  Depression Screening:   PHQ-2 Score: 0      Fall Risk Screening: In the past year, patient has experienced: history of falling in past year    Number of falls: 2 or more  Injured during fall?: No    Feels unsteady when standing or walking?: Yes    Worried about falling?: Yes      Urinary Incontinence Screening:   Patient has not leaked urine accidently in the last six months  Home Safety:  Patient does not have trouble with stairs inside or outside of their home  Patient has working smoke alarms and has working carbon monoxide detector  Home safety hazards include: none  Nutrition:   Current diet is Regular  Medications:   Patient is currently taking over-the-counter supplements  OTC medications include: see medication list  Patient is able to manage medications  Activities of Daily Living (ADLs)/Instrumental Activities of Daily Living (IADLs):   Walk and transfer into and out of bed and chair?: Yes  Dress and groom yourself?: Yes    Bathe or shower yourself?: Yes    Feed yourself? Yes  Do your laundry/housekeeping?: Yes  Manage your money, pay your bills and track your expenses?: Yes  Make your own meals?: Yes    Do your own shopping?: Yes    Previous Hospitalizations:   Any hospitalizations or ED visits within the last 12 months?: Yes    How many hospitalizations have you had in the last year?: 1-2    Advance Care Planning:   Living will: No    Durable POA for healthcare:  Yes    Advanced directive: Yes    Advanced directive counseling given: Yes    Five wishes given: Yes    End of Life Decisions reviewed with patient: Yes    Provider agrees with end of life decisions: Yes      Cognitive Screening:   Provider or family/friend/caregiver concerned regarding cognition?: No    PREVENTIVE SCREENINGS      Cardiovascular Screening:    General: Screening Not Indicated and History Lipid Disorder      Diabetes Screening:     General: Screening Not Indicated and History Diabetes      Colorectal Cancer Screening:     General: Screening Current      Breast Cancer Screening:     General: Risks and Benefits Discussed      Cervical Cancer Screening:    General: Screening Not Indicated      Osteoporosis Screening:    General: Risks and Benefits Discussed      Abdominal Aortic Aneurysm (AAA) Screening:        General: Risks and Benefits Discussed      Lung Cancer Screening:     General: Risks and Benefits Discussed      Hepatitis C Screening:    General: Risks and Benefits Discussed    No exam data present     Physical Exam:     /86 (BP Location: Left arm, Patient Position: Sitting, Cuff Size: Large)   Pulse 83   Temp 98 °F (36 7 °C) (Tympanic)   Resp 18   Ht 5' 5 75" (1 67 m)   Wt 116 kg (256 lb)   SpO2 97%   BMI 41 64 kg/m²     Physical Exam    Marianna Macdonald, DO

## 2020-02-05 ENCOUNTER — OFFICE VISIT (OUTPATIENT)
Dept: PODIATRY | Facility: CLINIC | Age: 75
End: 2020-02-05
Payer: COMMERCIAL

## 2020-02-05 VITALS
DIASTOLIC BLOOD PRESSURE: 60 MMHG | WEIGHT: 253 LBS | HEIGHT: 66 IN | HEART RATE: 80 BPM | SYSTOLIC BLOOD PRESSURE: 94 MMHG | BODY MASS INDEX: 40.66 KG/M2

## 2020-02-05 DIAGNOSIS — E11.9 TYPE 2 DIABETES MELLITUS WITHOUT COMPLICATION, WITHOUT LONG-TERM CURRENT USE OF INSULIN (HCC): ICD-10-CM

## 2020-02-05 DIAGNOSIS — I73.9 PERIPHERAL VASCULAR DISEASE, UNSPECIFIED (HCC): Primary | ICD-10-CM

## 2020-02-05 PROCEDURE — 3008F BODY MASS INDEX DOCD: CPT | Performed by: PODIATRIST

## 2020-02-05 PROCEDURE — 4040F PNEUMOC VAC/ADMIN/RCVD: CPT | Performed by: PODIATRIST

## 2020-02-05 PROCEDURE — 1160F RVW MEDS BY RX/DR IN RCRD: CPT | Performed by: PODIATRIST

## 2020-02-05 PROCEDURE — 3078F DIAST BP <80 MM HG: CPT | Performed by: PODIATRIST

## 2020-02-05 PROCEDURE — 99212 OFFICE O/P EST SF 10 MIN: CPT | Performed by: PODIATRIST

## 2020-02-05 PROCEDURE — 2022F DILAT RTA XM EVC RTNOPTHY: CPT | Performed by: PODIATRIST

## 2020-02-05 PROCEDURE — 3074F SYST BP LT 130 MM HG: CPT | Performed by: PODIATRIST

## 2020-02-05 PROCEDURE — 1170F FXNL STATUS ASSESSED: CPT | Performed by: PODIATRIST

## 2020-02-05 PROCEDURE — 3066F NEPHROPATHY DOC TX: CPT | Performed by: PODIATRIST

## 2020-02-05 PROCEDURE — 1036F TOBACCO NON-USER: CPT | Performed by: PODIATRIST

## 2020-02-05 NOTE — PROGRESS NOTES
Patient presents for palliative diabetic foot care  Patient has known PVD  No acute disorder noted  All toenails are elongated, thickened dystrophic  No palpable pedal pulses  Treatment consisted of nail trimming

## 2020-02-12 ENCOUNTER — TELEPHONE (OUTPATIENT)
Dept: NEPHROLOGY | Facility: CLINIC | Age: 75
End: 2020-02-12

## 2020-02-12 DIAGNOSIS — R60.9 EDEMA, UNSPECIFIED TYPE: ICD-10-CM

## 2020-02-12 DIAGNOSIS — I10 ESSENTIAL HYPERTENSION: ICD-10-CM

## 2020-02-12 DIAGNOSIS — E11.9 TYPE 2 DIABETES MELLITUS WITHOUT COMPLICATION, WITHOUT LONG-TERM CURRENT USE OF INSULIN (HCC): ICD-10-CM

## 2020-02-12 RX ORDER — METOLAZONE 2.5 MG/1
2.5 TABLET ORAL 3 TIMES WEEKLY
Qty: 12 TABLET | Refills: 5 | Status: SHIPPED | OUTPATIENT
Start: 2020-02-12 | End: 2020-08-24

## 2020-02-27 DIAGNOSIS — E11.9 TYPE 2 DIABETES MELLITUS WITHOUT COMPLICATION, WITHOUT LONG-TERM CURRENT USE OF INSULIN (HCC): Primary | ICD-10-CM

## 2020-02-28 ENCOUNTER — TELEPHONE (OUTPATIENT)
Dept: FAMILY MEDICINE CLINIC | Facility: CLINIC | Age: 75
End: 2020-02-28

## 2020-03-06 DIAGNOSIS — E87.6 HYPOKALEMIA: ICD-10-CM

## 2020-03-06 LAB
BUN SERPL-MCNC: 43 MG/DL (ref 7–25)
BUN/CREAT SERPL: 21 (CALC) (ref 6–22)
CALCIUM SERPL-MCNC: 9.2 MG/DL (ref 8.6–10.4)
CHLORIDE SERPL-SCNC: 99 MMOL/L (ref 98–110)
CO2 SERPL-SCNC: 31 MMOL/L (ref 20–32)
CREAT SERPL-MCNC: 2.09 MG/DL (ref 0.6–0.93)
GLUCOSE SERPL-MCNC: 129 MG/DL (ref 65–99)
POTASSIUM SERPL-SCNC: 3.1 MMOL/L (ref 3.5–5.3)
SL AMB EGFR AFRICAN AMERICAN: 26 ML/MIN/1.73M2
SL AMB EGFR NON AFRICAN AMERICAN: 22 ML/MIN/1.73M2
SODIUM SERPL-SCNC: 142 MMOL/L (ref 135–146)

## 2020-03-10 RX ORDER — POTASSIUM CHLORIDE 20 MEQ/1
TABLET, EXTENDED RELEASE ORAL
Qty: 60 TABLET | Refills: 1 | Status: SHIPPED | OUTPATIENT
Start: 2020-03-10 | End: 2020-05-18

## 2020-03-12 ENCOUNTER — CONSULT (OUTPATIENT)
Dept: ENDOCRINOLOGY | Facility: CLINIC | Age: 75
End: 2020-03-12
Payer: COMMERCIAL

## 2020-03-12 VITALS
SYSTOLIC BLOOD PRESSURE: 140 MMHG | HEART RATE: 69 BPM | WEIGHT: 261.6 LBS | DIASTOLIC BLOOD PRESSURE: 90 MMHG | HEIGHT: 66 IN | BODY MASS INDEX: 42.04 KG/M2

## 2020-03-12 DIAGNOSIS — E66.01 CLASS 3 SEVERE OBESITY DUE TO EXCESS CALORIES WITH SERIOUS COMORBIDITY AND BODY MASS INDEX (BMI) OF 40.0 TO 44.9 IN ADULT (HCC): ICD-10-CM

## 2020-03-12 DIAGNOSIS — E03.9 HYPOTHYROIDISM, UNSPECIFIED TYPE: ICD-10-CM

## 2020-03-12 DIAGNOSIS — E11.9 TYPE 2 DIABETES MELLITUS WITHOUT COMPLICATION, WITHOUT LONG-TERM CURRENT USE OF INSULIN (HCC): ICD-10-CM

## 2020-03-12 DIAGNOSIS — IMO0002 UNCONTROLLED DIABETES WITH STAGE 3 CHRONIC KIDNEY DISEASE GFR 30-59: ICD-10-CM

## 2020-03-12 DIAGNOSIS — E11.65 TYPE 2 DIABETES MELLITUS WITH HYPERGLYCEMIA, WITHOUT LONG-TERM CURRENT USE OF INSULIN (HCC): Primary | ICD-10-CM

## 2020-03-12 PROBLEM — E66.813 CLASS 3 SEVERE OBESITY DUE TO EXCESS CALORIES WITH SERIOUS COMORBIDITY AND BODY MASS INDEX (BMI) OF 40.0 TO 44.9 IN ADULT (HCC): Status: ACTIVE | Noted: 2020-03-12

## 2020-03-12 PROCEDURE — 99204 OFFICE O/P NEW MOD 45 MIN: CPT | Performed by: INTERNAL MEDICINE

## 2020-03-12 NOTE — ASSESSMENT & PLAN NOTE
Lab Results   Component Value Date    HGBA1C 7 7 (H) 01/15/2020   A1C  slightly higher in January - right after the holiday season  Given her age still  in a reasonable range   For for now continue current regimen -I am going to refer for medical nutrition therapy   Have also advised her to check her blood sugar once a day at different times and send me a log in 2 weeks  If needed short-acting secretagogues like Prandin can be added before meals    Given her CKD-options are limited

## 2020-03-12 NOTE — PROGRESS NOTES
Foster Shown 66 y o  female MRN: [id number]    Encounter: [id number]      Assessment/Plan       Problem List Items Addressed This Visit        Endocrine    Hypothyroidism     Continue levothyroxine at current dose          Relevant Orders    TSH, 3rd generation Lab Collect    T4, free Lab Collect    Type 2 diabetes mellitus with hyperglycemia, without long-term current use of insulin (HCC) - Primary       Lab Results   Component Value Date    HGBA1C 7 7 (H) 01/15/2020   A1C  slightly higher in January - right after the holiday season  Given her age still  in a reasonable range   For for now continue current regimen -I am going to refer for medical nutrition therapy   Have also advised her to check her blood sugar once a day at different times and send me a log in 2 weeks  If needed short-acting secretagogues like Prandin can be added before meals    Given her CKD-options are limited         Relevant Orders    Ambulatory referral to Diabetic Education    Comprehensive metabolic panel Lab Collect    HEMOGLOBIN A1C W/ EAG ESTIMATION Lab Collect    Type 2 diabetes mellitus without complication, without long-term current use of insulin ([facility])    Uncontrolled diabetes with stage 3 chronic kidney disease GFR 30-59 ([facility])       Lab Results   Component Value Date    HGBA1C 7 7 (H) 01/15/2020   She has upcoming appointment with Nephrology         Relevant Orders    Ambulatory referral to Diabetic Education       Other    Class 3 severe obesity due to excess calories with serious comorbidity and body mass index (BMI) of 40 0 to 44 9 in [facility])     Counseled her on metabolic complications of obesity-referred for medical nutrition therapy         Relevant Orders    Ambulatory referral to Diabetic Education          CC: Diabetes    History of Present Illness     HPI:70 y/o female referred here for evaluation of uncontrolled type 2 DM    She has had Type 2 DM for 3 years - and has been treated with oral hypoglycemics -she also has CKD for which she follows up with nephrology - CKD felt to be related to hypertensive nephrosclerosis   Currently she is on  januvia 25 mg daily   Checks f s once a day fasting- finger sticks usually range between  120-130s   No polyuria , polydypsia  , no blurry vision , last eye exam - 6 months back, + numbness and tingling left foot  Weight gain 5 lbs in the past few months   No CVD/stroke     On prednisone 5 mg daily for fibromyalgia /gout /arthritis     Review of Systems   Constitutional: Positive for fatigue  Negative for unexpected weight change  Eyes: Negative for visual disturbance  Respiratory: Negative for cough and shortness of breath  Cardiovascular: Negative for palpitations and leg swelling  Gastrointestinal: Negative for constipation, diarrhea, nausea and vomiting  Endocrine: Negative for polydipsia and polyuria  Musculoskeletal: Positive for arthralgias and gait problem  Skin: Negative for pallor, rash and wound  Psychiatric/Behavioral: Positive for sleep disturbance  All other systems reviewed and are negative        Historical Information   Past Medical History:   Diagnosis Date    Abnormal blood chemistry     last assessed 11/13/2013    Angina pectoris ([facility] )     last assessed 11/012/13    Arthritis     last assessed 11/13/2013    Asthma     Athlete's foot     resolved 10/11/17    Cataract     last assessed 03/18/2016    Diabetes mellitus with kidney disease ([facility] )     last assessed 10/03/17    Eczema     Gait disorder     last assessed 11/12/2013    Idiopathic thrombocytopenia purpura ([facility] )     last assessed 08/06/15    Overactive bladder     Post menopausal syndrome     resolved 10/11/17    Restless legs syndrome     last assessed 10/24/2013    Subclinical hypothyroidism     last assessed 12/11/17    Urinary frequency     resolved 12/18/2014     Past Surgical History:   Procedure Laterality Date    ACHILLES TENDON REPAIR      REPLACEMENT TOTAL KNEE       Social History   Social History     Substance and Sexual Activity   Alcohol Use Yes    Frequency: 2-3 times a week    Drinks per session: 1 or 2    Binge frequency: Never    Comment: occasional     Social History     Substance and Sexual Activity   Drug Use No     Social History     Tobacco Use   Smoking Status Never Smoker   Smokeless Tobacco Never Used   Tobacco Comment    per allscripts; former smoker     Family History:   Family History   Problem Relation Age of Onset    Leukemia Mother     Eczema Father     Diabetes type II Father     Diabetes type II Maternal Aunt        Meds/Allergies   Current Outpatient Medications   Medication Sig Dispense Refill    albuterol (PROAIR HFA) 90 mcg/act inhaler Inhale 2 puffs every 6 (six) hours as needed for wheezing 8 5 g 3    amitriptyline (ELAVIL) 10 mg tablet TAKE ONE TABLET BY MOUTH AT BEDTIME      aspirin (ASPIRIN ADULT LOW DOSE) 81 mg EC tablet Take 1 tablet by mouth daily      Cetirizine HCl 10 MG CAPS Take 10 mg by mouth daily        cholecalciferol (VITAMIN D3) 1,000 units tablet Take 1,000 Units by mouth daily      citalopram (CeleXA) 40 mg tablet Take 1 tablet (40 mg total) by mouth daily 90 tablet 1    febuxostat (ULORIC) 80 MG TABS Take 1 tablet by mouth daily      furosemide (LASIX) 40 mg tablet Take 1 tablet (40 mg total) by mouth daily 30 tablet 2    gabapentin (NEURONTIN) 300 mg capsule Take 1 tablet by mouth 3 (three) times a day      levothyroxine 50 mcg tablet TAKE ONE TABLET BY MOUTH EVERY DAY  30 tablet 8    metolazone (ZAROXOLYN) 2 5 mg tablet Take 1 tablet (2 5 mg total) by mouth 3 (three) times a week Take 1 tablet Monday, Wednesday, and Friday   12 tablet 5    metoprolol tartrate (LOPRESSOR) 25 mg tablet Take 1 tablet (25 mg total) by mouth daily 30 tablet 5    potassium chloride (K-DUR,KLOR-CON) 20 mEq tablet TAKE ONE TABLET BY MOUTH TWICE DAILY  60 tablet 1    predniSONE 5 mg tablet Take 5 mg by mouth daily      simvastatin (ZOCOR) 10 mg tablet Take 1 tablet (10 mg total) by mouth daily 30 tablet 3    sitaGLIPtin (JANUVIA) 25 mg tablet Take 1 tablet (25 mg total) by mouth daily 30 tablet 5    traMADol (ULTRAM) 50 mg tablet Take 1 tablet by mouth      benzonatate (TESSALON) 200 MG capsule Take 1 capsule (200 mg total) by mouth 3 (three) times a day as needed for cough (Patient not taking: Reported on 9/11/2019) 20 capsule 0    Cholecalciferol (D 400) 400 units TABS Take 400 Units by mouth daily 2 tabs       No current facility-administered medications for this visit  Allergies   Allergen Reactions    Ace Inhibitors Angioedema    Prochlorperazine Anaphylaxis     lethargy, tongue and throat swelling    Ramipril Anaphylaxis     angioedema    Angiotensin Receptor Blockers        Objective   Vitals: Blood pressure 140/90, pulse 69, height 5' 5 5" (1 664 m), weight 119 kg (261 lb 9 6 oz), not currently breastfeeding  Physical Exam   Constitutional: She is oriented to person, place, and time  She appears well-developed and well-nourished  No distress  HENT:   Head: Atraumatic  Eyes: EOM are normal  No scleral icterus  Neck: Normal range of motion  Neck supple  No thyromegaly present  Cardiovascular: Normal rate, regular rhythm and normal heart sounds  No murmur heard  Pulmonary/Chest: Effort normal and breath sounds normal  No respiratory distress  She has no wheezes  She has no rales  Abdominal: Soft  Bowel sounds are normal  She exhibits no distension  There is no tenderness  There is no guarding  Musculoskeletal: Normal range of motion  She exhibits edema  She exhibits no deformity  Lymphadenopathy:     She has no cervical adenopathy  Neurological: She is alert and oriented to person, place, and time  Skin: Skin is warm and dry  Psychiatric: She has a normal mood and affect  Her behavior is normal  Judgment and thought content normal    Vitals reviewed        The history was obtained from the review of the chart, patient      Lab Results:   Lab Results   Component Value Date/Time    Hemoglobin A1C 7 7 (H) 01/15/2020 09:07 AM    Hemoglobin A1C 7 5 (H) 06/24/2019 10:04 AM    WBC 9 90 09/08/2019 05:45 AM    WBC 13 80 (H) 09/07/2019 02:03 PM    White Blood Cell Count 9 6 01/15/2020 09:07 AM    Hemoglobin 12 8 01/15/2020 09:07 AM    Hemoglobin 11 6 09/08/2019 05:45 AM    Hemoglobin 12 3 09/07/2019 02:03 PM    HCT 39 5 01/15/2020 09:07 AM    Hematocrit 36 7 09/08/2019 05:45 AM    Hematocrit 39 5 09/07/2019 02:03 PM    MCV 92 9 01/15/2020 09:07 AM    MCV 98 09/08/2019 05:45 AM    MCV 97 09/07/2019 02:03 PM    Platelet Count 572 32/71/6215 09:07 AM    Platelets 927 44/76/0185 05:45 AM    Platelets 720 75/70/0760 02:03 PM    BUN 43 (H) 03/05/2020 08:54 AM    BUN 41 (H) 01/15/2020 09:07 AM    BUN 36 (H) 09/08/2019 05:45 AM    BUN 38 (H) 09/07/2019 02:03 PM    BUN 38 (H) 09/04/2019 08:01 AM    Potassium 3 1 (L) 03/05/2020 08:54 AM    Potassium 3 5 01/15/2020 09:07 AM    Potassium 3 3 (L) 09/08/2019 05:45 AM    Potassium 4 0 09/07/2019 02:03 PM    Potassium 3 7 09/04/2019 08:01 AM    Chloride 99 03/05/2020 08:54 AM    Chloride 99 01/15/2020 09:07 AM    Chloride 101 09/08/2019 05:45 AM    Chloride 99 (L) 09/07/2019 02:03 PM    Chloride 95 (L) 09/04/2019 08:01 AM    CO2 31 03/05/2020 08:54 AM    CO2 29 01/15/2020 09:07 AM    CO2 30 09/08/2019 05:45 AM    CO2 30 09/07/2019 02:03 PM    CO2 33 (H) 09/04/2019 08:01 AM    Creatinine 2 09 (H) 03/05/2020 08:54 AM    Creatinine 1 54 (H) 01/15/2020 09:07 AM    Creatinine 1 48 (H) 09/08/2019 05:45 AM    Creatinine 1 68 (H) 09/07/2019 02:03 PM    Creatinine 1 85 (H) 09/04/2019 08:01 AM    AST 16 01/15/2020 09:07 AM    AST 17 09/07/2019 02:03 PM    AST 17 06/24/2019 10:04 AM    ALT 10 01/15/2020 09:07 AM    ALT 15 09/07/2019 02:03 PM    ALT 10 06/24/2019 10:04 AM    Albumin 3 9 01/15/2020 09:07 AM    Albumin 3 4 (L) 09/07/2019 02:03 PM    Albumin 3 9 06/24/2019 10:04 AM Globulin 2 7 01/15/2020 09:07 AM    Globulin 2 6 06/24/2019 10:04 AM    HDL 59 06/24/2019 10:04 AM    Triglycerides 136 06/24/2019 10:04 AM           Portions of the record may have been created with voice recognition software  Occasional wrong word or "sound a like" substitutions may have occurred due to the inherent limitations of voice recognition software  Read the chart carefully and recognize, using context, where substitutions have occurred

## 2020-03-12 NOTE — ASSESSMENT & PLAN NOTE
Lab Results   Component Value Date    HGBA1C 7 7 (H) 01/15/2020   She has upcoming appointment with Nephrology

## 2020-03-23 ENCOUNTER — TELEPHONE (OUTPATIENT)
Dept: FAMILY MEDICINE CLINIC | Facility: CLINIC | Age: 75
End: 2020-03-23

## 2020-03-23 ENCOUNTER — APPOINTMENT (OUTPATIENT)
Dept: RADIOLOGY | Facility: CLINIC | Age: 75
End: 2020-03-23
Payer: COMMERCIAL

## 2020-03-23 ENCOUNTER — OFFICE VISIT (OUTPATIENT)
Dept: FAMILY MEDICINE CLINIC | Facility: CLINIC | Age: 75
End: 2020-03-23
Payer: COMMERCIAL

## 2020-03-23 VITALS
TEMPERATURE: 97.8 F | WEIGHT: 253 LBS | DIASTOLIC BLOOD PRESSURE: 80 MMHG | HEIGHT: 65 IN | BODY MASS INDEX: 42.15 KG/M2 | HEART RATE: 71 BPM | OXYGEN SATURATION: 96 % | RESPIRATION RATE: 18 BRPM | SYSTOLIC BLOOD PRESSURE: 120 MMHG

## 2020-03-23 DIAGNOSIS — M89.8X6 PAIN IN LEFT TIBIA: Primary | ICD-10-CM

## 2020-03-23 DIAGNOSIS — M89.8X6 PAIN IN LEFT TIBIA: ICD-10-CM

## 2020-03-23 PROCEDURE — 4040F PNEUMOC VAC/ADMIN/RCVD: CPT | Performed by: FAMILY MEDICINE

## 2020-03-23 PROCEDURE — 1036F TOBACCO NON-USER: CPT | Performed by: FAMILY MEDICINE

## 2020-03-23 PROCEDURE — 3008F BODY MASS INDEX DOCD: CPT | Performed by: FAMILY MEDICINE

## 2020-03-23 PROCEDURE — 2022F DILAT RTA XM EVC RTNOPTHY: CPT | Performed by: FAMILY MEDICINE

## 2020-03-23 PROCEDURE — 73590 X-RAY EXAM OF LOWER LEG: CPT

## 2020-03-23 PROCEDURE — 3074F SYST BP LT 130 MM HG: CPT | Performed by: FAMILY MEDICINE

## 2020-03-23 PROCEDURE — 3079F DIAST BP 80-89 MM HG: CPT | Performed by: FAMILY MEDICINE

## 2020-03-23 PROCEDURE — 99213 OFFICE O/P EST LOW 20 MIN: CPT | Performed by: FAMILY MEDICINE

## 2020-03-23 PROCEDURE — 1160F RVW MEDS BY RX/DR IN RCRD: CPT | Performed by: FAMILY MEDICINE

## 2020-03-23 PROCEDURE — 3066F NEPHROPATHY DOC TX: CPT | Performed by: FAMILY MEDICINE

## 2020-03-23 NOTE — PROGRESS NOTES
[facility]      NAME: Lashell Raya  AGE: 66 y o  SEX: female  : 1945   MRN: [id number]    DATE: 3/23/2020  TIME: 5:37 PM    Assessment and Plan     Problem List Items Addressed This Visit     Pain in left tibia - Primary     2 day hx of pain L ant lower leg  No recent trauma  No edema  No fevers/chills  No hx of DVT  Examination reveals exquisite point tenderness along tibial shaft anteriorly  No significant calf edema or tenderness  No erythema  No Homans sign  Distal pulses intact  Will sent for x-rays of left tibia/fibula  If x-rays negative, consider circulation studies           Relevant Orders    XR tibia fibula 2 vw left              Return to office in: will call w/ xray results    Chief Complaint     Chief Complaint   Patient presents with    Leg Pain     left leg pain x 2 days       History of Present Illness     2 day hx of pain L ant lower leg  No recent trauma  No edema  No fevers/chills  No hx of DVT  The following portions of the patient's history were reviewed and updated as appropriate: allergies, current medications, past family history, past medical history, past social history, past surgical history and problem list     Review of Systems   Review of Systems   Respiratory: Negative  Cardiovascular: Negative  Gastrointestinal: Negative  Genitourinary: Negative      Musculoskeletal:        L lower leg pain       Active Problem List     Patient Active Problem List   Diagnosis    Anemia    Chronic gout    Chronic kidney disease, stage 3 (HCC)    Combined form of senile cataract of left eye    Cough    Depression    Edema    Fibromyalgia    Homocysteinemia ([address] )    Hyperlipidemia    Hypothyroidism    Hypokalemia    Hypertension    Low back pain    Pain of left hip    Primary osteoarthritis of both hands    Uncontrolled diabetes with stage 3 chronic kidney disease GFR 30-59 ([address] )    Increased frequency of urination    Vitamin D deficiency    Folliculitis    Symptomatic varicose veins of both lower extremities    Intermittent claudication (HCC)    CRI (chronic renal insufficiency)    Scalp laceration    Chest pain    Leukocytosis    Type 2 diabetes mellitus without complication, without long-term current use of insulin (HCC)    Gait abnormality    Class 3 severe obesity due to excess calories with serious comorbidity and body mass index (BMI) of 40 0 to 44 9 in adult [facility])    Type 2 diabetes mellitus with hyperglycemia, without long-term current use of insulin (HCC)    Pain in left tibia       Objective   /80 (BP Location: Left arm, Patient Position: Sitting, Cuff Size: Large)   Pulse 71   Temp 97 8 °F (36 6 °C) (Tympanic)   Resp 18   Ht 5' 5 35" (1 66 m)   Wt 115 kg (253 lb)   SpO2 96%   BMI 41 65 kg/m²     Physical Exam    Pertinent Laboratory/Diagnostic Studies:  none    Current Medications     Current Outpatient Medications:     albuterol (PROAIR HFA) 90 mcg/act inhaler, Inhale 2 puffs every 6 (six) hours as needed for wheezing, Disp: 8 5 g, Rfl: 3    amitriptyline (ELAVIL) 10 mg tablet, TAKE ONE TABLET BY MOUTH AT BEDTIME, Disp: , Rfl:     aspirin (ASPIRIN ADULT LOW DOSE) 81 mg EC tablet, Take 1 tablet by mouth daily, Disp: , Rfl:     Cetirizine HCl 10 MG CAPS, Take 10 mg by mouth daily  , Disp: , Rfl:     Cholecalciferol (D 400) 400 units TABS, Take 400 Units by mouth daily 2 tabs, Disp: , Rfl:     cholecalciferol (VITAMIN D3) 1,000 units tablet, Take 1,000 Units by mouth daily, Disp: , Rfl:     citalopram (CeleXA) 40 mg tablet, Take 1 tablet (40 mg total) by mouth daily, Disp: 90 tablet, Rfl: 1    febuxostat (ULORIC) 80 MG TABS, Take 1 tablet by mouth daily, Disp: , Rfl:     furosemide (LASIX) 40 mg tablet, Take 1 tablet (40 mg total) by mouth daily, Disp: 30 tablet, Rfl: 2    gabapentin (NEURONTIN) 300 mg capsule, Take 1 tablet by mouth 3 (three) times a day, Disp: , Rfl:     levothyroxine 50 mcg tablet, TAKE ONE TABLET BY MOUTH EVERY DAY , Disp: 30 tablet, Rfl: 8    metolazone (ZAROXOLYN) 2 5 mg tablet, Take 1 tablet (2 5 mg total) by mouth 3 (three) times a week Take 1 tablet Monday, Wednesday, and Friday , Disp: 12 tablet, Rfl: 5    metoprolol tartrate (LOPRESSOR) 25 mg tablet, Take 1 tablet (25 mg total) by mouth daily, Disp: 30 tablet, Rfl: 5    potassium chloride (K-DUR,KLOR-CON) 20 mEq tablet, TAKE ONE TABLET BY MOUTH TWICE DAILY , Disp: 60 tablet, Rfl: 1    predniSONE 5 mg tablet, Take 5 mg by mouth daily, Disp: , Rfl:     simvastatin (ZOCOR) 10 mg tablet, Take 1 tablet (10 mg total) by mouth daily, Disp: 30 tablet, Rfl: 3    sitaGLIPtin (JANUVIA) 25 mg tablet, Take 1 tablet (25 mg total) by mouth daily, Disp: 30 tablet, Rfl: 5    traMADol (ULTRAM) 50 mg tablet, Take 1 tablet by mouth, Disp: , Rfl:     benzonatate (TESSALON) 200 MG capsule, Take 1 capsule (200 mg total) by mouth 3 (three) times a day as needed for cough (Patient not taking: Reported on 9/11/2019), Disp: 20 capsule, Rfl: 0    Health Maintenance     Health Maintenance   Topic Date Due    SLP PLAN OF CARE  1945    Falls: Plan of Care  02/09/2008    Diabetic Foot Exam  01/21/2021 (Originally 11/11/2016)    DM Eye Exam  04/02/2020    HEMOGLOBIN A1C  04/15/2020    URINE MICROALBUMIN  06/24/2020    Medicare Annual Wellness Visit (AWV)  01/20/2021    BMI: Followup Plan  01/20/2021    Fall Risk  01/21/2021    BMI: Adult  03/12/2021    CRC Screening: Colonoscopy  11/11/2023    DTaP,Tdap,and Td Vaccines (3 - Td) 08/23/2029    Influenza Vaccine  Completed    Pneumococcal Vaccine: 65+ Years  Completed    Pneumococcal Vaccine: Pediatrics (0 to 5 Years) and At-Risk Patients (6 to 59 Years)  Aged Out    HIB Vaccine  Aged Out    Hepatitis B Vaccine  Aged Out    IPV Vaccine  Aged Out    Hepatitis A Vaccine  Aged Out    Meningococcal ACWY Vaccine  Aged Out    HPV Vaccine  Aged [employer] History Administered Date(s) Administered    INFLUENZA 11/14/2012, 03/18/2013, 01/01/2014, 03/03/2014, 11/19/2014, 11/18/2015, 11/25/2016, 10/12/2017    Influenza Split High Dose Preservative Free IM 11/18/2015, 11/25/2016, 10/12/2017    Influenza TIV (IM) 09/09/2011, 10/15/2012, 09/25/2013, 01/01/2014    Influenza, high dose seasonal 0 5 mL 10/08/2018, 10/08/2019    Pneumococcal Conjugate 13-Valent 08/06/2015    Pneumococcal Polysaccharide PPV23 10/15/2008    Tdap 08/06/2015, 08/23/2019       Cori Varner DO  [facility]

## 2020-03-23 NOTE — ASSESSMENT & PLAN NOTE
2 day hx of pain L ant lower leg  No recent trauma  No edema  No fevers/chills  No hx of DVT  Examination reveals exquisite point tenderness along tibial shaft anteriorly  No significant calf edema or tenderness  No erythema  No Homans sign  Distal pulses intact  Will sent for x-rays of left tibia/fibula    If x-rays negative, consider circulation studies

## 2020-03-25 ENCOUNTER — TELEMEDICINE (OUTPATIENT)
Dept: DIABETES SERVICES | Facility: CLINIC | Age: 75
End: 2020-03-25
Payer: COMMERCIAL

## 2020-03-25 DIAGNOSIS — E11.65 TYPE 2 DIABETES MELLITUS WITH HYPERGLYCEMIA, WITHOUT LONG-TERM CURRENT USE OF INSULIN (HCC): Primary | ICD-10-CM

## 2020-03-25 PROCEDURE — 97802 MEDICAL NUTRITION INDIV IN: CPT | Performed by: DIETITIAN, REGISTERED

## 2020-03-25 NOTE — PATIENT INSTRUCTIONS
1  Follow Meal plan (coming via email and hard copy in the mail)  2  Limit half and half in your coffee to 2 Tbsp per day, add more milk if needed  3  Include carbohydrates at every meal   4  Include a source of protein with breakfast and/or midday snack daily (yogurt, peanut butter, egg, cheese, meat)  5   Get daily exercise, walk as much as you safely can

## 2020-03-25 NOTE — PROGRESS NOTES
Virtual Regular Visit    Problem List Items Addressed This Visit     None               Reason for visit is   E11 65 (ICD-10-CM) - Type 2 diabetes mellitus with hyperglycemia, without long-term current use of insulin ([facility])   E11 22, N18 3, E11 65 (ICD-10-CM) - Uncontrolled diabetes with stage 3 chronic kidney disease GFR 30-59 ([facility])   E66 01, Z68 41 (ICD-10-CM) - Class 3 severe obesity due to excess calories with serious comorbidity and body mass index (BMI) of 40 0 to 44 9 in adult [facility])         Encounter provider Orion Waldrop    Provider located at [address] [id number]      Recent Visits  Date Type Provider Dept   03/23/20 Office Visit Marianna Macdonald DO [address] [id number] [address] recent visits within past 7 days and meeting all other requirements     Future Appointments  No visits were found meeting these conditions  Showing future appointments within next 150 days and meeting all other requirements        After connecting through [employer], the patient was identified by name and date of birth  Cleveland Barton was informed that this is a telemedicine visit and that the visit is being conducted through telephone which may not be secure and therefore, might not be HIPAA-compliant  My office door was closed  No one else was in the room  She acknowledged consent and understanding of privacy and security of the video platform  The patient has agreed to participate and understands they can discontinue the visit at any time  Subjective  Cleveland Barton is a 66 y o  female , please see below for details of MNT visit        Past Medical History:   Diagnosis Date    Abnormal blood chemistry     last assessed 11/13/2013    Angina pectoris ([address] )     last assessed 11/012/13    Arthritis     last assessed 11/13/2013    Asthma     Athlete's foot     resolved 10/11/17    Cataract     last assessed 03/18/2016    Diabetes mellitus with kidney disease ([facility] )     last assessed 10/03/17    Eczema     Gait disorder     last assessed 11/12/2013    Idiopathic thrombocytopenia purpura ([facility] )     last assessed 08/06/15    Overactive bladder     Post menopausal syndrome     resolved 10/11/17    Restless legs syndrome     last assessed 10/24/2013    Subclinical hypothyroidism     last assessed 12/11/17    Urinary frequency     resolved 12/18/2014       Past Surgical History:   Procedure Laterality Date    ACHILLES TENDON REPAIR      REPLACEMENT TOTAL KNEE         Current Outpatient Medications   Medication Sig Dispense Refill    sitaGLIPtin (JANUVIA) 25 mg tablet Take 1 tablet (25 mg total) by mouth daily 30 tablet 5    albuterol (PROAIR HFA) 90 mcg/act inhaler Inhale 2 puffs every 6 (six) hours as needed for wheezing 8 5 g 3    amitriptyline (ELAVIL) 10 mg tablet TAKE ONE TABLET BY MOUTH AT BEDTIME      aspirin (ASPIRIN ADULT LOW DOSE) 81 mg EC tablet Take 1 tablet by mouth daily      benzonatate (TESSALON) 200 MG capsule Take 1 capsule (200 mg total) by mouth 3 (three) times a day as needed for cough (Patient not taking: Reported on 9/11/2019) 20 capsule 0    Cetirizine HCl 10 MG CAPS Take 10 mg by mouth daily        Cholecalciferol (D 400) 400 units TABS Take 400 Units by mouth daily 2 tabs      cholecalciferol (VITAMIN D3) 1,000 units tablet Take 1,000 Units by mouth daily      citalopram (CeleXA) 40 mg tablet Take 1 tablet (40 mg total) by mouth daily 90 tablet 1    febuxostat (ULORIC) 80 MG TABS Take 1 tablet by mouth daily      furosemide (LASIX) 40 mg tablet Take 1 tablet (40 mg total) by mouth daily 30 tablet 2    gabapentin (NEURONTIN) 300 mg capsule Take 1 tablet by mouth 3 (three) times a day      levothyroxine 50 mcg tablet TAKE ONE TABLET BY MOUTH EVERY DAY  30 tablet 8    metolazone (ZAROXOLYN) 2 5 mg tablet Take 1 tablet (2 5 mg total) by mouth 3 (three) times a week Take 1 tablet Monday, Wednesday, and Friday  12 tablet 5    metoprolol tartrate (LOPRESSOR) 25 mg tablet Take 1 tablet (25 mg total) by mouth daily 30 tablet 5    potassium chloride (K-DUR,KLOR-CON) 20 mEq tablet TAKE ONE TABLET BY MOUTH TWICE DAILY  60 tablet 1    predniSONE 5 mg tablet Take 5 mg by mouth daily      simvastatin (ZOCOR) 10 mg tablet Take 1 tablet (10 mg total) by mouth daily 30 tablet 3    traMADol (ULTRAM) 50 mg tablet Take 1 tablet by mouth       No current facility-administered medications for this visit  Allergies   Allergen Reactions    Ace Inhibitors Angioedema    Prochlorperazine Anaphylaxis     lethargy, tongue and throat swelling    Ramipril Anaphylaxis     angioedema    Angiotensin Receptor Blockers        Review of Systems      I spent 30 minutes with the patient during this visit  Medical Nutrition Therapy        Assessment    Visit Type: Initial visit    Chief complaint Type 2 diabetes for at least 3 years, she has never had diabetes education before  HPI: Yolandes diet history reveals that she eats 2 meals daily and snacks usually only in the evening  She does not want to eat first thing in the morning  She has been very generous with the cream in her coffee and is willing to measure to 2 Tbsp and cut back if needed  Normally, she goes to the gym for an hour 3 mornings per week but is unable to go at this time as the gyms are closed due to COVID-19  Currently meals range from 20 to 50 grams of carbohydrate  Explained basic pathophysiology of diabetes and impact of diet on blood glucose levels  Together we discussed what foods contain CHO, reading a food label, and serving sizes  Explained about the portion booklet to teach Alberto Aleman more about food groups and basic carbohydrate counting  She has not opened her email with the portion book and food diary  We discussed how to use it  I will send a hard copy in the mail   Created an individualized meal plan for Alberto Aleman with 2 meals and 2 snacks providing 30-45 g carb per meal and 20-30 g carb per snack  This plan will help promote weight loss  Put together sample meals for Yolande's reference  Angelica Bob demonstrated fair understanding of education overall and did not have any questions at this time  She will be provided with my number, Angelica Bob will call with questions or for more education  Ht Readings from Last 1 Encounters:   03/23/20 5' 5 35" (1 66 m)     Wt Readings from Last 2 Encounters:   03/23/20 115 kg (253 lb)   03/12/20 119 kg (261 lb 9 6 oz)     Weight Change: Yes stable per patient    Medical Diagnosis/ICD10:   E11 65 (ICD-10-CM) - Type 2 diabetes mellitus with hyperglycemia, without long-term current use of insulin ([facility])   E11 22, N18 3, E11 65 (ICD-10-CM) - Uncontrolled diabetes with stage 3 chronic kidney disease GFR 30-59 ([facility])   E66 01, Z68 41 (ICD-10-CM) - Class 3 severe obesity due to excess calories with serious comorbidity and body mass index (BMI) of 40 0 to 44 9 in [facility])     Barriers to Learning: no barriers    Do you follow any special diet presently?: Yes - has cut back on eating     Who shops: patient and sister  Who cooks: patient    Food Log: Completed via the method of food recall  Usually up at 8am, water, coffee w/ half and half  Breakfast:11am, green salad w/ red wine vinegar dressing, craisins, lettuce, homemade salsa  Afternoon Snack: seldom has an apple  Dinner: 5pm, pork chop, diced potatoes, applesauce, no fresh veggies but usually has broccoli and [place] sprouts  Evening Snack:apple or pickles  Beverages: water only besides coffee in the morning  Eating out/Take out:not usually  Exercise has been going to the gym 3 times a week, gets up at 5, at the gym 6-7am  Walks outside, takes path around community most days    Calorie needs 1220 kcals/day Carbs: 30-45 g/meal, 20-30 g/snack         Nutrition Diagnosis:  Food and nutrition related knowledge deficit  related to Lack of prior exposure to accurate nutrition related information as evidenced by Verbalizes inaccurate or incomplete information    Intervention: reduced fat intake, carbohydrate counting, increased protein intake, meal planning, individualized meal plan, monitoring portion control and exercise guidelines     Treatment Goals: Patient understands education and recommendations, Patient will monitor fat intake and Patient will count carbohydrates    Monitoring and evaluation:    Term code indicator  FH 1 6 1 Fat and Cholesterol Intake Criteria: limit added fat to 5 servings per day (2 T  half and half is one serving)  Term code indicator  FH 1 6 3 Carbohydrate Intake Criteria: 30-45 per meal    Patients Response to Instruction:  Comprehensiongood  Motivationgood  Expected Compliancegood    Thank you for coming to the [facility] for education today  Please feel free to call with any questions or concerns      [facility]  [id number] [facility]  [address] [facility] [id number]

## 2020-04-04 DIAGNOSIS — R60.9 EDEMA, UNSPECIFIED TYPE: ICD-10-CM

## 2020-04-04 RX ORDER — FUROSEMIDE 40 MG/1
TABLET ORAL
Qty: 30 TABLET | Refills: 0 | Status: SHIPPED | OUTPATIENT
Start: 2020-04-04 | End: 2020-05-19 | Stop reason: SDUPTHER

## 2020-04-23 ENCOUNTER — TELEPHONE (OUTPATIENT)
Dept: NEPHROLOGY | Facility: CLINIC | Age: 75
End: 2020-04-23

## 2020-04-30 ENCOUNTER — TELEPHONE (OUTPATIENT)
Dept: ENDOCRINOLOGY | Facility: CLINIC | Age: 75
End: 2020-04-30

## 2020-05-05 DIAGNOSIS — E78.2 MIXED HYPERLIPIDEMIA: ICD-10-CM

## 2020-05-05 RX ORDER — SIMVASTATIN 10 MG
TABLET ORAL
Qty: 30 TABLET | Refills: 0 | Status: SHIPPED | OUTPATIENT
Start: 2020-05-05 | End: 2020-06-12 | Stop reason: SDUPTHER

## 2020-05-06 ENCOUNTER — TELEMEDICINE (OUTPATIENT)
Dept: NEPHROLOGY | Facility: CLINIC | Age: 75
End: 2020-05-06
Payer: COMMERCIAL

## 2020-05-06 DIAGNOSIS — N18.9 CHRONIC RENAL IMPAIRMENT, UNSPECIFIED CKD STAGE: ICD-10-CM

## 2020-05-06 DIAGNOSIS — I10 ESSENTIAL HYPERTENSION: Primary | ICD-10-CM

## 2020-05-06 DIAGNOSIS — I12.9 NEPHROSCLEROSIS, STAGE 1-4 OR UNSPECIFIED CHRONIC KIDNEY DISEASE: ICD-10-CM

## 2020-05-06 PROBLEM — N18.30 CHRONIC KIDNEY DISEASE, STAGE 3 (HCC): Status: RESOLVED | Noted: 2017-10-12 | Resolved: 2020-05-06

## 2020-05-06 PROCEDURE — 99214 OFFICE O/P EST MOD 30 MIN: CPT | Performed by: INTERNAL MEDICINE

## 2020-05-18 ENCOUNTER — TELEMEDICINE (OUTPATIENT)
Dept: ENDOCRINOLOGY | Facility: CLINIC | Age: 75
End: 2020-05-18
Payer: COMMERCIAL

## 2020-05-18 DIAGNOSIS — E11.9 TYPE 2 DIABETES MELLITUS WITHOUT COMPLICATION, WITHOUT LONG-TERM CURRENT USE OF INSULIN (HCC): Primary | ICD-10-CM

## 2020-05-18 DIAGNOSIS — E03.9 HYPOTHYROIDISM, UNSPECIFIED TYPE: ICD-10-CM

## 2020-05-18 DIAGNOSIS — E78.2 MIXED HYPERLIPIDEMIA: ICD-10-CM

## 2020-05-18 DIAGNOSIS — I10 ESSENTIAL HYPERTENSION: ICD-10-CM

## 2020-05-18 DIAGNOSIS — E87.6 HYPOKALEMIA: ICD-10-CM

## 2020-05-18 PROCEDURE — 99214 OFFICE O/P EST MOD 30 MIN: CPT | Performed by: PHYSICIAN ASSISTANT

## 2020-05-18 RX ORDER — POTASSIUM CHLORIDE 20 MEQ/1
TABLET, EXTENDED RELEASE ORAL
Qty: 60 TABLET | Refills: 0 | Status: SHIPPED | OUTPATIENT
Start: 2020-05-18 | End: 2020-06-12 | Stop reason: SDUPTHER

## 2020-05-19 DIAGNOSIS — R60.9 EDEMA, UNSPECIFIED TYPE: ICD-10-CM

## 2020-05-20 RX ORDER — FUROSEMIDE 40 MG/1
40 TABLET ORAL DAILY
Qty: 30 TABLET | Refills: 1 | Status: SHIPPED | OUTPATIENT
Start: 2020-05-20 | End: 2020-07-12

## 2020-05-27 ENCOUNTER — OFFICE VISIT (OUTPATIENT)
Dept: PODIATRY | Facility: CLINIC | Age: 75
End: 2020-05-27
Payer: COMMERCIAL

## 2020-05-27 VITALS — BODY MASS INDEX: 41.82 KG/M2 | HEIGHT: 65 IN | WEIGHT: 251 LBS

## 2020-05-27 DIAGNOSIS — E11.9 TYPE 2 DIABETES MELLITUS WITHOUT COMPLICATION, WITHOUT LONG-TERM CURRENT USE OF INSULIN (HCC): Primary | ICD-10-CM

## 2020-05-27 PROCEDURE — 3074F SYST BP LT 130 MM HG: CPT | Performed by: PODIATRIST

## 2020-05-27 PROCEDURE — 3066F NEPHROPATHY DOC TX: CPT | Performed by: PODIATRIST

## 2020-05-27 PROCEDURE — 1036F TOBACCO NON-USER: CPT | Performed by: PODIATRIST

## 2020-05-27 PROCEDURE — 4040F PNEUMOC VAC/ADMIN/RCVD: CPT | Performed by: PODIATRIST

## 2020-05-27 PROCEDURE — 2022F DILAT RTA XM EVC RTNOPTHY: CPT | Performed by: PODIATRIST

## 2020-05-27 PROCEDURE — 3008F BODY MASS INDEX DOCD: CPT | Performed by: PODIATRIST

## 2020-05-27 PROCEDURE — 3079F DIAST BP 80-89 MM HG: CPT | Performed by: PODIATRIST

## 2020-05-27 PROCEDURE — 99213 OFFICE O/P EST LOW 20 MIN: CPT | Performed by: PODIATRIST

## 2020-05-27 PROCEDURE — 1160F RVW MEDS BY RX/DR IN RCRD: CPT | Performed by: PODIATRIST

## 2020-06-12 DIAGNOSIS — E78.2 MIXED HYPERLIPIDEMIA: ICD-10-CM

## 2020-06-12 DIAGNOSIS — E87.6 HYPOKALEMIA: ICD-10-CM

## 2020-06-13 RX ORDER — SIMVASTATIN 10 MG
10 TABLET ORAL DAILY
Qty: 30 TABLET | Refills: 0 | Status: SHIPPED | OUTPATIENT
Start: 2020-06-13 | End: 2020-07-15

## 2020-06-13 RX ORDER — POTASSIUM CHLORIDE 20 MEQ/1
20 TABLET, EXTENDED RELEASE ORAL 2 TIMES DAILY
Qty: 60 TABLET | Refills: 0 | Status: SHIPPED | OUTPATIENT
Start: 2020-06-13 | End: 2020-07-15

## 2020-06-28 DIAGNOSIS — E03.9 HYPOTHYROIDISM, UNSPECIFIED TYPE: ICD-10-CM

## 2020-06-28 RX ORDER — LEVOTHYROXINE SODIUM 0.05 MG/1
TABLET ORAL
Qty: 30 TABLET | Refills: 0 | Status: SHIPPED | OUTPATIENT
Start: 2020-06-28 | End: 2020-09-19

## 2020-07-04 DIAGNOSIS — F32.A DEPRESSION, UNSPECIFIED DEPRESSION TYPE: ICD-10-CM

## 2020-07-06 RX ORDER — CITALOPRAM 40 MG/1
TABLET ORAL
Qty: 90 TABLET | Refills: 0 | Status: SHIPPED | OUTPATIENT
Start: 2020-07-06 | End: 2020-10-28 | Stop reason: SDUPTHER

## 2020-07-09 ENCOUNTER — TELEMEDICINE (OUTPATIENT)
Dept: FAMILY MEDICINE CLINIC | Facility: CLINIC | Age: 75
End: 2020-07-09
Payer: COMMERCIAL

## 2020-07-09 DIAGNOSIS — Z20.828 EXPOSURE TO SARS-ASSOCIATED CORONAVIRUS: ICD-10-CM

## 2020-07-09 DIAGNOSIS — Z20.828 EXPOSURE TO SARS-ASSOCIATED CORONAVIRUS: Primary | ICD-10-CM

## 2020-07-09 PROCEDURE — U0003 INFECTIOUS AGENT DETECTION BY NUCLEIC ACID (DNA OR RNA); SEVERE ACUTE RESPIRATORY SYNDROME CORONAVIRUS 2 (SARS-COV-2) (CORONAVIRUS DISEASE [COVID-19]), AMPLIFIED PROBE TECHNIQUE, MAKING USE OF HIGH THROUGHPUT TECHNOLOGIES AS DESCRIBED BY CMS-2020-01-R: HCPCS

## 2020-07-09 PROCEDURE — 1160F RVW MEDS BY RX/DR IN RCRD: CPT | Performed by: FAMILY MEDICINE

## 2020-07-09 PROCEDURE — 99213 OFFICE O/P EST LOW 20 MIN: CPT | Performed by: FAMILY MEDICINE

## 2020-07-09 NOTE — PROGRESS NOTES
COVID-19 Virtual Visit     Assessment/Plan:    Problem List Items Addressed This Visit     None        This virtual check-in was done via [employer] and patient was informed that this is a secure, HIPAA-compliant platform  She agrees to proceed     Disposition:    Patient and her sister were recently exposed to her next door neighbor who was diagnosed with COVID-19 yesterday  They have both had close contact with neighbor for the past few days helping her due to her illness  Currently patient complains   Of mild cough and shortness of breath  Denies any fevers, chills, myalgias     Will send for COVID-19 testing  Patient instructed to self quarantine until results are available  She was also instructed to contact me if her symptoms should worsen in the meantime  I referred Nadege Garcia to one of our centralized sites for a COVID-19 swab    I spent Ten minutes directly with the patient during this visit    Encounter provider Cece Arauz DO    Provider located at [address] [facility] [address] [facility] [address]    Recent Visits  No visits were found meeting these conditions  Showing recent visits within past 7 days and meeting all other requirements     Future Appointments  No visits were found meeting these conditions  Showing future appointments within next 150 days and meeting all other requirements        Patient agrees to participate in a virtual check in via telephone or video visit instead of presenting to the office to address urgent/immediate medical needs  Patient is aware this is a billable service  After connecting through telephone, the patient was identified by name and date of birth  Indira Osman was informed that this was a telemedicine visit and that the exam was being conducted confidentially over secure lines  My office door was closed  No one else was in the room    Indira Osman acknowledged consent and understanding of privacy and security of the telemedicine visit  I informed the patient that I have reviewed her record in Epic and presented the opportunity for her to ask any questions regarding the visit today  The patient agreed to participate  Subjective  Angel Wong is a 66 y o  female who is concerned about COVID-19  She reports cough and shortness of breath  She has not experienced fever, chills and myalgias She has had contact with a person who is under investigation for or who is positive for COVID-19 within the last 14 days  She has not been hospitalized recently for fever and/or lower respiratory symptoms      Past Medical History:   Diagnosis Date    Abnormal blood chemistry     last assessed 11/13/2013    Angina pectoris ([facility] )     last assessed 11/012/13    Arthritis     last assessed 11/13/2013    Asthma     Athlete's foot     resolved 10/11/17    Cataract     last assessed 03/18/2016    Diabetes mellitus with kidney disease [facility] )     last assessed 10/03/17    Eczema     Gait disorder     last assessed 11/12/2013    Idiopathic thrombocytopenia purpura ([facility] )     last assessed 08/06/15    Overactive bladder     Post menopausal syndrome     resolved 10/11/17    Restless legs syndrome     last assessed 10/24/2013    Subclinical hypothyroidism     last assessed 12/11/17    Urinary frequency     resolved 12/18/2014       Past Surgical History:   Procedure Laterality Date    ACHILLES TENDON REPAIR      REPLACEMENT TOTAL KNEE         Current Outpatient Medications   Medication Sig Dispense Refill    albuterol (PROAIR HFA) 90 mcg/act inhaler Inhale 2 puffs every 6 (six) hours as needed for wheezing 8 5 g 3    amitriptyline (ELAVIL) 10 mg tablet TAKE ONE TABLET BY MOUTH AT BEDTIME      aspirin (ASPIRIN ADULT LOW DOSE) 81 mg EC tablet Take 1 tablet by mouth daily      benzonatate (TESSALON) 200 MG capsule Take 1 capsule (200 mg total) by mouth 3 (three) times a day as needed for cough 20 capsule 0    Cetirizine HCl 10 MG CAPS Take 10 mg by mouth daily        Cholecalciferol (D 400) 400 units TABS Take 400 Units by mouth daily 2 tabs      cholecalciferol (VITAMIN D3) 1,000 units tablet Take 1,000 Units by mouth daily      citalopram (CeleXA) 40 mg tablet TAKE ONE TABLET BY MOUTH EVERY DAY  90 tablet 0    febuxostat (ULORIC) 80 MG TABS Take 1 tablet by mouth daily      furosemide (LASIX) 40 mg tablet Take 1 tablet (40 mg total) by mouth daily 30 tablet 1    gabapentin (NEURONTIN) 300 mg capsule Take 1 tablet by mouth 3 (three) times a day      levothyroxine 50 mcg tablet TAKE ONE TABLET BY MOUTH EVERY DAY  30 tablet 0    metolazone (ZAROXOLYN) 2 5 mg tablet Take 1 tablet (2 5 mg total) by mouth 3 (three) times a week Take 1 tablet Monday, Wednesday, and Friday  12 tablet 5    metoprolol tartrate (LOPRESSOR) 25 mg tablet Take 1 tablet (25 mg total) by mouth daily 30 tablet 5    potassium chloride (K-DUR,KLOR-CON) 20 mEq tablet Take 1 tablet (20 mEq total) by mouth 2 (two) times a day 60 tablet 0    predniSONE 5 mg tablet Take 5 mg by mouth daily      simvastatin (ZOCOR) 10 mg tablet Take 1 tablet (10 mg total) by mouth daily 30 tablet 0    sitaGLIPtin (JANUVIA) 25 mg tablet Take 1 tablet (25 mg total) by mouth daily 30 tablet 5    traMADol (ULTRAM) 50 mg tablet Take 1 tablet by mouth       No current facility-administered medications for this visit  Allergies   Allergen Reactions    Ace Inhibitors Angioedema    Prochlorperazine Anaphylaxis     lethargy, tongue and throat swelling    Ramipril Anaphylaxis     angioedema    Angiotensin Receptor Blockers        Review of Systems    Video Exam    There were no vitals filed for this visit  Raffi appears healthy, alert, no distress  Physical Exam     VIRTUAL VISIT DISCLAIMER    Thresa Saint acknowledges that she has consented to an online visit or consultation   She understands that the online visit is based solely on information provided by her, and that, in the absence of a face-to-face physical evaluation by the physician, the diagnosis she receives is both limited and provisional in terms of accuracy and completeness  This is not intended to replace a full medical face-to-face evaluation by the physician  Crow Bhandari understands and accepts these terms

## 2020-07-12 DIAGNOSIS — R60.9 EDEMA, UNSPECIFIED TYPE: ICD-10-CM

## 2020-07-12 LAB — SARS-COV-2 RNA SPEC QL NAA+PROBE: NOT DETECTED

## 2020-07-12 RX ORDER — FUROSEMIDE 40 MG/1
TABLET ORAL
Qty: 30 TABLET | Refills: 0 | Status: SHIPPED | OUTPATIENT
Start: 2020-07-12 | End: 2020-08-17 | Stop reason: SDUPTHER

## 2020-07-14 DIAGNOSIS — E87.6 HYPOKALEMIA: ICD-10-CM

## 2020-07-14 DIAGNOSIS — E78.2 MIXED HYPERLIPIDEMIA: ICD-10-CM

## 2020-07-15 RX ORDER — SIMVASTATIN 10 MG
TABLET ORAL
Qty: 30 TABLET | Refills: 0 | Status: SHIPPED | OUTPATIENT
Start: 2020-07-15 | End: 2020-08-17 | Stop reason: SDUPTHER

## 2020-07-15 RX ORDER — POTASSIUM CHLORIDE 20 MEQ/1
TABLET, EXTENDED RELEASE ORAL
Qty: 60 TABLET | Refills: 0 | Status: SHIPPED | OUTPATIENT
Start: 2020-07-15 | End: 2020-07-20 | Stop reason: SDUPTHER

## 2020-07-20 ENCOUNTER — OFFICE VISIT (OUTPATIENT)
Dept: FAMILY MEDICINE CLINIC | Facility: CLINIC | Age: 75
End: 2020-07-20
Payer: COMMERCIAL

## 2020-07-20 VITALS — SYSTOLIC BLOOD PRESSURE: 114 MMHG | DIASTOLIC BLOOD PRESSURE: 62 MMHG

## 2020-07-20 DIAGNOSIS — B35.4 TINEA CORPORIS: ICD-10-CM

## 2020-07-20 DIAGNOSIS — M79.7 FIBROMYALGIA: ICD-10-CM

## 2020-07-20 DIAGNOSIS — E78.2 MIXED HYPERLIPIDEMIA: ICD-10-CM

## 2020-07-20 DIAGNOSIS — E03.9 HYPOTHYROIDISM, UNSPECIFIED TYPE: ICD-10-CM

## 2020-07-20 DIAGNOSIS — R26.9 GAIT ABNORMALITY: ICD-10-CM

## 2020-07-20 DIAGNOSIS — F32.A DEPRESSION, UNSPECIFIED DEPRESSION TYPE: ICD-10-CM

## 2020-07-20 DIAGNOSIS — E87.6 HYPOKALEMIA: ICD-10-CM

## 2020-07-20 DIAGNOSIS — E11.65 TYPE 2 DIABETES MELLITUS WITH HYPERGLYCEMIA, WITHOUT LONG-TERM CURRENT USE OF INSULIN (HCC): ICD-10-CM

## 2020-07-20 DIAGNOSIS — N18.30 STAGE 3 CHRONIC KIDNEY DISEASE (HCC): Primary | ICD-10-CM

## 2020-07-20 PROCEDURE — 3078F DIAST BP <80 MM HG: CPT | Performed by: FAMILY MEDICINE

## 2020-07-20 PROCEDURE — 1036F TOBACCO NON-USER: CPT | Performed by: FAMILY MEDICINE

## 2020-07-20 PROCEDURE — 3066F NEPHROPATHY DOC TX: CPT | Performed by: FAMILY MEDICINE

## 2020-07-20 PROCEDURE — 3288F FALL RISK ASSESSMENT DOCD: CPT | Performed by: FAMILY MEDICINE

## 2020-07-20 PROCEDURE — 3074F SYST BP LT 130 MM HG: CPT | Performed by: FAMILY MEDICINE

## 2020-07-20 PROCEDURE — 99214 OFFICE O/P EST MOD 30 MIN: CPT | Performed by: FAMILY MEDICINE

## 2020-07-20 PROCEDURE — 1101F PT FALLS ASSESS-DOCD LE1/YR: CPT | Performed by: FAMILY MEDICINE

## 2020-07-20 PROCEDURE — 1160F RVW MEDS BY RX/DR IN RCRD: CPT | Performed by: FAMILY MEDICINE

## 2020-07-20 PROCEDURE — 2022F DILAT RTA XM EVC RTNOPTHY: CPT | Performed by: FAMILY MEDICINE

## 2020-07-20 PROCEDURE — 4040F PNEUMOC VAC/ADMIN/RCVD: CPT | Performed by: FAMILY MEDICINE

## 2020-07-20 RX ORDER — POTASSIUM CHLORIDE 20 MEQ/1
20 TABLET, EXTENDED RELEASE ORAL 2 TIMES DAILY
Qty: 180 TABLET | Refills: 3 | Status: SHIPPED | OUTPATIENT
Start: 2020-07-20 | End: 2020-12-08 | Stop reason: HOSPADM

## 2020-07-20 RX ORDER — CLOTRIMAZOLE AND BETAMETHASONE DIPROPIONATE 10; .64 MG/G; MG/G
CREAM TOPICAL 2 TIMES DAILY
Qty: 30 G | Refills: 0 | Status: SHIPPED | OUTPATIENT
Start: 2020-07-20 | End: 2020-12-08 | Stop reason: HOSPADM

## 2020-07-20 NOTE — ASSESSMENT & PLAN NOTE
Patient has erythematous scaly patch posterior scalp  Will give Rx for Lotrisone cream b i d  P r n

## 2020-07-20 NOTE — ASSESSMENT & PLAN NOTE
Lab Results   Component Value Date    HGBA1C 7 7 (H) 01/15/2020    he last A1c 7 7%  Patient currently seeing endocrinology  Doing well on Januvia 25 mg daily

## 2020-07-20 NOTE — PROGRESS NOTES
[facility]      NAME: Judi Mixon  AGE: 66 y o  SEX: female  : 1945   MRN: [id number]    DATE: 2020  TIME: 2:28 PM    Assessment and Plan     Problem List Items Addressed This Visit     Stage 3 chronic kidney disease ([facility] ) - Primary      GFR has been stable  Patient labs are ordered by endocrinology         Depression      Doing well on citalopram 40 mg daily  Fibromyalgia       Patient no longer on prednisone  Stable on gabapentin 300 t i d   Patient has tramadol, but only uses it sparingly  Continue follow-up with her rheumatologist         Hyperlipidemia       Doing well on simvastatin 10 mg daily  Will continue to monitor         Hypothyroidism      Doing well on levothyroxine 50 mcg daily  Labs ordered by her endocrinologist         Hypokalemia    Relevant Medications    potassium chloride (K-DUR,KLOR-CON) 20 mEq tablet    Gait abnormality      Has been stable  No recent falls  Patient ambulating with the use of a cane  Type 2 diabetes mellitus with hyperglycemia, without long-term current use of insulin ([facility])       Lab Results   Component Value Date    HGBA1C 7 7 (H) 01/15/2020    he last A1c 7 7%  Patient currently seeing endocrinology  Doing well on Januvia 25 mg daily  Tinea corporis      Patient has erythematous scaly patch posterior scalp  Will give Rx for Lotrisone cream b i d  P r n  Relevant Medications    clotrimazole-betamethasone (LOTRISONE) 1-0 05 % cream              Return to office in:    Labs ordered her endocrinologist        6 months, AWV    Chief Complaint     Chief Complaint   Patient presents with    Follow-up     6 months check up       History of Present Illness      Patient presents for recheck chronic medical problems today  Overall she is doing well  Doing well on citalopram for depression  Doing well on gabapentin  Still sees rheumatologist for fibromyalgia    Doing well on simvastatin for cholesterol  Doing well levothyroxine for hypothyroid  Doing well on Januvia for diabetes  Patient still has not gotten her blood work done yet      The following portions of the patient's history were reviewed and updated as appropriate: allergies, current medications, past family history, past medical history, past social history, past surgical history and problem list     Review of Systems   Review of Systems   Respiratory: Negative  Cardiovascular: Negative  Gastrointestinal: Negative  Genitourinary: Negative  Active Problem List     Patient Active Problem List   Diagnosis    Anemia    Chronic gout    Stage 3 chronic kidney disease (HCC)    Combined form of senile cataract of left eye    Cough    Depression    Edema    Fibromyalgia    [facility]ocysteinemia [facility] )    Hyperlipidemia    Hypothyroidism    Hypokalemia    Hypertension    Low back pain    Pain of left hip    Primary osteoarthritis of both hands    Increased frequency of urination    Vitamin D deficiency    Folliculitis    Symptomatic varicose veins of both lower extremities    Intermittent claudication ([facility])    Scalp laceration    Chest pain    Leukocytosis    Type 2 diabetes mellitus without complication, without long-term current use of insulin (HCC)    Gait abnormality    Class 3 severe obesity due to excess calories with serious comorbidity and body mass index (BMI) of 40 0 to 44 9 in adult [facility])    Type 2 diabetes mellitus with hyperglycemia, without long-term current use of insulin ([facility])    Pain in left tibia    Nephrosclerosis, stage 1-4 or unspecified chronic kidney disease    Tinea corporis       Objective   /62     Physical Exam   Cardiovascular: Normal rate, regular rhythm, normal heart sounds and intact distal pulses  Carotids: no bruits  Ext: no edema   Pulmonary/Chest: Effort normal  No respiratory distress  She has no wheezes  She has no rales     Psychiatric: She has a normal mood and affect   Her behavior is normal  Thought content normal        Pertinent Laboratory/Diagnostic Studies:   labs due    Current Medications     Current Outpatient Medications:     albuterol (PROAIR HFA) 90 mcg/act inhaler, Inhale 2 puffs every 6 (six) hours as needed for wheezing, Disp: 8 5 g, Rfl: 3    amitriptyline (ELAVIL) 10 mg tablet, TAKE ONE TABLET BY MOUTH AT BEDTIME, Disp: , Rfl:     aspirin (ASPIRIN ADULT LOW DOSE) 81 mg EC tablet, Take 1 tablet by mouth daily, Disp: , Rfl:     Cetirizine HCl 10 MG CAPS, Take 10 mg by mouth daily  , Disp: , Rfl:     Cholecalciferol (D 400) 400 units TABS, Take 400 Units by mouth daily 2 tabs, Disp: , Rfl:     cholecalciferol (VITAMIN D3) 1,000 units tablet, Take 1,000 Units by mouth daily, Disp: , Rfl:     citalopram (CeleXA) 40 mg tablet, TAKE ONE TABLET BY MOUTH EVERY DAY , Disp: 90 tablet, Rfl: 0    febuxostat (ULORIC) 80 MG TABS, Take 1 tablet by mouth daily, Disp: , Rfl:     furosemide (LASIX) 40 mg tablet, TAKE ONE TABLET BY MOUTH EVERY DAY , Disp: 30 tablet, Rfl: 0    gabapentin (NEURONTIN) 300 mg capsule, Take 1 tablet by mouth 3 (three) times a day, Disp: , Rfl:     levothyroxine 50 mcg tablet, TAKE ONE TABLET BY MOUTH EVERY DAY , Disp: 30 tablet, Rfl: 0    metolazone (ZAROXOLYN) 2 5 mg tablet, Take 1 tablet (2 5 mg total) by mouth 3 (three) times a week Take 1 tablet Monday, Wednesday, and Friday , Disp: 12 tablet, Rfl: 5    metoprolol tartrate (LOPRESSOR) 25 mg tablet, Take 1 tablet (25 mg total) by mouth daily, Disp: 30 tablet, Rfl: 5    potassium chloride (K-DUR,KLOR-CON) 20 mEq tablet, Take 1 tablet (20 mEq total) by mouth 2 (two) times a day, Disp: 180 tablet, Rfl: 3    predniSONE 5 mg tablet, Take 5 mg by mouth daily, Disp: , Rfl:     simvastatin (ZOCOR) 10 mg tablet, TAKE ONE TABLET BY MOUTH EVERY DAY , Disp: 30 tablet, Rfl: 0    sitaGLIPtin (JANUVIA) 25 mg tablet, Take 1 tablet (25 mg total) by mouth daily, Disp: 30 tablet, Rfl: 5   traMADol (ULTRAM) 50 mg tablet, Take 1 tablet by mouth, Disp: , Rfl:     clotrimazole-betamethasone (LOTRISONE) 1-0 05 % cream, Apply topically 2 (two) times a day, Disp: 30 g, Rfl: 0    Health Maintenance     Health Maintenance   Topic Date Due    SLP PLAN OF CARE  1945    Falls: Plan of Care  02/09/2008    URINE MICROALBUMIN  06/24/2020    Influenza Vaccine  07/01/2020    HEMOGLOBIN A1C  07/15/2020    BMI: Followup Plan  01/20/2021    Medicare Annual Wellness Visit (AWV)  01/20/2021    DM Eye Exam  04/02/2021    BMI: Adult  05/27/2021    Diabetic Foot Exam  05/27/2021    Fall Risk  07/20/2021    CRC Screening: Colonoscopy  11/11/2023    DTaP,Tdap,and Td Vaccines (3 - Td) 08/23/2029    Pneumococcal Vaccine: 65+ Years  Completed    Pneumococcal Vaccine: Pediatrics (0 to 5 Years) and At-Risk Patients (6 to 59 Years)  Aged Out    HIB Vaccine  Aged Out    Hepatitis B Vaccine  Aged Out    IPV Vaccine  Aged Out    Hepatitis A Vaccine  Aged Out    Meningococcal ACWY Vaccine  Aged Out    HPV Vaccine  Aged [employer] History   Administered Date(s) Administered    INFLUENZA 11/14/2012, 03/18/2013, 01/01/2014, 03/03/2014, 11/19/2014, 11/18/2015, 11/25/2016, 10/12/2017    Influenza Split High Dose Preservative Free IM 11/18/2015, 11/25/2016, 10/12/2017    Influenza TIV (IM) 09/09/2011, 10/15/2012, 09/25/2013, 01/01/2014    Influenza, high dose seasonal 0 5 mL 10/08/2018, 10/08/2019    Pneumococcal Conjugate 13-Valent 08/06/2015    Pneumococcal Polysaccharide PPV23 10/15/2008    Tdap 08/06/2015, 08/23/2019       Kyle Barrientos DO  [facility]

## 2020-07-20 NOTE — ASSESSMENT & PLAN NOTE
Patient no longer on prednisone  Stable on gabapentin 300 t i d   Patient has tramadol, but only uses it sparingly    Continue follow-up with her rheumatologist

## 2020-08-08 DIAGNOSIS — E11.9 TYPE 2 DIABETES MELLITUS WITHOUT COMPLICATION, WITHOUT LONG-TERM CURRENT USE OF INSULIN (HCC): ICD-10-CM

## 2020-08-09 RX ORDER — SITAGLIPTIN 25 MG/1
TABLET, FILM COATED ORAL
Qty: 30 TABLET | Refills: 0 | Status: SHIPPED | OUTPATIENT
Start: 2020-08-09 | End: 2020-08-17 | Stop reason: SDUPTHER

## 2020-08-17 DIAGNOSIS — E11.9 TYPE 2 DIABETES MELLITUS WITHOUT COMPLICATION, WITHOUT LONG-TERM CURRENT USE OF INSULIN (HCC): ICD-10-CM

## 2020-08-17 DIAGNOSIS — R60.9 EDEMA, UNSPECIFIED TYPE: ICD-10-CM

## 2020-08-17 DIAGNOSIS — E78.2 MIXED HYPERLIPIDEMIA: ICD-10-CM

## 2020-08-17 RX ORDER — FUROSEMIDE 40 MG/1
40 TABLET ORAL DAILY
Qty: 30 TABLET | Refills: 5 | Status: SHIPPED | OUTPATIENT
Start: 2020-08-17 | End: 2021-02-22 | Stop reason: SDUPTHER

## 2020-08-17 RX ORDER — SIMVASTATIN 10 MG
10 TABLET ORAL DAILY
Qty: 30 TABLET | Refills: 5 | Status: SHIPPED | OUTPATIENT
Start: 2020-08-17 | End: 2020-10-28 | Stop reason: SDUPTHER

## 2020-08-24 DIAGNOSIS — R60.9 EDEMA, UNSPECIFIED TYPE: ICD-10-CM

## 2020-08-24 RX ORDER — METOLAZONE 2.5 MG/1
TABLET ORAL
Qty: 12 TABLET | Refills: 0 | Status: SHIPPED | OUTPATIENT
Start: 2020-08-24 | End: 2020-10-05 | Stop reason: SDUPTHER

## 2020-08-26 ENCOUNTER — TELEPHONE (OUTPATIENT)
Dept: NEPHROLOGY | Facility: CLINIC | Age: 75
End: 2020-08-26

## 2020-08-26 DIAGNOSIS — I10 ESSENTIAL HYPERTENSION: ICD-10-CM

## 2020-09-14 DIAGNOSIS — I10 ESSENTIAL HYPERTENSION: ICD-10-CM

## 2020-09-14 DIAGNOSIS — E11.9 TYPE 2 DIABETES MELLITUS WITHOUT COMPLICATION, WITHOUT LONG-TERM CURRENT USE OF INSULIN (HCC): ICD-10-CM

## 2020-09-14 LAB — HBA1C MFR BLD HPLC: 6.8 %

## 2020-09-18 ENCOUNTER — IMMUNIZATIONS (OUTPATIENT)
Dept: FAMILY MEDICINE CLINIC | Facility: CLINIC | Age: 75
End: 2020-09-18
Payer: COMMERCIAL

## 2020-09-18 VITALS — TEMPERATURE: 97.3 F

## 2020-09-18 DIAGNOSIS — E03.9 HYPOTHYROIDISM, UNSPECIFIED TYPE: ICD-10-CM

## 2020-09-18 DIAGNOSIS — Z23 NEED FOR VACCINATION: Primary | ICD-10-CM

## 2020-09-18 PROCEDURE — G0008 ADMIN INFLUENZA VIRUS VAC: HCPCS | Performed by: FAMILY MEDICINE

## 2020-09-18 PROCEDURE — 90662 IIV NO PRSV INCREASED AG IM: CPT | Performed by: FAMILY MEDICINE

## 2020-09-19 RX ORDER — LEVOTHYROXINE SODIUM 0.05 MG/1
TABLET ORAL
Qty: 30 TABLET | Refills: 0 | Status: SHIPPED | OUTPATIENT
Start: 2020-09-19 | End: 2020-09-21

## 2020-09-21 ENCOUNTER — TELEMEDICINE (OUTPATIENT)
Dept: ENDOCRINOLOGY | Facility: CLINIC | Age: 75
End: 2020-09-21
Payer: COMMERCIAL

## 2020-09-21 ENCOUNTER — TELEPHONE (OUTPATIENT)
Dept: ENDOCRINOLOGY | Facility: CLINIC | Age: 75
End: 2020-09-21

## 2020-09-21 DIAGNOSIS — I10 ESSENTIAL HYPERTENSION: ICD-10-CM

## 2020-09-21 DIAGNOSIS — N18.30 STAGE 3 CHRONIC KIDNEY DISEASE (HCC): ICD-10-CM

## 2020-09-21 DIAGNOSIS — E03.9 HYPOTHYROIDISM, UNSPECIFIED TYPE: ICD-10-CM

## 2020-09-21 DIAGNOSIS — E11.65 TYPE 2 DIABETES MELLITUS WITH HYPERGLYCEMIA, WITHOUT LONG-TERM CURRENT USE OF INSULIN (HCC): Primary | ICD-10-CM

## 2020-09-21 PROCEDURE — 99214 OFFICE O/P EST MOD 30 MIN: CPT | Performed by: INTERNAL MEDICINE

## 2020-09-21 RX ORDER — LEVOTHYROXINE SODIUM 0.05 MG/1
TABLET ORAL
Qty: 30 TABLET | Refills: 0 | Status: SHIPPED | OUTPATIENT
Start: 2020-09-21 | End: 2020-10-28 | Stop reason: SDUPTHER

## 2020-09-21 NOTE — ASSESSMENT & PLAN NOTE
Lab Results   Component Value Date    HGBA1C 6 8 (H) 09/14/2020   Well controlled-continue current medications

## 2020-09-21 NOTE — PROGRESS NOTES
Virtual Brief Visit    Assessment/Plan:    Problem List Items Addressed This Visit        Endocrine    Hypothyroidism     Continue levothyroxine at current dose         Type 2 diabetes mellitus with hyperglycemia, without long-term current use of insulin ([facility] ) - Primary       Lab Results   Component Value Date    HGBA1C 6 8 (H) 09/14/2020   Well controlled-continue current medications         Relevant Orders    Comprehensive metabolic panel Lab Collect    HEMOGLOBIN A1C W/ EAG ESTIMATION Lab Collect       Cardiovascular and Mediastinum    Hypertension    Relevant Orders    Comprehensive metabolic panel Lab Collect       Genitourinary    Stage 3 chronic kidney disease ([facility] [address] )     She has upcoming follow-up with nephrologist                     Reason for visit is   Chief Complaint   Patient presents with    Virtual Brief Visit        Encounter provider Rosalind Lemons MD    Provider located at [address] [place] [id number]    Recent Visits  No visits were found meeting these conditions  Showing recent visits within past 7 days and meeting all other requirements     Today's Visits  Date Type Provider Dept   09/21/20 Telephone 6 [facility] Endocrinology [facility] 23   09/21/20 Telemedicine Rosalind Lemons MD [facility] 23   Showing today's visits and meeting all other requirements     Future Appointments  No visits were found meeting these conditions  Showing future appointments within next 150 days and meeting all other requirements        After connecting through telephone, the patient was identified by name and date of birth  Kinga Walls was informed that this is a telemedicine visit and that the visit is being conducted through telephone  My office door was closed  No one else was in the room    She acknowledged consent and understanding of privacy and security of the platform  The patient has agreed to participate and understands she can discontinue the visit at any time  Patient is aware this is a billable service  Subjective    Julieth Diaz is a 66 y o  female with type 2 diabetes, chronic kidney disease, hypothyroidism       HPI type 2 diabetes is treated with oral hypoglycemics that she has been tolerating without any side effects  Checks f s fasting and bedtime and most sugars between 100-120s   No polyuria , polydypsia , no blurry vision , no numbness and tingling in feet   No nausea , vomiting , diarrhea      Past Medical History:   Diagnosis Date    Abnormal blood chemistry     last assessed 11/13/2013    Angina pectoris ([facility] )     last assessed 11/012/13    Arthritis     last assessed 11/13/2013    Asthma     Athlete's foot     resolved 10/11/17    Cataract     last assessed 03/18/2016    Diabetes mellitus with kidney disease ([facility] )     last assessed 10/03/17    Eczema     Gait disorder     last assessed 11/12/2013    Idiopathic thrombocytopenia purpura ([facility] )     last assessed 08/06/15    Overactive bladder     Post menopausal syndrome     resolved 10/11/17    Restless legs syndrome     last assessed 10/24/2013    Subclinical hypothyroidism     last assessed 12/11/17    Urinary frequency     resolved 12/18/2014       Past Surgical History:   Procedure Laterality Date    ACHILLES TENDON REPAIR      REPLACEMENT TOTAL KNEE         Current Outpatient Medications   Medication Sig Dispense Refill    albuterol (PROAIR HFA) 90 mcg/act inhaler Inhale 2 puffs every 6 (six) hours as needed for wheezing 8 5 g 3    amitriptyline (ELAVIL) 10 mg tablet TAKE ONE TABLET BY MOUTH AT BEDTIME      aspirin (ASPIRIN ADULT LOW DOSE) 81 mg EC tablet Take 1 tablet by mouth daily      Cetirizine HCl 10 MG CAPS Take 10 mg by mouth daily        Cholecalciferol (D 400) 400 units TABS Take 400 Units by mouth daily 2 tabs      cholecalciferol (VITAMIN D3) 1,000 units tablet Take 1,000 Units by mouth daily      citalopram (CeleXA) 40 mg tablet TAKE ONE TABLET BY MOUTH EVERY DAY  90 tablet 0    clotrimazole-betamethasone (LOTRISONE) 1-0 05 % cream Apply topically 2 (two) times a day 30 g 0    febuxostat (ULORIC) 80 MG TABS Take 1 tablet by mouth daily      furosemide (LASIX) 40 mg tablet Take 1 tablet (40 mg total) by mouth daily 30 tablet 5    gabapentin (NEURONTIN) 300 mg capsule Take 1 tablet by mouth 3 (three) times a day      levothyroxine 50 mcg tablet TAKE ONE TABLET BY MOUTH EVERY DAY  30 tablet 0    metolazone (ZAROXOLYN) 2 5 mg tablet TAKE ONE TABLET BY MOUTH THREE TIMES A WEEK  ( take one tablet on monday, wednesday and friday ) 12 tablet 0    metoprolol tartrate (LOPRESSOR) 25 mg tablet Take 1 tablet (25 mg total) by mouth daily 30 tablet 2    potassium chloride (K-DUR,KLOR-CON) 20 mEq tablet Take 1 tablet (20 mEq total) by mouth 2 (two) times a day 180 tablet 3    predniSONE 5 mg tablet Take 5 mg by mouth daily      simvastatin (ZOCOR) 10 mg tablet Take 1 tablet (10 mg total) by mouth daily 30 tablet 5    sitaGLIPtin (Januvia) 25 mg tablet Take 1 tablet (25 mg total) by mouth daily 30 tablet 5    traMADol (ULTRAM) 50 mg tablet Take 1 tablet by mouth       No current facility-administered medications for this visit  Allergies   Allergen Reactions    Ace Inhibitors Angioedema    Prochlorperazine Anaphylaxis     lethargy, tongue and throat swelling    Ramipril Anaphylaxis     angioedema    Angiotensin Receptor Blockers        Review of Systems    There were no vitals filed for this visit  I spent 15 minutes directly with the patient during this visit    VIRTUAL VISIT DISCLAIMER    Jese Lima acknowledges that she has consented to an online visit or consultation   She understands that the online visit is based solely on information provided by her, and that, in the absence of a face-to-face physical evaluation by the physician, the diagnosis she receives is both limited and provisional in terms of accuracy and completeness  This is not intended to replace a full medical face-to-face evaluation by the physician  Julieth Diaz understands and accepts these terms

## 2020-09-21 NOTE — TELEPHONE ENCOUNTER
----- Message from Jeremy Smith MD sent at 9/20/2020 11:03 PM EDT -----  Will discuss labs on upcoming visit

## 2020-10-05 DIAGNOSIS — R60.9 EDEMA, UNSPECIFIED TYPE: ICD-10-CM

## 2020-10-05 RX ORDER — METOLAZONE 2.5 MG/1
TABLET ORAL
Qty: 12 TABLET | Refills: 0 | Status: SHIPPED | OUTPATIENT
Start: 2020-10-05 | End: 2020-11-11

## 2020-10-12 ENCOUNTER — TELEMEDICINE (OUTPATIENT)
Dept: FAMILY MEDICINE CLINIC | Facility: CLINIC | Age: 75
End: 2020-10-12
Payer: COMMERCIAL

## 2020-10-12 DIAGNOSIS — Z20.822 EXPOSURE TO COVID-19 VIRUS: ICD-10-CM

## 2020-10-12 DIAGNOSIS — J02.9 SORE THROAT: ICD-10-CM

## 2020-10-12 DIAGNOSIS — R19.7 DIARRHEA, UNSPECIFIED TYPE: ICD-10-CM

## 2020-10-12 DIAGNOSIS — Z20.822 EXPOSURE TO COVID-19 VIRUS: Primary | ICD-10-CM

## 2020-10-12 PROCEDURE — U0003 INFECTIOUS AGENT DETECTION BY NUCLEIC ACID (DNA OR RNA); SEVERE ACUTE RESPIRATORY SYNDROME CORONAVIRUS 2 (SARS-COV-2) (CORONAVIRUS DISEASE [COVID-19]), AMPLIFIED PROBE TECHNIQUE, MAKING USE OF HIGH THROUGHPUT TECHNOLOGIES AS DESCRIBED BY CMS-2020-01-R: HCPCS | Performed by: FAMILY MEDICINE

## 2020-10-12 PROCEDURE — 99214 OFFICE O/P EST MOD 30 MIN: CPT | Performed by: FAMILY MEDICINE

## 2020-10-13 LAB — SARS-COV-2 RNA SPEC QL NAA+PROBE: NOT DETECTED

## 2020-10-14 ENCOUNTER — TELEPHONE (OUTPATIENT)
Dept: FAMILY MEDICINE CLINIC | Facility: CLINIC | Age: 75
End: 2020-10-14

## 2020-10-14 DIAGNOSIS — J40 BRONCHITIS: Primary | ICD-10-CM

## 2020-10-14 RX ORDER — CEFUROXIME AXETIL 500 MG/1
500 TABLET ORAL EVERY 12 HOURS SCHEDULED
Qty: 20 TABLET | Refills: 0 | Status: SHIPPED | OUTPATIENT
Start: 2020-10-14 | End: 2020-10-24

## 2020-10-21 ENCOUNTER — OFFICE VISIT (OUTPATIENT)
Dept: NEPHROLOGY | Facility: CLINIC | Age: 75
End: 2020-10-21
Payer: COMMERCIAL

## 2020-10-21 VITALS
SYSTOLIC BLOOD PRESSURE: 120 MMHG | TEMPERATURE: 97.8 F | HEIGHT: 65 IN | HEART RATE: 70 BPM | WEIGHT: 240 LBS | BODY MASS INDEX: 39.99 KG/M2 | DIASTOLIC BLOOD PRESSURE: 78 MMHG

## 2020-10-21 DIAGNOSIS — N18.9 CHRONIC KIDNEY DISEASE, UNSPECIFIED CKD STAGE: Primary | ICD-10-CM

## 2020-10-21 DIAGNOSIS — I10 ESSENTIAL HYPERTENSION: ICD-10-CM

## 2020-10-21 DIAGNOSIS — N18.30 STAGE 3 CHRONIC KIDNEY DISEASE, UNSPECIFIED WHETHER STAGE 3A OR 3B CKD (HCC): ICD-10-CM

## 2020-10-21 PROCEDURE — 3078F DIAST BP <80 MM HG: CPT | Performed by: INTERNAL MEDICINE

## 2020-10-21 PROCEDURE — 3074F SYST BP LT 130 MM HG: CPT | Performed by: INTERNAL MEDICINE

## 2020-10-21 PROCEDURE — 1036F TOBACCO NON-USER: CPT | Performed by: INTERNAL MEDICINE

## 2020-10-21 PROCEDURE — 99214 OFFICE O/P EST MOD 30 MIN: CPT | Performed by: INTERNAL MEDICINE

## 2020-10-21 PROCEDURE — 1160F RVW MEDS BY RX/DR IN RCRD: CPT | Performed by: INTERNAL MEDICINE

## 2020-10-28 DIAGNOSIS — E03.9 HYPOTHYROIDISM, UNSPECIFIED TYPE: ICD-10-CM

## 2020-10-28 DIAGNOSIS — F32.A DEPRESSION, UNSPECIFIED DEPRESSION TYPE: ICD-10-CM

## 2020-10-28 DIAGNOSIS — E78.2 MIXED HYPERLIPIDEMIA: ICD-10-CM

## 2020-10-28 RX ORDER — LEVOTHYROXINE SODIUM 0.05 MG/1
50 TABLET ORAL DAILY
Qty: 30 TABLET | Refills: 5 | Status: SHIPPED | OUTPATIENT
Start: 2020-10-28 | End: 2021-04-28

## 2020-10-28 RX ORDER — CITALOPRAM 40 MG/1
40 TABLET ORAL DAILY
Qty: 90 TABLET | Refills: 1 | Status: SHIPPED | OUTPATIENT
Start: 2020-10-28 | End: 2021-06-01 | Stop reason: SDUPTHER

## 2020-10-28 RX ORDER — SIMVASTATIN 10 MG
10 TABLET ORAL DAILY
Qty: 30 TABLET | Refills: 5 | Status: SHIPPED | OUTPATIENT
Start: 2020-10-28 | End: 2021-05-14

## 2020-11-04 ENCOUNTER — OFFICE VISIT (OUTPATIENT)
Dept: PODIATRY | Facility: CLINIC | Age: 75
End: 2020-11-04
Payer: COMMERCIAL

## 2020-11-04 VITALS — BODY MASS INDEX: 41.15 KG/M2 | WEIGHT: 247 LBS | HEIGHT: 65 IN

## 2020-11-04 DIAGNOSIS — L60.3 NAIL DYSTROPHY: ICD-10-CM

## 2020-11-04 DIAGNOSIS — E11.9 TYPE 2 DIABETES MELLITUS WITHOUT COMPLICATION, WITHOUT LONG-TERM CURRENT USE OF INSULIN (HCC): Primary | ICD-10-CM

## 2020-11-04 DIAGNOSIS — I73.9 PERIPHERAL VASCULAR DISEASE, UNSPECIFIED (HCC): ICD-10-CM

## 2020-11-04 PROCEDURE — 99212 OFFICE O/P EST SF 10 MIN: CPT | Performed by: PODIATRIST

## 2020-11-04 PROCEDURE — 1036F TOBACCO NON-USER: CPT | Performed by: PODIATRIST

## 2020-11-04 PROCEDURE — 1160F RVW MEDS BY RX/DR IN RCRD: CPT | Performed by: PODIATRIST

## 2020-11-11 DIAGNOSIS — R60.9 EDEMA, UNSPECIFIED TYPE: ICD-10-CM

## 2020-11-11 LAB
LEFT EYE DIABETIC RETINOPATHY: NORMAL
RIGHT EYE DIABETIC RETINOPATHY: NORMAL

## 2020-11-11 RX ORDER — METOLAZONE 2.5 MG/1
TABLET ORAL
Qty: 12 TABLET | Refills: 0 | Status: SHIPPED | OUTPATIENT
Start: 2020-11-11 | End: 2022-06-03

## 2020-12-01 ENCOUNTER — OFFICE VISIT (OUTPATIENT)
Dept: FAMILY MEDICINE CLINIC | Facility: CLINIC | Age: 75
End: 2020-12-01
Payer: COMMERCIAL

## 2020-12-01 ENCOUNTER — HOSPITAL ENCOUNTER (INPATIENT)
Facility: HOSPITAL | Age: 75
LOS: 6 days | Discharge: NURSING, REHAB OR LONG-TERM CARE | DRG: 184 | End: 2020-12-08
Attending: SURGERY | Admitting: SURGERY
Payer: COMMERCIAL

## 2020-12-01 ENCOUNTER — APPOINTMENT (EMERGENCY)
Dept: CT IMAGING | Facility: HOSPITAL | Age: 75
DRG: 184 | End: 2020-12-01
Payer: COMMERCIAL

## 2020-12-01 ENCOUNTER — HOSPITAL ENCOUNTER (EMERGENCY)
Facility: HOSPITAL | Age: 75
DRG: 184 | End: 2020-12-01
Attending: EMERGENCY MEDICINE | Admitting: EMERGENCY MEDICINE
Payer: COMMERCIAL

## 2020-12-01 VITALS
RESPIRATION RATE: 17 BRPM | SYSTOLIC BLOOD PRESSURE: 124 MMHG | BODY MASS INDEX: 42.04 KG/M2 | DIASTOLIC BLOOD PRESSURE: 75 MMHG | OXYGEN SATURATION: 97 % | HEART RATE: 64 BPM | TEMPERATURE: 98.3 F | WEIGHT: 252.65 LBS

## 2020-12-01 VITALS
OXYGEN SATURATION: 97 % | HEART RATE: 65 BPM | SYSTOLIC BLOOD PRESSURE: 118 MMHG | TEMPERATURE: 98 F | DIASTOLIC BLOOD PRESSURE: 78 MMHG | RESPIRATION RATE: 20 BRPM

## 2020-12-01 DIAGNOSIS — R06.02 SOB (SHORTNESS OF BREATH): ICD-10-CM

## 2020-12-01 DIAGNOSIS — G89.11 ACUTE PAIN DUE TO TRAUMA: ICD-10-CM
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
## 2020-12-01 DIAGNOSIS — S22.42XA MULTIPLE CLOSED FRACTURES OF RIBS OF LEFT SIDE: Primary | ICD-10-CM

## 2020-12-01 DIAGNOSIS — R07.89 LEFT-SIDED CHEST WALL PAIN: ICD-10-CM

## 2020-12-01 DIAGNOSIS — E87.1 HYPONATREMIA: ICD-10-CM

## 2020-12-01 DIAGNOSIS — M54.6 THORACIC SPINE PAIN: ICD-10-CM

## 2020-12-01 DIAGNOSIS — S22.42XA CLOSED FRACTURE OF MULTIPLE RIBS OF LEFT SIDE, INITIAL ENCOUNTER: Primary | ICD-10-CM
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
## 2020-12-01 PROBLEM — M15.9 OSTEOARTHRITIS OF MULTIPLE JOINTS: Status: ACTIVE | Noted: 2019-05-09

## 2020-12-01 PROBLEM — N95.1 POST MENOPAUSAL SYNDROME: Status: ACTIVE | Noted: 2020-12-01

## 2020-12-01 PROBLEM — H26.9 CATARACT: Status: ACTIVE | Noted: 2020-12-01

## 2020-12-01 PROBLEM — M1A.00X1 IDIOPATHIC CHRONIC GOUT WITH TOPHUS: Status: ACTIVE | Noted: 2019-05-09

## 2020-12-01 PROBLEM — M19.90 ARTHRITIS: Status: ACTIVE | Noted: 2019-11-07

## 2020-12-01 PROBLEM — D69.3 IDIOPATHIC THROMBOCYTOPENIC PURPURA (HCC): Status: ACTIVE | Noted: 2020-12-01

## 2020-12-01 PROBLEM — G25.81 RESTLESS LEGS SYNDROME: Status: ACTIVE | Noted: 2020-12-01

## 2020-12-01 LAB
ANION GAP SERPL CALCULATED.3IONS-SCNC: 8 MMOL/L (ref 4–13)
BASOPHILS # BLD AUTO: 0.08 THOUSANDS/ΜL (ref 0–0.1)
BASOPHILS NFR BLD AUTO: 1 % (ref 0–1)
BUN SERPL-MCNC: 45 MG/DL (ref 5–25)
CALCIUM SERPL-MCNC: 9 MG/DL (ref 8.3–10.1)
CHLORIDE SERPL-SCNC: 90 MMOL/L (ref 100–108)
CO2 SERPL-SCNC: 31 MMOL/L (ref 21–32)
CREAT SERPL-MCNC: 2.21 MG/DL (ref 0.6–1.3)
EOSINOPHIL # BLD AUTO: 0.12 THOUSAND/ΜL (ref 0–0.61)
EOSINOPHIL NFR BLD AUTO: 1 % (ref 0–6)
ERYTHROCYTE [DISTWIDTH] IN BLOOD BY AUTOMATED COUNT: 13.5 % (ref 11.6–15.1)
GFR SERPL CREATININE-BSD FRML MDRD: 21 ML/MIN/1.73SQ M
GLUCOSE SERPL-MCNC: 121 MG/DL (ref 65–140)
HCT VFR BLD AUTO: 37.5 % (ref 34.8–46.1)
HGB BLD-MCNC: 12 G/DL (ref 11.5–15.4)
IMM GRANULOCYTES # BLD AUTO: 0.14 THOUSAND/UL (ref 0–0.2)
IMM GRANULOCYTES NFR BLD AUTO: 1 % (ref 0–2)
LYMPHOCYTES # BLD AUTO: 0.7 THOUSANDS/ΜL (ref 0.6–4.47)
LYMPHOCYTES NFR BLD AUTO: 5 % (ref 14–44)
MCH RBC QN AUTO: 30.4 PG (ref 26.8–34.3)
MCHC RBC AUTO-ENTMCNC: 32 G/DL (ref 31.4–37.4)
MCV RBC AUTO: 95 FL (ref 82–98)
MONOCYTES # BLD AUTO: 0.93 THOUSAND/ΜL (ref 0.17–1.22)
MONOCYTES NFR BLD AUTO: 7 % (ref 4–12)
NEUTROPHILS # BLD AUTO: 12.26 THOUSANDS/ΜL (ref 1.85–7.62)
NEUTS SEG NFR BLD AUTO: 85 % (ref 43–75)
NRBC BLD AUTO-RTO: 0 /100 WBCS
PLATELET # BLD AUTO: 264 THOUSANDS/UL (ref 149–390)
PMV BLD AUTO: 10 FL (ref 8.9–12.7)
POTASSIUM SERPL-SCNC: 4.5 MMOL/L (ref 3.5–5.3)
RBC # BLD AUTO: 3.95 MILLION/UL (ref 3.81–5.12)
SODIUM SERPL-SCNC: 129 MMOL/L (ref 136–145)
WBC # BLD AUTO: 14.23 THOUSAND/UL (ref 4.31–10.16)

## 2020-12-01 PROCEDURE — 3288F FALL RISK ASSESSMENT DOCD: CPT | Performed by: FAMILY MEDICINE

## 2020-12-01 PROCEDURE — 96375 TX/PRO/DX INJ NEW DRUG ADDON: CPT

## 2020-12-01 PROCEDURE — 36415 COLL VENOUS BLD VENIPUNCTURE: CPT | Performed by: EMERGENCY MEDICINE

## 2020-12-01 PROCEDURE — G1004 CDSM NDSC: HCPCS

## 2020-12-01 PROCEDURE — 99219 PR INITIAL OBSERVATION CARE/DAY 50 MINUTES: CPT | Performed by: SURGERY

## 2020-12-01 PROCEDURE — 99285 EMERGENCY DEPT VISIT HI MDM: CPT

## 2020-12-01 PROCEDURE — 96376 TX/PRO/DX INJ SAME DRUG ADON: CPT

## 2020-12-01 PROCEDURE — 70450 CT HEAD/BRAIN W/O DYE: CPT

## 2020-12-01 PROCEDURE — 1160F RVW MEDS BY RX/DR IN RCRD: CPT | Performed by: FAMILY MEDICINE

## 2020-12-01 PROCEDURE — 85025 COMPLETE CBC W/AUTO DIFF WBC: CPT | Performed by: EMERGENCY MEDICINE

## 2020-12-01 PROCEDURE — 1036F TOBACCO NON-USER: CPT | Performed by: FAMILY MEDICINE

## 2020-12-01 PROCEDURE — 96361 HYDRATE IV INFUSION ADD-ON: CPT

## 2020-12-01 PROCEDURE — 99214 OFFICE O/P EST MOD 30 MIN: CPT | Performed by: FAMILY MEDICINE

## 2020-12-01 PROCEDURE — 96374 THER/PROPH/DIAG INJ IV PUSH: CPT

## 2020-12-01 PROCEDURE — 99285 EMERGENCY DEPT VISIT HI MDM: CPT | Performed by: EMERGENCY MEDICINE

## 2020-12-01 PROCEDURE — 1100F PTFALLS ASSESS-DOCD GE2>/YR: CPT | Performed by: FAMILY MEDICINE

## 2020-12-01 PROCEDURE — 72125 CT NECK SPINE W/O DYE: CPT

## 2020-12-01 PROCEDURE — 74176 CT ABD & PELVIS W/O CONTRAST: CPT

## 2020-12-01 PROCEDURE — 80048 BASIC METABOLIC PNL TOTAL CA: CPT | Performed by: EMERGENCY MEDICINE

## 2020-12-01 PROCEDURE — 3074F SYST BP LT 130 MM HG: CPT | Performed by: FAMILY MEDICINE

## 2020-12-01 PROCEDURE — 71250 CT THORAX DX C-: CPT

## 2020-12-01 PROCEDURE — 3078F DIAST BP <80 MM HG: CPT | Performed by: FAMILY MEDICINE

## 2020-12-01 RX ORDER — FUROSEMIDE 40 MG/1
40 TABLET ORAL DAILY
Status: DISCONTINUED | OUTPATIENT
Start: 2020-12-02 | End: 2020-12-08 | Stop reason: HOSPADM

## 2020-12-01 RX ORDER — ACETAMINOPHEN 325 MG/1
975 TABLET ORAL EVERY 8 HOURS SCHEDULED
Status: DISCONTINUED | OUTPATIENT
Start: 2020-12-01 | End: 2020-12-08 | Stop reason: HOSPADM

## 2020-12-01 RX ORDER — PRAVASTATIN SODIUM 20 MG
20 TABLET ORAL
Status: DISCONTINUED | OUTPATIENT
Start: 2020-12-02 | End: 2020-12-08 | Stop reason: HOSPADM

## 2020-12-01 RX ORDER — AMITRIPTYLINE HYDROCHLORIDE 10 MG/1
10 TABLET, FILM COATED ORAL
Status: DISCONTINUED | OUTPATIENT
Start: 2020-12-01 | End: 2020-12-02

## 2020-12-01 RX ORDER — MELATONIN
1000 DAILY
Status: DISCONTINUED | OUTPATIENT
Start: 2020-12-02 | End: 2020-12-08 | Stop reason: HOSPADM

## 2020-12-01 RX ORDER — FENTANYL CITRATE 50 UG/ML
50 INJECTION, SOLUTION INTRAMUSCULAR; INTRAVENOUS ONCE
Status: COMPLETED | OUTPATIENT
Start: 2020-12-01 | End: 2020-12-01

## 2020-12-01 RX ORDER — ASPIRIN 81 MG/1
81 TABLET ORAL DAILY
Status: DISCONTINUED | OUTPATIENT
Start: 2020-12-02 | End: 2020-12-08 | Stop reason: HOSPADM

## 2020-12-01 RX ORDER — HYDROMORPHONE HCL/PF 1 MG/ML
0.5 SYRINGE (ML) INJECTION ONCE
Status: COMPLETED | OUTPATIENT
Start: 2020-12-01 | End: 2020-12-01

## 2020-12-01 RX ORDER — OXYCODONE HYDROCHLORIDE 5 MG/1
5 TABLET ORAL EVERY 4 HOURS PRN
Status: DISCONTINUED | OUTPATIENT
Start: 2020-12-01 | End: 2020-12-05

## 2020-12-01 RX ORDER — OXYCODONE HYDROCHLORIDE 5 MG/1
2.5 TABLET ORAL EVERY 4 HOURS PRN
Status: DISCONTINUED | OUTPATIENT
Start: 2020-12-01 | End: 2020-12-05

## 2020-12-01 RX ORDER — HYDROMORPHONE HCL/PF 1 MG/ML
0.2 SYRINGE (ML) INJECTION EVERY 4 HOURS PRN
Status: DISCONTINUED | OUTPATIENT
Start: 2020-12-01 | End: 2020-12-08

## 2020-12-01 RX ORDER — ALLOPURINOL 100 MG/1
200 TABLET ORAL DAILY
Status: DISCONTINUED | OUTPATIENT
Start: 2020-12-02 | End: 2020-12-08 | Stop reason: HOSPADM

## 2020-12-01 RX ORDER — LIDOCAINE 50 MG/G
1 PATCH TOPICAL ONCE
Status: DISCONTINUED | OUTPATIENT
Start: 2020-12-01 | End: 2020-12-01 | Stop reason: HOSPADM

## 2020-12-01 RX ORDER — GABAPENTIN 300 MG/1
300 CAPSULE ORAL 3 TIMES DAILY
Status: DISCONTINUED | OUTPATIENT
Start: 2020-12-01 | End: 2020-12-04

## 2020-12-01 RX ORDER — LEVOTHYROXINE SODIUM 0.05 MG/1
50 TABLET ORAL DAILY
Status: DISCONTINUED | OUTPATIENT
Start: 2020-12-02 | End: 2020-12-08 | Stop reason: HOSPADM

## 2020-12-01 RX ORDER — CITALOPRAM 20 MG/1
40 TABLET ORAL DAILY
Status: DISCONTINUED | OUTPATIENT
Start: 2020-12-02 | End: 2020-12-08 | Stop reason: HOSPADM

## 2020-12-01 RX ORDER — PREDNISONE 1 MG/1
5 TABLET ORAL DAILY
Status: DISCONTINUED | OUTPATIENT
Start: 2020-12-02 | End: 2020-12-08 | Stop reason: HOSPADM

## 2020-12-01 RX ORDER — ACETAMINOPHEN 325 MG/1
975 TABLET ORAL ONCE
Status: COMPLETED | OUTPATIENT
Start: 2020-12-01 | End: 2020-12-01

## 2020-12-01 RX ORDER — METOLAZONE 2.5 MG/1
2.5 TABLET ORAL DAILY
Status: DISCONTINUED | OUTPATIENT
Start: 2020-12-02 | End: 2020-12-02

## 2020-12-01 RX ORDER — ALBUTEROL SULFATE 90 UG/1
2 AEROSOL, METERED RESPIRATORY (INHALATION) EVERY 6 HOURS PRN
Status: DISCONTINUED | OUTPATIENT
Start: 2020-12-01 | End: 2020-12-08 | Stop reason: HOSPADM

## 2020-12-01 RX ADMIN — HYDROMORPHONE HYDROCHLORIDE 0.2 MG: 1 INJECTION, SOLUTION INTRAMUSCULAR; INTRAVENOUS; SUBCUTANEOUS at 22:30

## 2020-12-01 RX ADMIN — FENTANYL CITRATE 50 MCG: 50 INJECTION INTRAMUSCULAR; INTRAVENOUS at 11:32

## 2020-12-01 RX ADMIN — GABAPENTIN 300 MG: 300 CAPSULE ORAL at 21:28

## 2020-12-01 RX ADMIN — HYDROMORPHONE HYDROCHLORIDE 0.5 MG: 1 INJECTION, SOLUTION INTRAMUSCULAR; INTRAVENOUS; SUBCUTANEOUS at 13:12

## 2020-12-01 RX ADMIN — AMITRIPTYLINE HYDROCHLORIDE 10 MG: 10 TABLET, FILM COATED ORAL at 21:28

## 2020-12-01 RX ADMIN — HYDROMORPHONE HYDROCHLORIDE 0.2 MG: 1 INJECTION, SOLUTION INTRAMUSCULAR; INTRAVENOUS; SUBCUTANEOUS at 17:42

## 2020-12-01 RX ADMIN — LIDOCAINE 1 PATCH: 50 PATCH CUTANEOUS at 13:41

## 2020-12-01 RX ADMIN — ACETAMINOPHEN 975 MG: 325 TABLET, FILM COATED ORAL at 21:28

## 2020-12-01 RX ADMIN — SODIUM CHLORIDE 1000 ML: 0.9 INJECTION, SOLUTION INTRAVENOUS at 11:30

## 2020-12-01 RX ADMIN — OXYCODONE HYDROCHLORIDE 5 MG: 5 TABLET ORAL at 19:46

## 2020-12-01 RX ADMIN — ACETAMINOPHEN 975 MG: 325 TABLET, FILM COATED ORAL at 11:30

## 2020-12-01 RX ADMIN — HYDROMORPHONE HYDROCHLORIDE 0.5 MG: 1 INJECTION, SOLUTION INTRAMUSCULAR; INTRAVENOUS; SUBCUTANEOUS at 15:04

## 2020-12-02 PROBLEM — S22.42XA CLOSED FRACTURE OF MULTIPLE RIBS OF LEFT SIDE: Status: ACTIVE | Noted: 2020-12-02

## 2020-12-02 LAB
APTT PPP: 27 SECONDS (ref 23–37)
INR PPP: 1.08 (ref 0.84–1.19)
PLATELET # BLD AUTO: 229 THOUSANDS/UL (ref 149–390)
PMV BLD AUTO: 10.1 FL (ref 8.9–12.7)
PROTHROMBIN TIME: 14 SECONDS (ref 11.6–14.5)

## 2020-12-02 PROCEDURE — 94760 N-INVAS EAR/PLS OXIMETRY 1: CPT

## 2020-12-02 PROCEDURE — 99222 1ST HOSP IP/OBS MODERATE 55: CPT | Performed by: INTERNAL MEDICINE

## 2020-12-02 PROCEDURE — 85730 THROMBOPLASTIN TIME PARTIAL: CPT | Performed by: STUDENT IN AN ORGANIZED HEALTH CARE EDUCATION/TRAINING PROGRAM

## 2020-12-02 PROCEDURE — 97163 PT EVAL HIGH COMPLEX 45 MIN: CPT

## 2020-12-02 PROCEDURE — 85610 PROTHROMBIN TIME: CPT | Performed by: STUDENT IN AN ORGANIZED HEALTH CARE EDUCATION/TRAINING PROGRAM

## 2020-12-02 PROCEDURE — 97167 OT EVAL HIGH COMPLEX 60 MIN: CPT

## 2020-12-02 PROCEDURE — 99233 SBSQ HOSP IP/OBS HIGH 50: CPT | Performed by: SURGERY

## 2020-12-02 PROCEDURE — 85049 AUTOMATED PLATELET COUNT: CPT | Performed by: EMERGENCY MEDICINE

## 2020-12-02 RX ORDER — METOLAZONE 2.5 MG/1
2.5 TABLET ORAL
Status: DISCONTINUED | OUTPATIENT
Start: 2020-12-04 | End: 2020-12-08 | Stop reason: HOSPADM

## 2020-12-02 RX ADMIN — OXYCODONE HYDROCHLORIDE 5 MG: 5 TABLET ORAL at 12:00

## 2020-12-02 RX ADMIN — PRAVASTATIN SODIUM 20 MG: 20 TABLET ORAL at 17:28

## 2020-12-02 RX ADMIN — OXYCODONE HYDROCHLORIDE 5 MG: 5 TABLET ORAL at 17:28

## 2020-12-02 RX ADMIN — ACETAMINOPHEN 975 MG: 325 TABLET, FILM COATED ORAL at 05:11

## 2020-12-02 RX ADMIN — ALLOPURINOL 200 MG: 100 TABLET ORAL at 08:51

## 2020-12-02 RX ADMIN — LEVOTHYROXINE SODIUM 50 MCG: 50 TABLET ORAL at 08:50

## 2020-12-02 RX ADMIN — GABAPENTIN 300 MG: 300 CAPSULE ORAL at 08:50

## 2020-12-02 RX ADMIN — HYDROMORPHONE HYDROCHLORIDE 0.2 MG: 1 INJECTION, SOLUTION INTRAMUSCULAR; INTRAVENOUS; SUBCUTANEOUS at 08:50

## 2020-12-02 RX ADMIN — FUROSEMIDE 40 MG: 40 TABLET ORAL at 08:50

## 2020-12-02 RX ADMIN — GABAPENTIN 300 MG: 300 CAPSULE ORAL at 15:28

## 2020-12-02 RX ADMIN — OXYCODONE HYDROCHLORIDE 5 MG: 5 TABLET ORAL at 05:11

## 2020-12-02 RX ADMIN — PREDNISONE 5 MG: 10 TABLET ORAL at 08:50

## 2020-12-02 RX ADMIN — METOPROLOL TARTRATE 25 MG: 25 TABLET, FILM COATED ORAL at 08:50

## 2020-12-02 RX ADMIN — GABAPENTIN 300 MG: 300 CAPSULE ORAL at 22:01

## 2020-12-02 RX ADMIN — ACETAMINOPHEN 975 MG: 325 TABLET, FILM COATED ORAL at 22:01

## 2020-12-02 RX ADMIN — ACETAMINOPHEN 975 MG: 325 TABLET, FILM COATED ORAL at 15:28

## 2020-12-02 RX ADMIN — OXYCODONE HYDROCHLORIDE 5 MG: 5 TABLET ORAL at 22:02

## 2020-12-02 RX ADMIN — ASPIRIN 81 MG: 81 TABLET ORAL at 08:49

## 2020-12-02 RX ADMIN — ENOXAPARIN SODIUM 30 MG: 30 INJECTION SUBCUTANEOUS at 08:52

## 2020-12-02 RX ADMIN — Medication 1000 UNITS: at 08:50

## 2020-12-02 RX ADMIN — HYDROMORPHONE HYDROCHLORIDE 0.2 MG: 1 INJECTION, SOLUTION INTRAMUSCULAR; INTRAVENOUS; SUBCUTANEOUS at 20:26

## 2020-12-02 RX ADMIN — HYDROMORPHONE HYDROCHLORIDE 0.2 MG: 1 INJECTION, SOLUTION INTRAMUSCULAR; INTRAVENOUS; SUBCUTANEOUS at 15:28

## 2020-12-02 RX ADMIN — SITAGLIPTIN 25 MG: 25 TABLET, FILM COATED ORAL at 08:52

## 2020-12-02 RX ADMIN — METOLAZONE 2.5 MG: 2.5 TABLET ORAL at 08:51

## 2020-12-02 RX ADMIN — CITALOPRAM HYDROBROMIDE 40 MG: 20 TABLET ORAL at 08:50

## 2020-12-03 ENCOUNTER — ANESTHESIA (INPATIENT)
Dept: SURGERY | Facility: HOSPITAL | Age: 75
DRG: 184 | End: 2020-12-03
Payer: COMMERCIAL

## 2020-12-03 ENCOUNTER — APPOINTMENT (INPATIENT)
Dept: RADIOLOGY | Facility: HOSPITAL | Age: 75
DRG: 184 | End: 2020-12-03
Payer: COMMERCIAL

## 2020-12-03 ENCOUNTER — TELEPHONE (OUTPATIENT)
Dept: SURGERY | Facility: HOSPITAL | Age: 75
End: 2020-12-03

## 2020-12-03 ENCOUNTER — APPOINTMENT (INPATIENT)
Dept: SURGERY | Facility: HOSPITAL | Age: 75
DRG: 184 | End: 2020-12-03
Payer: COMMERCIAL

## 2020-12-03 ENCOUNTER — ANESTHESIA EVENT (INPATIENT)
Dept: SURGERY | Facility: HOSPITAL | Age: 75
DRG: 184 | End: 2020-12-03
Payer: COMMERCIAL

## 2020-12-03 PROBLEM — G89.11 ACUTE PAIN DUE TO TRAUMA: Status: ACTIVE | Noted: 2020-12-03
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
## 2020-12-03 PROBLEM — H91.93 BILATERAL HEARING LOSS: Chronic | Status: ACTIVE | Noted: 2020-12-03

## 2020-12-03 PROBLEM — H54.7 IMPAIRED VISION: Chronic | Status: ACTIVE | Noted: 2020-12-03

## 2020-12-03 PROBLEM — E87.1 HYPONATREMIA: Status: ACTIVE | Noted: 2020-12-03

## 2020-12-03 PROBLEM — Z91.89 AT RISK FOR DELIRIUM: Status: ACTIVE | Noted: 2020-12-03

## 2020-12-03 LAB
GLUCOSE SERPL-MCNC: 153 MG/DL (ref 65–140)
GLUCOSE SERPL-MCNC: 159 MG/DL (ref 65–140)
GLUCOSE SERPL-MCNC: 164 MG/DL (ref 65–140)

## 2020-12-03 PROCEDURE — 82948 REAGENT STRIP/BLOOD GLUCOSE: CPT

## 2020-12-03 PROCEDURE — 71045 X-RAY EXAM CHEST 1 VIEW: CPT

## 2020-12-03 PROCEDURE — 99233 SBSQ HOSP IP/OBS HIGH 50: CPT | Performed by: EMERGENCY MEDICINE

## 2020-12-03 PROCEDURE — 99232 SBSQ HOSP IP/OBS MODERATE 35: CPT | Performed by: INTERNAL MEDICINE

## 2020-12-03 PROCEDURE — 99232 SBSQ HOSP IP/OBS MODERATE 35: CPT | Performed by: PHYSICIAN ASSISTANT

## 2020-12-03 PROCEDURE — 0WJK3ZZ INSPECTION OF UPPER BACK, PERCUTANEOUS APPROACH: ICD-10-PCS | Performed by: ANESTHESIOLOGY

## 2020-12-03 PROCEDURE — 3E0T3BZ INTRODUCTION OF ANESTHETIC AGENT INTO PERIPHERAL NERVES AND PLEXI, PERCUTANEOUS APPROACH: ICD-10-PCS | Performed by: ANESTHESIOLOGY

## 2020-12-03 PROCEDURE — C9290 INJ, BUPIVACAINE LIPOSOME: HCPCS | Performed by: ANESTHESIOLOGY

## 2020-12-03 RX ORDER — HYDROMORPHONE HCL/PF 1 MG/ML
0.2 SYRINGE (ML) INJECTION ONCE
Status: COMPLETED | OUTPATIENT
Start: 2020-12-03 | End: 2020-12-03

## 2020-12-03 RX ORDER — AMOXICILLIN 250 MG
1 CAPSULE ORAL
Status: DISCONTINUED | OUTPATIENT
Start: 2020-12-03 | End: 2020-12-04

## 2020-12-03 RX ORDER — BUPIVACAINE HYDROCHLORIDE 2.5 MG/ML
INJECTION, SOLUTION EPIDURAL; INFILTRATION; INTRACAUDAL
Status: COMPLETED | OUTPATIENT
Start: 2020-12-03 | End: 2020-12-03

## 2020-12-03 RX ORDER — POLYETHYLENE GLYCOL 3350 17 G/17G
17 POWDER, FOR SOLUTION ORAL DAILY
Status: DISCONTINUED | OUTPATIENT
Start: 2020-12-03 | End: 2020-12-08 | Stop reason: HOSPADM

## 2020-12-03 RX ADMIN — GABAPENTIN 300 MG: 300 CAPSULE ORAL at 17:15

## 2020-12-03 RX ADMIN — HYDROMORPHONE HYDROCHLORIDE 0.2 MG: 1 INJECTION, SOLUTION INTRAMUSCULAR; INTRAVENOUS; SUBCUTANEOUS at 20:28

## 2020-12-03 RX ADMIN — FUROSEMIDE 40 MG: 40 TABLET ORAL at 08:03

## 2020-12-03 RX ADMIN — BUPIVACAINE 20 ML: 13.3 INJECTION, SUSPENSION, LIPOSOMAL INFILTRATION at 11:57

## 2020-12-03 RX ADMIN — PRAVASTATIN SODIUM 20 MG: 20 TABLET ORAL at 17:15

## 2020-12-03 RX ADMIN — ACETAMINOPHEN 975 MG: 325 TABLET, FILM COATED ORAL at 21:42

## 2020-12-03 RX ADMIN — HYDROMORPHONE HYDROCHLORIDE 0.2 MG: 1 INJECTION, SOLUTION INTRAMUSCULAR; INTRAVENOUS; SUBCUTANEOUS at 04:25

## 2020-12-03 RX ADMIN — OXYCODONE HYDROCHLORIDE 5 MG: 5 TABLET ORAL at 03:40

## 2020-12-03 RX ADMIN — GABAPENTIN 300 MG: 300 CAPSULE ORAL at 08:03

## 2020-12-03 RX ADMIN — GABAPENTIN 300 MG: 300 CAPSULE ORAL at 21:42

## 2020-12-03 RX ADMIN — HYDROMORPHONE HYDROCHLORIDE 0.2 MG: 1 INJECTION, SOLUTION INTRAMUSCULAR; INTRAVENOUS; SUBCUTANEOUS at 00:36

## 2020-12-03 RX ADMIN — METOPROLOL TARTRATE 25 MG: 25 TABLET, FILM COATED ORAL at 08:03

## 2020-12-03 RX ADMIN — OXYCODONE HYDROCHLORIDE 2.5 MG: 5 TABLET ORAL at 13:18

## 2020-12-03 RX ADMIN — PREDNISONE 5 MG: 10 TABLET ORAL at 08:17

## 2020-12-03 RX ADMIN — OXYCODONE HYDROCHLORIDE 2.5 MG: 5 TABLET ORAL at 05:47

## 2020-12-03 RX ADMIN — DOCUSATE SODIUM AND SENNOSIDES 1 TABLET: 8.6; 5 TABLET ORAL at 21:42

## 2020-12-03 RX ADMIN — SITAGLIPTIN 25 MG: 25 TABLET, FILM COATED ORAL at 08:17

## 2020-12-03 RX ADMIN — CITALOPRAM HYDROBROMIDE 40 MG: 20 TABLET ORAL at 08:03

## 2020-12-03 RX ADMIN — POLYETHYLENE GLYCOL 3350 17 G: 17 POWDER, FOR SOLUTION ORAL at 08:05

## 2020-12-03 RX ADMIN — OXYCODONE HYDROCHLORIDE 5 MG: 5 TABLET ORAL at 17:14

## 2020-12-03 RX ADMIN — ACETAMINOPHEN 975 MG: 325 TABLET, FILM COATED ORAL at 13:17

## 2020-12-03 RX ADMIN — ALLOPURINOL 200 MG: 100 TABLET ORAL at 08:18

## 2020-12-03 RX ADMIN — HYDROMORPHONE HYDROCHLORIDE 0.2 MG: 1 INJECTION, SOLUTION INTRAMUSCULAR; INTRAVENOUS; SUBCUTANEOUS at 08:05

## 2020-12-03 RX ADMIN — ACETAMINOPHEN 975 MG: 325 TABLET, FILM COATED ORAL at 05:47

## 2020-12-03 RX ADMIN — LEVOTHYROXINE SODIUM 50 MCG: 50 TABLET ORAL at 08:03

## 2020-12-03 RX ADMIN — BUPIVACAINE HYDROCHLORIDE 10 ML: 2.5 INJECTION, SOLUTION EPIDURAL; INFILTRATION; INTRACAUDAL at 11:57

## 2020-12-03 RX ADMIN — Medication 1000 UNITS: at 08:03

## 2020-12-04 LAB
GLUCOSE SERPL-MCNC: 100 MG/DL (ref 65–140)
GLUCOSE SERPL-MCNC: 136 MG/DL (ref 65–140)
GLUCOSE SERPL-MCNC: 145 MG/DL (ref 65–140)
GLUCOSE SERPL-MCNC: 178 MG/DL (ref 65–140)

## 2020-12-04 PROCEDURE — 99231 SBSQ HOSP IP/OBS SF/LOW 25: CPT | Performed by: EMERGENCY MEDICINE

## 2020-12-04 PROCEDURE — NC001 PR NO CHARGE: Performed by: ANESTHESIOLOGY

## 2020-12-04 PROCEDURE — 97535 SELF CARE MNGMENT TRAINING: CPT

## 2020-12-04 PROCEDURE — 99232 SBSQ HOSP IP/OBS MODERATE 35: CPT | Performed by: ANESTHESIOLOGY

## 2020-12-04 PROCEDURE — 99232 SBSQ HOSP IP/OBS MODERATE 35: CPT | Performed by: INTERNAL MEDICINE

## 2020-12-04 PROCEDURE — 82948 REAGENT STRIP/BLOOD GLUCOSE: CPT

## 2020-12-04 RX ORDER — LORAZEPAM 2 MG/ML
1 INJECTION INTRAMUSCULAR
Status: ACTIVE | OUTPATIENT
Start: 2020-12-04 | End: 2020-12-06

## 2020-12-04 RX ORDER — METHOCARBAMOL 500 MG/1
500 TABLET, FILM COATED ORAL EVERY 6 HOURS SCHEDULED
Status: DISCONTINUED | OUTPATIENT
Start: 2020-12-04 | End: 2020-12-08 | Stop reason: HOSPADM

## 2020-12-04 RX ORDER — AMOXICILLIN 250 MG
1 CAPSULE ORAL 2 TIMES DAILY
Status: DISCONTINUED | OUTPATIENT
Start: 2020-12-04 | End: 2020-12-08 | Stop reason: HOSPADM

## 2020-12-04 RX ORDER — HALOPERIDOL 5 MG/ML
2 INJECTION INTRAMUSCULAR
Status: ACTIVE | OUTPATIENT
Start: 2020-12-04 | End: 2020-12-06

## 2020-12-04 RX ORDER — AMOXICILLIN 250 MG
1 CAPSULE ORAL 2 TIMES DAILY
Status: DISCONTINUED | OUTPATIENT
Start: 2020-12-04 | End: 2020-12-04

## 2020-12-04 RX ORDER — GLYCOPYRROLATE 0.2 MG/ML
0.2 INJECTION INTRAMUSCULAR; INTRAVENOUS EVERY 4 HOURS PRN
Status: DISCONTINUED | OUTPATIENT
Start: 2020-12-04 | End: 2020-12-07

## 2020-12-04 RX ORDER — GABAPENTIN 300 MG/1
300 CAPSULE ORAL 2 TIMES DAILY
Status: DISCONTINUED | OUTPATIENT
Start: 2020-12-04 | End: 2020-12-08 | Stop reason: HOSPADM

## 2020-12-04 RX ORDER — HYDROMORPHONE HCL/PF 1 MG/ML
0.2 SYRINGE (ML) INJECTION ONCE
Status: COMPLETED | OUTPATIENT
Start: 2020-12-04 | End: 2020-12-04

## 2020-12-04 RX ADMIN — METHOCARBAMOL TABLETS 500 MG: 500 TABLET, COATED ORAL at 13:58

## 2020-12-04 RX ADMIN — POLYETHYLENE GLYCOL 3350 17 G: 17 POWDER, FOR SOLUTION ORAL at 08:39

## 2020-12-04 RX ADMIN — METOPROLOL TARTRATE 25 MG: 25 TABLET, FILM COATED ORAL at 08:39

## 2020-12-04 RX ADMIN — DOCUSATE SODIUM AND SENNOSIDES 1 TABLET: 8.6; 5 TABLET ORAL at 17:28

## 2020-12-04 RX ADMIN — FUROSEMIDE 40 MG: 40 TABLET ORAL at 08:39

## 2020-12-04 RX ADMIN — HYDROMORPHONE HYDROCHLORIDE 0.2 MG: 1 INJECTION, SOLUTION INTRAMUSCULAR; INTRAVENOUS; SUBCUTANEOUS at 01:40

## 2020-12-04 RX ADMIN — PREDNISONE 5 MG: 10 TABLET ORAL at 08:39

## 2020-12-04 RX ADMIN — ACETAMINOPHEN 975 MG: 325 TABLET, FILM COATED ORAL at 21:42

## 2020-12-04 RX ADMIN — HYDROMORPHONE HYDROCHLORIDE 0.2 MG: 1 INJECTION, SOLUTION INTRAMUSCULAR; INTRAVENOUS; SUBCUTANEOUS at 08:38

## 2020-12-04 RX ADMIN — OXYCODONE HYDROCHLORIDE 5 MG: 5 TABLET ORAL at 21:42

## 2020-12-04 RX ADMIN — OXYCODONE HYDROCHLORIDE 5 MG: 5 TABLET ORAL at 00:12

## 2020-12-04 RX ADMIN — KETAMINE HYDROCHLORIDE 0.1 MG/KG/HR: 50 INJECTION, SOLUTION INTRAMUSCULAR; INTRAVENOUS at 15:57

## 2020-12-04 RX ADMIN — ALLOPURINOL 200 MG: 100 TABLET ORAL at 08:38

## 2020-12-04 RX ADMIN — ACETAMINOPHEN 975 MG: 325 TABLET, FILM COATED ORAL at 13:58

## 2020-12-04 RX ADMIN — METHOCARBAMOL TABLETS 500 MG: 500 TABLET, COATED ORAL at 17:28

## 2020-12-04 RX ADMIN — ACETAMINOPHEN 975 MG: 325 TABLET, FILM COATED ORAL at 05:45

## 2020-12-04 RX ADMIN — OXYCODONE HYDROCHLORIDE 5 MG: 5 TABLET ORAL at 11:15

## 2020-12-04 RX ADMIN — LEVOTHYROXINE SODIUM 50 MCG: 50 TABLET ORAL at 08:39

## 2020-12-04 RX ADMIN — METOLAZONE 2.5 MG: 2.5 TABLET ORAL at 08:38

## 2020-12-04 RX ADMIN — GABAPENTIN 300 MG: 300 CAPSULE ORAL at 17:28

## 2020-12-04 RX ADMIN — CITALOPRAM HYDROBROMIDE 40 MG: 20 TABLET ORAL at 08:39

## 2020-12-04 RX ADMIN — GABAPENTIN 300 MG: 300 CAPSULE ORAL at 08:39

## 2020-12-04 RX ADMIN — SITAGLIPTIN 25 MG: 25 TABLET, FILM COATED ORAL at 08:40

## 2020-12-04 RX ADMIN — ASPIRIN 81 MG: 81 TABLET ORAL at 08:39

## 2020-12-04 RX ADMIN — Medication 1000 UNITS: at 08:39

## 2020-12-04 RX ADMIN — ENOXAPARIN SODIUM 30 MG: 30 INJECTION SUBCUTANEOUS at 08:39

## 2020-12-04 RX ADMIN — DOCUSATE SODIUM AND SENNOSIDES 1 TABLET: 8.6; 5 TABLET ORAL at 11:15

## 2020-12-04 RX ADMIN — PRAVASTATIN SODIUM 20 MG: 20 TABLET ORAL at 17:29

## 2020-12-04 RX ADMIN — OXYCODONE HYDROCHLORIDE 5 MG: 5 TABLET ORAL at 17:29

## 2020-12-04 RX ADMIN — HYDROMORPHONE HYDROCHLORIDE 0.2 MG: 1 INJECTION, SOLUTION INTRAMUSCULAR; INTRAVENOUS; SUBCUTANEOUS at 13:59

## 2020-12-04 RX ADMIN — OXYCODONE HYDROCHLORIDE 5 MG: 5 TABLET ORAL at 04:13

## 2020-12-04 RX ADMIN — METHOCARBAMOL TABLETS 500 MG: 500 TABLET, COATED ORAL at 23:44

## 2020-12-04 RX ADMIN — HYDROMORPHONE HYDROCHLORIDE 0.2 MG: 1 INJECTION, SOLUTION INTRAMUSCULAR; INTRAVENOUS; SUBCUTANEOUS at 05:45

## 2020-12-04 RX ADMIN — HYDROMORPHONE HYDROCHLORIDE 0.2 MG: 1 INJECTION, SOLUTION INTRAMUSCULAR; INTRAVENOUS; SUBCUTANEOUS at 19:53

## 2020-12-05 DIAGNOSIS — I10 ESSENTIAL HYPERTENSION: ICD-10-CM

## 2020-12-05 LAB
GLUCOSE SERPL-MCNC: 104 MG/DL (ref 65–140)
GLUCOSE SERPL-MCNC: 150 MG/DL (ref 65–140)
GLUCOSE SERPL-MCNC: 154 MG/DL (ref 65–140)
GLUCOSE SERPL-MCNC: 163 MG/DL (ref 65–140)

## 2020-12-05 PROCEDURE — 99232 SBSQ HOSP IP/OBS MODERATE 35: CPT | Performed by: PHYSICIAN ASSISTANT

## 2020-12-05 PROCEDURE — 99232 SBSQ HOSP IP/OBS MODERATE 35: CPT | Performed by: SURGERY

## 2020-12-05 PROCEDURE — 82948 REAGENT STRIP/BLOOD GLUCOSE: CPT

## 2020-12-05 RX ORDER — HYDROMORPHONE HYDROCHLORIDE 2 MG/1
3 TABLET ORAL EVERY 4 HOURS PRN
Status: DISCONTINUED | OUTPATIENT
Start: 2020-12-05 | End: 2020-12-08 | Stop reason: HOSPADM

## 2020-12-05 RX ORDER — HYDROMORPHONE HYDROCHLORIDE 2 MG/1
1 TABLET ORAL EVERY 4 HOURS PRN
Status: DISCONTINUED | OUTPATIENT
Start: 2020-12-05 | End: 2020-12-08 | Stop reason: HOSPADM

## 2020-12-05 RX ORDER — HYDROMORPHONE HYDROCHLORIDE 2 MG/1
3 TABLET ORAL EVERY 6 HOURS PRN
Status: DISCONTINUED | OUTPATIENT
Start: 2020-12-05 | End: 2020-12-05

## 2020-12-05 RX ADMIN — ASPIRIN 81 MG: 81 TABLET ORAL at 09:31

## 2020-12-05 RX ADMIN — ENOXAPARIN SODIUM 30 MG: 30 INJECTION SUBCUTANEOUS at 09:29

## 2020-12-05 RX ADMIN — ACETAMINOPHEN 975 MG: 325 TABLET, FILM COATED ORAL at 06:25

## 2020-12-05 RX ADMIN — OXYCODONE HYDROCHLORIDE 5 MG: 5 TABLET ORAL at 03:39

## 2020-12-05 RX ADMIN — METHOCARBAMOL TABLETS 500 MG: 500 TABLET, COATED ORAL at 23:00

## 2020-12-05 RX ADMIN — FUROSEMIDE 40 MG: 40 TABLET ORAL at 09:31

## 2020-12-05 RX ADMIN — PRAVASTATIN SODIUM 20 MG: 20 TABLET ORAL at 16:34

## 2020-12-05 RX ADMIN — METHOCARBAMOL TABLETS 500 MG: 500 TABLET, COATED ORAL at 18:51

## 2020-12-05 RX ADMIN — DOCUSATE SODIUM AND SENNOSIDES 1 TABLET: 8.6; 5 TABLET ORAL at 09:30

## 2020-12-05 RX ADMIN — ACETAMINOPHEN 975 MG: 325 TABLET, FILM COATED ORAL at 21:47

## 2020-12-05 RX ADMIN — PREDNISONE 5 MG: 10 TABLET ORAL at 09:30

## 2020-12-05 RX ADMIN — HYDROMORPHONE HYDROCHLORIDE 3 MG: 2 TABLET ORAL at 18:51

## 2020-12-05 RX ADMIN — METOPROLOL TARTRATE 25 MG: 25 TABLET, FILM COATED ORAL at 09:31

## 2020-12-05 RX ADMIN — DOCUSATE SODIUM AND SENNOSIDES 1 TABLET: 8.6; 5 TABLET ORAL at 18:51

## 2020-12-05 RX ADMIN — ALLOPURINOL 200 MG: 100 TABLET ORAL at 09:30

## 2020-12-05 RX ADMIN — HYDROMORPHONE HYDROCHLORIDE 3 MG: 2 TABLET ORAL at 13:30

## 2020-12-05 RX ADMIN — CITALOPRAM HYDROBROMIDE 40 MG: 20 TABLET ORAL at 09:31

## 2020-12-05 RX ADMIN — HYDROMORPHONE HYDROCHLORIDE 0.2 MG: 1 INJECTION, SOLUTION INTRAMUSCULAR; INTRAVENOUS; SUBCUTANEOUS at 16:34

## 2020-12-05 RX ADMIN — SITAGLIPTIN 25 MG: 25 TABLET, FILM COATED ORAL at 09:30

## 2020-12-05 RX ADMIN — GABAPENTIN 300 MG: 300 CAPSULE ORAL at 09:29

## 2020-12-05 RX ADMIN — HYDROMORPHONE HYDROCHLORIDE 0.2 MG: 1 INJECTION, SOLUTION INTRAMUSCULAR; INTRAVENOUS; SUBCUTANEOUS at 21:47

## 2020-12-05 RX ADMIN — LEVOTHYROXINE SODIUM 50 MCG: 50 TABLET ORAL at 09:31

## 2020-12-05 RX ADMIN — GABAPENTIN 300 MG: 300 CAPSULE ORAL at 18:51

## 2020-12-05 RX ADMIN — METHOCARBAMOL TABLETS 500 MG: 500 TABLET, COATED ORAL at 12:51

## 2020-12-05 RX ADMIN — Medication 1000 UNITS: at 09:31

## 2020-12-05 RX ADMIN — ACETAMINOPHEN 975 MG: 325 TABLET, FILM COATED ORAL at 13:30

## 2020-12-05 RX ADMIN — OXYCODONE HYDROCHLORIDE 5 MG: 5 TABLET ORAL at 09:31

## 2020-12-06 LAB
GLUCOSE SERPL-MCNC: 103 MG/DL (ref 65–140)
GLUCOSE SERPL-MCNC: 147 MG/DL (ref 65–140)
GLUCOSE SERPL-MCNC: 147 MG/DL (ref 65–140)
GLUCOSE SERPL-MCNC: 239 MG/DL (ref 65–140)

## 2020-12-06 PROCEDURE — 82948 REAGENT STRIP/BLOOD GLUCOSE: CPT

## 2020-12-06 PROCEDURE — NC001 PR NO CHARGE: Performed by: INTERNAL MEDICINE

## 2020-12-06 RX ADMIN — ACETAMINOPHEN 975 MG: 325 TABLET, FILM COATED ORAL at 22:59

## 2020-12-06 RX ADMIN — PREDNISONE 5 MG: 10 TABLET ORAL at 08:06

## 2020-12-06 RX ADMIN — LEVOTHYROXINE SODIUM 50 MCG: 50 TABLET ORAL at 08:06

## 2020-12-06 RX ADMIN — HYDROMORPHONE HYDROCHLORIDE 0.2 MG: 1 INJECTION, SOLUTION INTRAMUSCULAR; INTRAVENOUS; SUBCUTANEOUS at 23:02

## 2020-12-06 RX ADMIN — PRAVASTATIN SODIUM 20 MG: 20 TABLET ORAL at 17:10

## 2020-12-06 RX ADMIN — GABAPENTIN 300 MG: 300 CAPSULE ORAL at 08:06

## 2020-12-06 RX ADMIN — Medication 1000 UNITS: at 08:06

## 2020-12-06 RX ADMIN — METHOCARBAMOL TABLETS 500 MG: 500 TABLET, COATED ORAL at 17:10

## 2020-12-06 RX ADMIN — ACETAMINOPHEN 975 MG: 325 TABLET, FILM COATED ORAL at 13:30

## 2020-12-06 RX ADMIN — HYDROMORPHONE HYDROCHLORIDE 3 MG: 2 TABLET ORAL at 19:41

## 2020-12-06 RX ADMIN — SITAGLIPTIN 25 MG: 25 TABLET, FILM COATED ORAL at 08:08

## 2020-12-06 RX ADMIN — METHOCARBAMOL TABLETS 500 MG: 500 TABLET, COATED ORAL at 23:01

## 2020-12-06 RX ADMIN — ALLOPURINOL 200 MG: 100 TABLET ORAL at 08:08

## 2020-12-06 RX ADMIN — GABAPENTIN 300 MG: 300 CAPSULE ORAL at 17:10

## 2020-12-06 RX ADMIN — HYDROMORPHONE HYDROCHLORIDE 3 MG: 2 TABLET ORAL at 05:22

## 2020-12-06 RX ADMIN — ASPIRIN 81 MG: 81 TABLET ORAL at 08:07

## 2020-12-06 RX ADMIN — HYDROMORPHONE HYDROCHLORIDE 0.2 MG: 1 INJECTION, SOLUTION INTRAMUSCULAR; INTRAVENOUS; SUBCUTANEOUS at 08:06

## 2020-12-06 RX ADMIN — METHOCARBAMOL TABLETS 500 MG: 500 TABLET, COATED ORAL at 12:26

## 2020-12-06 RX ADMIN — DOCUSATE SODIUM AND SENNOSIDES 1 TABLET: 8.6; 5 TABLET ORAL at 08:06

## 2020-12-06 RX ADMIN — DOCUSATE SODIUM AND SENNOSIDES 1 TABLET: 8.6; 5 TABLET ORAL at 17:10

## 2020-12-06 RX ADMIN — ACETAMINOPHEN 975 MG: 325 TABLET, FILM COATED ORAL at 05:22

## 2020-12-06 RX ADMIN — METOPROLOL TARTRATE 25 MG: 25 TABLET, FILM COATED ORAL at 08:06

## 2020-12-06 RX ADMIN — METHOCARBAMOL TABLETS 500 MG: 500 TABLET, COATED ORAL at 05:22

## 2020-12-06 RX ADMIN — CITALOPRAM HYDROBROMIDE 40 MG: 20 TABLET ORAL at 08:06

## 2020-12-06 RX ADMIN — FUROSEMIDE 40 MG: 40 TABLET ORAL at 08:06

## 2020-12-06 RX ADMIN — ENOXAPARIN SODIUM 30 MG: 30 INJECTION SUBCUTANEOUS at 08:07

## 2020-12-06 RX ADMIN — HYDROMORPHONE HYDROCHLORIDE 3 MG: 2 TABLET ORAL at 12:27

## 2020-12-07 LAB
ANION GAP SERPL CALCULATED.3IONS-SCNC: 9 MMOL/L (ref 4–13)
BUN SERPL-MCNC: 53 MG/DL (ref 5–25)
CALCIUM SERPL-MCNC: 9.3 MG/DL (ref 8.3–10.1)
CHLORIDE SERPL-SCNC: 94 MMOL/L (ref 100–108)
CO2 SERPL-SCNC: 28 MMOL/L (ref 21–32)
CREAT SERPL-MCNC: 1.58 MG/DL (ref 0.6–1.3)
ERYTHROCYTE [DISTWIDTH] IN BLOOD BY AUTOMATED COUNT: 13.2 % (ref 11.6–15.1)
FLUAV RNA RESP QL NAA+PROBE: NEGATIVE
FLUBV RNA RESP QL NAA+PROBE: NEGATIVE
GFR SERPL CREATININE-BSD FRML MDRD: 31 ML/MIN/1.73SQ M
GLUCOSE SERPL-MCNC: 119 MG/DL (ref 65–140)
GLUCOSE SERPL-MCNC: 161 MG/DL (ref 65–140)
GLUCOSE SERPL-MCNC: 170 MG/DL (ref 65–140)
GLUCOSE SERPL-MCNC: 201 MG/DL (ref 65–140)
GLUCOSE SERPL-MCNC: 95 MG/DL (ref 65–140)
HCT VFR BLD AUTO: 31.7 % (ref 34.8–46.1)
HGB BLD-MCNC: 10 G/DL (ref 11.5–15.4)
MCH RBC QN AUTO: 30.1 PG (ref 26.8–34.3)
MCHC RBC AUTO-ENTMCNC: 31.5 G/DL (ref 31.4–37.4)
MCV RBC AUTO: 96 FL (ref 82–98)
PLATELET # BLD AUTO: 263 THOUSANDS/UL (ref 149–390)
PMV BLD AUTO: 10 FL (ref 8.9–12.7)
POTASSIUM SERPL-SCNC: 3.1 MMOL/L (ref 3.5–5.3)
RBC # BLD AUTO: 3.32 MILLION/UL (ref 3.81–5.12)
RSV RNA RESP QL NAA+PROBE: NEGATIVE
SARS-COV-2 RNA RESP QL NAA+PROBE: NEGATIVE
SODIUM SERPL-SCNC: 131 MMOL/L (ref 136–145)
WBC # BLD AUTO: 8.18 THOUSAND/UL (ref 4.31–10.16)

## 2020-12-07 PROCEDURE — 99233 SBSQ HOSP IP/OBS HIGH 50: CPT | Performed by: SURGERY

## 2020-12-07 PROCEDURE — 0241U HB NFCT DS VIR RESP RNA 4 TRGT: CPT | Performed by: PHYSICIAN ASSISTANT

## 2020-12-07 PROCEDURE — 80048 BASIC METABOLIC PNL TOTAL CA: CPT | Performed by: SURGERY

## 2020-12-07 PROCEDURE — 99232 SBSQ HOSP IP/OBS MODERATE 35: CPT | Performed by: INTERNAL MEDICINE

## 2020-12-07 PROCEDURE — 82948 REAGENT STRIP/BLOOD GLUCOSE: CPT

## 2020-12-07 PROCEDURE — 97535 SELF CARE MNGMENT TRAINING: CPT

## 2020-12-07 PROCEDURE — 85027 COMPLETE CBC AUTOMATED: CPT | Performed by: SURGERY

## 2020-12-07 PROCEDURE — 97116 GAIT TRAINING THERAPY: CPT

## 2020-12-07 RX ORDER — POTASSIUM CHLORIDE 20 MEQ/1
40 TABLET, EXTENDED RELEASE ORAL 2 TIMES DAILY
Status: COMPLETED | OUTPATIENT
Start: 2020-12-07 | End: 2020-12-07

## 2020-12-07 RX ADMIN — POTASSIUM CHLORIDE 40 MEQ: 1500 TABLET, EXTENDED RELEASE ORAL at 08:46

## 2020-12-07 RX ADMIN — GABAPENTIN 300 MG: 300 CAPSULE ORAL at 17:11

## 2020-12-07 RX ADMIN — ASPIRIN 81 MG: 81 TABLET ORAL at 08:47

## 2020-12-07 RX ADMIN — POTASSIUM CHLORIDE 40 MEQ: 1500 TABLET, EXTENDED RELEASE ORAL at 17:11

## 2020-12-07 RX ADMIN — METHOCARBAMOL TABLETS 500 MG: 500 TABLET, COATED ORAL at 23:21

## 2020-12-07 RX ADMIN — METHOCARBAMOL TABLETS 500 MG: 500 TABLET, COATED ORAL at 12:20

## 2020-12-07 RX ADMIN — LEVOTHYROXINE SODIUM 50 MCG: 50 TABLET ORAL at 08:46

## 2020-12-07 RX ADMIN — SITAGLIPTIN 25 MG: 25 TABLET, FILM COATED ORAL at 08:47

## 2020-12-07 RX ADMIN — CITALOPRAM HYDROBROMIDE 40 MG: 20 TABLET ORAL at 08:46

## 2020-12-07 RX ADMIN — METHOCARBAMOL TABLETS 500 MG: 500 TABLET, COATED ORAL at 05:46

## 2020-12-07 RX ADMIN — ACETAMINOPHEN 975 MG: 325 TABLET, FILM COATED ORAL at 05:46

## 2020-12-07 RX ADMIN — HYDROMORPHONE HYDROCHLORIDE 3 MG: 2 TABLET ORAL at 05:46

## 2020-12-07 RX ADMIN — HYDROMORPHONE HYDROCHLORIDE 0.2 MG: 1 INJECTION, SOLUTION INTRAMUSCULAR; INTRAVENOUS; SUBCUTANEOUS at 08:45

## 2020-12-07 RX ADMIN — METHOCARBAMOL TABLETS 500 MG: 500 TABLET, COATED ORAL at 17:11

## 2020-12-07 RX ADMIN — METOLAZONE 2.5 MG: 2.5 TABLET ORAL at 08:48

## 2020-12-07 RX ADMIN — DOCUSATE SODIUM AND SENNOSIDES 1 TABLET: 8.6; 5 TABLET ORAL at 08:46

## 2020-12-07 RX ADMIN — PREDNISONE 5 MG: 10 TABLET ORAL at 08:47

## 2020-12-07 RX ADMIN — PRAVASTATIN SODIUM 20 MG: 20 TABLET ORAL at 17:11

## 2020-12-07 RX ADMIN — ACETAMINOPHEN 975 MG: 325 TABLET, FILM COATED ORAL at 21:04

## 2020-12-07 RX ADMIN — HYDROMORPHONE HYDROCHLORIDE 3 MG: 2 TABLET ORAL at 10:43

## 2020-12-07 RX ADMIN — METOPROLOL TARTRATE 25 MG: 25 TABLET, FILM COATED ORAL at 08:46

## 2020-12-07 RX ADMIN — HYDROMORPHONE HYDROCHLORIDE 0.2 MG: 1 INJECTION, SOLUTION INTRAMUSCULAR; INTRAVENOUS; SUBCUTANEOUS at 23:21

## 2020-12-07 RX ADMIN — ALLOPURINOL 200 MG: 100 TABLET ORAL at 08:47

## 2020-12-07 RX ADMIN — ENOXAPARIN SODIUM 30 MG: 30 INJECTION SUBCUTANEOUS at 08:47

## 2020-12-07 RX ADMIN — Medication 1000 UNITS: at 08:47

## 2020-12-07 RX ADMIN — ACETAMINOPHEN 975 MG: 325 TABLET, FILM COATED ORAL at 14:50

## 2020-12-07 RX ADMIN — FUROSEMIDE 40 MG: 40 TABLET ORAL at 08:46

## 2020-12-07 RX ADMIN — HYDROMORPHONE HYDROCHLORIDE 3 MG: 2 TABLET ORAL at 20:20

## 2020-12-07 RX ADMIN — GABAPENTIN 300 MG: 300 CAPSULE ORAL at 08:46

## 2020-12-08 VITALS
OXYGEN SATURATION: 98 % | RESPIRATION RATE: 12 BRPM | SYSTOLIC BLOOD PRESSURE: 130 MMHG | BODY MASS INDEX: 38.45 KG/M2 | WEIGHT: 245 LBS | HEIGHT: 67 IN | TEMPERATURE: 97.5 F | HEART RATE: 64 BPM | DIASTOLIC BLOOD PRESSURE: 70 MMHG

## 2020-12-08 LAB
ANION GAP SERPL CALCULATED.3IONS-SCNC: 6 MMOL/L (ref 4–13)
BASOPHILS # BLD AUTO: 0.06 THOUSANDS/ΜL (ref 0–0.1)
BASOPHILS NFR BLD AUTO: 1 % (ref 0–1)
BUN SERPL-MCNC: 53 MG/DL (ref 5–25)
CALCIUM SERPL-MCNC: 9.5 MG/DL (ref 8.3–10.1)
CHLORIDE SERPL-SCNC: 97 MMOL/L (ref 100–108)
CO2 SERPL-SCNC: 36 MMOL/L (ref 21–32)
CREAT SERPL-MCNC: 1.52 MG/DL (ref 0.6–1.3)
EOSINOPHIL # BLD AUTO: 0.29 THOUSAND/ΜL (ref 0–0.61)
EOSINOPHIL NFR BLD AUTO: 4 % (ref 0–6)
ERYTHROCYTE [DISTWIDTH] IN BLOOD BY AUTOMATED COUNT: 13.4 % (ref 11.6–15.1)
GFR SERPL CREATININE-BSD FRML MDRD: 33 ML/MIN/1.73SQ M
GLUCOSE SERPL-MCNC: 104 MG/DL (ref 65–140)
GLUCOSE SERPL-MCNC: 113 MG/DL (ref 65–140)
GLUCOSE SERPL-MCNC: 153 MG/DL (ref 65–140)
HCT VFR BLD AUTO: 31.6 % (ref 34.8–46.1)
HGB BLD-MCNC: 9.8 G/DL (ref 11.5–15.4)
IMM GRANULOCYTES # BLD AUTO: 0.06 THOUSAND/UL (ref 0–0.2)
IMM GRANULOCYTES NFR BLD AUTO: 1 % (ref 0–2)
LYMPHOCYTES # BLD AUTO: 1.89 THOUSANDS/ΜL (ref 0.6–4.47)
LYMPHOCYTES NFR BLD AUTO: 25 % (ref 14–44)
MAGNESIUM SERPL-MCNC: 1.7 MG/DL (ref 1.6–2.6)
MCH RBC QN AUTO: 29.8 PG (ref 26.8–34.3)
MCHC RBC AUTO-ENTMCNC: 31 G/DL (ref 31.4–37.4)
MCV RBC AUTO: 96 FL (ref 82–98)
MONOCYTES # BLD AUTO: 0.59 THOUSAND/ΜL (ref 0.17–1.22)
MONOCYTES NFR BLD AUTO: 8 % (ref 4–12)
NEUTROPHILS # BLD AUTO: 4.78 THOUSANDS/ΜL (ref 1.85–7.62)
NEUTS SEG NFR BLD AUTO: 61 % (ref 43–75)
NRBC BLD AUTO-RTO: 0 /100 WBCS
PHOSPHATE SERPL-MCNC: 3 MG/DL (ref 2.3–4.1)
PLATELET # BLD AUTO: 250 THOUSANDS/UL (ref 149–390)
PMV BLD AUTO: 10 FL (ref 8.9–12.7)
POTASSIUM SERPL-SCNC: 3.3 MMOL/L (ref 3.5–5.3)
RBC # BLD AUTO: 3.29 MILLION/UL (ref 3.81–5.12)
SODIUM SERPL-SCNC: 139 MMOL/L (ref 136–145)
WBC # BLD AUTO: 7.67 THOUSAND/UL (ref 4.31–10.16)

## 2020-12-08 PROCEDURE — 80048 BASIC METABOLIC PNL TOTAL CA: CPT | Performed by: PHYSICIAN ASSISTANT

## 2020-12-08 PROCEDURE — NC001 PR NO CHARGE: Performed by: EMERGENCY MEDICINE

## 2020-12-08 PROCEDURE — 99232 SBSQ HOSP IP/OBS MODERATE 35: CPT | Performed by: INTERNAL MEDICINE

## 2020-12-08 PROCEDURE — 99238 HOSP IP/OBS DSCHRG MGMT 30/<: CPT | Performed by: EMERGENCY MEDICINE

## 2020-12-08 PROCEDURE — 83735 ASSAY OF MAGNESIUM: CPT | Performed by: PHYSICIAN ASSISTANT

## 2020-12-08 PROCEDURE — 97535 SELF CARE MNGMENT TRAINING: CPT

## 2020-12-08 PROCEDURE — 84100 ASSAY OF PHOSPHORUS: CPT | Performed by: PHYSICIAN ASSISTANT

## 2020-12-08 PROCEDURE — 85025 COMPLETE CBC W/AUTO DIFF WBC: CPT | Performed by: PHYSICIAN ASSISTANT

## 2020-12-08 PROCEDURE — 82948 REAGENT STRIP/BLOOD GLUCOSE: CPT

## 2020-12-08 RX ORDER — METHOCARBAMOL 500 MG/1
500 TABLET, FILM COATED ORAL EVERY 6 HOURS SCHEDULED
Qty: 30 TABLET | Refills: 0 | Status: SHIPPED | OUTPATIENT
Start: 2020-12-08 | End: 2020-12-08

## 2020-12-08 RX ORDER — POLYETHYLENE GLYCOL 3350 17 G/17G
17 POWDER, FOR SOLUTION ORAL DAILY
Qty: 30 EACH | Refills: 0 | Status: SHIPPED | OUTPATIENT
Start: 2020-12-09 | End: 2022-06-03

## 2020-12-08 RX ORDER — AMOXICILLIN 250 MG
1 CAPSULE ORAL 2 TIMES DAILY
Qty: 30 TABLET | Refills: 0 | Status: SHIPPED | OUTPATIENT
Start: 2020-12-08 | End: 2021-01-21 | Stop reason: ALTCHOICE

## 2020-12-08 RX ORDER — METHOCARBAMOL 500 MG/1
500 TABLET, FILM COATED ORAL EVERY 6 HOURS SCHEDULED
Qty: 30 TABLET | Refills: 0 | Status: SHIPPED | OUTPATIENT
Start: 2020-12-08 | End: 2022-06-03 | Stop reason: SDUPTHER

## 2020-12-08 RX ORDER — HYDROMORPHONE HYDROCHLORIDE 2 MG/1
1 TABLET ORAL EVERY 4 HOURS PRN
Qty: 30 TABLET | Refills: 0 | Status: SHIPPED | OUTPATIENT
Start: 2020-12-08 | End: 2020-12-08

## 2020-12-08 RX ORDER — POTASSIUM CHLORIDE 20 MEQ/1
40 TABLET, EXTENDED RELEASE ORAL ONCE
Status: COMPLETED | OUTPATIENT
Start: 2020-12-08 | End: 2020-12-08

## 2020-12-08 RX ORDER — HYDROMORPHONE HYDROCHLORIDE 2 MG/1
3 TABLET ORAL EVERY 4 HOURS PRN
Qty: 30 TABLET | Refills: 0 | Status: SHIPPED | OUTPATIENT
Start: 2020-12-08 | End: 2020-12-18

## 2020-12-08 RX ORDER — HYDROMORPHONE HYDROCHLORIDE 2 MG/1
1 TABLET ORAL EVERY 4 HOURS PRN
Qty: 30 TABLET | Refills: 0 | Status: SHIPPED | OUTPATIENT
Start: 2020-12-08 | End: 2020-12-18

## 2020-12-08 RX ORDER — ACETAMINOPHEN 325 MG/1
975 TABLET ORAL EVERY 8 HOURS SCHEDULED
Qty: 30 TABLET | Refills: 0 | Status: SHIPPED | OUTPATIENT
Start: 2020-12-08 | End: 2021-02-25

## 2020-12-08 RX ORDER — HYDROMORPHONE HYDROCHLORIDE 2 MG/1
3 TABLET ORAL EVERY 4 HOURS PRN
Qty: 30 TABLET | Refills: 0 | Status: SHIPPED | OUTPATIENT
Start: 2020-12-08 | End: 2020-12-08

## 2020-12-08 RX ADMIN — FUROSEMIDE 40 MG: 40 TABLET ORAL at 09:22

## 2020-12-08 RX ADMIN — SITAGLIPTIN 25 MG: 25 TABLET, FILM COATED ORAL at 09:22

## 2020-12-08 RX ADMIN — POTASSIUM CHLORIDE 40 MEQ: 1500 TABLET, EXTENDED RELEASE ORAL at 09:22

## 2020-12-08 RX ADMIN — GABAPENTIN 300 MG: 300 CAPSULE ORAL at 09:22

## 2020-12-08 RX ADMIN — ALLOPURINOL 200 MG: 100 TABLET ORAL at 09:22

## 2020-12-08 RX ADMIN — ACETAMINOPHEN 975 MG: 325 TABLET, FILM COATED ORAL at 05:35

## 2020-12-08 RX ADMIN — CITALOPRAM HYDROBROMIDE 40 MG: 20 TABLET ORAL at 09:22

## 2020-12-08 RX ADMIN — ASPIRIN 81 MG: 81 TABLET ORAL at 09:22

## 2020-12-08 RX ADMIN — METHOCARBAMOL TABLETS 500 MG: 500 TABLET, COATED ORAL at 12:35

## 2020-12-08 RX ADMIN — METOPROLOL TARTRATE 25 MG: 25 TABLET, FILM COATED ORAL at 09:22

## 2020-12-08 RX ADMIN — LEVOTHYROXINE SODIUM 50 MCG: 50 TABLET ORAL at 09:22

## 2020-12-08 RX ADMIN — DOCUSATE SODIUM AND SENNOSIDES 1 TABLET: 8.6; 5 TABLET ORAL at 09:22

## 2020-12-08 RX ADMIN — ACETAMINOPHEN 975 MG: 325 TABLET, FILM COATED ORAL at 14:57

## 2020-12-08 RX ADMIN — Medication 1000 UNITS: at 09:22

## 2020-12-08 RX ADMIN — PREDNISONE 5 MG: 10 TABLET ORAL at 09:22

## 2020-12-08 RX ADMIN — METHOCARBAMOL TABLETS 500 MG: 500 TABLET, COATED ORAL at 05:35

## 2020-12-08 RX ADMIN — HYDROMORPHONE HYDROCHLORIDE 3 MG: 2 TABLET ORAL at 09:23

## 2020-12-08 RX ADMIN — ENOXAPARIN SODIUM 40 MG: 40 INJECTION SUBCUTANEOUS at 09:23

## 2020-12-31 ENCOUNTER — OFFICE VISIT (OUTPATIENT)
Dept: SURGERY | Facility: CLINIC | Age: 75
End: 2020-12-31
Payer: COMMERCIAL

## 2020-12-31 VITALS
DIASTOLIC BLOOD PRESSURE: 86 MMHG | TEMPERATURE: 96.6 F | SYSTOLIC BLOOD PRESSURE: 124 MMHG | BODY MASS INDEX: 37.89 KG/M2 | WEIGHT: 241.4 LBS | HEIGHT: 67 IN

## 2020-12-31 DIAGNOSIS — S22.42XD CLOSED FRACTURE OF MULTIPLE RIBS OF LEFT SIDE WITH ROUTINE HEALING, SUBSEQUENT ENCOUNTER: Primary | ICD-10-CM

## 2020-12-31 PROCEDURE — 99213 OFFICE O/P EST LOW 20 MIN: CPT | Performed by: PHYSICIAN ASSISTANT

## 2021-01-13 LAB — HBA1C MFR BLD HPLC: 6.1 %

## 2021-01-21 ENCOUNTER — OFFICE VISIT (OUTPATIENT)
Dept: ENDOCRINOLOGY | Facility: CLINIC | Age: 76
End: 2021-01-21
Payer: COMMERCIAL

## 2021-01-21 VITALS
DIASTOLIC BLOOD PRESSURE: 70 MMHG | BODY MASS INDEX: 38.2 KG/M2 | SYSTOLIC BLOOD PRESSURE: 118 MMHG | HEIGHT: 67 IN | WEIGHT: 243.38 LBS | HEART RATE: 56 BPM

## 2021-01-21 DIAGNOSIS — E11.65 TYPE 2 DIABETES MELLITUS WITH HYPERGLYCEMIA, WITHOUT LONG-TERM CURRENT USE OF INSULIN (HCC): Primary | ICD-10-CM

## 2021-01-21 DIAGNOSIS — N18.30 STAGE 3 CHRONIC KIDNEY DISEASE, UNSPECIFIED WHETHER STAGE 3A OR 3B CKD (HCC): ICD-10-CM

## 2021-01-21 DIAGNOSIS — E78.2 MIXED HYPERLIPIDEMIA: ICD-10-CM

## 2021-01-21 DIAGNOSIS — E03.9 HYPOTHYROIDISM, UNSPECIFIED TYPE: ICD-10-CM

## 2021-01-21 PROCEDURE — 3074F SYST BP LT 130 MM HG: CPT | Performed by: PHYSICIAN ASSISTANT

## 2021-01-21 PROCEDURE — 99214 OFFICE O/P EST MOD 30 MIN: CPT | Performed by: PHYSICIAN ASSISTANT

## 2021-01-21 PROCEDURE — 1036F TOBACCO NON-USER: CPT | Performed by: PHYSICIAN ASSISTANT

## 2021-01-21 PROCEDURE — 3078F DIAST BP <80 MM HG: CPT | Performed by: PHYSICIAN ASSISTANT

## 2021-01-21 PROCEDURE — 1160F RVW MEDS BY RX/DR IN RCRD: CPT | Performed by: PHYSICIAN ASSISTANT

## 2021-01-21 NOTE — PROGRESS NOTES
Established Patient Progress Note      Chief Complaint   Patient presents with    Diabetes Type 2        History of Present Illness:   Ender Connolly is a 66 y o  female with a history of type 2 diabetes without long term use of insulin since about 4 years ago  Reports complications of CKD- follows with nephrology  She was hospitalized in December with fall and had hyponatremia and rib fractures  Denies recent severe hypoglycemic or severe hyperglycemic episodes  Denies any issues with her current regimen  home glucose monitoring: are performed regularly twice per day  Mornings around 110, evenings 180  Currently on long term Prednisone 5mg daily for for neck pain/radiculopathy    Current regimen:   Januvia 25mg daily     Last Eye Exam: Due for exam next month has appt  Last Foot Exam: UTD, feet OK    Has hypertension: Taking metoprolol  Has hyperlipidemia: Taking simvastatin    Thyroid disorders: hypothyroidism- takes levothyroxine 50mcg daily on empty stomach  Feeling OK  Follows with nephology       Patient Active Problem List   Diagnosis    Anemia    Chronic gout    Stage 3 chronic kidney disease    Combined form of senile cataract of left eye    Cough    Depression    Edema    Fibromyalgia    Homocysteinemia ([address] )    Hyperlipidemia    Hypothyroidism    Hypokalemia    Low back pain    Pain of left hip    Primary osteoarthritis of both hands    Increased frequency of urination    Vitamin D deficiency    Folliculitis    Symptomatic varicose veins of both lower extremities    Intermittent claudication (HCC)    Scalp laceration    Chest pain    Leukocytosis    Type 2 diabetes mellitus without complication, without long-term current use of insulin (HCC)    Abnormal gait    Class 3 severe obesity due to excess calories with serious comorbidity and body mass index (BMI) of 40 0 to 44 9 in adult [facility])    Type 2 diabetes mellitus with hyperglycemia, without long-term current use of insulin ([facility]    Pain in left tibia    Nephrosclerosis, stage 1-4 or unspecified chronic kidney disease    Tinea corporis    Essential hypertension    Arthritis    Cataract    Idiopathic chronic gout with tophus    Idiopathic thrombocytopenic purpura (HCC)    Osteoarthritis of multiple joints    Post menopausal syndrome    Restless legs syndrome    Ambulatory dysfunction with recurrent falls    Fracture of multiple ribs of left side    Acute pain due to trauma    Impaired vision    Bilateral hearing loss    Delirium precautions    Hyponatremia      Past Medical History:   Diagnosis Date    Abnormal blood chemistry     last assessed 11/13/2013    Angina pectoris ([facility] )     last assessed 11/012/13    Arthritis     last assessed 11/13/2013    Asthma     Athlete's foot     resolved 10/11/17    Cataract     last assessed 03/18/2016    Diabetes mellitus ([facility] )     type 2    Diabetes mellitus with kidney disease ([facility] )     last assessed 10/03/17    Eczema     Gait disorder     last assessed 11/12/2013    Idiopathic thrombocytopenia purpura ([facility] )     last assessed 08/06/15    Overactive bladder     Post menopausal syndrome     resolved 10/11/17    Restless legs syndrome     last assessed 10/24/2013    Subclinical hypothyroidism     last assessed 12/11/17    Urinary frequency     resolved 12/18/2014      Past Surgical History:   Procedure Laterality Date    ACHILLES TENDON REPAIR      REPLACEMENT TOTAL KNEE        Family History   Problem Relation Age of Onset    Leukemia Mother     Eczema Father     Diabetes type II Father     Diabetes type II Maternal Aunt      Social History     Tobacco Use    Smoking status: Never Smoker    Smokeless tobacco: Never Used    Tobacco comment: per allscripts; former smoker   Substance Use Topics    Alcohol use: Yes     Frequency: 2-4 times a month     Drinks per session: 1 or 2     Binge frequency: Never     Comment: occasional     Allergies Allergen Reactions    Ace Inhibitors Angioedema    Prochlorperazine Anaphylaxis     lethargy, tongue and throat swelling    Ramipril Anaphylaxis     angioedema    Angiotensin Receptor Blockers          Current Outpatient Medications:     acetaminophen (TYLENOL) 325 mg tablet, Take 3 tablets (975 mg total) by mouth every 8 (eight) hours, Disp: 30 tablet, Rfl: 0    albuterol (PROAIR HFA) 90 mcg/act inhaler, Inhale 2 puffs every 6 (six) hours as needed for wheezing, Disp: 8 5 g, Rfl: 3    aspirin (ASPIRIN ADULT LOW DOSE) 81 mg EC tablet, Take 1 tablet by mouth daily, Disp: , Rfl:     cholecalciferol (VITAMIN D3) 1,000 units tablet, Take 1,000 Units by mouth daily, Disp: , Rfl:     citalopram (CeleXA) 40 mg tablet, Take 1 tablet (40 mg total) by mouth daily, Disp: 90 tablet, Rfl: 1    febuxostat (ULORIC) 80 MG TABS, Take 1 tablet by mouth daily, Disp: , Rfl:     furosemide (LASIX) 40 mg tablet, Take 1 tablet (40 mg total) by mouth daily, Disp: 30 tablet, Rfl: 5    gabapentin (NEURONTIN) 300 mg capsule, Take 1 tablet by mouth 3 (three) times a day, Disp: , Rfl:     levothyroxine 50 mcg tablet, Take 1 tablet (50 mcg total) by mouth daily, Disp: 30 tablet, Rfl: 5    methocarbamol (ROBAXIN) 500 mg tablet, Take 1 tablet (500 mg total) by mouth every 6 (six) hours, Disp: 30 tablet, Rfl: 0    metolazone (ZAROXOLYN) 2 5 mg tablet, TAKE ONE TABLET BY MOUTH THREE TIMES A WEEK on monday, wednesday and friday , Disp: 12 tablet, Rfl: 0    metoprolol tartrate (LOPRESSOR) 25 mg tablet, TAKE ONE TABLET BY MOUTH EVERY DAY , Disp: 30 tablet, Rfl: 0    polyethylene glycol (MIRALAX) 17 g packet, Take 17 g by mouth daily, Disp: 30 each, Rfl: 0    predniSONE 5 mg tablet, Take 5 mg by mouth daily, Disp: , Rfl:     simvastatin (ZOCOR) 10 mg tablet, Take 1 tablet (10 mg total) by mouth daily, Disp: 30 tablet, Rfl: 5    sitaGLIPtin (Januvia) 25 mg tablet, Take 1 tablet (25 mg total) by mouth daily, Disp: 30 tablet, Rfl: 5    Review of Systems   Constitutional: Negative for activity change, appetite change, chills, diaphoresis, fatigue, fever and unexpected weight change  HENT: Negative for trouble swallowing and voice change  Eyes: Negative for visual disturbance  Respiratory: Negative for shortness of breath  Cardiovascular: Negative for chest pain and palpitations  Gastrointestinal: Negative for abdominal pain, constipation and diarrhea  Endocrine: Negative for cold intolerance, heat intolerance, polydipsia, polyphagia and polyuria  Genitourinary: Negative for frequency and menstrual problem  Musculoskeletal: Positive for gait problem (using cane)  Negative for arthralgias and myalgias  Skin: Negative for rash  Allergic/Immunologic: Negative for food allergies  Neurological: Negative for dizziness and tremors  Hematological: Negative for adenopathy  Psychiatric/Behavioral: Negative for sleep disturbance  All other systems reviewed and are negative  Physical Exam:  Body mass index is 38 12 kg/m²  /70 (BP Location: Left arm, Patient Position: Sitting, Cuff Size: Large)   Pulse 56   Ht 5' 7" (1 702 m)   Wt 110 kg (243 lb 6 oz)   BMI 38 12 kg/m²    Wt Readings from Last 3 Encounters:   01/21/21 110 kg (243 lb 6 oz)   12/31/20 109 kg (241 lb 6 4 oz)   12/01/20 111 kg (245 lb)       Physical Exam  Vitals signs reviewed  Constitutional:       General: She is not in acute distress  Appearance: She is well-developed  HENT:      Head: Normocephalic and atraumatic  Eyes:      Conjunctiva/sclera: Conjunctivae normal       Pupils: Pupils are equal, round, and reactive to light  Neck:      Musculoskeletal: Normal range of motion and neck supple  Thyroid: No thyromegaly  Cardiovascular:      Rate and Rhythm: Normal rate and regular rhythm  Heart sounds: Normal heart sounds  Pulmonary:      Effort: Pulmonary effort is normal  No respiratory distress        Breath sounds: Normal breath sounds  No wheezing or rales  Abdominal:      General: Bowel sounds are normal  There is no distension  Palpations: Abdomen is soft  Tenderness: There is no abdominal tenderness  Musculoskeletal: Normal range of motion  Skin:     General: Skin is warm and dry  Neurological:      Mental Status: She is alert and oriented to person, place, and time  Labs:   9/14/2020 TSH 3 55  Lab Results   Component Value Date    HGBA1C 6 1 (H) 01/13/2021    HGBA1C 6 1 01/13/2021    HGBA1C 6 8 (H) 09/14/2020     Lab Results   Component Value Date    CREATININE 1 52 (H) 12/08/2020    CREATININE 1 58 (H) 12/07/2020    CREATININE 2 21 (H) 12/01/2020    BUN 53 (H) 12/08/2020     12/11/2017    K 3 3 (L) 12/08/2020    CL 97 (L) 12/08/2020    CO2 36 (H) 12/08/2020     eGFR   Date Value Ref Range Status   12/08/2020 33 ml/min/1 73sq m Final     Lab Results   Component Value Date    CHOL 175 08/06/2015    HDL 59 06/24/2019    TRIG 136 06/24/2019     Lab Results   Component Value Date    ALT 10 01/15/2020    AST 16 01/15/2020    ALKPHOS 61 01/15/2020    BILITOT 0 75 08/06/2015     Lab Results   Component Value Date    XIZ5JQCOJFBD 1 160 12/04/2017    JWT5SVAJKAQX 5 090 (H) 10/03/2017    [id number] 2 300 11/10/2016     Lab Results   Component Value Date    FREET4 0 79 10/03/2017       Impression & Plan:    Problem List Items Addressed This Visit        Endocrine    Hypothyroidism     Continue levothyroxine  Check TSH/Free T4  Relevant Orders    TSH, 3rd generation    T4, free    Type 2 diabetes mellitus with hyperglycemia, without long-term current use of insulin ([address] ) - Primary     Well controlled with no hypoglycemia on Januvia 25mg daily  Discussed option of stopping Januvia since A1C is now decreased to 6 1 however she would prefer to remain on this medication     Lab Results   Component Value Date    HGBA1C 6 1 (H) 01/13/2021            Relevant Orders    Ambulatory referral to Ophthalmology       Genitourinary    Stage 3 chronic kidney disease     Following with nephrology  Other    Hyperlipidemia     Continue statin  Check Lipid Panel  Relevant Orders    Lipid Panel with Direct LDL reflex          Orders Placed This Encounter   Procedures    Lipid Panel with Direct LDL reflex     This is a patient instruction: This test requires patient fasting for 10-12 hours or longer  Drinking of black coffee or black tea is acceptable  Standing Status:   Future     Standing Expiration Date:   7/21/2021    TSH, 3rd generation     This is a patient instruction: This test is non-fasting  Please drink two glasses of water morning of bloodwork  Standing Status:   Future     Standing Expiration Date:   7/21/2021    T4, free     Standing Status:   Future     Standing Expiration Date:   7/21/2021    Ambulatory referral to Ophthalmology     Standing Status:   Future     Standing Expiration Date:   1/21/2022     Referral Priority:   Routine     Referral Type:   Consult - AMB     Referral Reason:   Specialty Services Required     Referred to Provider:   Riccardo Dietz MD     Requested Specialty:   Ophthalmology     Number of Visits Requested:   1     Expiration Date:   1/21/2022       There are no Patient Instructions on file for this visit  Discussed with the patient and all questioned fully answered  She will call me if any problems arise  Follow-up appointment in 4 months       Counseled patient on diagnostic results, prognosis, risk and benefit of treatment options, instruction for management, importance of treatment compliance, Risk  factor reduction and impressions    Mimi Andrea PA-C

## 2021-01-21 NOTE — ASSESSMENT & PLAN NOTE
Well controlled with no hypoglycemia on Januvia 25mg daily  Discussed option of stopping Januvia since A1C is now decreased to 6 1 however she would prefer to remain on this medication     Lab Results   Component Value Date    HGBA1C 6 1 (H) 01/13/2021

## 2021-02-09 ENCOUNTER — TELEPHONE (OUTPATIENT)
Dept: FAMILY MEDICINE CLINIC | Facility: CLINIC | Age: 76
End: 2021-02-09

## 2021-02-12 DIAGNOSIS — Z23 ENCOUNTER FOR IMMUNIZATION: ICD-10-CM

## 2021-02-17 ENCOUNTER — TRANSITIONAL CARE MANAGEMENT (OUTPATIENT)
Dept: FAMILY MEDICINE CLINIC | Facility: CLINIC | Age: 76
End: 2021-02-17

## 2021-02-19 NOTE — PROGRESS NOTES
Assessment & Plan:     E11 65 Type 2 diabetes mellitus with hyperglycemia, without long-term current use of insulin ([facility] )  I have evaluated the patient and found the condition to be: Stable  I have evaluated the patient and: Recommended continue with same treatment plan    I12 9 Hypertensive chronic kidney disease with stage 1 through stage 4 chronic kidney disease, or unspecified chronic kidney disease  I have evaluated the patient and found the condition to be: Stable  I have evaluated the patient and: Recommended continue with same treatment plan    I10 Essential hypertension  I have evaluated the patient and found the condition to be: Stable  I have evaluated the patient and: Recommended continue with same treatment plan    D69 3 Idiopathic thrombocytopenic purpura ([facility] )  I have evaluated the patient and found the condition to be: Stable  I have evaluated the patient and: Recommended continue with same treatment plan    N18 30 Stage 3 chronic kidney disease(HCC)   I have evaluated the patient and found the condition to be: Stable  I have evaluated the patient and: Recommended continue with same treatment plan    E55 9 Vitamin D deficiency  I have evaluated the patient and found the condition to be: Stable  I have evaluated the patient and: Recommended continue with same treatment plan    I73 9 Intermittent claudication ([facility] )  I have evaluated the patient and found the condition to be: Stable  I have evaluated the patient and: Recommended continue with same treatment plan    E72 11 Homocysteinemia ([facility] )  I have evaluated the patient and found the condition to be: Stable  I have evaluated the patient and: Recommended continue with same treatment plan    E66 01 Morbid (severe) obesity due to excess calories ([facility] )   I have evaluated the patient and found the condition to be:  Stable  I have evaluated the patient and: Recommended continue with same treatment plan         Subjective:     Patient ID: Cristo May is a 64 y o  female     Chief Complaint   Patient presents with    Transition of Care Management    Medicare Wellness Visit      HPI    Review of Systems    Objective:      /70 (BP Location: Left arm, Patient Position: Sitting, Cuff Size: Large)   Pulse 65   Temp 97 7 °F (36 5 °C) (Tympanic)   Resp 18   Ht 5' 6 93" (1 7 m)   Wt 112 kg (246 lb)   SpO2 95%   BMI 38 61 kg/m²     Problem List Items Addressed This Visit     None      Visit Diagnoses     Postmenopausal    -  Primary          Physical Exam

## 2021-02-22 ENCOUNTER — LAB (OUTPATIENT)
Dept: LAB | Facility: MEDICAL CENTER | Age: 76
End: 2021-02-22
Payer: COMMERCIAL

## 2021-02-22 ENCOUNTER — TRANSCRIBE ORDERS (OUTPATIENT)
Dept: ADMINISTRATIVE | Facility: HOSPITAL | Age: 76
End: 2021-02-22

## 2021-02-22 DIAGNOSIS — N28.9 KIDNEY DISEASE: ICD-10-CM

## 2021-02-22 DIAGNOSIS — R60.9 EDEMA, UNSPECIFIED TYPE: ICD-10-CM

## 2021-02-22 DIAGNOSIS — N28.9 KIDNEY DISEASE: Primary | ICD-10-CM

## 2021-02-22 DIAGNOSIS — I10 ESSENTIAL HYPERTENSION: ICD-10-CM

## 2021-02-22 LAB
ANION GAP SERPL CALCULATED.3IONS-SCNC: 8 MMOL/L (ref 4–13)
BUN SERPL-MCNC: 32 MG/DL (ref 5–25)
CALCIUM SERPL-MCNC: 9.3 MG/DL (ref 8.3–10.1)
CHLORIDE SERPL-SCNC: 107 MMOL/L (ref 100–108)
CO2 SERPL-SCNC: 29 MMOL/L (ref 21–32)
CREAT SERPL-MCNC: 1.68 MG/DL (ref 0.6–1.3)
GFR SERPL CREATININE-BSD FRML MDRD: 29 ML/MIN/1.73SQ M
GLUCOSE P FAST SERPL-MCNC: 102 MG/DL (ref 65–99)
POTASSIUM SERPL-SCNC: 4.1 MMOL/L (ref 3.5–5.3)
SODIUM SERPL-SCNC: 144 MMOL/L (ref 136–145)

## 2021-02-22 PROCEDURE — 80048 BASIC METABOLIC PNL TOTAL CA: CPT

## 2021-02-22 PROCEDURE — 36415 COLL VENOUS BLD VENIPUNCTURE: CPT

## 2021-02-22 RX ORDER — FUROSEMIDE 40 MG/1
40 TABLET ORAL DAILY
Qty: 30 TABLET | Refills: 5 | Status: SHIPPED | OUTPATIENT
Start: 2021-02-22 | End: 2021-09-09 | Stop reason: SDUPTHER

## 2021-02-25 ENCOUNTER — OFFICE VISIT (OUTPATIENT)
Dept: FAMILY MEDICINE CLINIC | Facility: CLINIC | Age: 76
End: 2021-02-25
Payer: COMMERCIAL

## 2021-02-25 VITALS
RESPIRATION RATE: 18 BRPM | TEMPERATURE: 97.7 F | DIASTOLIC BLOOD PRESSURE: 70 MMHG | OXYGEN SATURATION: 95 % | WEIGHT: 246 LBS | HEART RATE: 65 BPM | SYSTOLIC BLOOD PRESSURE: 120 MMHG | HEIGHT: 67 IN | BODY MASS INDEX: 38.61 KG/M2

## 2021-02-25 DIAGNOSIS — E11.65 TYPE 2 DIABETES MELLITUS WITH HYPERGLYCEMIA, WITHOUT LONG-TERM CURRENT USE OF INSULIN (HCC): ICD-10-CM

## 2021-02-25 DIAGNOSIS — R79.83 HOMOCYSTEINEMIA: ICD-10-CM

## 2021-02-25 DIAGNOSIS — D69.3 IDIOPATHIC THROMBOCYTOPENIC PURPURA (HCC): ICD-10-CM

## 2021-02-25 DIAGNOSIS — F33.9 DEPRESSION, RECURRENT (HCC): ICD-10-CM

## 2021-02-25 DIAGNOSIS — E03.9 HYPOTHYROIDISM, UNSPECIFIED TYPE: ICD-10-CM

## 2021-02-25 DIAGNOSIS — A41.9 SEPSIS, DUE TO UNSPECIFIED ORGANISM, UNSPECIFIED WHETHER ACUTE ORGAN DYSFUNCTION PRESENT (HCC): Primary | ICD-10-CM

## 2021-02-25 DIAGNOSIS — N18.30 STAGE 3 CHRONIC KIDNEY DISEASE, UNSPECIFIED WHETHER STAGE 3A OR 3B CKD (HCC): ICD-10-CM

## 2021-02-25 DIAGNOSIS — Z78.0 POSTMENOPAUSAL: ICD-10-CM

## 2021-02-25 DIAGNOSIS — M79.7 FIBROMYALGIA: ICD-10-CM

## 2021-02-25 DIAGNOSIS — E78.2 MIXED HYPERLIPIDEMIA: ICD-10-CM

## 2021-02-25 DIAGNOSIS — R26.9 GAIT ABNORMALITY: ICD-10-CM

## 2021-02-25 DIAGNOSIS — F32.A DEPRESSION, UNSPECIFIED DEPRESSION TYPE: ICD-10-CM

## 2021-02-25 PROCEDURE — 1125F AMNT PAIN NOTED PAIN PRSNT: CPT | Performed by: FAMILY MEDICINE

## 2021-02-25 PROCEDURE — 99495 TRANSJ CARE MGMT MOD F2F 14D: CPT | Performed by: FAMILY MEDICINE

## 2021-02-25 PROCEDURE — 3288F FALL RISK ASSESSMENT DOCD: CPT | Performed by: FAMILY MEDICINE

## 2021-02-25 PROCEDURE — 3725F SCREEN DEPRESSION PERFORMED: CPT | Performed by: FAMILY MEDICINE

## 2021-02-25 PROCEDURE — 1170F FXNL STATUS ASSESSED: CPT | Performed by: FAMILY MEDICINE

## 2021-02-25 PROCEDURE — T1015 CLINIC SERVICE: HCPCS | Performed by: FAMILY MEDICINE

## 2021-02-25 PROCEDURE — G0439 PPPS, SUBSEQ VISIT: HCPCS | Performed by: FAMILY MEDICINE

## 2021-02-25 NOTE — ASSESSMENT & PLAN NOTE
Lab Results   Component Value Date    HGBA1C 6 1 (H) 01/13/2021    last A1c 6 1% January 2021  Januvia was discontinued during hospitalization due to possible nephrotoxicity    Continue follow-up with endocrinologist as directed

## 2021-02-25 NOTE — PROGRESS NOTES
[facility]      NAME: Cleveland Barton  AGE: 64 y o  SEX: female  : 1945   MRN: [id number]    DATE: 2021  TIME: 9:58 AM  TCM Call (since 2021)     Date and time call was made  2021 11:43 AM    Hospital care reviewed  Records reviewed    Patient was hospitialized at  Other (comment)        Comment  [facility] 2/3-2/10/21 then sent to [facility] for rehab    Date of Admission  02/10/21    Date of discharge  21    Diagnosis  sepsis    Disposition  Home    Were the patients medications reviewed and updated  No    Current Symptoms  None      TCM Call (since 2021)     Post hospital issues  None    Should patient be enrolled in anticoag monitoring? No    Scheduled for follow up? Yes    Referrals needed  none    Did you obtain your prescribed medications  Yes    Do you need help managing your prescriptions or medications  No    Is transportation to your appointment needed  No    I have advised the patient to call PCP with any new or worsening symptoms  Beto Snow lpn     Living Arrangements  Family members    Support System  Family    The type of support provided  Emotional; Physical    Do you have social support  Yes, as much as I need    Are you recieving any outpatient services  No    Are you recieving home care services  No    Are you using any community resources  No    Current waiver services  No    Have you fallen in the last 12 months  Yes    How many times  1    Interperter language line needed  No        Assessment and Plan     Problem List Items Addressed This Visit     Stage 3 chronic kidney disease     Lab Results   Component Value Date    EGFR 29 2021    EGFR 33 2020    EGFR 31 2020    CREATININE 1 68 (H) 2021    CREATININE 1 52 (H) 2020    CREATININE 1 58 (H) 2020    patient with worsening of renal function during recent hospitalization  Nephrotoxins were disc continued including Januvia    Renal function has stabilized  Is now close to baseline  Most recent creatinine on February 10th was 1 73 with GFR 28  Patient will be seeing nephrologist next month         Depression, recurrent ([facility] [address] )      Stable on citalopram 40         Fibromyalgia      Stable on gabapentin  Continue follow-up with rheumatologist         Homocysteinemia ([facility] [address] )    Hyperlipidemia      Doing well on simvastatin 10  Will continue to monitor         Hypothyroidism      Doing well on levothyroxine 50         Gait abnormality      Patient states she is improved since subacute rehab stay at [facility]  No recent falls  Ambulating well with the aid of a cane and/or walker         Type 2 diabetes mellitus with hyperglycemia, without long-term current use of insulin ([facility])       Lab Results   Component Value Date    HGBA1C 6 1 (H) 01/13/2021    last A1c 6 1% January 2021  Januvia was discontinued during hospitalization due to possible nephrotoxicity  Continue follow-up with endocrinologist as directed         Idiopathic thrombocytopenic purpura [facility] )      Stable platelets  Most recent level 70,000 on February 8th  No bleeding issues           Other Visit Diagnoses     Sepsis, due to unspecified organism, unspecified whether acute organ dysfunction present [facility])    -  Primary    Postmenopausal        Relevant Orders    DXA bone density spine hip and pelvis        This is a TCM visit for patient's recent hospitalization to [facility] February 3rd to February 10th  She was subsequently discharged and transferred to [facility] for subacute rehab February 10th to February 17th  Patient was admitted with fever and shortness of breath  In ED, patient was found to be hypotensive with leukocytosis  She was diagnosed with sepsis and started on broad-spectrum antibiotics  COVID test was negative  Urine culture negative  Id was consulted  Echocardiogram showed ejection fraction of 17% with diastolic dysfunction    Patient also exhibited worsening renal function well in hospital   Nephrology was consulted they advised nephrotoxins to be discontinued  Kidney function improved  On February 10th, patient was transferred to [facility] in stable condition  For subacute rehab  She did well and was discharged to home on February 17th  Patient has done well since returning home  Denies any recent falls  She will be seeing nephrologist next month  She also has future appointments with Endocrine and rheumatologist       Return to office in:  6 months, labs ordered through endocrinology    Chief Complaint     Chief Complaint   Patient presents with    Transition of Care Management    Medicare Wellness Visit       History of Present Illness      This is a TCM visit for patient's recent hospitalization to [facility] February 3rd to February 10th  She was subsequently discharged and transferred to [facility] for subacute rehab February 10th to February 17th  Patient was admitted with fever and shortness of breath  In ED, patient was found to be hypotensive with leukocytosis  She was diagnosed with sepsis and started on broad-spectrum antibiotics  COVID test was negative  Urine culture negative  Id was consulted  Echocardiogram showed ejection fraction of 80% with diastolic dysfunction  Patient also exhibited worsening renal function well in hospital   Nephrology was consulted they advised nephrotoxins to be discontinued  Kidney function improved  On February 10th, patient was transferred to [facility] in stable condition  For subacute rehab  She did well and was discharged to home on February 17th  The following portions of the patient's history were reviewed and updated as appropriate: allergies, current medications, past family history, past medical history, past social history, past surgical history and problem list     Review of Systems   Review of Systems   Respiratory: Negative  Cardiovascular: Negative  Gastrointestinal: Negative  Genitourinary: Negative  Active Problem List     Patient Active Problem List   Diagnosis    Anemia    Chronic gout    Stage 3 chronic kidney disease    Combined form of senile cataract of left eye    Cough    Depression, recurrent ([facility] [address] )    Edema    Fibromyalgia    Homocysteinemia ([facility] [address] )    Hyperlipidemia    Hypothyroidism    Hypokalemia    Low back pain    Pain of left hip    Primary osteoarthritis of both hands    Increased frequency of urination    Vitamin D deficiency    Folliculitis    Symptomatic varicose veins of both lower extremities    Intermittent claudication (HCC)    Scalp laceration    Chest pain    Leukocytosis    Type 2 diabetes mellitus without complication, without long-term current use of insulin (HCC)    Gait abnormality    Class 3 severe obesity due to excess calories with serious comorbidity and body mass index (BMI) of 40 0 to 44 9 in [facility])    Type 2 diabetes mellitus with hyperglycemia, without long-term current use of insulin (HCC)    Pain in left tibia    Nephrosclerosis, stage 1-4 or unspecified chronic kidney disease    Tinea corporis    Essential hypertension    Arthritis    Cataract    Idiopathic chronic gout with tophus    Idiopathic thrombocytopenic purpura (HCC)    Osteoarthritis of multiple joints    Post menopausal syndrome    Restless legs syndrome    Ambulatory dysfunction with recurrent falls    Fracture of multiple ribs of left side    Acute pain due to trauma    Impaired vision    Bilateral hearing loss    Delirium precautions    Hyponatremia       Objective   /70 (BP Location: Left arm, Patient Position: Sitting, Cuff Size: Large)   Pulse 65   Temp 97 7 °F (36 5 °C) (Tympanic)   Resp 18   Ht 5' 6 93" (1 7 m)   Wt 112 kg (246 lb)   SpO2 95%   BMI 38 61 kg/m²     Physical Exam  Cardiovascular:      Rate and Rhythm: Normal rate and regular rhythm        Comments: Carotids: no bruits  Ext: no edema  Pulmonary:      Effort: Pulmonary effort is normal  No respiratory distress  Breath sounds: No wheezing or rales  Psychiatric:         Behavior: Behavior normal          Thought Content: Thought content normal          Pertinent Laboratory/Diagnostic Studies:    Hospital records reviewed    Current Medications     Current Outpatient Medications:     albuterol (PROAIR HFA) 90 mcg/act inhaler, Inhale 2 puffs every 6 (six) hours as needed for wheezing, Disp: 8 5 g, Rfl: 3    aspirin (ASPIRIN ADULT LOW DOSE) 81 mg EC tablet, Take 1 tablet by mouth daily, Disp: , Rfl:     cholecalciferol (VITAMIN D3) 1,000 units tablet, Take 1,000 Units by mouth daily, Disp: , Rfl:     citalopram (CeleXA) 40 mg tablet, Take 1 tablet (40 mg total) by mouth daily, Disp: 90 tablet, Rfl: 1    febuxostat (ULORIC) 80 MG TABS, Take 1 tablet by mouth daily, Disp: , Rfl:     furosemide (LASIX) 40 mg tablet, Take 1 tablet (40 mg total) by mouth daily, Disp: 30 tablet, Rfl: 5    gabapentin (NEURONTIN) 300 mg capsule, Take 1 tablet by mouth 3 (three) times a day, Disp: , Rfl:     levothyroxine 50 mcg tablet, Take 1 tablet (50 mcg total) by mouth daily, Disp: 30 tablet, Rfl: 5    methocarbamol (ROBAXIN) 500 mg tablet, Take 1 tablet (500 mg total) by mouth every 6 (six) hours, Disp: 30 tablet, Rfl: 0    metoprolol tartrate (LOPRESSOR) 25 mg tablet, Take 1 tablet (25 mg total) by mouth daily, Disp: 30 tablet, Rfl: 5    polyethylene glycol (MIRALAX) 17 g packet, Take 17 g by mouth daily, Disp: 30 each, Rfl: 0    predniSONE 5 mg tablet, Take 5 mg by mouth daily, Disp: , Rfl:     simvastatin (ZOCOR) 10 mg tablet, Take 1 tablet (10 mg total) by mouth daily, Disp: 30 tablet, Rfl: 5    metolazone (ZAROXOLYN) 2 5 mg tablet, TAKE ONE TABLET BY MOUTH THREE TIMES A WEEK on monday, wednesday and friday   (Patient not taking: Reported on 2/25/2021), Disp: 12 tablet, Rfl: 0    sitaGLIPtin (Januvia) 25 mg tablet, Take 1 tablet (25 mg total) by mouth daily (Patient not taking: Reported on 2/25/2021), Disp: 30 tablet, Rfl: 5    Health Maintenance     Health Maintenance   Topic Date Due    SLP PLAN OF CARE  1945    Depression Remission PHQ  02/09/1955    COVID-19 Vaccine (1 of 2) 02/09/1959    Falls: Plan of Care  02/09/2008    URINE MICROALBUMIN  06/24/2020    Medicare Annual Wellness Visit (AWV)  01/20/2021    BMI: Followup Plan  01/20/2021    DM Eye Exam  04/02/2021    Diabetic Foot Exam  05/27/2021    HEMOGLOBIN A1C  07/13/2021    Fall Risk  02/25/2022    BMI: Adult  02/25/2022    DTaP,Tdap,and Td Vaccines (3 - Td) 08/23/2029    Pneumococcal Vaccine: 65+ Years  Completed    Influenza Vaccine  Completed    HIB Vaccine  Aged Out    Hepatitis B Vaccine  Aged Out    IPV Vaccine  Aged Out    Hepatitis A Vaccine  Aged Out    Meningococcal ACWY Vaccine  Aged Out    HPV Vaccine  Aged Out     Immunization History   Administered Date(s) Administered    INFLUENZA 11/14/2012, 03/18/2013, 01/01/2014, 03/03/2014, 11/19/2014, 11/18/2015, 11/25/2016, 10/12/2017    Influenza Split High Dose Preservative Free IM 11/18/2015, 11/25/2016, 10/12/2017    Influenza, high dose seasonal 0 7 mL 10/08/2018, 10/08/2019, 09/18/2020    Influenza, seasonal, injectable 09/09/2011, 10/15/2012, 09/25/2013, 01/01/2014    Pneumococcal Conjugate 13-Valent 08/06/2015    Pneumococcal Polysaccharide PPV23 10/15/2008    Tdap 08/06/2015, 08/23/2019       Cori Varner DO  [facility]

## 2021-02-25 NOTE — ASSESSMENT & PLAN NOTE
Lab Results   Component Value Date    EGFR 29 02/22/2021    EGFR 33 12/08/2020    EGFR 31 12/07/2020    CREATININE 1 68 (H) 02/22/2021    CREATININE 1 52 (H) 12/08/2020    CREATININE 1 58 (H) 12/07/2020    patient with worsening of renal function during recent hospitalization  Nephrotoxins were disc continued including Januvia  Renal function has stabilized  Is now close to baseline  Most recent creatinine on February 10th was 1 73 with GFR 28    Patient will be seeing nephrologist next month

## 2021-02-25 NOTE — ASSESSMENT & PLAN NOTE
Patient states she is improved since subacute rehab stay at [facility]  No recent falls    Ambulating well with the aid of a cane and/or walker

## 2021-02-25 NOTE — PROGRESS NOTES
Assessment and Plan:     MEDICARE WELLNESS VISIT    Problem List Items Addressed This Visit     None           Preventive health issues were discussed with patient, and age appropriate screening tests were ordered as noted in patient's After Visit Summary  Personalized health advice and appropriate referrals for health education or preventive services given if needed, as noted in patient's After Visit Summary  History of Present Illness:     Patient presents for Welcome to Medicare visit       Patient Care Team:  Dea Caban DO as PCP - General  Dea Caban DO as PCP - PCP-[facility]  Babs Ahumada, MD as PCP - Endocrinology (Endocrinology)  DO Deja Araya MD (Nephrology)  [facility] (Podiatry)     Review of Systems:     Review of Systems   Problem List:     Patient Active Problem List   Diagnosis    Anemia    Chronic gout    Stage 3 chronic kidney disease    Combined form of senile cataract of left eye    Cough    Depression    Edema    Fibromyalgia    Homocysteinemia ([address] )    Hyperlipidemia    Hypothyroidism    Hypokalemia    Low back pain    Pain of left hip    Primary osteoarthritis of both hands    Increased frequency of urination    Vitamin D deficiency    Folliculitis    Symptomatic varicose veins of both lower extremities    Intermittent claudication (HCC)    Scalp laceration    Chest pain    Leukocytosis    Type 2 diabetes mellitus without complication, without long-term current use of insulin (HCC)    Abnormal gait    Class 3 severe obesity due to excess calories with serious comorbidity and body mass index (BMI) of 40 0 to 44 9 in adult [facility])    Type 2 diabetes mellitus with hyperglycemia, without long-term current use of insulin (HCC)    Pain in left tibia    Nephrosclerosis, stage 1-4 or unspecified chronic kidney disease    Tinea corporis    Essential hypertension    Arthritis    Cataract    Idiopathic chronic gout with tophus    Idiopathic thrombocytopenic purpura (HCC)    Osteoarthritis of multiple joints    Post menopausal syndrome    Restless legs syndrome    Ambulatory dysfunction with recurrent falls    Fracture of multiple ribs of left side    Acute pain due to trauma    Impaired vision    Bilateral hearing loss    Delirium precautions    Hyponatremia      Past Medical and Surgical History:     Past Medical History:   Diagnosis Date    Abnormal blood chemistry     last assessed 11/13/2013    Angina pectoris ([facility] [address] )     last assessed 11/012/13    Arthritis     last assessed 11/13/2013    Asthma     Athlete's foot     resolved 10/11/17    Cataract     last assessed 03/18/2016    Diabetes mellitus ([facility] [address] )     type 2    Diabetes mellitus with kidney disease ([facility] [address] )     last assessed 10/03/17    Eczema     Gait disorder     last assessed 11/12/2013    Idiopathic thrombocytopenia purpura ([facility] [address] )     last assessed 08/06/15    Overactive bladder     Post menopausal syndrome     resolved 10/11/17    Restless legs syndrome     last assessed 10/24/2013    Subclinical hypothyroidism     last assessed 12/11/17    Urinary frequency     resolved 12/18/2014     Past Surgical History:   Procedure Laterality Date    ACHILLES TENDON REPAIR      REPLACEMENT TOTAL KNEE        Family History:     Family History   Problem Relation Age of Onset    Leukemia Mother     Eczema Father     Diabetes type II Father     Diabetes type II Maternal Aunt       Social History:     E-Cigarette/Vaping    E-Cigarette Use Never User      E-Cigarette/Vaping Substances    Nicotine No     THC No     CBD No     Flavoring No     Other No     Unknown No      Social History     Socioeconomic History    Marital status: Single     Spouse name: None    Number of children: None    Years of education: None    Highest education level: None   Occupational History    None   Social Needs    Financial resource strain: None   [place] insecurity     Worry: None     Inability: None    Transportation needs     Medical: None     Non-medical: None   Tobacco Use    Smoking status: Never Smoker    Smokeless tobacco: Never Used    Tobacco comment: per allscripts; former smoker   Substance and Sexual Activity    Alcohol use: Yes     Frequency: 2-4 times a month     Drinks per session: 1 or 2     Binge frequency: Never     Comment: occasional    Drug use: No    Sexual activity: None   Lifestyle    Physical activity     Days per week: None     Minutes per session: None    Stress: None   Relationships    Social connections     Talks on phone: None     Gets together: None     Attends Religion service: None     Active member of club or organization: None     Attends meetings of clubs or organizations: None     Relationship status: None    Intimate partner violence     Fear of current or ex partner: None     Emotionally abused: None     Physically abused: None     Forced sexual activity: None   Other Topics Concern    None   Social History Narrative    None      Medications and Allergies:     Current Outpatient Medications   Medication Sig Dispense Refill    albuterol (PROAIR HFA) 90 mcg/act inhaler Inhale 2 puffs every 6 (six) hours as needed for wheezing 8 5 g 3    aspirin (ASPIRIN ADULT LOW DOSE) 81 mg EC tablet Take 1 tablet by mouth daily      cholecalciferol (VITAMIN D3) 1,000 units tablet Take 1,000 Units by mouth daily      citalopram (CeleXA) 40 mg tablet Take 1 tablet (40 mg total) by mouth daily 90 tablet 1    febuxostat (ULORIC) 80 MG TABS Take 1 tablet by mouth daily      furosemide (LASIX) 40 mg tablet Take 1 tablet (40 mg total) by mouth daily 30 tablet 5    gabapentin (NEURONTIN) 300 mg capsule Take 1 tablet by mouth 3 (three) times a day      levothyroxine 50 mcg tablet Take 1 tablet (50 mcg total) by mouth daily 30 tablet 5    methocarbamol (ROBAXIN) 500 mg tablet Take 1 tablet (500 mg total) by mouth every 6 (six) hours 30 tablet 0    metoprolol tartrate (LOPRESSOR) 25 mg tablet Take 1 tablet (25 mg total) by mouth daily 30 tablet 5    polyethylene glycol (MIRALAX) 17 g packet Take 17 g by mouth daily 30 each 0    predniSONE 5 mg tablet Take 5 mg by mouth daily      simvastatin (ZOCOR) 10 mg tablet Take 1 tablet (10 mg total) by mouth daily 30 tablet 5    acetaminophen (TYLENOL) 325 mg tablet Take 3 tablets (975 mg total) by mouth every 8 (eight) hours (Patient not taking: Reported on 2/25/2021) 30 tablet 0    metolazone (ZAROXOLYN) 2 5 mg tablet TAKE ONE TABLET BY MOUTH THREE TIMES A WEEK on monday, wednesday and friday  (Patient not taking: Reported on 2/25/2021) 12 tablet 0    sitaGLIPtin (Januvia) 25 mg tablet Take 1 tablet (25 mg total) by mouth daily (Patient not taking: Reported on 2/25/2021) 30 tablet 5     No current facility-administered medications for this visit  Allergies   Allergen Reactions    Ace Inhibitors Angioedema    Prochlorperazine Anaphylaxis     lethargy, tongue and throat swelling    Ramipril Anaphylaxis     angioedema    Angiotensin Receptor Blockers       Immunizations:     Immunization History   Administered Date(s) Administered    INFLUENZA 11/14/2012, 03/18/2013, 01/01/2014, 03/03/2014, 11/19/2014, 11/18/2015, 11/25/2016, 10/12/2017    Influenza Split High Dose Preservative Free IM 11/18/2015, 11/25/2016, 10/12/2017    Influenza, high dose seasonal 0 7 mL 10/08/2018, 10/08/2019, 09/18/2020    Influenza, seasonal, injectable 09/09/2011, 10/15/2012, 09/25/2013, 01/01/2014    Pneumococcal Conjugate 13-Valent 08/06/2015    Pneumococcal Polysaccharide PPV23 10/15/2008    Tdap 08/06/2015, 08/23/2019      Health Maintenance: There are no preventive care reminders to display for this patient  There are no preventive care reminders to display for this patient  Medicare Screening Tests and Risk Assessments:     Eva Aguila is here for her Subsequent Wellness visit   Last Medicare Wellness visit information reviewed, patient interviewed and updates made to the record today  Health Risk Assessment:   Patient rates overall health as fair  Patient feels that their physical health rating is same  Eyesight was rated as same  Hearing was rated as same  Patient feels that their emotional and mental health rating is same  Pain experienced in the last 7 days has been none  Patient states that she has experienced no weight loss or gain in last 6 months  Depression Screening:   PHQ-2 Score: 2  PHQ-9 Score: 5      Fall Risk Screening: In the past year, patient has experienced: history of falling in past year    Number of falls: 2 or more  Injured during fall?: Yes    Feels unsteady when standing or walking?: Yes    Worried about falling?: Yes      Urinary Incontinence Screening:   Patient has not leaked urine accidently in the last six months  Home Safety:  Patient does not have trouble with stairs inside or outside of their home  Patient has working smoke alarms and has working carbon monoxide detector  Home safety hazards include: none  Nutrition:   Current diet is Regular  Medications:   Patient is currently taking over-the-counter supplements  OTC medications include: see medication list  Patient is able to manage medications  Activities of Daily Living (ADLs)/Instrumental Activities of Daily Living (IADLs):   Walk and transfer into and out of bed and chair?: Yes  Dress and groom yourself?: Yes    Bathe or shower yourself?: Yes    Feed yourself? Yes  Do your laundry/housekeeping?: Yes  Manage your money, pay your bills and track your expenses?: Yes  Make your own meals?: Yes    Do your own shopping?: Yes    Previous Hospitalizations:   Any hospitalizations or ED visits within the last 12 months?: Yes    How many hospitalizations have you had in the last year?: 1-2    Advance Care Planning:   Living will: Yes    Durable POA for healthcare: Yes    Advanced directive:  Yes Advanced directive counseling given: Yes    Five wishes given: Yes    End of Life Decisions reviewed with patient: Yes    Provider agrees with end of life decisions: Yes      Cognitive Screening:   Provider or family/friend/caregiver concerned regarding cognition?: No    PREVENTIVE SCREENINGS      Cardiovascular Screening:    General: Screening Not Indicated and History Lipid Disorder      Diabetes Screening:     General: Screening Not Indicated and History Diabetes      Colorectal Cancer Screening:     General: Risks and Benefits Discussed      Breast Cancer Screening:     General: Risks and Benefits Discussed      Cervical Cancer Screening:    General: Screening Not Indicated      Osteoporosis Screening:    General: Risks and Benefits Discussed      Abdominal Aortic Aneurysm (AAA) Screening:        General: Risks and Benefits Discussed      Lung Cancer Screening:     General: Screening Not Indicated      Hepatitis C Screening:    General: Risks and Benefits Discussed    No exam data present     Physical Exam:     /70 (BP Location: Left arm, Patient Position: Sitting, Cuff Size: Large)   Pulse 65   Temp 97 7 °F (36 5 °C) (Tympanic)   Resp 18   Ht 5' 6 93" (1 7 m)   Wt 112 kg (246 lb)   SpO2 95%   BMI 38 61 kg/m²     Physical Exam     Bijan Randhawa,

## 2021-02-25 NOTE — PATIENT INSTRUCTIONS
Medicare Preventive Visit Patient Instructions  Thank you for completing your Welcome to Medicare Visit or Medicare Annual Wellness Visit today  Your next wellness visit will be due in one year (2/25/2022)  The screening/preventive services that you may require over the next 5-10 years are detailed below  Some tests may not apply to you based off risk factors and/or age  Screening tests ordered at today's visit but not completed yet may show as past due  Also, please note that scanned in results may not display below  Preventive Screenings:  Service Recommendations Previous Testing/Comments   Colorectal Cancer Screening  * Colonoscopy    * Fecal Occult Blood Test (FOBT)/Fecal Immunochemical Test (FIT)  * Fecal DNA/Cologuard Test  * Flexible Sigmoidoscopy Age: 54-65 years old   Colonoscopy: every 10 years (may be performed more frequently if at higher risk)  OR  FOBT/FIT: every 1 year  OR  Cologuard: every 3 years  OR  Sigmoidoscopy: every 5 years  Screening may be recommended earlier than age 48 if at higher risk for colorectal cancer  Also, an individualized decision between you and your healthcare provider will decide whether screening between the ages of 74-80 would be appropriate  Colonoscopy: Not on file  FOBT/FIT: 11/23/2018  Cologuard: Not on file  Sigmoidoscopy: Not on file         Breast Cancer Screening Age: 36 years old  Frequency: every 1-2 years  Not required if history of left and right mastectomy Mammogram: Not on file       Cervical Cancer Screening Between the ages of 21-29, pap smear recommended once every 3 years  Between the ages of 33-67, can perform pap smear with HPV co-testing every 5 years     Recommendations may differ for women with a history of total hysterectomy, cervical cancer, or abnormal pap smears in past  Pap Smear: Not on file    Screening Not Indicated   Hepatitis C Screening Once for adults born between [facility]  More frequently in patients at high risk for Hepatitis C Hep C Antibody: Not on file       Diabetes Screening 1-2 times per year if you're at risk for diabetes or have pre-diabetes Fasting glucose: 102 mg/dL   A1C: 6 1 %    Screening Not Indicated  History Diabetes   Cholesterol Screening Once every 5 years if you don't have a lipid disorder  May order more often based on risk factors  Lipid panel: 06/24/2019    Screening Not Indicated  History Lipid Disorder     Other Preventive Screenings Covered by Medicare:  1  Abdominal Aortic Aneurysm (AAA) Screening: covered once if your at risk  You're considered to be at risk if you have a family history of AAA  2  Lung Cancer Screening: covers low dose CT scan once per year if you meet all of the following conditions: (1) Age 50-69; (2) No signs or symptoms of lung cancer; (3) Current smoker or have quit smoking within the last 15 years; (4) You have a tobacco smoking history of at least 30 pack years (packs per day multiplied by number of years you smoked); (5) You get a written order from a healthcare provider  3  Glaucoma Screening: covered annually if you're considered high risk: (1) You have diabetes OR (2) Family history of glaucoma OR (3)  aged 48 and older OR (3)  American aged 72 and older  3  Osteoporosis Screening: covered every 2 years if you meet one of the following conditions: (1) You're estrogen deficient and at risk for osteoporosis based off medical history and other findings; (2) Have a vertebral abnormality; (3) On glucocorticoid therapy for more than 3 months; (4) Have primary hyperparathyroidism; (5) On osteoporosis medications and need to assess response to drug therapy  · Last bone density test (DXA Scan): Not on file  5  HIV Screening: covered annually if you're between the age of 12-76  Also covered annually if you are younger than 13 and older than 72 with risk factors for HIV infection   For pregnant patients, it is covered up to 3 times per pregnancy  Immunizations:  Immunization Recommendations   Influenza Vaccine Annual influenza vaccination during flu season is recommended for all persons aged >= 6 months who do not have contraindications   Pneumococcal Vaccine (Prevnar and Pneumovax)  * Prevnar = PCV13  * Pneumovax = PPSV23   Adults 25-60 years old: 1-3 doses may be recommended based on certain risk factors  Adults 72 years old: Prevnar (PCV13) vaccine recommended followed by Pneumovax (PPSV23) vaccine  If already received PPSV23 since turning 65, then PCV13 recommended at least one year after PPSV23 dose  Hepatitis B Vaccine 3 dose series if at intermediate or high risk (ex: diabetes, end stage renal disease, liver disease)   Tetanus (Td) Vaccine - COST NOT COVERED BY MEDICARE PART B Following completion of primary series, a booster dose should be given every 10 years to maintain immunity against tetanus  Td may also be given as tetanus wound prophylaxis  Tdap Vaccine - COST NOT COVERED BY MEDICARE PART B Recommended at least once for all adults  For pregnant patients, recommended with each pregnancy  Shingles Vaccine (Shingrix) - COST NOT COVERED BY MEDICARE PART B  2 shot series recommended in those aged 48 and above     Health Maintenance Due:  There are no preventive care reminders to display for this patient  Immunizations Due:  There are no preventive care reminders to display for this patient  Advance Directives   What are advance directives? Advance directives are legal documents that state your wishes and plans for medical care  These plans are made ahead of time in case you lose your ability to make decisions for yourself  Advance directives can apply to any medical decision, such as the treatments you want, and if you want to donate organs  What are the types of advance directives? There are many types of advance directives, and each state has rules about how to use them   You may choose a combination of any of the following:  · Living will: This is a written record of the treatment you want  You can also choose which treatments you do not want, which to limit, and which to stop at a certain time  This includes surgery, medicine, IV fluid, and tube feedings  · Durable power of  for healthcare [facility]): This is a written record that states who you want to make healthcare choices for you when you are unable to make them for yourself  This person, called a proxy, is usually a family member or a friend  You may choose more than 1 proxy  · Do not resuscitate (DNR) order:  A DNR order is used in case your heart stops beating or you stop breathing  It is a request not to have certain forms of treatment, such as CPR  A DNR order may be included in other types of advance directives  · Medical directive: This covers the care that you want if you are in a coma, near death, or unable to make decisions for yourself  You can list the treatments you want for each condition  Treatment may include pain medicine, surgery, blood transfusions, dialysis, IV or tube feedings, and a ventilator (breathing machine)  · Values history: This document has questions about your views, beliefs, and how you feel and think about life  This information can help others choose the care that you would choose  Why are advance directives important? An advance directive helps you control your care  Although spoken wishes may be used, it is better to have your wishes written down  Spoken wishes can be misunderstood, or not followed  Treatments may be given even if you do not want them  An advance directive may make it easier for your family to make difficult choices about your care  Fall Prevention    Fall prevention  includes ways to make your home and other areas safer  It also includes ways you can move more carefully to prevent a fall   Health conditions that cause changes in your blood pressure, vision, or muscle strength and coordination may increase your risk for falls  Medicines may also increase your risk for falls if they make you dizzy, weak, or sleepy  Fall prevention tips:   · Stand or sit up slowly  · Use assistive devices as directed  · Wear shoes that fit well and have soles that   · Wear a personal alarm  · Stay active  · Manage your medical conditions  Home Safety Tips:  · Add items to prevent falls in the bathroom  · Keep paths clear  · Install bright lights in your home  · Keep items you use often on shelves within reach  · Paint or place reflective tape on the edges of your stairs  Urinary Incontinence   Urinary incontinence (UI)  is when you lose control of your bladder  UI develops because your bladder cannot store or empty urine properly  The 3 most common types of UI are stress incontinence, urge incontinence, or both  Medicines:   · May be given to help strengthen your bladder control  Report any side effects of medication to your healthcare provider  Do pelvic muscle exercises often:  Your pelvic muscles help you stop urinating  Squeeze these muscles tight for 5 seconds, then relax for 5 seconds  Gradually work up to squeezing for 10 seconds  Do 3 sets of 15 repetitions a day, or as directed  This will help strengthen your pelvic muscles and improve bladder control  Train your bladder:  Go to the bathroom at set times, such as every 2 hours, even if you do not feel the urge to go  You can also try to hold your urine when you feel the urge to go  For example, hold your urine for 5 minutes when you feel the urge to go  As that becomes easier, hold your urine for 10 minutes  Self-care:   · Keep a UI record  Write down how often you leak urine and how much you leak  Make a note of what you were doing when you leaked urine  · Drink liquids as directed  You may need to limit the amount of liquid you drink to help control your urine leakage  Do not drink any liquid right before you go to bed  Limit or do not have drinks that contain caffeine or alcohol  · Prevent constipation  Eat a variety of high-fiber foods  Good examples are high-fiber cereals, beans, vegetables, and whole-grain breads  Walking is the best way to trigger your intestines to have a bowel movement  · Exercise regularly and maintain a healthy weight  Weight loss and exercise will decrease pressure on your bladder and help you control your leakage  · Use a catheter as directed  to help empty your bladder  A catheter is a tiny, plastic tube that is put into your bladder to drain your urine  · Go to behavior therapy as directed  Behavior therapy may be used to help you learn to control your urge to urinate  Weight Management   Why it is important to manage your weight:  Being overweight increases your risk of health conditions such as heart disease, high blood pressure, type 2 diabetes, and certain types of cancer  It can also increase your risk for osteoarthritis, sleep apnea, and other respiratory problems  Aim for a slow, steady weight loss  Even a small amount of weight loss can lower your risk of health problems  How to lose weight safely:  A safe and healthy way to lose weight is to eat fewer calories and get regular exercise  You can lose up about 1 pound a week by decreasing the number of calories you eat by 500 calories each day  Healthy meal plan for weight management:  A healthy meal plan includes a variety of foods, contains fewer calories, and helps you stay healthy  A healthy meal plan includes the following:  · Eat whole-grain foods more often  A healthy meal plan should contain fiber  Fiber is the part of grains, fruits, and vegetables that is not broken down by your body  Whole-grain foods are healthy and provide extra fiber in your diet  Some examples of whole-grain foods are whole-wheat breads and pastas, oatmeal, brown rice, and bulgur  · Eat a variety of vegetables every day    Include dark, leafy greens such as spinach, kale, thiago greens, and mustard greens  Eat yellow and orange vegetables such as carrots, sweet potatoes, and winter squash  · Eat a variety of fruits every day  Choose fresh or canned fruit (canned in its own juice or light syrup) instead of juice  Fruit juice has very little or no fiber  · Eat low-fat dairy foods  Drink fat-free (skim) milk or 1% milk  Eat fat-free yogurt and low-fat cottage cheese  Try low-fat cheeses such as mozzarella and other reduced-fat cheeses  · Choose meat and other protein foods that are low in fat  Choose beans or other legumes such as split peas or lentils  Choose fish, skinless poultry (chicken or turkey), or lean cuts of red meat (beef or pork)  Before you cook meat or poultry, cut off any visible fat  · Use less fat and oil  Try baking foods instead of frying them  Add less fat, such as margarine, sour cream, regular salad dressing and mayonnaise to foods  Eat fewer high-fat foods  Some examples of high-fat foods include french fries, doughnuts, ice cream, and cakes  · Eat fewer sweets  Limit foods and drinks that are high in sugar  This includes candy, cookies, regular soda, and sweetened drinks  Exercise:  Exercise at least 30 minutes per day on most days of the week  Some examples of exercise include walking, biking, dancing, and swimming  You can also fit in more physical activity by taking the stairs instead of the elevator or parking farther away from stores  Ask your healthcare provider about the best exercise plan for you  © Copyright [employer] 2018 Information is for End User's use only and may not be sold, redistributed or otherwise used for commercial purposes   All illustrations and images included in CareNotes® are the copyrighted property of A D A M , [employer]  or [address] [place] [address] [employer]

## 2021-02-27 NOTE — TELEPHONE ENCOUNTER
----- Message from Lem Mcardle, LPN sent at 3/67/1040  8:34 AM EST -----      ----- Message -----  From: Gissel Moe DO  Sent: 2/27/2020   5:14 PM EST  To: [address]     Please call [facility] endocrinology to help patient schedule evaluation regarding her uncontrolled diabetes Attending with

## 2021-03-03 ENCOUNTER — OFFICE VISIT (OUTPATIENT)
Dept: PODIATRY | Facility: CLINIC | Age: 76
End: 2021-03-03
Payer: COMMERCIAL

## 2021-03-03 VITALS
HEIGHT: 67 IN | BODY MASS INDEX: 38.71 KG/M2 | DIASTOLIC BLOOD PRESSURE: 68 MMHG | WEIGHT: 246.6 LBS | SYSTOLIC BLOOD PRESSURE: 124 MMHG

## 2021-03-03 DIAGNOSIS — E11.621 DIABETIC ULCER OF TOE OF RIGHT FOOT ASSOCIATED WITH TYPE 2 DIABETES MELLITUS, LIMITED TO BREAKDOWN OF SKIN (HCC): Primary | ICD-10-CM

## 2021-03-03 DIAGNOSIS — L97.511 DIABETIC ULCER OF TOE OF RIGHT FOOT ASSOCIATED WITH TYPE 2 DIABETES MELLITUS, LIMITED TO BREAKDOWN OF SKIN (HCC): Primary | ICD-10-CM

## 2021-03-03 PROCEDURE — 97597 DBRDMT OPN WND 1ST 20 CM/<: CPT | Performed by: PODIATRIST

## 2021-03-03 NOTE — PROGRESS NOTES
Patient presents with a painful right 3rd toe  Pain began approximately 2 weeks ago  Patient notes the presence of a corn on the tip of the toe  Patient states that she is not taking medication for diabetes at this time  She states that her blood sugar has been under better control  On exam, hyperkeratotic lesion present distal aspect right 3rd toe  Trimmed hyperkeratotic lesion revealing a ulceration at the tip of the toe  The ulcer measures 0 3 cm in diameter  Is it is to the level of subcutaneous tissue  It does not probe to bone  There is no drainage or bleeding  There is no digital cellulitis  Bacitracin dressing applied  Prescribed Bactroban ointment for b i d  application  Dispensed toe crest to elevate the digit  Patient will be reassessed in 2 weeks  All elongated toenails were also trimmed

## 2021-03-08 ENCOUNTER — IMMUNIZATIONS (OUTPATIENT)
Dept: FAMILY MEDICINE CLINIC | Facility: HOSPITAL | Age: 76
End: 2021-03-08

## 2021-03-08 DIAGNOSIS — Z23 ENCOUNTER FOR IMMUNIZATION: Primary | ICD-10-CM

## 2021-03-08 PROCEDURE — 0001A SARS-COV-2 / COVID-19 MRNA VACCINE (PFIZER-BIONTECH) 30 MCG: CPT

## 2021-03-08 PROCEDURE — 91300 SARS-COV-2 / COVID-19 MRNA VACCINE (PFIZER-BIONTECH) 30 MCG: CPT

## 2021-03-17 ENCOUNTER — OFFICE VISIT (OUTPATIENT)
Dept: PODIATRY | Facility: CLINIC | Age: 76
End: 2021-03-17
Payer: COMMERCIAL

## 2021-03-17 VITALS
HEIGHT: 67 IN | BODY MASS INDEX: 37.67 KG/M2 | SYSTOLIC BLOOD PRESSURE: 130 MMHG | WEIGHT: 240 LBS | DIASTOLIC BLOOD PRESSURE: 62 MMHG

## 2021-03-17 DIAGNOSIS — L97.511 DIABETIC ULCER OF TOE OF RIGHT FOOT ASSOCIATED WITH TYPE 2 DIABETES MELLITUS, LIMITED TO BREAKDOWN OF SKIN (HCC): Primary | ICD-10-CM

## 2021-03-17 DIAGNOSIS — E11.621 DIABETIC ULCER OF TOE OF RIGHT FOOT ASSOCIATED WITH TYPE 2 DIABETES MELLITUS, LIMITED TO BREAKDOWN OF SKIN (HCC): Primary | ICD-10-CM

## 2021-03-17 PROCEDURE — 1160F RVW MEDS BY RX/DR IN RCRD: CPT | Performed by: PODIATRIST

## 2021-03-17 PROCEDURE — 1036F TOBACCO NON-USER: CPT | Performed by: PODIATRIST

## 2021-03-17 PROCEDURE — 99212 OFFICE O/P EST SF 10 MIN: CPT | Performed by: PODIATRIST

## 2021-03-17 NOTE — PROGRESS NOTES
Patient presents for assessment of ulceration on the distal aspect of the right 3rd toe  Patient has been wearing toe crest and using topical antibiotic as directed  She relates no discomfort today  On exam, superficial hyperkeratotic lesion present on the tip of the right 3rd toe  There is no evidence of ulceration  Patient will be reassessed in 2 months    She may discontinue the antibiotic but should  continue with the toe crest

## 2021-03-27 DIAGNOSIS — E87.6 HYPOKALEMIA: ICD-10-CM

## 2021-03-27 RX ORDER — POTASSIUM CHLORIDE 20 MEQ/1
TABLET, EXTENDED RELEASE ORAL
Qty: 60 TABLET | Refills: 0 | Status: SHIPPED | OUTPATIENT
Start: 2021-03-27 | End: 2021-04-28 | Stop reason: SDUPTHER

## 2021-03-29 ENCOUNTER — IMMUNIZATIONS (OUTPATIENT)
Dept: FAMILY MEDICINE CLINIC | Facility: HOSPITAL | Age: 76
End: 2021-03-29

## 2021-03-29 DIAGNOSIS — Z23 ENCOUNTER FOR IMMUNIZATION: Primary | ICD-10-CM

## 2021-03-29 PROCEDURE — 0002A SARS-COV-2 / COVID-19 MRNA VACCINE (PFIZER-BIONTECH) 30 MCG: CPT

## 2021-03-29 PROCEDURE — 91300 SARS-COV-2 / COVID-19 MRNA VACCINE (PFIZER-BIONTECH) 30 MCG: CPT

## 2021-04-01 ENCOUNTER — APPOINTMENT (OUTPATIENT)
Dept: LAB | Facility: MEDICAL CENTER | Age: 76
End: 2021-04-01
Payer: COMMERCIAL

## 2021-04-01 DIAGNOSIS — N18.9 CHRONIC RENAL IMPAIRMENT, UNSPECIFIED CKD STAGE: ICD-10-CM

## 2021-04-01 LAB
ANION GAP SERPL CALCULATED.3IONS-SCNC: 5 MMOL/L (ref 4–13)
BUN SERPL-MCNC: 40 MG/DL (ref 5–25)
CALCIUM SERPL-MCNC: 9.3 MG/DL (ref 8.3–10.1)
CHLORIDE SERPL-SCNC: 104 MMOL/L (ref 100–108)
CO2 SERPL-SCNC: 31 MMOL/L (ref 21–32)
CREAT SERPL-MCNC: 2.07 MG/DL (ref 0.6–1.3)
GFR SERPL CREATININE-BSD FRML MDRD: 22 ML/MIN/1.73SQ M
GLUCOSE P FAST SERPL-MCNC: 96 MG/DL (ref 65–99)
POTASSIUM SERPL-SCNC: 3.2 MMOL/L (ref 3.5–5.3)
SODIUM SERPL-SCNC: 140 MMOL/L (ref 136–145)

## 2021-04-01 PROCEDURE — 36415 COLL VENOUS BLD VENIPUNCTURE: CPT

## 2021-04-01 PROCEDURE — 80048 BASIC METABOLIC PNL TOTAL CA: CPT

## 2021-04-05 ENCOUNTER — OFFICE VISIT (OUTPATIENT)
Dept: NEPHROLOGY | Facility: CLINIC | Age: 76
End: 2021-04-05
Payer: COMMERCIAL

## 2021-04-05 VITALS
RESPIRATION RATE: 16 BRPM | DIASTOLIC BLOOD PRESSURE: 80 MMHG | HEART RATE: 70 BPM | HEIGHT: 67 IN | BODY MASS INDEX: 37.51 KG/M2 | WEIGHT: 239 LBS | SYSTOLIC BLOOD PRESSURE: 140 MMHG

## 2021-04-05 DIAGNOSIS — I12.9 NEPHROSCLEROSIS, STAGE 1-4 OR UNSPECIFIED CHRONIC KIDNEY DISEASE: Primary | ICD-10-CM

## 2021-04-05 PROBLEM — E87.1 HYPONATREMIA: Status: RESOLVED | Noted: 2020-12-03 | Resolved: 2021-04-05

## 2021-04-05 PROCEDURE — 1160F RVW MEDS BY RX/DR IN RCRD: CPT | Performed by: INTERNAL MEDICINE

## 2021-04-05 PROCEDURE — 3077F SYST BP >= 140 MM HG: CPT | Performed by: INTERNAL MEDICINE

## 2021-04-05 PROCEDURE — 99214 OFFICE O/P EST MOD 30 MIN: CPT | Performed by: INTERNAL MEDICINE

## 2021-04-05 PROCEDURE — 1036F TOBACCO NON-USER: CPT | Performed by: INTERNAL MEDICINE

## 2021-04-05 PROCEDURE — 3079F DIAST BP 80-89 MM HG: CPT | Performed by: INTERNAL MEDICINE

## 2021-04-05 NOTE — PROGRESS NOTES
NEPHROLOGY PROGRESS NOTE    Amisha Juares 64 y o  female MRN: [id number]  Unit/Bed#:  Encounter: [id number]  Reason for Consult:  Renal insufficiency    Patient is here for routine follow-up she looks and feels well  She did tell me she was hospitalized recently with a severe urinary tract infection in urine sepsis  She then went to rehab  She also was hospitalized at some point for fractured ribs  We reviewed her medications  She did tell me she is no longer on her diabetes medication blood sugars have been good  ASSESSMENT/PLAN:  1  Renal    History of chronic renal insufficiency likely due to nephrosclerosis given lack of proteinuria  Last time was measured urine microalbumin was negative  Urinalysis in the hospital showed some protein but was infected at the time so likely contaminated  Creatinine tends to range 1 5-2 and her latest creatinine is 2  Likely fluctuates due to her diuretic use which is necessary to prevent worsening edema  At this point creatinine is been fluctuating but stable over the last year and a half  In the hospital creatinine was lower but she is on fluids and her diuretics were held but she said she swelled up  Continue current medications  Labs and follow-up as scheduled  BMP in urine microalbumin screens    She told me her family physician is aware that she no longer requires diabetic medication and her blood glucose on her most recent BMP was less than 100  Blood pressure is well controlled  No changes in medications  SUBJECTIVE:  Review of Systems   Constitution: Negative for chills, fever, malaise/fatigue and night sweats  HENT: Negative  Eyes: Negative  Cardiovascular: Negative  Negative for chest pain, dyspnea on exertion, orthopnea and palpitations  Respiratory: Negative for cough, sputum production and wheezing  Gastrointestinal: Negative for abdominal pain, diarrhea, nausea and vomiting     Genitourinary: Negative for dysuria, flank pain, hematuria and incomplete emptying  Neurological: Negative for dizziness, focal weakness, headaches and weakness  Psychiatric/Behavioral: Negative for altered mental status, depression, hallucinations and hypervigilance  OBJECTIVE:  Current Weight: Weight - Scale: 108 kg (239 lb)  [email or web address]:     Blood pressure 140/80, pulse 70, resp  rate 16, height 5' 6 93" (1 7 m), weight 108 kg (239 lb), not currently breastfeeding  , Body mass index is 37 51 kg/m²  [unfilled]    Physical Exam: /80 (BP Location: Right arm, Patient Position: Sitting, Cuff Size: Standard)   Pulse 70   Resp 16   Ht 5' 6 93" (1 7 m)   Wt 108 kg (239 lb)   BMI 37 51 kg/m²   Physical Exam  Constitutional:       General: She is not in acute distress  Appearance: She is not ill-appearing or diaphoretic  HENT:      Head: Normocephalic and atraumatic  Nose:      Comments: Mask     Mouth/Throat:      Comments: Mask  Eyes:      General: No scleral icterus  Extraocular Movements: Extraocular movements intact  Neck:      Musculoskeletal: Normal range of motion and neck supple  Cardiovascular:      Rate and Rhythm: Normal rate and regular rhythm  Heart sounds: No friction rub  No gallop  Comments: Mild edema  Pulmonary:      Effort: Pulmonary effort is normal  No respiratory distress  Breath sounds: Normal breath sounds  No wheezing or rales  Abdominal:      General: Bowel sounds are normal  There is no distension  Palpations: Abdomen is soft  Tenderness: There is no abdominal tenderness  There is no rebound  Neurological:      General: No focal deficit present  Mental Status: She is alert and oriented to person, place, and time  Mental status is at baseline  Psychiatric:         Mood and Affect: Mood normal          Behavior: Behavior normal          Thought Content:  Thought content normal          Judgment: Judgment normal          Medications:    Current Outpatient Medications:     albuterol (PROAIR HFA) 90 mcg/act inhaler, Inhale 2 puffs every 6 (six) hours as needed for wheezing, Disp: 8 5 g, Rfl: 3    aspirin (ASPIRIN ADULT LOW DOSE) 81 mg EC tablet, Take 1 tablet by mouth daily, Disp: , Rfl:     cholecalciferol (VITAMIN D3) 1,000 units tablet, Take 1,000 Units by mouth daily, Disp: , Rfl:     citalopram (CeleXA) 40 mg tablet, Take 1 tablet (40 mg total) by mouth daily, Disp: 90 tablet, Rfl: 1    febuxostat (ULORIC) 80 MG TABS, Take 1 tablet by mouth daily, Disp: , Rfl:     furosemide (LASIX) 40 mg tablet, Take 1 tablet (40 mg total) by mouth daily, Disp: 30 tablet, Rfl: 5    gabapentin (NEURONTIN) 100 mg capsule, Take 1 tablet by mouth 3 (three) times a day , Disp: , Rfl:     levothyroxine 50 mcg tablet, Take 1 tablet (50 mcg total) by mouth daily, Disp: 30 tablet, Rfl: 5    methocarbamol (ROBAXIN) 500 mg tablet, Take 1 tablet (500 mg total) by mouth every 6 (six) hours, Disp: 30 tablet, Rfl: 0    metoprolol tartrate (LOPRESSOR) 25 mg tablet, Take 1 tablet (25 mg total) by mouth daily, Disp: 30 tablet, Rfl: 5    mupirocin (BACTROBAN) 2 % ointment, Apply topically 2 (two) times a day, Disp: 22 g, Rfl: 0    polyethylene glycol (MIRALAX) 17 g packet, Take 17 g by mouth daily, Disp: 30 each, Rfl: 0    potassium chloride (K-DUR,KLOR-CON) 20 mEq tablet, TAKE ONE TABLET BY MOUTH TWICE DAILY , Disp: 60 tablet, Rfl: 0    predniSONE 5 mg tablet, Take 5 mg by mouth daily, Disp: , Rfl:     simvastatin (ZOCOR) 10 mg tablet, Take 1 tablet (10 mg total) by mouth daily, Disp: 30 tablet, Rfl: 5    metolazone (ZAROXOLYN) 2 5 mg tablet, TAKE ONE TABLET BY MOUTH THREE TIMES A WEEK on monday, wednesday and friday   (Patient not taking: Reported on 4/5/2021), Disp: 12 tablet, Rfl: 0    Laboratory Results:  Lab Results   Component Value Date    WBC 7 67 12/08/2020    HGB 9 8 (L) 12/08/2020    HCT 31 6 (L) 12/08/2020    MCV 96 12/08/2020     12/08/2020     Lab Results Component Value Date    SODIUM 140 04/01/2021    K 3 2 (L) 04/01/2021     04/01/2021    CO2 31 04/01/2021    BUN 40 (H) 04/01/2021    CREATININE 2 07 (H) 04/01/2021    GLUC 104 12/08/2020    CALCIUM 9 3 04/01/2021     Lab Results   Component Value Date    CALCIUM 9 3 04/01/2021    PHOS 3 0 12/08/2020     No results found for: LABPROT

## 2021-04-05 NOTE — LETTER
April 5, 2021     Rain Jacobs, [address] [facility] [address]  [address] [facility]    Patient: Angel Wong   YOB: 1945   Date of Visit: 4/5/2021       Dear Dr Jolie Alejo:    Thank you for referring Angel Wong to me for evaluation  Below are my notes for this consultation  If you have questions, please do not hesitate to call me  I look forward to following your patient along with you  Sincerely,        Nidhi Anderson MD        CC: No Recipients  Nidhi Anderson MD  4/5/2021 10:04 AM  Sign when Signing Visit  NEPHROLOGY PROGRESS NOTE    Angel Wong 64 y o  female MRN: [id number]  Unit/Bed#:  Encounter: [id number]  Reason for Consult:  Renal insufficiency    Patient is here for routine follow-up she looks and feels well  She did tell me she was hospitalized recently with a severe urinary tract infection in urine sepsis  She then went to rehab  She also was hospitalized at some point for fractured ribs  We reviewed her medications  She did tell me she is no longer on her diabetes medication blood sugars have been good  ASSESSMENT/PLAN:  1  Renal    History of chronic renal insufficiency likely due to nephrosclerosis given lack of proteinuria  Last time was measured urine microalbumin was negative  Urinalysis in the hospital showed some protein but was infected at the time so likely contaminated  Creatinine tends to range 1 5-2 and her latest creatinine is 2  Likely fluctuates due to her diuretic use which is necessary to prevent worsening edema  At this point creatinine is been fluctuating but stable over the last year and a half  In the hospital creatinine was lower but she is on fluids and her diuretics were held but she said she swelled up      Continue current medications  Labs and follow-up as scheduled  BMP in urine microalbumin screens    She told me her family physician is aware that she no longer requires diabetic medication and her blood glucose on her most recent BMP was less than 100  Blood pressure is well controlled  No changes in medications  SUBJECTIVE:  Review of Systems   Constitution: Negative for chills, fever, malaise/fatigue and night sweats  HENT: Negative  Eyes: Negative  Cardiovascular: Negative  Negative for chest pain, dyspnea on exertion, orthopnea and palpitations  Respiratory: Negative for cough, sputum production and wheezing  Gastrointestinal: Negative for abdominal pain, diarrhea, nausea and vomiting  Genitourinary: Negative for dysuria, flank pain, hematuria and incomplete emptying  Neurological: Negative for dizziness, focal weakness, headaches and weakness  Psychiatric/Behavioral: Negative for altered mental status, depression, hallucinations and hypervigilance  OBJECTIVE:  Current Weight: Weight - Scale: 108 kg (239 lb)  [email or web address]     Blood pressure 140/80, pulse 70, resp  rate 16, height 5' 6 93" (1 7 m), weight 108 kg (239 lb), not currently breastfeeding  , Body mass index is 37 51 kg/m²  [unfilled]    Physical Exam: /80 (BP Location: Right arm, Patient Position: Sitting, Cuff Size: Standard)   Pulse 70   Resp 16   Ht 5' 6 93" (1 7 m)   Wt 108 kg (239 lb)   BMI 37 51 kg/m²   Physical Exam  Constitutional:       General: She is not in acute distress  Appearance: She is not ill-appearing or diaphoretic  HENT:      Head: Normocephalic and atraumatic  Nose:      Comments: Mask     Mouth/Throat:      Comments: Mask  Eyes:      General: No scleral icterus  Extraocular Movements: Extraocular movements intact  Neck:      Musculoskeletal: Normal range of motion and neck supple  Cardiovascular:      Rate and Rhythm: Normal rate and regular rhythm  Heart sounds: No friction rub  No gallop  Comments: Mild edema  Pulmonary:      Effort: Pulmonary effort is normal  No respiratory distress  Breath sounds: Normal breath sounds  No wheezing or rales  Abdominal:      General: Bowel sounds are normal  There is no distension  Palpations: Abdomen is soft  Tenderness: There is no abdominal tenderness  There is no rebound  Neurological:      General: No focal deficit present  Mental Status: She is alert and oriented to person, place, and time  Mental status is at baseline  Psychiatric:         Mood and Affect: Mood normal          Behavior: Behavior normal          Thought Content:  Thought content normal          Judgment: Judgment normal          Medications:    Current Outpatient Medications:     albuterol (PROAIR HFA) 90 mcg/act inhaler, Inhale 2 puffs every 6 (six) hours as needed for wheezing, Disp: 8 5 g, Rfl: 3    aspirin (ASPIRIN ADULT LOW DOSE) 81 mg EC tablet, Take 1 tablet by mouth daily, Disp: , Rfl:     cholecalciferol (VITAMIN D3) 1,000 units tablet, Take 1,000 Units by mouth daily, Disp: , Rfl:     citalopram (CeleXA) 40 mg tablet, Take 1 tablet (40 mg total) by mouth daily, Disp: 90 tablet, Rfl: 1    febuxostat (ULORIC) 80 MG TABS, Take 1 tablet by mouth daily, Disp: , Rfl:     furosemide (LASIX) 40 mg tablet, Take 1 tablet (40 mg total) by mouth daily, Disp: 30 tablet, Rfl: 5    gabapentin (NEURONTIN) 100 mg capsule, Take 1 tablet by mouth 3 (three) times a day , Disp: , Rfl:     levothyroxine 50 mcg tablet, Take 1 tablet (50 mcg total) by mouth daily, Disp: 30 tablet, Rfl: 5    methocarbamol (ROBAXIN) 500 mg tablet, Take 1 tablet (500 mg total) by mouth every 6 (six) hours, Disp: 30 tablet, Rfl: 0    metoprolol tartrate (LOPRESSOR) 25 mg tablet, Take 1 tablet (25 mg total) by mouth daily, Disp: 30 tablet, Rfl: 5    mupirocin (BACTROBAN) 2 % ointment, Apply topically 2 (two) times a day, Disp: 22 g, Rfl: 0    polyethylene glycol (MIRALAX) 17 g packet, Take 17 g by mouth daily, Disp: 30 each, Rfl: 0    potassium chloride (K-DUR,KLOR-CON) 20 mEq tablet, TAKE ONE TABLET BY MOUTH TWICE DAILY , Disp: 60 tablet, Rfl: 0   predniSONE 5 mg tablet, Take 5 mg by mouth daily, Disp: , Rfl:     simvastatin (ZOCOR) 10 mg tablet, Take 1 tablet (10 mg total) by mouth daily, Disp: 30 tablet, Rfl: 5    metolazone (ZAROXOLYN) 2 5 mg tablet, TAKE ONE TABLET BY MOUTH THREE TIMES A WEEK on monday, wednesday and friday   (Patient not taking: Reported on 4/5/2021), Disp: 12 tablet, Rfl: 0    Laboratory Results:  Lab Results   Component Value Date    WBC 7 67 12/08/2020    HGB 9 8 (L) 12/08/2020    HCT 31 6 (L) 12/08/2020    MCV 96 12/08/2020     12/08/2020     Lab Results   Component Value Date    SODIUM 140 04/01/2021    K 3 2 (L) 04/01/2021     04/01/2021    CO2 31 04/01/2021    BUN 40 (H) 04/01/2021    CREATININE 2 07 (H) 04/01/2021    GLUC 104 12/08/2020    CALCIUM 9 3 04/01/2021     Lab Results   Component Value Date    CALCIUM 9 3 04/01/2021    PHOS 3 0 12/08/2020     No results found for: LABPROT

## 2021-04-05 NOTE — PATIENT INSTRUCTIONS
You are here for follow-up in you told me that in between our visit your hospitalized a couple time 1 for severe urinary tract infection  Your recent blood work after your hospitalization showed a creatinine of 2  That is the blood test for the kidney function and over the last year year and a half you range 1 5-2  I do not think it is from diabetic kidney disease because there was no protein in your urine in the past when we measured it  That is a good thing  It fluctuates probably from the water pills to help keep the swelling down  Regardless I would continue all your medications as they are things seem to be back to her baseline will continue to follow      Labs and follow-up as scheduled  No changes in medications  Stay well

## 2021-04-28 DIAGNOSIS — E87.6 HYPOKALEMIA: ICD-10-CM

## 2021-04-28 DIAGNOSIS — E03.9 HYPOTHYROIDISM, UNSPECIFIED TYPE: ICD-10-CM

## 2021-04-28 RX ORDER — POTASSIUM CHLORIDE 20 MEQ/1
20 TABLET, EXTENDED RELEASE ORAL 2 TIMES DAILY
Qty: 60 TABLET | Refills: 5 | Status: SHIPPED | OUTPATIENT
Start: 2021-04-28 | End: 2021-10-06

## 2021-04-28 RX ORDER — LEVOTHYROXINE SODIUM 0.05 MG/1
TABLET ORAL
Qty: 30 TABLET | Refills: 0 | Status: SHIPPED | OUTPATIENT
Start: 2021-04-28 | End: 2021-06-01 | Stop reason: SDUPTHER

## 2021-05-14 DIAGNOSIS — E78.2 MIXED HYPERLIPIDEMIA: ICD-10-CM

## 2021-05-15 RX ORDER — SIMVASTATIN 10 MG
TABLET ORAL
Qty: 30 TABLET | Refills: 1 | Status: SHIPPED | OUTPATIENT
Start: 2021-05-15 | End: 2021-07-26 | Stop reason: SDUPTHER

## 2021-05-26 ENCOUNTER — OFFICE VISIT (OUTPATIENT)
Dept: PODIATRY | Facility: CLINIC | Age: 76
End: 2021-05-26
Payer: COMMERCIAL

## 2021-05-26 ENCOUNTER — APPOINTMENT (OUTPATIENT)
Dept: RADIOLOGY | Facility: CLINIC | Age: 76
End: 2021-05-26
Payer: COMMERCIAL

## 2021-05-26 VITALS
HEIGHT: 67 IN | DIASTOLIC BLOOD PRESSURE: 60 MMHG | BODY MASS INDEX: 37.48 KG/M2 | WEIGHT: 238.8 LBS | SYSTOLIC BLOOD PRESSURE: 118 MMHG

## 2021-05-26 DIAGNOSIS — M20.41 ACQUIRED HAMMER TOE OF RIGHT FOOT: ICD-10-CM

## 2021-05-26 DIAGNOSIS — M20.41 ACQUIRED HAMMER TOE OF RIGHT FOOT: Primary | ICD-10-CM

## 2021-05-26 PROCEDURE — 73630 X-RAY EXAM OF FOOT: CPT

## 2021-05-26 PROCEDURE — 99212 OFFICE O/P EST SF 10 MIN: CPT | Performed by: PODIATRIST

## 2021-05-26 RX ORDER — TRAMADOL HYDROCHLORIDE 50 MG/1
50 TABLET ORAL DAILY
COMMUNITY
Start: 2021-03-23 | End: 2022-03-23

## 2021-06-01 DIAGNOSIS — E03.9 HYPOTHYROIDISM, UNSPECIFIED TYPE: ICD-10-CM

## 2021-06-01 DIAGNOSIS — F32.A DEPRESSION, UNSPECIFIED DEPRESSION TYPE: ICD-10-CM

## 2021-06-01 RX ORDER — CITALOPRAM 40 MG/1
40 TABLET ORAL DAILY
Qty: 90 TABLET | Refills: 1 | Status: SHIPPED | OUTPATIENT
Start: 2021-06-01 | End: 2021-11-24

## 2021-06-01 RX ORDER — LEVOTHYROXINE SODIUM 0.05 MG/1
50 TABLET ORAL DAILY
Qty: 90 TABLET | Refills: 1 | Status: SHIPPED | OUTPATIENT
Start: 2021-06-01 | End: 2021-08-30 | Stop reason: SDUPTHER

## 2021-06-09 ENCOUNTER — OFFICE VISIT (OUTPATIENT)
Dept: PODIATRY | Facility: CLINIC | Age: 76
End: 2021-06-09
Payer: COMMERCIAL

## 2021-06-09 VITALS
BODY MASS INDEX: 37.98 KG/M2 | SYSTOLIC BLOOD PRESSURE: 122 MMHG | DIASTOLIC BLOOD PRESSURE: 62 MMHG | HEIGHT: 67 IN | WEIGHT: 242 LBS

## 2021-06-09 DIAGNOSIS — M20.41 ACQUIRED HAMMER TOE OF RIGHT FOOT: Primary | ICD-10-CM

## 2021-06-09 DIAGNOSIS — E11.9 TYPE 2 DIABETES MELLITUS WITHOUT COMPLICATION, WITHOUT LONG-TERM CURRENT USE OF INSULIN (HCC): ICD-10-CM

## 2021-06-09 PROCEDURE — 99213 OFFICE O/P EST LOW 20 MIN: CPT | Performed by: PODIATRIST

## 2021-06-09 NOTE — PROGRESS NOTES
Assessment/Plan:    Reviewed x-rays of right foot with patient  Hammertoe deformities are noted 2nd, 3rd and 4th toes right foot  Trimmed hyperkeratotic lesion right 3rd toe  No evidence of ulceration  Explained to patient that in order to correct and attempt to eliminate the painful corn at the tip of the right 3rd toe, a PIPJ arthroplasty is needed  However, due to the deformities at the  2nd and 4th toes right foot, these digit should also be addressed surgically  Patient to consider  For now we will continue with routine care  She will be reassessed in 6 weeks  No problem-specific Assessment & Plan notes found for this encounter  There are no diagnoses linked to this encounter  Subjective:      Patient ID: Kendal Hernández is a 64 y o  female  HPI    Patient, a 68-year-old type 2 diabetic presents for assessment of her right 3rd toe  Patient has a corn on the tip of the toe that has been ulcerated in recent weeks  The ulcer is now gone  Patient was referred for x-rays to assess her digits  The right 2nd 3rd and 4th toes are all hammertoe deformities  Only the right 3rd toe is currently painful  I personally reviewed x-rays of the toes of the right foot dated 05/26/2021  Hammertoe deformities of the right 2nd, 3rd and 4th toes noted  I personally reviewed glucose taken on 02/10/2021  It was 178  I personally reviewed basic metabolic panel taken 42/69/6689  Blood glucose was 91  The following portions of the patient's history were reviewed and updated as appropriate: allergies, current medications, past family history, past medical history, past social history, past surgical history and problem list     Review of Systems   Genitourinary:        History of renal disease   Musculoskeletal: Positive for arthralgias  Neurological: Negative for numbness                   Objective:      /62   Ht 5' 6 93" (1 7 m)   Wt 110 kg (242 lb)   BMI 37 98 kg/m² Physical Exam  Constitutional:       Appearance: She is obese  Cardiovascular:      Comments: Dorsalis pedis pulse +1/4 bilateral; posterior tibial pulse +1/4 bilateral   Moderate edema present both feet  Musculoskeletal:         General: Tenderness and deformity present  Comments: Hammertoe deformities 2nd, 3rd and 4th toes right foot  Skin:     Findings: Lesion present  Comments: Hyperkeratotic lesion tip of right 3rd toe

## 2021-07-01 ENCOUNTER — APPOINTMENT (OUTPATIENT)
Dept: LAB | Facility: MEDICAL CENTER | Age: 76
End: 2021-07-01
Payer: COMMERCIAL

## 2021-07-01 DIAGNOSIS — E03.9 HYPOTHYROIDISM, UNSPECIFIED TYPE: ICD-10-CM

## 2021-07-01 DIAGNOSIS — E78.2 MIXED HYPERLIPIDEMIA: ICD-10-CM

## 2021-07-01 LAB
CHOLEST SERPL-MCNC: 163 MG/DL (ref 50–200)
HDLC SERPL-MCNC: 72 MG/DL
LDLC SERPL CALC-MCNC: 68 MG/DL (ref 0–100)
T4 FREE SERPL-MCNC: 0.93 NG/DL (ref 0.76–1.46)
TRIGL SERPL-MCNC: 116 MG/DL
TSH SERPL DL<=0.05 MIU/L-ACNC: 1.89 UIU/ML (ref 0.36–3.74)

## 2021-07-01 PROCEDURE — 84439 ASSAY OF FREE THYROXINE: CPT

## 2021-07-01 PROCEDURE — 84443 ASSAY THYROID STIM HORMONE: CPT

## 2021-07-01 PROCEDURE — 36415 COLL VENOUS BLD VENIPUNCTURE: CPT

## 2021-07-01 PROCEDURE — 80061 LIPID PANEL: CPT

## 2021-07-07 ENCOUNTER — OFFICE VISIT (OUTPATIENT)
Dept: ENDOCRINOLOGY | Facility: CLINIC | Age: 76
End: 2021-07-07
Payer: COMMERCIAL

## 2021-07-07 VITALS
SYSTOLIC BLOOD PRESSURE: 118 MMHG | HEIGHT: 66 IN | WEIGHT: 246.6 LBS | DIASTOLIC BLOOD PRESSURE: 68 MMHG | BODY MASS INDEX: 39.63 KG/M2 | HEART RATE: 63 BPM

## 2021-07-07 DIAGNOSIS — E11.22 TYPE 2 DIABETES MELLITUS WITH CHRONIC KIDNEY DISEASE, WITHOUT LONG-TERM CURRENT USE OF INSULIN, UNSPECIFIED CKD STAGE (HCC): Primary | ICD-10-CM

## 2021-07-07 DIAGNOSIS — E03.9 HYPOTHYROIDISM, UNSPECIFIED TYPE: ICD-10-CM

## 2021-07-07 PROCEDURE — 1036F TOBACCO NON-USER: CPT | Performed by: INTERNAL MEDICINE

## 2021-07-07 PROCEDURE — 1160F RVW MEDS BY RX/DR IN RCRD: CPT | Performed by: INTERNAL MEDICINE

## 2021-07-07 PROCEDURE — 99214 OFFICE O/P EST MOD 30 MIN: CPT | Performed by: INTERNAL MEDICINE

## 2021-07-07 NOTE — PROGRESS NOTES
James Rueda 64 y o  female MRN: [id number]    Encounter: [id number]      Assessment/Plan     Problem List Items Addressed This Visit        Endocrine    Hypothyroidism     Continue levothyroxine         Type 2 diabetes mellitus with chronic kidney disease, without long-term current use of insulin ([facility] [address] ) - Primary       Lab Results   Component Value Date    HGBA1C 6 1 (H) 01/13/2021   Continue dietary modifications, she will follow-up with primary care               CC: Diabetes    History of Present Illness     HPI:  68-year-old female with type 2 diabetes, hypothyroidism, chronic kidney disease seen in follow-up  She has been of Januvia in the past few months     no polyuria , polydpsia , no blurry vision , no numbness and tingling in feet   Weight stable    Review of Systems    Historical Information   Past Medical History:   Diagnosis Date    Abnormal blood chemistry     last assessed 11/13/2013    Angina pectoris ([facility] [address] )     last assessed 11/012/13    Arthritis     last assessed 11/13/2013    Asthma     Athlete's foot     resolved 10/11/17    Cataract     last assessed 03/18/2016    Diabetes mellitus ([facility] [address] )     type 2    Diabetes mellitus with kidney disease ([facility] [address] )     last assessed 10/03/17    Eczema     Gait disorder     last assessed 11/12/2013    Idiopathic thrombocytopenia purpura ([facility] [address] )     last assessed 08/06/15    Overactive bladder     Post menopausal syndrome     resolved 10/11/17    Restless legs syndrome     last assessed 10/24/2013    Subclinical hypothyroidism     last assessed 12/11/17    Urinary frequency     resolved 12/18/2014     Past Surgical History:   Procedure Laterality Date    ACHILLES TENDON REPAIR      REPLACEMENT TOTAL KNEE       Social History   Social History     Substance and Sexual Activity   Alcohol Use Yes    Comment: occasional     Social History     Substance and Sexual Activity   Drug Use No     Social History     Tobacco Use   Smoking Status Never Smoker Smokeless Tobacco Never Used   Tobacco Comment    per allscripts; former smoker     Family History:   Family History   Problem Relation Age of Onset    Leukemia Mother     Eczema Father     Diabetes type II Father     Diabetes type II Maternal Aunt        Meds/Allergies   Current Outpatient Medications   Medication Sig Dispense Refill    albuterol (PROAIR HFA) 90 mcg/act inhaler Inhale 2 puffs every 6 (six) hours as needed for wheezing 8 5 g 3    aspirin (ASPIRIN ADULT LOW DOSE) 81 mg EC tablet Take 1 tablet by mouth daily      cholecalciferol (VITAMIN D3) 1,000 units tablet Take 1,000 Units by mouth daily      citalopram (CeleXA) 40 mg tablet Take 1 tablet (40 mg total) by mouth daily 90 tablet 1    febuxostat (ULORIC) 80 MG TABS Take 1 tablet by mouth daily      furosemide (LASIX) 40 mg tablet Take 1 tablet (40 mg total) by mouth daily 30 tablet 5    gabapentin (NEURONTIN) 100 mg capsule Take 1 tablet by mouth 3 (three) times a day       levothyroxine 50 mcg tablet Take 1 tablet (50 mcg total) by mouth daily 90 tablet 1    metolazone (ZAROXOLYN) 2 5 mg tablet TAKE ONE TABLET BY MOUTH THREE TIMES A WEEK on monday, wednesday and friday   12 tablet 0    metoprolol tartrate (LOPRESSOR) 25 mg tablet Take 1 tablet (25 mg total) by mouth daily 30 tablet 5    mupirocin (BACTROBAN) 2 % ointment Apply topically 2 (two) times a day 22 g 0    potassium chloride (K-DUR,KLOR-CON) 20 mEq tablet Take 1 tablet (20 mEq total) by mouth 2 (two) times a day 60 tablet 5    predniSONE 5 mg tablet Take 5 mg by mouth daily      simvastatin (ZOCOR) 10 mg tablet TAKE ONE TABLET BY MOUTH EVERY DAY  30 tablet 1    traMADol (ULTRAM) 50 mg tablet Take 50 mg by mouth daily      methocarbamol (ROBAXIN) 500 mg tablet Take 1 tablet (500 mg total) by mouth every 6 (six) hours (Patient not taking: Reported on 7/7/2021) 30 tablet 0    polyethylene glycol (MIRALAX) 17 g packet Take 17 g by mouth daily (Patient not taking: Reported on 7/7/2021) 30 each 0     No current facility-administered medications for this visit  Allergies   Allergen Reactions    Ace Inhibitors Angioedema    Prochlorperazine Anaphylaxis     lethargy, tongue and throat swelling    Ramipril Anaphylaxis     angioedema    Angiotensin Receptor Blockers        Objective   Vitals: Blood pressure 118/68, pulse 63, height 5' 6" (1 676 m), weight 112 kg (246 lb 9 6 oz), not currently breastfeeding  Physical Exam  Vitals reviewed  Constitutional:       Appearance: Normal appearance  She is obese  She is not ill-appearing or diaphoretic  HENT:      Head: Normocephalic and atraumatic  Eyes:      General: No scleral icterus  Extraocular Movements: Extraocular movements intact  Cardiovascular:      Rate and Rhythm: Normal rate and regular rhythm  Pulses:           Dorsalis pedis pulses are 1+ on the right side and 1+ on the left side  Heart sounds: Normal heart sounds  No murmur heard  Pulmonary:      Effort: Pulmonary effort is normal  No respiratory distress  Breath sounds: Normal breath sounds  No wheezing  Abdominal:      Palpations: Abdomen is soft  Musculoskeletal:      Cervical back: Neck supple  Right lower leg: No edema  Left lower leg: No edema  Feet:      Right foot:      Skin integrity: Callus and dry skin present  No ulcer, skin breakdown, erythema or warmth  Left foot:      Skin integrity: Callus and dry skin present  No ulcer, skin breakdown, erythema or warmth  Lymphadenopathy:      Cervical: No cervical adenopathy  Skin:     General: Skin is dry  Neurological:      General: No focal deficit present  Mental Status: She is alert and oriented to person, place, and time  Gait: Gait abnormal    Psychiatric:         Mood and Affect: Mood normal          Behavior: Behavior normal          Thought Content:  Thought content normal          Judgment: Judgment normal            Patient's shoes and socks removed  Right Foot/Ankle   Right Foot Inspection  Skin Exam: skin normal, skin intact, dry skin, callus and callus no warmth, no erythema, no maceration, no abnormal color, no pre-ulcer and no ulcer                            Sensory       Monofilament testing: diminished  Vascular    The right DP pulse is 1+  Left Foot/Ankle  Left Foot Inspection  Skin Exam: skin normal, skin intact, dry skin and callusno warmth, no erythema, no maceration, normal color, no pre-ulcer and no ulcer                                         Sensory       Monofilament: diminished  Vascular    The left DP pulse is 1+  Assign Risk Category:  Deformity present; No loss of protective sensation;        Risk: 1    The history was obtained from the review of the chart, patient      Lab Results:   Lab Results   Component Value Date/Time    Hemoglobin A1C 6 1 (H) 01/13/2021 10:25 AM    Hemoglobin A1C 6 1 01/13/2021 12:00 AM    Hemoglobin A1C 6 8 (H) 09/14/2020 08:58 AM    Hemoglobin A1C 6 8 09/14/2020 12:00 AM    WBC 7 67 12/08/2020 05:28 AM    WBC 8 18 12/07/2020 05:24 AM    WBC 14 23 (H) 12/01/2020 11:28 AM    Hemoglobin 9 8 (L) 12/08/2020 05:28 AM    Hemoglobin 10 0 (L) 12/07/2020 05:24 AM    Hemoglobin 12 0 12/01/2020 11:28 AM    Hematocrit 31 6 (L) 12/08/2020 05:28 AM    Hematocrit 31 7 (L) 12/07/2020 05:24 AM    Hematocrit 37 5 12/01/2020 11:28 AM    MCV 96 12/08/2020 05:28 AM    MCV 96 12/07/2020 05:24 AM    MCV 95 12/01/2020 11:28 AM    Platelets 856 62/75/6058 05:28 AM    Platelets 780 75/36/3848 05:24 AM    Platelets 554 05/53/4943 05:09 AM    BUN 40 (H) 04/01/2021 09:09 AM    BUN 32 (H) 02/22/2021 08:21 AM    BUN 53 (H) 12/08/2020 05:28 AM    Potassium 3 2 (L) 04/01/2021 09:09 AM    Potassium 4 1 02/22/2021 08:21 AM    Potassium 3 3 (L) 12/08/2020 05:28 AM    Chloride 104 04/01/2021 09:09 AM    Chloride 107 02/22/2021 08:21 AM    Chloride 97 (L) 12/08/2020 05:28 AM    CO2 31 04/01/2021 09:09 AM    CO2 29 02/22/2021 08:21 AM    CO2 36 (H) 12/08/2020 05:28 AM    Creatinine 2 07 (H) 04/01/2021 09:09 AM    Creatinine 1 68 (H) 02/22/2021 08:21 AM    Creatinine 1 52 (H) 12/08/2020 05:28 AM    HDL, Direct 72 07/01/2021 09:33 AM    Triglycerides 116 07/01/2021 09:33 AM         Portions of the record may have been created with voice recognition software  Occasional wrong word or "sound a like" substitutions may have occurred due to the inherent limitations of voice recognition software  Read the chart carefully and recognize, using context, where substitutions have occurred

## 2021-07-08 ENCOUNTER — TELEPHONE (OUTPATIENT)
Dept: ADMINISTRATIVE | Facility: OTHER | Age: 76
End: 2021-07-08

## 2021-07-08 NOTE — TELEPHONE ENCOUNTER
----- Message from Elizabet Valenzuela sent at 7/7/2021 11:03 AM EDT -----  07/07/21 11:03 AM    Hello, our patient Mitchell Vega has had diabetic eye exam completed/performed  Please assist in obtaining the exclusion documentation by Phyllis Guerra  The date of service is 03/2021      Thank you,  Elizabet Valenzuela  [facility]

## 2021-07-08 NOTE — LETTER
Diabetic Eye Exam Form    Date Requested: 21  Patient: James Rueda  Patient : 1945   Referring Provider: Jose Armando Nielsen DO    Dilated Retinal Exam, Optomap-Iris Exam, or Fundus Photography Done         Yes ([place] one above)         No     Date of Diabetic Eye Exam ______________________________  Left Eye      Exam did show retinopathy    Exam did not show retinopathy         Mild       Moderate       None       Proliferative       Severe     Right Eye     Exam did show retinopathy    Exam did not show retinopathy         Mild       Moderate       None       Proliferative       Severe     Comments __________________________________________________________    Practice Providing Exam ______________________________________________    Exam Performed By (print name) _______________________________________      Provider Signature ___________________________________________________      These reports are needed for  compliance    Please fax this completed form and a copy of the Diabetic Eye Exam report to our office located at [place] [address] as soon as possible to [phone] juan Bello: Phone [phone]    We thank you for your assistance in treating our mutual patient

## 2021-07-08 NOTE — TELEPHONE ENCOUNTER
Upon review of the In Basket request and the patient's chart, initial outreach has been made via fax, please see Contacts section for details       Thank you  Ollie Jefferson

## 2021-07-09 NOTE — TELEPHONE ENCOUNTER
Upon review of the In Basket request we were able to locate, review, and update the patient chart as requested for Diabetic Eye Exam     Any additional questions or concerns should be emailed to the Practice Liaisons via [email or web address] email, please do not reply via In Basket      Thank you  Tres Morley

## 2021-07-09 NOTE — ASSESSMENT & PLAN NOTE
Lab Results   Component Value Date    EGFR 22 04/01/2021    EGFR 29 02/22/2021    EGFR 33 12/08/2020    CREATININE 2 07 (H) 04/01/2021    CREATININE 1 68 (H) 02/22/2021    CREATININE 1 52 (H) 12/08/2020   Continue follow-up with nephrologist

## 2021-07-09 NOTE — ASSESSMENT & PLAN NOTE
Lab Results   Component Value Date    HGBA1C 6 1 (H) 01/13/2021   Continue dietary modifications, she will follow-up with primary care

## 2021-07-26 DIAGNOSIS — E78.2 MIXED HYPERLIPIDEMIA: ICD-10-CM

## 2021-07-26 RX ORDER — SIMVASTATIN 10 MG
10 TABLET ORAL DAILY
Qty: 30 TABLET | Refills: 1 | Status: SHIPPED | OUTPATIENT
Start: 2021-07-26 | End: 2021-10-05 | Stop reason: SDUPTHER

## 2021-08-30 DIAGNOSIS — E03.9 HYPOTHYROIDISM, UNSPECIFIED TYPE: ICD-10-CM

## 2021-08-30 RX ORDER — LEVOTHYROXINE SODIUM 0.05 MG/1
50 TABLET ORAL DAILY
Qty: 90 TABLET | Refills: 1 | Status: SHIPPED | OUTPATIENT
Start: 2021-08-30 | End: 2021-11-30 | Stop reason: SDUPTHER

## 2021-09-07 DIAGNOSIS — I10 ESSENTIAL HYPERTENSION: ICD-10-CM

## 2021-09-09 DIAGNOSIS — R60.9 EDEMA, UNSPECIFIED TYPE: ICD-10-CM

## 2021-09-09 RX ORDER — FUROSEMIDE 40 MG/1
40 TABLET ORAL DAILY
Qty: 30 TABLET | Refills: 5 | Status: SHIPPED | OUTPATIENT
Start: 2021-09-09 | End: 2022-03-11 | Stop reason: SDUPTHER

## 2021-09-22 ENCOUNTER — APPOINTMENT (OUTPATIENT)
Dept: LAB | Facility: MEDICAL CENTER | Age: 76
End: 2021-09-22
Payer: COMMERCIAL

## 2021-09-22 DIAGNOSIS — I12.9 NEPHROSCLEROSIS, STAGE 1-4 OR UNSPECIFIED CHRONIC KIDNEY DISEASE: ICD-10-CM

## 2021-09-22 LAB
ANION GAP SERPL CALCULATED.3IONS-SCNC: 4 MMOL/L (ref 4–13)
BUN SERPL-MCNC: 28 MG/DL (ref 5–25)
CALCIUM SERPL-MCNC: 8.9 MG/DL (ref 8.3–10.1)
CHLORIDE SERPL-SCNC: 107 MMOL/L (ref 100–108)
CO2 SERPL-SCNC: 30 MMOL/L (ref 21–32)
CREAT SERPL-MCNC: 1.47 MG/DL (ref 0.6–1.3)
CREAT UR-MCNC: 101 MG/DL
GFR SERPL CREATININE-BSD FRML MDRD: 34 ML/MIN/1.73SQ M
GLUCOSE P FAST SERPL-MCNC: 86 MG/DL (ref 65–99)
MICROALBUMIN UR-MCNC: <5 MG/L (ref 0–20)
MICROALBUMIN/CREAT 24H UR: <5 MG/G CREATININE (ref 0–30)
POTASSIUM SERPL-SCNC: 3.8 MMOL/L (ref 3.5–5.3)
SODIUM SERPL-SCNC: 141 MMOL/L (ref 136–145)

## 2021-09-22 PROCEDURE — 36415 COLL VENOUS BLD VENIPUNCTURE: CPT

## 2021-09-22 PROCEDURE — 82043 UR ALBUMIN QUANTITATIVE: CPT

## 2021-09-22 PROCEDURE — 82570 ASSAY OF URINE CREATININE: CPT

## 2021-09-22 PROCEDURE — 80048 BASIC METABOLIC PNL TOTAL CA: CPT

## 2021-09-29 ENCOUNTER — RA CDI HCC (OUTPATIENT)
Dept: OTHER | Facility: HOSPITAL | Age: 76
End: 2021-09-29

## 2021-09-30 PROBLEM — I12.9 HYPERTENSIVE CHRONIC KIDNEY DISEASE WITH STAGE 1 THROUGH STAGE 4 CHRONIC KIDNEY DISEASE, OR UNSPECIFIED CHRONIC KIDNEY DISEASE: Status: ACTIVE | Noted: 2020-10-21

## 2021-10-05 DIAGNOSIS — E78.2 MIXED HYPERLIPIDEMIA: ICD-10-CM

## 2021-10-05 RX ORDER — SIMVASTATIN 10 MG
10 TABLET ORAL DAILY
Qty: 90 TABLET | Refills: 0 | Status: SHIPPED | OUTPATIENT
Start: 2021-10-05 | End: 2022-01-11 | Stop reason: SDUPTHER

## 2021-10-06 ENCOUNTER — OFFICE VISIT (OUTPATIENT)
Dept: PODIATRY | Facility: CLINIC | Age: 76
End: 2021-10-06
Payer: COMMERCIAL

## 2021-10-06 ENCOUNTER — OFFICE VISIT (OUTPATIENT)
Dept: FAMILY MEDICINE CLINIC | Facility: CLINIC | Age: 76
End: 2021-10-06
Payer: COMMERCIAL

## 2021-10-06 VITALS
SYSTOLIC BLOOD PRESSURE: 118 MMHG | BODY MASS INDEX: 39.37 KG/M2 | HEIGHT: 66 IN | DIASTOLIC BLOOD PRESSURE: 70 MMHG | WEIGHT: 245 LBS

## 2021-10-06 VITALS
TEMPERATURE: 97.4 F | HEART RATE: 65 BPM | HEIGHT: 66 IN | OXYGEN SATURATION: 96 % | SYSTOLIC BLOOD PRESSURE: 120 MMHG | BODY MASS INDEX: 39.37 KG/M2 | RESPIRATION RATE: 16 BRPM | WEIGHT: 245 LBS | DIASTOLIC BLOOD PRESSURE: 70 MMHG

## 2021-10-06 DIAGNOSIS — B35.1 ONYCHOMYCOSIS: Primary | ICD-10-CM

## 2021-10-06 DIAGNOSIS — D69.3 IDIOPATHIC THROMBOCYTOPENIC PURPURA (HCC): ICD-10-CM

## 2021-10-06 DIAGNOSIS — E11.9 TYPE 2 DIABETES MELLITUS WITHOUT COMPLICATION, WITHOUT LONG-TERM CURRENT USE OF INSULIN (HCC): Primary | ICD-10-CM

## 2021-10-06 DIAGNOSIS — N18.30 STAGE 3 CHRONIC KIDNEY DISEASE, UNSPECIFIED WHETHER STAGE 3A OR 3B CKD (HCC): ICD-10-CM

## 2021-10-06 DIAGNOSIS — E78.2 MIXED HYPERLIPIDEMIA: ICD-10-CM

## 2021-10-06 DIAGNOSIS — F33.9 DEPRESSION, RECURRENT (HCC): ICD-10-CM

## 2021-10-06 DIAGNOSIS — M79.7 FIBROMYALGIA: ICD-10-CM

## 2021-10-06 DIAGNOSIS — I73.9 PERIPHERAL VASCULAR DISEASE, UNSPECIFIED (HCC): ICD-10-CM

## 2021-10-06 DIAGNOSIS — E03.9 HYPOTHYROIDISM, UNSPECIFIED TYPE: ICD-10-CM

## 2021-10-06 PROCEDURE — 11720 DEBRIDE NAIL 1-5: CPT | Performed by: PODIATRIST

## 2021-10-06 PROCEDURE — 99214 OFFICE O/P EST MOD 30 MIN: CPT | Performed by: FAMILY MEDICINE

## 2021-10-06 RX ORDER — POTASSIUM CHLORIDE 1500 MG/1
TABLET, FILM COATED, EXTENDED RELEASE ORAL
COMMUNITY
Start: 2021-09-25 | End: 2021-11-10 | Stop reason: SDUPTHER

## 2021-10-12 ENCOUNTER — IMMUNIZATIONS (OUTPATIENT)
Dept: FAMILY MEDICINE CLINIC | Facility: CLINIC | Age: 76
End: 2021-10-12
Payer: COMMERCIAL

## 2021-10-12 DIAGNOSIS — Z23 NEED FOR VACCINATION: Primary | ICD-10-CM

## 2021-10-12 PROCEDURE — 90662 IIV NO PRSV INCREASED AG IM: CPT | Performed by: FAMILY MEDICINE

## 2021-10-12 PROCEDURE — G0008 ADMIN INFLUENZA VIRUS VAC: HCPCS | Performed by: FAMILY MEDICINE

## 2021-10-25 ENCOUNTER — OFFICE VISIT (OUTPATIENT)
Dept: NEPHROLOGY | Facility: CLINIC | Age: 76
End: 2021-10-25
Payer: COMMERCIAL

## 2021-10-25 VITALS
HEART RATE: 80 BPM | WEIGHT: 233 LBS | DIASTOLIC BLOOD PRESSURE: 60 MMHG | BODY MASS INDEX: 37.45 KG/M2 | RESPIRATION RATE: 17 BRPM | HEIGHT: 66 IN | SYSTOLIC BLOOD PRESSURE: 120 MMHG

## 2021-10-25 DIAGNOSIS — I12.9 NEPHROSCLEROSIS, STAGE 1-4 OR UNSPECIFIED CHRONIC KIDNEY DISEASE: ICD-10-CM

## 2021-10-25 DIAGNOSIS — N18.9 CHRONIC RENAL IMPAIRMENT, UNSPECIFIED CKD STAGE: Primary | ICD-10-CM

## 2021-10-25 PROBLEM — N18.30 STAGE 3 CHRONIC KIDNEY DISEASE (HCC): Status: RESOLVED | Noted: 2017-10-12 | Resolved: 2021-10-25

## 2021-10-25 PROCEDURE — 99214 OFFICE O/P EST MOD 30 MIN: CPT | Performed by: INTERNAL MEDICINE

## 2021-10-25 PROCEDURE — 1036F TOBACCO NON-USER: CPT | Performed by: INTERNAL MEDICINE

## 2021-10-25 PROCEDURE — 1160F RVW MEDS BY RX/DR IN RCRD: CPT | Performed by: INTERNAL MEDICINE

## 2021-10-27 NOTE — PROGRESS NOTES
[facility]      NAME:  Half  AGE: 66 y o  SEX: female  : 1945   MRN: [id number]    DATE: 2020  TIME: 3:03 PM    Assessment and Plan     Problem List Items Addressed This Visit     Chronic kidney disease, stage 3 ([facility] )      Kidney function stable  Creatinine 1 54 with GFR of 32  Will continue to monitor         Depression      Stable on citalopram 40 mg daily         Fibromyalgia      Continue follow-up with her rheumatologist, Dr Isis Herbert  No longer on prednisone  Stable on gabapentin 300 t i d  Has tramadol, but has not needed a in a while  Hyperlipidemia      Cholesterol doing well on simvastatin 10 mg daily  Will continue to monitor         Hypothyroidism      Doing well on levothyroxine 50 mcg daily  Will continue to monitor         Uncontrolled diabetes with stage 3 chronic kidney disease GFR 30-59 (HCC)       Lab Results   Component Value Date    HGBA1C 7 7 (H) 01/15/2020    current A1c 7 7%, was 7 5%  Patient is on reduced dosages of Januvia   ( 25 mg daily)  due to renal function  Will refer to endocrinology at this time  Gait abnormality       Patient states she has had a few falls recently, 1 resulting in sutures  No hospitalizations  Discussed referral to physical therapy for gait training  Patient refuses at this time due to high Co pays  I watched her ambulate in the office today  She is stable with the use of a cane    Will continue to monitor           Other Visit Diagnoses     Encounter for Medicare annual wellness exam    -  Primary    Morbid obesity with BMI of 40 0-44 9, adult ([facility] )                  Return to office in:  6 months (  Will hold off on lab orders for now due to referral to endocrinology, but she is due for full labs in July)    Chief Complaint     Chief Complaint   Patient presents with   [facility] Wellness Visit    Follow-up     6 months check up       History of Present Illness      Patient presents for recheck of chronic medical problems today  She has had a few recent falls lately 1 requiring sutures  Otherwise no other injuries  Otherwise she is feeling well  Doing well on citalopram for depression  Stable on gabapentin for fibromyalgia  Doing well levothyroxine for thyroid  Doing well on simvastatin for cholesterol  Patient had labs drawn January 16th      The following portions of the patient's history were reviewed and updated as appropriate: allergies, current medications, past family history, past medical history, past social history, past surgical history and problem list     Review of Systems   Review of Systems   Respiratory: Negative  Cardiovascular: Negative  Gastrointestinal: Negative  Genitourinary: Negative          Active Problem List     Patient Active Problem List   Diagnosis    Anemia    Chronic gout    Chronic kidney disease, stage 3 ([facility])    Combined form of senile cataract of left eye    Cough    Depression    Edema    Fibromyalgia    Homocysteinemia ([address] )    Hyperlipidemia    Hypothyroidism    Hypokalemia    Hypertension    Low back pain    Pain of left hip    Primary osteoarthritis of both hands    Uncontrolled diabetes with stage 3 chronic kidney disease GFR 30-59 ([facility])    Increased frequency of urination    Vitamin D deficiency    Folliculitis    Symptomatic varicose veins of both lower extremities    Intermittent claudication ([facility])    CRI (chronic renal insufficiency)    Scalp laceration    Chest pain    Leukocytosis    Type 2 diabetes mellitus without complication, without long-term current use of insulin ([facility])    Gait abnormality       Objective   /86 (BP Location: Left arm, Patient Position: Sitting, Cuff Size: Large)   Pulse 83   Temp 98 °F (36 7 °C) (Tympanic)   Resp 18   Ht 5' 5 75" (1 67 m)   Wt 116 kg (256 lb)   SpO2 97%   BMI 41 64 kg/m²     Physical Exam   Cardiovascular: Normal rate, regular rhythm, normal heart sounds and intact distal pulses  Carotids: no bruits  Ext: no edema   Pulmonary/Chest: Effort normal  No respiratory distress  She has no wheezes  She has no rales  Psychiatric: She has a normal mood and affect   Her behavior is normal  Thought content normal        Pertinent Laboratory/Diagnostic Studies:    Labs from January 16    Current Medications     Current Outpatient Medications:     albuterol (PROAIR HFA) 90 mcg/act inhaler, Inhale 2 puffs every 6 (six) hours as needed for wheezing, Disp: 8 5 g, Rfl: 3    amitriptyline (ELAVIL) 10 mg tablet, TAKE ONE TABLET BY MOUTH AT BEDTIME, Disp: , Rfl:     aspirin (ASPIRIN ADULT LOW DOSE) 81 mg EC tablet, Take 1 tablet by mouth daily, Disp: , Rfl:     Cetirizine HCl 10 MG CAPS, Take 10 mg by mouth daily  , Disp: , Rfl:     Cholecalciferol (D 400) 400 units TABS, Take 400 Units by mouth daily 2 tabs, Disp: , Rfl:     cholecalciferol (VITAMIN D3) 1,000 units tablet, Take 1,000 Units by mouth daily, Disp: , Rfl:     citalopram (CeleXA) 40 mg tablet, Take 1 tablet (40 mg total) by mouth daily, Disp: 90 tablet, Rfl: 1    febuxostat (ULORIC) 80 MG TABS, Take 1 tablet by mouth daily, Disp: , Rfl:     furosemide (LASIX) 40 mg tablet, Take 1 tablet (40 mg total) by mouth daily, Disp: 30 tablet, Rfl: 2    gabapentin (NEURONTIN) 300 mg capsule, Take 1 tablet by mouth 3 (three) times a day, Disp: , Rfl:     JANUVIA 25 MG tablet, TAKE ONE TABLET BY MOUTH EVERY DAY , Disp: 30 tablet, Rfl: 3    levothyroxine 50 mcg tablet, TAKE ONE TABLET BY MOUTH EVERY DAY , Disp: 30 tablet, Rfl: 8    metolazone (ZAROXOLYN) 2 5 mg tablet, Take 1 tablet (2 5 mg total) by mouth 3 (three) times a week Take 1 tablet Monday, Wednesday, and Friday , Disp: 12 tablet, Rfl: 1    metoprolol tartrate (LOPRESSOR) 25 mg tablet, TAKE ONE TABLET BY MOUTH EVERY DAY , Disp: 30 tablet, Rfl: 0    potassium chloride (K-DUR,KLOR-CON) 20 mEq tablet, Take 1 tablet (20 mEq total) by mouth 2 (two) times a day, Disp: 60 tablet, Rfl: 2    predniSONE 5 mg tablet, Take 5 mg by mouth daily, Disp: , Rfl:     simvastatin (ZOCOR) 10 mg tablet, Take 1 tablet (10 mg total) by mouth daily, Disp: 30 tablet, Rfl: 3    traMADol (ULTRAM) 50 mg tablet, Take 1 tablet by mouth, Disp: , Rfl:     benzonatate (TESSALON) 200 MG capsule, Take 1 capsule (200 mg total) by mouth 3 (three) times a day as needed for cough (Patient not taking: Reported on 9/11/2019), Disp: 20 capsule, Rfl: 0    Health Maintenance     Health Maintenance   Topic Date Due    SLP PLAN OF CARE  1945    BMI: Followup Plan  02/09/1961    Hepatitis B Vaccine (1 of 3 - Risk 3-dose series) 02/09/1962    Diabetic Foot Exam  11/11/2016    Fall Risk  12/27/2019    Medicare Annual Wellness Visit (AWV)  12/27/2019    DM Eye Exam  04/02/2020    HEMOGLOBIN A1C  04/15/2020    URINE MICROALBUMIN  06/24/2020    BMI: Adult  10/30/2020    CRC Screening: Colonoscopy  11/11/2023    DTaP,Tdap,and Td Vaccines (3 - Td) 08/23/2029    Influenza Vaccine  Completed    Pneumococcal Vaccine: 65+ Years  Completed    Pneumococcal Vaccine: Pediatrics (0 to 5 Years) and At-Risk Patients (6 to 59 Years)  Aged Out    HIB Vaccine  Aged Out    IPV Vaccine  Aged Out    Hepatitis A Vaccine  Aged Out    Meningococcal ACWY Vaccine  Aged Out    HPV Vaccine  Aged [employer] History   Administered Date(s) Administered    INFLUENZA 11/14/2012, 03/18/2013, 01/01/2014, 03/03/2014, 11/19/2014, 11/18/2015, 11/25/2016, 10/12/2017    Influenza Split High Dose Preservative Free IM 11/18/2015, 11/25/2016, 10/12/2017    Influenza TIV (IM) 09/09/2011, 10/15/2012, 09/25/2013, 01/01/2014    Influenza, high dose seasonal 0 5 mL 10/08/2018, 10/08/2019    Pneumococcal Conjugate 13-Valent 08/06/2015    Pneumococcal Polysaccharide PPV23 10/15/2008    Tdap 08/06/2015, 08/23/2019       Kenroy Martinez DO  [facility]  BMI Counseling: Body mass index is 41 64 kg/m²  The BMI is above normal  Nutrition recommendations include 3-5 servings of fruits/vegetables daily  No

## 2021-11-04 ENCOUNTER — IMMUNIZATIONS (OUTPATIENT)
Dept: FAMILY MEDICINE CLINIC | Facility: HOSPITAL | Age: 76
End: 2021-11-04

## 2021-11-04 DIAGNOSIS — Z23 ENCOUNTER FOR IMMUNIZATION: Primary | ICD-10-CM

## 2021-11-04 PROCEDURE — 0001A COVID-19 PFIZER VACC 0.3 ML: CPT

## 2021-11-04 PROCEDURE — 91300 COVID-19 PFIZER VACC 0.3 ML: CPT

## 2021-11-10 DIAGNOSIS — E87.6 HYPOKALEMIA: Primary | ICD-10-CM

## 2021-11-10 RX ORDER — POTASSIUM CHLORIDE 1500 MG/1
20 TABLET, FILM COATED, EXTENDED RELEASE ORAL ONCE
Qty: 30 TABLET | Refills: 1 | Status: SHIPPED | OUTPATIENT
Start: 2021-11-10 | End: 2021-11-12 | Stop reason: SDUPTHER

## 2021-11-12 DIAGNOSIS — E87.6 HYPOKALEMIA: ICD-10-CM

## 2021-11-12 RX ORDER — POTASSIUM CHLORIDE 1500 MG/1
1 TABLET, FILM COATED, EXTENDED RELEASE ORAL DAILY
Qty: 30 TABLET | Refills: 1 | Status: SHIPPED | OUTPATIENT
Start: 2021-11-12 | End: 2022-01-03

## 2021-11-24 DIAGNOSIS — F32.A DEPRESSION, UNSPECIFIED DEPRESSION TYPE: ICD-10-CM

## 2021-11-24 RX ORDER — CITALOPRAM 40 MG/1
TABLET ORAL
Qty: 90 TABLET | Refills: 0 | Status: SHIPPED | OUTPATIENT
Start: 2021-11-24 | End: 2021-11-30

## 2021-11-30 DIAGNOSIS — F32.A DEPRESSION, UNSPECIFIED DEPRESSION TYPE: ICD-10-CM

## 2021-11-30 DIAGNOSIS — E03.9 HYPOTHYROIDISM, UNSPECIFIED TYPE: ICD-10-CM

## 2021-11-30 RX ORDER — LEVOTHYROXINE SODIUM 0.05 MG/1
50 TABLET ORAL DAILY
Qty: 90 TABLET | Refills: 1 | Status: SHIPPED | OUTPATIENT
Start: 2021-11-30

## 2021-11-30 RX ORDER — CITALOPRAM 40 MG/1
TABLET ORAL
Qty: 90 TABLET | Refills: 0 | Status: SHIPPED | OUTPATIENT
Start: 2021-11-30 | End: 2022-03-07 | Stop reason: SDUPTHER

## 2022-01-03 DIAGNOSIS — E87.6 HYPOKALEMIA: ICD-10-CM

## 2022-01-03 RX ORDER — POTASSIUM CHLORIDE 1500 MG/1
1 TABLET, FILM COATED, EXTENDED RELEASE ORAL DAILY
Qty: 30 TABLET | Refills: 0 | Status: SHIPPED | OUTPATIENT
Start: 2022-01-03 | End: 2022-02-07 | Stop reason: SDUPTHER

## 2022-01-05 DIAGNOSIS — J45.20 MILD INTERMITTENT ASTHMA WITHOUT COMPLICATION: ICD-10-CM

## 2022-01-05 RX ORDER — ALBUTEROL SULFATE 90 UG/1
2 AEROSOL, METERED RESPIRATORY (INHALATION) EVERY 6 HOURS PRN
Qty: 8.5 G | Refills: 3 | Status: SHIPPED | OUTPATIENT
Start: 2022-01-05

## 2022-01-11 DIAGNOSIS — E78.2 MIXED HYPERLIPIDEMIA: ICD-10-CM

## 2022-01-11 RX ORDER — SIMVASTATIN 10 MG
10 TABLET ORAL DAILY
Qty: 90 TABLET | Refills: 0 | Status: SHIPPED | OUTPATIENT
Start: 2022-01-11 | End: 2022-04-12 | Stop reason: SDUPTHER

## 2022-02-07 DIAGNOSIS — E87.6 HYPOKALEMIA: ICD-10-CM

## 2022-02-07 RX ORDER — POTASSIUM CHLORIDE 1500 MG/1
1 TABLET, FILM COATED, EXTENDED RELEASE ORAL DAILY
Qty: 30 TABLET | Refills: 0 | Status: SHIPPED | OUTPATIENT
Start: 2022-02-07 | End: 2022-04-24

## 2022-02-09 ENCOUNTER — OFFICE VISIT (OUTPATIENT)
Dept: PODIATRY | Facility: CLINIC | Age: 77
End: 2022-02-09
Payer: COMMERCIAL

## 2022-02-09 VITALS
DIASTOLIC BLOOD PRESSURE: 76 MMHG | WEIGHT: 244 LBS | SYSTOLIC BLOOD PRESSURE: 146 MMHG | BODY MASS INDEX: 39.21 KG/M2 | HEIGHT: 66 IN

## 2022-02-09 DIAGNOSIS — I73.9 PERIPHERAL VASCULAR DISEASE, UNSPECIFIED (HCC): Primary | ICD-10-CM

## 2022-02-09 DIAGNOSIS — B35.1 ONYCHOMYCOSIS: ICD-10-CM

## 2022-02-09 PROCEDURE — 1160F RVW MEDS BY RX/DR IN RCRD: CPT | Performed by: PODIATRIST

## 2022-02-09 PROCEDURE — 1036F TOBACCO NON-USER: CPT | Performed by: PODIATRIST

## 2022-02-09 PROCEDURE — 99212 OFFICE O/P EST SF 10 MIN: CPT | Performed by: PODIATRIST

## 2022-02-09 RX ORDER — ALLOPURINOL 100 MG/1
150 TABLET ORAL DAILY
COMMUNITY
Start: 2022-01-18

## 2022-02-09 RX ORDER — FEBUXOSTAT 40 MG/1
TABLET, FILM COATED ORAL
COMMUNITY
Start: 2022-01-03

## 2022-02-09 NOTE — PROGRESS NOTES
Patient, a 77-year-old type 2 diabetic presents for palliative care  Patient states both feet are painful  There are no palpable pedal pulses  Recommended vascular studies but patient prefers to hold off  Each great toenail is thickened yellow with subungual debris  Hyperkeratotic lesions are present on the tips of the right 2nd and 3rd toes due to hammertoe deformities  Treatment:  Trimmed each great toenail on all elongated toenails  Trimmed hyperkeratotic lesions right 2nd and 3rd toes  Patient is rescheduled in 3 months

## 2022-02-23 ENCOUNTER — TELEPHONE (OUTPATIENT)
Dept: NEPHROLOGY | Facility: CLINIC | Age: 77
End: 2022-02-23

## 2022-03-07 DIAGNOSIS — F32.A DEPRESSION, UNSPECIFIED DEPRESSION TYPE: ICD-10-CM

## 2022-03-07 RX ORDER — CITALOPRAM 40 MG/1
40 TABLET ORAL DAILY
Qty: 90 TABLET | Refills: 0 | Status: SHIPPED | OUTPATIENT
Start: 2022-03-07 | End: 2022-06-01 | Stop reason: SDUPTHER

## 2022-03-11 DIAGNOSIS — I10 ESSENTIAL HYPERTENSION: ICD-10-CM

## 2022-03-11 DIAGNOSIS — R60.9 EDEMA, UNSPECIFIED TYPE: ICD-10-CM

## 2022-03-11 RX ORDER — FUROSEMIDE 40 MG/1
40 TABLET ORAL DAILY
Qty: 30 TABLET | Refills: 5 | Status: SHIPPED | OUTPATIENT
Start: 2022-03-11

## 2022-03-23 ENCOUNTER — APPOINTMENT (OUTPATIENT)
Dept: LAB | Facility: MEDICAL CENTER | Age: 77
End: 2022-03-23
Payer: COMMERCIAL

## 2022-03-23 DIAGNOSIS — N18.9 CHRONIC RENAL IMPAIRMENT, UNSPECIFIED CKD STAGE: ICD-10-CM

## 2022-03-23 LAB
ANION GAP SERPL CALCULATED.3IONS-SCNC: 4 MMOL/L (ref 4–13)
BUN SERPL-MCNC: 37 MG/DL (ref 5–25)
CALCIUM SERPL-MCNC: 9.5 MG/DL (ref 8.3–10.1)
CHLORIDE SERPL-SCNC: 104 MMOL/L (ref 100–108)
CO2 SERPL-SCNC: 33 MMOL/L (ref 21–32)
CREAT SERPL-MCNC: 1.61 MG/DL (ref 0.6–1.3)
GFR SERPL CREATININE-BSD FRML MDRD: 30 ML/MIN/1.73SQ M
GLUCOSE P FAST SERPL-MCNC: 122 MG/DL (ref 65–99)
POTASSIUM SERPL-SCNC: 3.6 MMOL/L (ref 3.5–5.3)
SODIUM SERPL-SCNC: 141 MMOL/L (ref 136–145)

## 2022-03-23 PROCEDURE — 36415 COLL VENOUS BLD VENIPUNCTURE: CPT

## 2022-03-23 PROCEDURE — 80048 BASIC METABOLIC PNL TOTAL CA: CPT

## 2022-04-01 ENCOUNTER — TELEPHONE (OUTPATIENT)
Dept: NEPHROLOGY | Facility: CLINIC | Age: 77
End: 2022-04-01

## 2022-04-01 NOTE — TELEPHONE ENCOUNTER
Appointment Confirmation   Person confirmed appointment with  If not patient, name of the person lm    Date and time of appointment 04/04   Patient acknowledged and will be at appointment? no   Did you advise the patient that they will need a urine sample if they are a new patient? no   Did you advise the patient to bring their current medications for verification? (including any OTC) no   Additional Information Lm to confirm

## 2022-04-04 ENCOUNTER — OFFICE VISIT (OUTPATIENT)
Dept: NEPHROLOGY | Facility: CLINIC | Age: 77
End: 2022-04-04
Payer: COMMERCIAL

## 2022-04-04 VITALS
DIASTOLIC BLOOD PRESSURE: 70 MMHG | HEART RATE: 70 BPM | SYSTOLIC BLOOD PRESSURE: 134 MMHG | BODY MASS INDEX: 38.09 KG/M2 | WEIGHT: 237 LBS | HEIGHT: 66 IN

## 2022-04-04 DIAGNOSIS — I12.9 NEPHROSCLEROSIS, STAGE 1-4 OR UNSPECIFIED CHRONIC KIDNEY DISEASE: ICD-10-CM

## 2022-04-04 DIAGNOSIS — N18.9 CHRONIC RENAL IMPAIRMENT, UNSPECIFIED CKD STAGE: Primary | ICD-10-CM

## 2022-04-04 PROCEDURE — 99214 OFFICE O/P EST MOD 30 MIN: CPT | Performed by: INTERNAL MEDICINE

## 2022-04-04 NOTE — PROGRESS NOTES
NEPHROLOGY PROGRESS NOTE    Achilles Closs 76 y o  female MRN: [id number]  Unit/Bed#:  Encounter: [id number]  Reason for Consult:  Chronic renal insufficiency    Patient is here for routine follow-up she has been doing well states she has had no hospitalizations no changes in medications and overall is feeling well except for some arthralgias  Other than that no complaints for me and we reviewed her medications personally  ASSESSMENT/PLAN:  1  Renal    Patient's chronic renal insufficiency due to nephrosclerosis given lack of proteinuria  Patient had been diabetic in the past but no longer is diabetic and takes no medications  Her latest creatinine is 1 6 which is in her baseline range without any significant progression  I told her the reason the could fluctuate could be related to her diuretic use as this can cause changes in her volume status  Overall she is doing great urinalysis recently showed no blood or protein in the past had negative urine microalbumin screen  Blood pressure is well controlled  Continue current medications with no changes    Labs and follow-up as scheduled  SUBJECTIVE:  Review of Systems   Constitutional: Negative for chills, fever, malaise/fatigue and night sweats  HENT: Negative  Eyes: Negative  Cardiovascular: Positive for leg swelling  Negative for chest pain, orthopnea and palpitations  Wears compression stockings  Respiratory: Negative  Negative for cough, shortness of breath, sputum production and wheezing  Musculoskeletal: Positive for arthritis  Gastrointestinal: Negative for abdominal pain, diarrhea, nausea and vomiting  Genitourinary: Negative for dysuria, flank pain, hematuria and incomplete emptying  Neurological: Negative for dizziness, focal weakness, headaches and weakness  Psychiatric/Behavioral: Negative for altered mental status, depression, hallucinations and hypervigilance         OBJECTIVE:  Current Weight: Weight - Scale: 108 kg (237 lb)  [email or web address]     Blood pressure 134/70, pulse 70, height 5' 5 75" (1 67 m), weight 108 kg (237 lb), not currently breastfeeding  , Body mass index is 38 54 kg/m²  [unfilled]    Physical Exam: /70 (BP Location: Right arm, Patient Position: Sitting, Cuff Size: Standard)   Pulse 70   Ht 5' 5 75" (1 67 m)   Wt 108 kg (237 lb)   BMI 38 54 kg/m²   Physical Exam  Constitutional:       General: She is not in acute distress  Appearance: She is not toxic-appearing or diaphoretic  HENT:      Head: Normocephalic and atraumatic  Mouth/Throat:      Mouth: Mucous membranes are moist    Eyes:      General: No scleral icterus  Extraocular Movements: Extraocular movements intact  Cardiovascular:      Rate and Rhythm: Normal rate and regular rhythm  Heart sounds: No friction rub  No gallop  Comments: Mild edema  Pulmonary:      Effort: Pulmonary effort is normal  No respiratory distress  Breath sounds: Normal breath sounds  No wheezing, rhonchi or rales  Abdominal:      General: Bowel sounds are normal  There is no distension  Palpations: Abdomen is soft  Tenderness: There is no abdominal tenderness  There is no rebound  Musculoskeletal:      Cervical back: Normal range of motion and neck supple  Neurological:      General: No focal deficit present  Mental Status: She is alert and oriented to person, place, and time  Mental status is at baseline  Psychiatric:         Mood and Affect: Mood normal          Behavior: Behavior normal          Thought Content:  Thought content normal          Judgment: Judgment normal          Medications:    Current Outpatient Medications:     albuterol (ProAir HFA) 90 mcg/act inhaler, Inhale 2 puffs every 6 (six) hours as needed for wheezing, Disp: 8 5 g, Rfl: 3    allopurinol (ZYLOPRIM) 100 mg tablet, Take 150 mg by mouth daily  , Disp: , Rfl:     aspirin (ASPIRIN ADULT LOW DOSE) 81 mg EC tablet, Take 1 tablet by mouth daily  , Disp: , Rfl:     cholecalciferol (VITAMIN D3) 1,000 units tablet, Take 1,000 Units by mouth daily, Disp: , Rfl:     citalopram (CeleXA) 40 mg tablet, Take 1 tablet (40 mg total) by mouth daily, Disp: 90 tablet, Rfl: 0    febuxostat (ULORIC) 40 mg tablet, , Disp: , Rfl:     furosemide (LASIX) 40 mg tablet, Take 1 tablet (40 mg total) by mouth daily, Disp: 30 tablet, Rfl: 5    gabapentin (NEURONTIN) 100 mg capsule, Take 1 tablet by mouth 3 (three) times a day , Disp: , Rfl:     levothyroxine 50 mcg tablet, Take 1 tablet (50 mcg total) by mouth daily, Disp: 90 tablet, Rfl: 1    metoprolol tartrate (LOPRESSOR) 25 mg tablet, Take 1 tablet (25 mg total) by mouth daily, Disp: 30 tablet, Rfl: 5    Potassium Chloride ER 20 MEQ TBCR, Take 1 tablet (20 mEq total) by mouth daily, Disp: 30 tablet, Rfl: 0    predniSONE 5 mg tablet, Take 5 mg by mouth daily, Disp: , Rfl:     simvastatin (ZOCOR) 10 mg tablet, Take 1 tablet (10 mg total) by mouth daily, Disp: 90 tablet, Rfl: 0    methocarbamol (ROBAXIN) 500 mg tablet, Take 1 tablet (500 mg total) by mouth every 6 (six) hours (Patient not taking: Reported on 4/4/2022 ), Disp: 30 tablet, Rfl: 0    metolazone (ZAROXOLYN) 2 5 mg tablet, TAKE ONE TABLET BY MOUTH THREE TIMES A WEEK on monday, wednesday and friday   (Patient not taking: Reported on 4/4/2022 ), Disp: 12 tablet, Rfl: 0    mupirocin (BACTROBAN) 2 % ointment, Apply topically 2 (two) times a day (Patient not taking: Reported on 4/4/2022 ), Disp: 22 g, Rfl: 0    polyethylene glycol (MIRALAX) 17 g packet, Take 17 g by mouth daily (Patient not taking: Reported on 4/4/2022 ), Disp: 30 each, Rfl: 0    Laboratory Results:  Lab Results   Component Value Date    WBC 7 67 12/08/2020    HGB 9 8 (L) 12/08/2020    HCT 31 6 (L) 12/08/2020    MCV 96 12/08/2020     12/08/2020     Lab Results   Component Value Date    SODIUM 141 03/23/2022    K 3 6 03/23/2022     03/23/2022    CO2 33 (H) 03/23/2022    BUN 37 (H) 03/23/2022    CREATININE 1 61 (H) 03/23/2022    GLUC 104 12/08/2020    CALCIUM 9 5 03/23/2022     Lab Results   Component Value Date    CALCIUM 9 5 03/23/2022    PHOS 3 0 12/08/2020     No results found for: LABPROT

## 2022-04-04 NOTE — PATIENT INSTRUCTIONS
You are here for follow-up sounds like her health has been doing well except for your chronic aches and pains  Happy birthday as you recently celebrated  Your kidney function is stable with a creatinine of around 1 7 there is no signs of aggressive kidney disease cause there was no protein in the urine of significance which is a good thing  You likely have nephrosclerosis or potentially in the past using the anti-inflammatories affected the kidneys but things have been stable without progression  For now continue current medications blood pressure control and we will monitor  At times her blood test may fluctuate could your on the water pill to help control your swelling  No changes in medications  Labs and follow-up as scheduled    Please call me if you any concerns or questions before next visit

## 2022-04-12 DIAGNOSIS — E78.2 MIXED HYPERLIPIDEMIA: ICD-10-CM

## 2022-04-12 RX ORDER — SIMVASTATIN 10 MG
10 TABLET ORAL DAILY
Qty: 90 TABLET | Refills: 0 | Status: SHIPPED | OUTPATIENT
Start: 2022-04-12 | End: 2022-07-07

## 2022-04-23 DIAGNOSIS — E87.6 HYPOKALEMIA: ICD-10-CM

## 2022-04-24 RX ORDER — POTASSIUM CHLORIDE 1500 MG/1
TABLET, FILM COATED, EXTENDED RELEASE ORAL
Qty: 30 TABLET | Refills: 0 | Status: SHIPPED | OUTPATIENT
Start: 2022-04-24 | End: 2022-06-03 | Stop reason: SDUPTHER

## 2022-04-27 ENCOUNTER — OFFICE VISIT (OUTPATIENT)
Dept: FAMILY MEDICINE CLINIC | Facility: CLINIC | Age: 77
End: 2022-04-27
Payer: COMMERCIAL

## 2022-04-27 VITALS
HEIGHT: 66 IN | HEART RATE: 60 BPM | WEIGHT: 238 LBS | DIASTOLIC BLOOD PRESSURE: 80 MMHG | BODY MASS INDEX: 38.25 KG/M2 | SYSTOLIC BLOOD PRESSURE: 130 MMHG | OXYGEN SATURATION: 97 % | RESPIRATION RATE: 18 BRPM | TEMPERATURE: 97.7 F

## 2022-04-27 DIAGNOSIS — I12.9 NEPHROSCLEROSIS, STAGE 1-4 OR UNSPECIFIED CHRONIC KIDNEY DISEASE: ICD-10-CM

## 2022-04-27 DIAGNOSIS — E78.2 MIXED HYPERLIPIDEMIA: ICD-10-CM

## 2022-04-27 DIAGNOSIS — E11.65 TYPE 2 DIABETES MELLITUS WITH HYPERGLYCEMIA, WITHOUT LONG-TERM CURRENT USE OF INSULIN (HCC): ICD-10-CM

## 2022-04-27 DIAGNOSIS — E66.01 MORBID (SEVERE) OBESITY DUE TO EXCESS CALORIES (HCC): ICD-10-CM

## 2022-04-27 DIAGNOSIS — F33.9 DEPRESSION, RECURRENT (HCC): ICD-10-CM

## 2022-04-27 DIAGNOSIS — D69.3 IDIOPATHIC THROMBOCYTOPENIC PURPURA (HCC): ICD-10-CM

## 2022-04-27 DIAGNOSIS — E03.9 HYPOTHYROIDISM, UNSPECIFIED TYPE: ICD-10-CM

## 2022-04-27 DIAGNOSIS — Z00.00 ENCOUNTER FOR ANNUAL WELLNESS EXAM IN MEDICARE PATIENT: Primary | ICD-10-CM

## 2022-04-27 DIAGNOSIS — H92.03 OTALGIA OF BOTH EARS: ICD-10-CM

## 2022-04-27 DIAGNOSIS — N18.32 STAGE 3B CHRONIC KIDNEY DISEASE (HCC): ICD-10-CM

## 2022-04-27 DIAGNOSIS — M79.7 FIBROMYALGIA: ICD-10-CM

## 2022-04-27 PROCEDURE — 1036F TOBACCO NON-USER: CPT | Performed by: FAMILY MEDICINE

## 2022-04-27 PROCEDURE — 99214 OFFICE O/P EST MOD 30 MIN: CPT | Performed by: FAMILY MEDICINE

## 2022-04-27 PROCEDURE — 1125F AMNT PAIN NOTED PAIN PRSNT: CPT | Performed by: FAMILY MEDICINE

## 2022-04-27 PROCEDURE — 1160F RVW MEDS BY RX/DR IN RCRD: CPT | Performed by: FAMILY MEDICINE

## 2022-04-27 PROCEDURE — 1101F PT FALLS ASSESS-DOCD LE1/YR: CPT | Performed by: FAMILY MEDICINE

## 2022-04-27 PROCEDURE — 3725F SCREEN DEPRESSION PERFORMED: CPT | Performed by: FAMILY MEDICINE

## 2022-04-27 PROCEDURE — G0439 PPPS, SUBSEQ VISIT: HCPCS | Performed by: FAMILY MEDICINE

## 2022-04-27 PROCEDURE — 1170F FXNL STATUS ASSESSED: CPT | Performed by: FAMILY MEDICINE

## 2022-04-27 PROCEDURE — 3288F FALL RISK ASSESSMENT DOCD: CPT | Performed by: FAMILY MEDICINE

## 2022-04-27 NOTE — PROGRESS NOTES
Assessment and Plan:   MEDICARE WELLNESS VISIT    Problem List Items Addressed This Visit     None           Preventive health issues were discussed with patient, and age appropriate screening tests were ordered as noted in patient's After Visit Summary  Personalized health advice and appropriate referrals for health education or preventive services given if needed, as noted in patient's After Visit Summary  History of Present Illness:     Patient presents for Welcome to Medicare visit       Patient Care Team:  Marilou Schwartz DO as PCP - General  Marilou Schwartz DO as PCP - PCP-[facility]  Louis Morejon MD as PCP - Endocrinology (Endocrinology)  DO Arlette Collins MD (Nephrology)  [facility] (Podiatry)     Review of Systems:     Review of Systems   Problem List:     Patient Active Problem List   Diagnosis    Anemia    Chronic gout    Combined form of senile cataract of left eye    Cough    Depression, recurrent ([address] )    Edema    Fibromyalgia    Homocysteinemia    Hyperlipidemia    Hypothyroidism    Low back pain    Pain of left hip    Primary osteoarthritis of both hands    Increased frequency of urination    Vitamin D deficiency    Folliculitis    Symptomatic varicose veins of both lower extremities    Intermittent claudication (HCC)    Scalp laceration    Chest pain    Leukocytosis    Type 2 diabetes mellitus without complication, without long-term current use of insulin (HCC)    Gait abnormality    Class 3 severe obesity due to excess calories with serious comorbidity and body mass index (BMI) of 40 0 to 44 9 in [facility])    Type 2 diabetes mellitus with hyperglycemia, without long-term current use of insulin (HCC)    Pain in left tibia    Nephrosclerosis, stage 1-4 or unspecified chronic kidney disease    Tinea corporis    Hypertensive chronic kidney disease with stage 1 through stage 4 chronic kidney disease, or unspecified chronic kidney disease    Arthritis    Cataract    Idiopathic chronic gout with tophus    Idiopathic thrombocytopenic purpura (HCC)    Osteoarthritis of multiple joints    Post menopausal syndrome    Restless legs syndrome    Ambulatory dysfunction with recurrent falls    Fracture of multiple ribs of left side    Acute pain due to trauma    Impaired vision    Bilateral hearing loss    Delirium precautions    Type 2 diabetes mellitus with chronic kidney disease, without long-term current use of insulin ([facility] )      Past Medical and Surgical History:     Past Medical History:   Diagnosis Date    Abnormal blood chemistry     last assessed 11/13/2013    Angina pectoris ([facility] )     last assessed 11/012/13    Arthritis     last assessed 11/13/2013    Asthma     Athlete's foot     resolved 10/11/17    Cataract     last assessed 03/18/2016    Diabetes mellitus ([facility] )     type 2    Diabetes mellitus with kidney disease ([facility] )     last assessed 10/03/17    Eczema     Gait disorder     last assessed 11/12/2013    Idiopathic thrombocytopenia purpura ([facility] )     last assessed 08/06/15    Overactive bladder     Post menopausal syndrome     resolved 10/11/17    Restless legs syndrome     last assessed 10/24/2013    Subclinical hypothyroidism     last assessed 12/11/17    Urinary frequency     resolved 12/18/2014     Past Surgical History:   Procedure Laterality Date    ACHILLES TENDON REPAIR      REPLACEMENT TOTAL KNEE        Family History:     Family History   Problem Relation Age of Onset    Leukemia Mother     Eczema Father     Diabetes type II Father     Diabetes type II Maternal Aunt       Social History:     Social History     Socioeconomic History    Marital status: Single     Spouse name: None    Number of children: None    Years of education: None    Highest education level: None   Occupational History    None   Tobacco Use    Smoking status: Never Smoker    Smokeless tobacco: Never Used    Tobacco comment: per allscripts; former smoker   Vaping Use    Vaping Use: Never used   Substance and Sexual Activity    Alcohol use: Yes     Comment: occasional    Drug use: No    Sexual activity: None   Other Topics Concern    None   Social History Narrative    None     Social Determinants of Health     Financial Resource Strain: Not on file   Food Insecurity: Not on file   Transportation Needs: Not on file   Physical Activity: Not on file   Stress: Not on file   Social Connections: Not on file   Intimate Partner Violence: Not on file   Housing Stability: Not on file      Medications and Allergies:     Current Outpatient Medications   Medication Sig Dispense Refill    albuterol (ProAir HFA) 90 mcg/act inhaler Inhale 2 puffs every 6 (six) hours as needed for wheezing 8 5 g 3    allopurinol (ZYLOPRIM) 100 mg tablet Take 150 mg by mouth daily        aspirin (ASPIRIN ADULT LOW DOSE) 81 mg EC tablet Take 1 tablet by mouth daily        cholecalciferol (VITAMIN D3) 1,000 units tablet Take 1,000 Units by mouth daily      citalopram (CeleXA) 40 mg tablet Take 1 tablet (40 mg total) by mouth daily 90 tablet 0    furosemide (LASIX) 40 mg tablet Take 1 tablet (40 mg total) by mouth daily 30 tablet 5    gabapentin (NEURONTIN) 100 mg capsule Take 1 tablet by mouth 3 (three) times a day       levothyroxine 50 mcg tablet Take 1 tablet (50 mcg total) by mouth daily 90 tablet 1    metoprolol tartrate (LOPRESSOR) 25 mg tablet Take 1 tablet (25 mg total) by mouth daily 30 tablet 5    Potassium Chloride ER 20 MEQ TBCR TAKE 1 TABLET BY MOUTH FOR A SINGLE DOSE 30 tablet 0    predniSONE 5 mg tablet Take 5 mg by mouth daily      simvastatin (ZOCOR) 10 mg tablet Take 1 tablet (10 mg total) by mouth daily 90 tablet 0    febuxostat (ULORIC) 40 mg tablet  (Patient not taking: Reported on 4/27/2022 )      methocarbamol (ROBAXIN) 500 mg tablet Take 1 tablet (500 mg total) by mouth every 6 (six) hours (Patient not taking: Reported on 4/4/2022 ) 30 tablet 0    metolazone (ZAROXOLYN) 2 5 mg tablet TAKE ONE TABLET BY MOUTH THREE TIMES A WEEK on monday, wednesday and friday  (Patient not taking: Reported on 4/4/2022 ) 12 tablet 0    mupirocin (BACTROBAN) 2 % ointment Apply topically 2 (two) times a day (Patient not taking: Reported on 4/4/2022 ) 22 g 0    polyethylene glycol (MIRALAX) 17 g packet Take 17 g by mouth daily (Patient not taking: Reported on 4/4/2022 ) 30 each 0     No current facility-administered medications for this visit  Allergies   Allergen Reactions    Ace Inhibitors Angioedema    Prochlorperazine Anaphylaxis     lethargy, tongue and throat swelling    Ramipril Anaphylaxis     angioedema    Angiotensin Receptor Blockers       Immunizations:     Immunization History   Administered Date(s) Administered    COVID-19 PFIZER VACCINE 0 3 ML IM 03/08/2021, 03/29/2021, 11/04/2021    INFLUENZA 11/14/2012, 03/18/2013, 01/01/2014, 03/03/2014, 11/19/2014, 11/18/2015, 11/25/2016, 10/12/2017    Influenza Split High Dose Preservative Free IM 11/18/2015, 11/25/2016, 10/12/2017    Influenza, high dose seasonal 0 7 mL 10/08/2018, 10/08/2019, 09/18/2020, 10/12/2021    Influenza, seasonal, injectable 09/09/2011, 10/15/2012, 09/25/2013, 01/01/2014    Pneumococcal Conjugate 13-Valent 08/06/2015    Pneumococcal Polysaccharide PPV23 10/15/2008    Tdap 08/06/2015, 08/23/2019      Health Maintenance:         Topic Date Due    Hepatitis C Screening  Never done    Breast Cancer Screening: Mammogram  Never done     There are no preventive care reminders to display for this patient  Medicare Screening Tests and Risk Assessments:     Jac Pérez is here for her Subsequent Wellness visit  Last Medicare Wellness visit information reviewed, patient interviewed and updates made to the record today  Health Risk Assessment:   Patient rates overall health as fair  Patient feels that their physical health rating is same  Patient is satisfied with their life  Eyesight was rated as same  Hearing was rated as same  Patient feels that their emotional and mental health rating is same  Patients states they are never, rarely angry  Patient states they are never, rarely unusually tired/fatigued  Pain experienced in the last 7 days has been none  Patient states that she has experienced no weight loss or gain in last 6 months  Depression Screening:   PHQ-9 Score: 3      Fall Risk Screening: In the past year, patient has experienced: no history of falling in past year      Urinary Incontinence Screening:   Patient has not leaked urine accidently in the last six months  Home Safety:  Patient does not have trouble with stairs inside or outside of their home  Patient has working smoke alarms and has working carbon monoxide detector  Home safety hazards include: none  Nutrition:   Current diet is Regular  Medications:   Patient is currently taking over-the-counter supplements  OTC medications include: see medication list  Patient is able to manage medications  Activities of Daily Living (ADLs)/Instrumental Activities of Daily Living (IADLs):   Walk and transfer into and out of bed and chair?: Yes  Dress and groom yourself?: Yes    Bathe or shower yourself?: Yes    Feed yourself?  Yes  Do your laundry/housekeeping?: Yes  Manage your money, pay your bills and track your expenses?: Yes  Make your own meals?: Yes    Do your own shopping?: Yes    Previous Hospitalizations:   Any hospitalizations or ED visits within the last 12 months?: No      PREVENTIVE SCREENINGS      Cardiovascular Screening:    General: Screening Not Indicated and History Lipid Disorder      Diabetes Screening:     General: Screening Not Indicated and History Diabetes      Colorectal Cancer Screening:     General: Risks and Benefits Discussed      Breast Cancer Screening:     General: Risks and Benefits Discussed      Cervical Cancer Screening:    General: Screening Not Indicated      Osteoporosis Screening:    General: Risks and Benefits Discussed      Lung Cancer Screening:     General: Screening Not Indicated      Hepatitis C Screening:    General: Risks and Benefits Discussed    Screening, Brief Intervention, and Referral to Treatment (SBIRT)    Screening    Typical number of drinks in a week: 0    Single Item Drug Screening:  How often have you used an illegal drug (including marijuana) or a prescription medication for non-medical reasons in the past year? never    Single Item Drug Screen Score: 0  Interpretation: Negative screen for possible drug use disorder    No exam data present     Physical Exam:     /80 (BP Location: Left arm, Patient Position: Sitting, Cuff Size: Large)   Pulse 60   Temp 97 7 °F (36 5 °C) (Temporal)   Resp 18   Ht 5' 5 75" (1 67 m)   Wt 108 kg (238 lb)   SpO2 97%   BMI 38 71 kg/m²     Physical Exam     Amaury Munson DO

## 2022-04-27 NOTE — ASSESSMENT & PLAN NOTE
Patient complaining of occasional sharp pains in both for years  Examination is essentially unremarkable today  Patient has hearing aids   Will refer to ENT

## 2022-04-27 NOTE — PATIENT INSTRUCTIONS
Medicare Preventive Visit Patient Instructions  Thank you for completing your Welcome to Medicare Visit or Medicare Annual Wellness Visit today  Your next wellness visit will be due in one year (4/28/2023)  The screening/preventive services that you may require over the next 5-10 years are detailed below  Some tests may not apply to you based off risk factors and/or age  Screening tests ordered at today's visit but not completed yet may show as past due  Also, please note that scanned in results may not display below  Preventive Screenings:  Service Recommendations Previous Testing/Comments   Colorectal Cancer Screening  * Colonoscopy    * Fecal Occult Blood Test (FOBT)/Fecal Immunochemical Test (FIT)  * Fecal DNA/Cologuard Test  * Flexible Sigmoidoscopy Age: 54-65 years old   Colonoscopy: every 10 years (may be performed more frequently if at higher risk)  OR  FOBT/FIT: every 1 year  OR  Cologuard: every 3 years  OR  Sigmoidoscopy: every 5 years  Screening may be recommended earlier than age 48 if at higher risk for colorectal cancer  Also, an individualized decision between you and your healthcare provider will decide whether screening between the ages of 74-80 would be appropriate  Colonoscopy: Not on file  FOBT/FIT: Not on file  Cologuard: Not on file  Sigmoidoscopy: Not on file          Breast Cancer Screening Age: 36 years old  Frequency: every 1-2 years  Not required if history of left and right mastectomy Mammogram: Not on file        Cervical Cancer Screening Between the ages of 21-29, pap smear recommended once every 3 years  Between the ages of 33-67, can perform pap smear with HPV co-testing every 5 years     Recommendations may differ for women with a history of total hysterectomy, cervical cancer, or abnormal pap smears in past  Pap Smear: Not on file    Screening Not Indicated   Hepatitis C Screening Once for adults born between [facility]  More frequently in patients at high risk for Hepatitis C Hep C Antibody: Not on file        Diabetes Screening 1-2 times per year if you're at risk for diabetes or have pre-diabetes Fasting glucose: 122 mg/dL   A1C: 6 1 %    Screening Not Indicated  History Diabetes   Cholesterol Screening Once every 5 years if you don't have a lipid disorder  May order more often based on risk factors  Lipid panel: 07/01/2021    Screening Not Indicated  History Lipid Disorder     Other Preventive Screenings Covered by Medicare:  1  Abdominal Aortic Aneurysm (AAA) Screening: covered once if your at risk  You're considered to be at risk if you have a family history of AAA  2  Lung Cancer Screening: covers low dose CT scan once per year if you meet all of the following conditions: (1) Age 50-69; (2) No signs or symptoms of lung cancer; (3) Current smoker or have quit smoking within the last 15 years; (4) You have a tobacco smoking history of at least 30 pack years (packs per day multiplied by number of years you smoked); (5) You get a written order from a healthcare provider  3  Glaucoma Screening: covered annually if you're considered high risk: (1) You have diabetes OR (2) Family history of glaucoma OR (3)  aged 48 and older OR (3)  American aged 72 and older  3  Osteoporosis Screening: covered every 2 years if you meet one of the following conditions: (1) You're estrogen deficient and at risk for osteoporosis based off medical history and other findings; (2) Have a vertebral abnormality; (3) On glucocorticoid therapy for more than 3 months; (4) Have primary hyperparathyroidism; (5) On osteoporosis medications and need to assess response to drug therapy  · Last bone density test (DXA Scan): Not on file  5  HIV Screening: covered annually if you're between the age of 12-76  Also covered annually if you are younger than 13 and older than 72 with risk factors for HIV infection   For pregnant patients, it is covered up to 3 times per pregnancy  Immunizations:  Immunization Recommendations   Influenza Vaccine Annual influenza vaccination during flu season is recommended for all persons aged >= 6 months who do not have contraindications   Pneumococcal Vaccine (Prevnar and Pneumovax)  * Prevnar = PCV13  * Pneumovax = PPSV23   Adults 25-60 years old: 1-3 doses may be recommended based on certain risk factors  Adults 72 years old: Prevnar (PCV13) vaccine recommended followed by Pneumovax (PPSV23) vaccine  If already received PPSV23 since turning 65, then PCV13 recommended at least one year after PPSV23 dose  Hepatitis B Vaccine 3 dose series if at intermediate or high risk (ex: diabetes, end stage renal disease, liver disease)   Tetanus (Td) Vaccine - COST NOT COVERED BY MEDICARE PART B Following completion of primary series, a booster dose should be given every 10 years to maintain immunity against tetanus  Td may also be given as tetanus wound prophylaxis  Tdap Vaccine - COST NOT COVERED BY MEDICARE PART B Recommended at least once for all adults  For pregnant patients, recommended with each pregnancy  Shingles Vaccine (Shingrix) - COST NOT COVERED BY MEDICARE PART B  2 shot series recommended in those aged 48 and above     Health Maintenance Due:      Topic Date Due    Hepatitis C Screening  Never done    Breast Cancer Screening: Mammogram  Never done     Immunizations Due:  There are no preventive care reminders to display for this patient  Advance Directives   What are advance directives? Advance directives are legal documents that state your wishes and plans for medical care  These plans are made ahead of time in case you lose your ability to make decisions for yourself  Advance directives can apply to any medical decision, such as the treatments you want, and if you want to donate organs  What are the types of advance directives? There are many types of advance directives, and each state has rules about how to use them   You may choose a combination of any of the following:  · Living will: This is a written record of the treatment you want  You can also choose which treatments you do not want, which to limit, and which to stop at a certain time  This includes surgery, medicine, IV fluid, and tube feedings  · Durable power of  for healthcare [facility]): This is a written record that states who you want to make healthcare choices for you when you are unable to make them for yourself  This person, called a proxy, is usually a family member or a friend  You may choose more than 1 proxy  · Do not resuscitate (DNR) order:  A DNR order is used in case your heart stops beating or you stop breathing  It is a request not to have certain forms of treatment, such as CPR  A DNR order may be included in other types of advance directives  · Medical directive: This covers the care that you want if you are in a coma, near death, or unable to make decisions for yourself  You can list the treatments you want for each condition  Treatment may include pain medicine, surgery, blood transfusions, dialysis, IV or tube feedings, and a ventilator (breathing machine)  · Values history: This document has questions about your views, beliefs, and how you feel and think about life  This information can help others choose the care that you would choose  Why are advance directives important? An advance directive helps you control your care  Although spoken wishes may be used, it is better to have your wishes written down  Spoken wishes can be misunderstood, or not followed  Treatments may be given even if you do not want them  An advance directive may make it easier for your family to make difficult choices about your care  Weight Management   Why it is important to manage your weight:  Being overweight increases your risk of health conditions such as heart disease, high blood pressure, type 2 diabetes, and certain types of cancer   It can also increase your risk for osteoarthritis, sleep apnea, and other respiratory problems  Aim for a slow, steady weight loss  Even a small amount of weight loss can lower your risk of health problems  How to lose weight safely:  A safe and healthy way to lose weight is to eat fewer calories and get regular exercise  You can lose up about 1 pound a week by decreasing the number of calories you eat by 500 calories each day  Healthy meal plan for weight management:  A healthy meal plan includes a variety of foods, contains fewer calories, and helps you stay healthy  A healthy meal plan includes the following:  · Eat whole-grain foods more often  A healthy meal plan should contain fiber  Fiber is the part of grains, fruits, and vegetables that is not broken down by your body  Whole-grain foods are healthy and provide extra fiber in your diet  Some examples of whole-grain foods are whole-wheat breads and pastas, oatmeal, brown rice, and bulgur  · Eat a variety of vegetables every day  Include dark, leafy greens such as spinach, kale, thiago greens, and mustard greens  Eat yellow and orange vegetables such as carrots, sweet potatoes, and winter squash  · Eat a variety of fruits every day  Choose fresh or canned fruit (canned in its own juice or light syrup) instead of juice  Fruit juice has very little or no fiber  · Eat low-fat dairy foods  Drink fat-free (skim) milk or 1% milk  Eat fat-free yogurt and low-fat cottage cheese  Try low-fat cheeses such as mozzarella and other reduced-fat cheeses  · Choose meat and other protein foods that are low in fat  Choose beans or other legumes such as split peas or lentils  Choose fish, skinless poultry (chicken or turkey), or lean cuts of red meat (beef or pork)  Before you cook meat or poultry, cut off any visible fat  · Use less fat and oil  Try baking foods instead of frying them   Add less fat, such as margarine, sour cream, regular salad dressing and mayonnaise to foods  Eat fewer high-fat foods  Some examples of high-fat foods include french fries, doughnuts, ice cream, and cakes  · Eat fewer sweets  Limit foods and drinks that are high in sugar  This includes candy, cookies, regular soda, and sweetened drinks  Exercise:  Exercise at least 30 minutes per day on most days of the week  Some examples of exercise include walking, biking, dancing, and swimming  You can also fit in more physical activity by taking the stairs instead of the elevator or parking farther away from stores  Ask your healthcare provider about the best exercise plan for you  © Copyright [employer] 2018 Information is for End User's use only and may not be sold, redistributed or otherwise used for commercial purposes   All illustrations and images included in CareNotes® are the copyrighted property of [employer]  or [address] [place], [place] [address]

## 2022-04-27 NOTE — ASSESSMENT & PLAN NOTE
Lab Results   Component Value Date    EGFR 30 03/23/2022    EGFR 34 09/22/2021    EGFR 22 04/01/2021    CREATININE 1 61 (H) 03/23/2022    CREATININE 1 47 (H) 09/22/2021    CREATININE 2 07 (H) 04/01/2021   Being followed by Nephrology

## 2022-04-27 NOTE — ASSESSMENT & PLAN NOTE
Lab Results   Component Value Date    HGBA1C 6 1 (H) 01/13/2021   Last A1c 6 1 percent  Diet controlled (no longer on Januvia due to possible nephrotoxicity)  Patient is due for labs    She will get these drawn shortly

## 2022-04-27 NOTE — PROGRESS NOTES
[facility]      NAME: Aldo Avila  AGE: 76 y o  SEX: female  : 1945   MRN: [id number]    DATE: 2022  TIME: 10:50 AM    Assessment and Plan     Problem List Items Addressed This Visit     Stage 3b chronic kidney disease ([facility] [address] )    Depression, recurrent ([facility] [address] )    Fibromyalgia    Hyperlipidemia     Doing well on simvastatin 10  Patient is due for labs  She will get these drawn shortly         Relevant Orders    Lipid Panel with Direct LDL reflex    Hypothyroidism     Doing well on levothyroxine 50  Patient is due for labs  She will get these done shortly         Relevant Orders    TSH, 3rd generation with Free T4 reflex    Type 2 diabetes mellitus with hyperglycemia, without long-term current use of insulin ([facility])       Lab Results   Component Value Date    HGBA1C 6 1 (H) 2021   Last A1c 6 1 percent  Diet controlled (no longer on Januvia due to possible nephrotoxicity)  Patient is due for labs  She will get these drawn shortly         Relevant Orders    Comprehensive metabolic panel    Hemoglobin A1C    Microalbumin / creatinine urine ratio    Nephrosclerosis, stage 1-4 or unspecified chronic kidney disease     Lab Results   Component Value Date    EGFR 30 2022    EGFR 34 2021    EGFR 22 2021    CREATININE 1 61 (H) 2022    CREATININE 1 47 (H) 2021    CREATININE 2 07 (H) 2021   Being followed by Nephrology  Idiopathic thrombocytopenic purpura (HCC)     History of stable platelets  Last labs 71,000  No bleeding issues  Patient is due for repeat  Will call with results         Relevant Orders    CBC and differential    Otalgia of both ears     Patient complaining of occasional sharp pains in both for years  Examination is essentially unremarkable today  Patient has hearing aids   Will refer to ENT         Relevant Orders    Ambulatory Referral to Otolaryngology      Other Visit Diagnoses     Encounter for annual wellness exam in Medicare patient    -  Primary    Morbid (severe) obesity due to excess calories [facility])            Patient had COVID vaccination, and booster  Patient had Pneumovax  Tetanus 2015  Discussed Shingrix (patient will go to pharmacy)      Return to office in:  6 months, labs ordered through endocrinologist    Chief Complaint     Chief Complaint   Patient presents with    Medicare Wellness Visit       History of Present Illness     Patient presents recheck chronic medical problems today  She has been complaining of sharp ear pains on occasion for the past few months  Denies any hearing issues  Compliant with prescribed medications  The following portions of the patient's history were reviewed and updated as appropriate: allergies, current medications, past family history, past medical history, past social history, past surgical history and problem list     Review of Systems   Review of Systems   HENT: Positive for ear pain  Respiratory: Negative  Cardiovascular: Negative  Gastrointestinal: Negative  Genitourinary: Negative          Active Problem List     Patient Active Problem List   Diagnosis    Anemia    Chronic gout    Stage 3b chronic kidney disease ([facility] [address] )    Combined form of senile cataract of left eye    Cough    Depression, recurrent ([address] )    Edema    Fibromyalgia    Homocysteinemia    Hyperlipidemia    Hypothyroidism    Low back pain    Pain of left hip    Primary osteoarthritis of both hands    Increased frequency of urination    Vitamin D deficiency    Folliculitis    Symptomatic varicose veins of both lower extremities    Intermittent claudication (HCC)    Scalp laceration    Chest pain    Leukocytosis    Type 2 diabetes mellitus without complication, without long-term current use of insulin (HCC)    Gait abnormality    Class 3 severe obesity due to excess calories with serious comorbidity and body mass index (BMI) of 40 0 to 44 9 in adult [facility])    Type 2 diabetes mellitus with hyperglycemia, without long-term current use of insulin (HCC)    Pain in left tibia    Nephrosclerosis, stage 1-4 or unspecified chronic kidney disease    Tinea corporis    Hypertensive chronic kidney disease with stage 1 through stage 4 chronic kidney disease, or unspecified chronic kidney disease    Arthritis    Cataract    Idiopathic chronic gout with tophus    Idiopathic thrombocytopenic purpura (HCC)    Osteoarthritis of multiple joints    Post menopausal syndrome    Restless legs syndrome    Ambulatory dysfunction with recurrent falls    Fracture of multiple ribs of left side    Acute pain due to trauma    Impaired vision    Bilateral hearing loss    Delirium precautions    Type 2 diabetes mellitus with chronic kidney disease, without long-term current use of insulin (HCC)    Otalgia of both ears       Objective   /80 (BP Location: Left arm, Patient Position: Sitting, Cuff Size: Large)   Pulse 60   Temp 97 7 °F (36 5 °C) (Temporal)   Resp 18   Ht 5' 5 75" (1 67 m)   Wt 108 kg (238 lb)   SpO2 97%   BMI 38 71 kg/m²     Physical Exam  Cardiovascular:      Rate and Rhythm: Normal rate and regular rhythm  Heart sounds: Normal heart sounds  Comments: Carotids: no bruits  Ext: no edema  Pulmonary:      Effort: Pulmonary effort is normal  No respiratory distress  Breath sounds: No wheezing or rales  Psychiatric:         Behavior: Behavior normal          Thought Content:  Thought content normal          Pertinent Laboratory/Diagnostic Studies:  Last labs reviewed    Current Medications     Current Outpatient Medications:     albuterol (ProAir HFA) 90 mcg/act inhaler, Inhale 2 puffs every 6 (six) hours as needed for wheezing, Disp: 8 5 g, Rfl: 3    allopurinol (ZYLOPRIM) 100 mg tablet, Take 150 mg by mouth daily  , Disp: , Rfl:     aspirin (ASPIRIN ADULT LOW DOSE) 81 mg EC tablet, Take 1 tablet by mouth daily  , Disp: , Rfl:     cholecalciferol (VITAMIN D3) 1,000 units tablet, Take 1,000 Units by mouth daily, Disp: , Rfl:     citalopram (CeleXA) 40 mg tablet, Take 1 tablet (40 mg total) by mouth daily, Disp: 90 tablet, Rfl: 0    furosemide (LASIX) 40 mg tablet, Take 1 tablet (40 mg total) by mouth daily, Disp: 30 tablet, Rfl: 5    gabapentin (NEURONTIN) 100 mg capsule, Take 1 tablet by mouth 3 (three) times a day , Disp: , Rfl:     levothyroxine 50 mcg tablet, Take 1 tablet (50 mcg total) by mouth daily, Disp: 90 tablet, Rfl: 1    metoprolol tartrate (LOPRESSOR) 25 mg tablet, Take 1 tablet (25 mg total) by mouth daily, Disp: 30 tablet, Rfl: 5    Potassium Chloride ER 20 MEQ TBCR, TAKE 1 TABLET BY MOUTH FOR A SINGLE DOSE, Disp: 30 tablet, Rfl: 0    predniSONE 5 mg tablet, Take 5 mg by mouth daily, Disp: , Rfl:     simvastatin (ZOCOR) 10 mg tablet, Take 1 tablet (10 mg total) by mouth daily, Disp: 90 tablet, Rfl: 0    febuxostat (ULORIC) 40 mg tablet, , Disp: , Rfl:     methocarbamol (ROBAXIN) 500 mg tablet, Take 1 tablet (500 mg total) by mouth every 6 (six) hours (Patient not taking: Reported on 4/4/2022 ), Disp: 30 tablet, Rfl: 0    metolazone (ZAROXOLYN) 2 5 mg tablet, TAKE ONE TABLET BY MOUTH THREE TIMES A WEEK on monday, wednesday and friday   (Patient not taking: Reported on 4/4/2022 ), Disp: 12 tablet, Rfl: 0    mupirocin (BACTROBAN) 2 % ointment, Apply topically 2 (two) times a day (Patient not taking: Reported on 4/4/2022 ), Disp: 22 g, Rfl: 0    polyethylene glycol (MIRALAX) 17 g packet, Take 17 g by mouth daily (Patient not taking: Reported on 4/4/2022 ), Disp: 30 each, Rfl: 0    Health Maintenance     Health Maintenance   Topic Date Due    Hepatitis C Screening  Never done    SLP PLAN OF CARE  Never done    Breast Cancer Screening: Mammogram  Never done    Osteoporosis Screening  Never done    BMI: Followup Plan  01/20/2021    HEMOGLOBIN A1C  07/13/2021    Diabetic Foot Exam  07/07/2022    URINE MICROALBUMIN  09/22/2022    DM Eye Exam  11/11/2022    Fall Risk  04/27/2023    Medicare Annual Wellness Visit (AWV)  04/27/2023    BMI: Adult  04/27/2023    Depression Remission PHQ  04/27/2023    DTaP,Tdap,and Td Vaccines (3 - Td or Tdap) 08/23/2029    Pneumococcal Vaccine: 65+ Years  Completed    Influenza Vaccine  Completed    COVID-19 Vaccine  Completed    HIB Vaccine  Aged Out    Hepatitis B Vaccine  Aged Out    IPV Vaccine  Aged Out    Hepatitis A Vaccine  Aged Out    Meningococcal ACWY Vaccine  Aged Out    HPV Vaccine  Aged Out     Immunization History   Administered Date(s) Administered    COVID-19 PFIZER VACCINE 0 3 ML IM 03/08/2021, 03/29/2021, 11/04/2021    INFLUENZA 11/14/2012, 03/18/2013, 01/01/2014, 03/03/2014, 11/19/2014, 11/18/2015, 11/25/2016, 10/12/2017    Influenza Split High Dose Preservative Free IM 11/18/2015, 11/25/2016, 10/12/2017    Influenza, high dose seasonal 0 7 mL 10/08/2018, 10/08/2019, 09/18/2020, 10/12/2021    Influenza, seasonal, injectable 09/09/2011, 10/15/2012, 09/25/2013, 01/01/2014    Pneumococcal Conjugate 13-Valent 08/06/2015    Pneumococcal Polysaccharide PPV23 10/15/2008    Tdap 08/06/2015, 08/23/2019       Nithya Oliva DO  [facility]

## 2022-04-27 NOTE — ASSESSMENT & PLAN NOTE
History of stable platelets  Last labs 71,000  No bleeding issues  Patient is due for repeat    Will call with results

## 2022-05-11 ENCOUNTER — APPOINTMENT (OUTPATIENT)
Dept: LAB | Facility: CLINIC | Age: 77
End: 2022-05-11
Payer: COMMERCIAL

## 2022-05-11 ENCOUNTER — OFFICE VISIT (OUTPATIENT)
Dept: PODIATRY | Facility: CLINIC | Age: 77
End: 2022-05-11
Payer: COMMERCIAL

## 2022-05-11 VITALS
BODY MASS INDEX: 39.28 KG/M2 | HEIGHT: 66 IN | SYSTOLIC BLOOD PRESSURE: 132 MMHG | DIASTOLIC BLOOD PRESSURE: 72 MMHG | WEIGHT: 244.4 LBS

## 2022-05-11 DIAGNOSIS — E03.9 HYPOTHYROIDISM, UNSPECIFIED TYPE: ICD-10-CM

## 2022-05-11 DIAGNOSIS — D69.3 IDIOPATHIC THROMBOCYTOPENIC PURPURA (HCC): ICD-10-CM

## 2022-05-11 DIAGNOSIS — E78.2 MIXED HYPERLIPIDEMIA: ICD-10-CM

## 2022-05-11 DIAGNOSIS — E11.9 TYPE 2 DIABETES MELLITUS WITHOUT COMPLICATION, WITHOUT LONG-TERM CURRENT USE OF INSULIN (HCC): Primary | ICD-10-CM

## 2022-05-11 DIAGNOSIS — E11.65 TYPE 2 DIABETES MELLITUS WITH HYPERGLYCEMIA, WITHOUT LONG-TERM CURRENT USE OF INSULIN (HCC): ICD-10-CM

## 2022-05-11 LAB
ALBUMIN SERPL BCP-MCNC: 3.7 G/DL (ref 3.5–5)
ALP SERPL-CCNC: 52 U/L (ref 46–116)
ALT SERPL W P-5'-P-CCNC: 17 U/L (ref 12–78)
ANION GAP SERPL CALCULATED.3IONS-SCNC: 6 MMOL/L (ref 4–13)
AST SERPL W P-5'-P-CCNC: 16 U/L (ref 5–45)
BASOPHILS # BLD AUTO: 0.07 THOUSANDS/ΜL (ref 0–0.1)
BASOPHILS NFR BLD AUTO: 1 % (ref 0–1)
BILIRUB SERPL-MCNC: 0.67 MG/DL (ref 0.2–1)
BUN SERPL-MCNC: 40 MG/DL (ref 5–25)
CALCIUM SERPL-MCNC: 9.5 MG/DL (ref 8.3–10.1)
CHLORIDE SERPL-SCNC: 103 MMOL/L (ref 100–108)
CHOLEST SERPL-MCNC: 156 MG/DL
CO2 SERPL-SCNC: 28 MMOL/L (ref 21–32)
CREAT SERPL-MCNC: 2.07 MG/DL (ref 0.6–1.3)
CREAT UR-MCNC: 110 MG/DL
EOSINOPHIL # BLD AUTO: 0.2 THOUSAND/ΜL (ref 0–0.61)
EOSINOPHIL NFR BLD AUTO: 2 % (ref 0–6)
ERYTHROCYTE [DISTWIDTH] IN BLOOD BY AUTOMATED COUNT: 14.1 % (ref 11.6–15.1)
GFR SERPL CREATININE-BSD FRML MDRD: 22 ML/MIN/1.73SQ M
GLUCOSE P FAST SERPL-MCNC: 124 MG/DL (ref 65–99)
HCT VFR BLD AUTO: 38.9 % (ref 34.8–46.1)
HDLC SERPL-MCNC: 80 MG/DL
HGB BLD-MCNC: 12.1 G/DL (ref 11.5–15.4)
IMM GRANULOCYTES # BLD AUTO: 0.04 THOUSAND/UL (ref 0–0.2)
IMM GRANULOCYTES NFR BLD AUTO: 0 % (ref 0–2)
LDLC SERPL CALC-MCNC: 54 MG/DL (ref 0–100)
LYMPHOCYTES # BLD AUTO: 1.69 THOUSANDS/ΜL (ref 0.6–4.47)
LYMPHOCYTES NFR BLD AUTO: 18 % (ref 14–44)
MCH RBC QN AUTO: 30.7 PG (ref 26.8–34.3)
MCHC RBC AUTO-ENTMCNC: 31.1 G/DL (ref 31.4–37.4)
MCV RBC AUTO: 99 FL (ref 82–98)
MICROALBUMIN UR-MCNC: 11.9 MG/L (ref 0–20)
MICROALBUMIN/CREAT 24H UR: 11 MG/G CREATININE (ref 0–30)
MONOCYTES # BLD AUTO: 0.68 THOUSAND/ΜL (ref 0.17–1.22)
MONOCYTES NFR BLD AUTO: 7 % (ref 4–12)
NEUTROPHILS # BLD AUTO: 6.58 THOUSANDS/ΜL (ref 1.85–7.62)
NEUTS SEG NFR BLD AUTO: 72 % (ref 43–75)
NRBC BLD AUTO-RTO: 0 /100 WBCS
PLATELET # BLD AUTO: 269 THOUSANDS/UL (ref 149–390)
PMV BLD AUTO: 10.7 FL (ref 8.9–12.7)
POTASSIUM SERPL-SCNC: 4.4 MMOL/L (ref 3.5–5.3)
PROT SERPL-MCNC: 7.4 G/DL (ref 6.4–8.2)
RBC # BLD AUTO: 3.94 MILLION/UL (ref 3.81–5.12)
SODIUM SERPL-SCNC: 137 MMOL/L (ref 136–145)
TRIGL SERPL-MCNC: 110 MG/DL
TSH SERPL DL<=0.05 MIU/L-ACNC: 1.17 UIU/ML (ref 0.45–4.5)
WBC # BLD AUTO: 9.26 THOUSAND/UL (ref 4.31–10.16)

## 2022-05-11 PROCEDURE — 99212 OFFICE O/P EST SF 10 MIN: CPT | Performed by: PODIATRIST

## 2022-05-11 PROCEDURE — 84443 ASSAY THYROID STIM HORMONE: CPT

## 2022-05-11 PROCEDURE — 82043 UR ALBUMIN QUANTITATIVE: CPT

## 2022-05-11 PROCEDURE — 82570 ASSAY OF URINE CREATININE: CPT

## 2022-05-11 PROCEDURE — 80053 COMPREHEN METABOLIC PANEL: CPT

## 2022-05-11 PROCEDURE — 80061 LIPID PANEL: CPT

## 2022-05-11 PROCEDURE — 83036 HEMOGLOBIN GLYCOSYLATED A1C: CPT

## 2022-05-11 PROCEDURE — 1160F RVW MEDS BY RX/DR IN RCRD: CPT | Performed by: PODIATRIST

## 2022-05-11 PROCEDURE — 1036F TOBACCO NON-USER: CPT | Performed by: PODIATRIST

## 2022-05-11 PROCEDURE — 85025 COMPLETE CBC W/AUTO DIFF WBC: CPT

## 2022-05-11 PROCEDURE — 36415 COLL VENOUS BLD VENIPUNCTURE: CPT

## 2022-05-11 RX ORDER — POTASSIUM CHLORIDE 20 MEQ/1
TABLET, EXTENDED RELEASE ORAL
COMMUNITY
Start: 2022-04-25 | End: 2022-06-13 | Stop reason: SDUPTHER

## 2022-05-11 NOTE — PROGRESS NOTES
Patient presents for palliative foot care  No acute disorder noted  There are no palpable pedal pulses  Treatment consisted of nail and lesion trimming  Hyperkeratotic lesion present tip of right 3rd toe  Patient is rescheduled in 3 months

## 2022-05-12 LAB
EST. AVERAGE GLUCOSE BLD GHB EST-MCNC: 131 MG/DL
HBA1C MFR BLD: 6.2 %

## 2022-06-01 DIAGNOSIS — F32.A DEPRESSION, UNSPECIFIED DEPRESSION TYPE: ICD-10-CM

## 2022-06-01 RX ORDER — CITALOPRAM 40 MG/1
40 TABLET ORAL DAILY
Qty: 90 TABLET | Refills: 0 | Status: SHIPPED | OUTPATIENT
Start: 2022-06-01

## 2022-06-03 ENCOUNTER — APPOINTMENT (OUTPATIENT)
Dept: RADIOLOGY | Facility: CLINIC | Age: 77
End: 2022-06-03
Payer: COMMERCIAL

## 2022-06-03 ENCOUNTER — OFFICE VISIT (OUTPATIENT)
Dept: FAMILY MEDICINE CLINIC | Facility: CLINIC | Age: 77
End: 2022-06-03
Payer: COMMERCIAL

## 2022-06-03 ENCOUNTER — TELEPHONE (OUTPATIENT)
Dept: FAMILY MEDICINE CLINIC | Facility: CLINIC | Age: 77
End: 2022-06-03

## 2022-06-03 VITALS
DIASTOLIC BLOOD PRESSURE: 78 MMHG | HEIGHT: 66 IN | BODY MASS INDEX: 39.08 KG/M2 | HEART RATE: 76 BPM | WEIGHT: 243.2 LBS | RESPIRATION RATE: 20 BRPM | TEMPERATURE: 98.1 F | SYSTOLIC BLOOD PRESSURE: 124 MMHG | OXYGEN SATURATION: 95 %

## 2022-06-03 DIAGNOSIS — M54.2 NECK PAIN: ICD-10-CM

## 2022-06-03 DIAGNOSIS — M25.511 ACUTE PAIN OF RIGHT SHOULDER: Primary | ICD-10-CM

## 2022-06-03 DIAGNOSIS — E11.9 TYPE 2 DIABETES MELLITUS WITHOUT COMPLICATION, WITHOUT LONG-TERM CURRENT USE OF INSULIN (HCC): ICD-10-CM

## 2022-06-03 DIAGNOSIS — N18.4 CHRONIC RENAL DISEASE, STAGE IV (HCC): ICD-10-CM

## 2022-06-03 DIAGNOSIS — M25.511 ACUTE PAIN OF RIGHT SHOULDER: ICD-10-CM

## 2022-06-03 PROCEDURE — 72050 X-RAY EXAM NECK SPINE 4/5VWS: CPT

## 2022-06-03 PROCEDURE — 1160F RVW MEDS BY RX/DR IN RCRD: CPT | Performed by: FAMILY MEDICINE

## 2022-06-03 PROCEDURE — 73030 X-RAY EXAM OF SHOULDER: CPT

## 2022-06-03 PROCEDURE — 99214 OFFICE O/P EST MOD 30 MIN: CPT | Performed by: FAMILY MEDICINE

## 2022-06-03 PROCEDURE — 1036F TOBACCO NON-USER: CPT | Performed by: FAMILY MEDICINE

## 2022-06-03 RX ORDER — PREDNISONE 10 MG/1
TABLET ORAL
Qty: 30 TABLET | Refills: 0 | Status: SHIPPED | OUTPATIENT
Start: 2022-06-03

## 2022-06-03 RX ORDER — METHOCARBAMOL 500 MG/1
500 TABLET, FILM COATED ORAL EVERY 6 HOURS SCHEDULED
Qty: 30 TABLET | Refills: 0 | Status: SHIPPED | OUTPATIENT
Start: 2022-06-03

## 2022-06-03 RX ORDER — METHOCARBAMOL 500 MG/1
500 TABLET, FILM COATED ORAL EVERY 6 HOURS SCHEDULED
Qty: 30 TABLET | Refills: 0 | Status: SHIPPED | OUTPATIENT
Start: 2022-06-03 | End: 2022-06-03 | Stop reason: SDUPTHER

## 2022-06-03 NOTE — TELEPHONE ENCOUNTER
Pt called stating that the methocarbamol (ROBAXIN) 500 mg tablet   isn't at [facility], can you please resend

## 2022-06-03 NOTE — ASSESSMENT & PLAN NOTE
Check x-rays given bony tenderness and severity of pain and age; advised steroids and muscle relaxer; reviewed medications and potential SE; advised to monitor BS with DM hx; referred to ortho given pt states she is unable to do physical therapy at this time; f/u guidance given

## 2022-06-03 NOTE — PROGRESS NOTES
Assessment/Plan:     1  Acute pain of right shoulder  Assessment & Plan:  Check x-rays given bony tenderness and severity of pain and age; advised steroids and muscle relaxer; reviewed medications and potential SE; advised to monitor BS with DM hx; referred to ortho given pt states she is unable to do physical therapy at this time; f/u guidance given    Orders:  -     XR shoulder 2+ vw right; Future; Expected date: 06/03/2022  -     methocarbamol (ROBAXIN) 500 mg tablet; Take 1 tablet (500 mg total) by mouth every 6 (six) hours  -     Ambulatory Referral to Orthopedic Surgery; Future  -     predniSONE 10 mg tablet; Take 5 tabs PO daily x 1 day; take 4 tabs QD x 1 day; take 3 tabs QD x 1 day; take 2 tabs QD x 1 day; take 1 tab QD x 1 day    2  Neck pain  Assessment & Plan:  Check x-rays given age and TTP over spinous process to r/o compression fracture; steroids and muscle relaxer; defers PT at this time    Orders:  -     XR spine cervical complete 4 or 5 vw non injury; Future; Expected date: 06/03/2022  -     Ambulatory Referral to Orthopedic Surgery; Future  -     predniSONE 10 mg tablet; Take 5 tabs PO daily x 1 day; take 4 tabs QD x 1 day; take 3 tabs QD x 1 day; take 2 tabs QD x 1 day; take 1 tab QD x 1 day    3  Chronic renal disease, stage IV ([facility] [address] )    4  Type 2 diabetes mellitus without complication, without long-term current use of insulin (HCC)        Subjective:      Patient ID: Isis Franco is a 76 y o  female  Patient complaining of right shoulder pain  Started 2 weeks ago and has worsened  No known injury  Radiates down arm to mid bicep  Was able to lift it initially and now having difficulty moving arm  No prior injury  Patient has been taking Tylenol without much relief         The following portions of the patient's history were reviewed and updated as appropriate: allergies, current medications, past family history, past medical history, past social history, past surgical history, and problem list     Review of Systems   Constitutional: Negative for chills and fever  Cardiovascular: Negative for chest pain  Musculoskeletal: Positive for arthralgias and neck pain  Objective:      /78 (BP Location: Left arm, Patient Position: Sitting, Cuff Size: Large)   Pulse 76   Temp 98 1 °F (36 7 °C) (Temporal)   Resp 20   Ht 5' 5 75" (1 67 m)   Wt 110 kg (243 lb 3 2 oz)   SpO2 95%   BMI 39 55 kg/m²          Physical Exam  Vitals reviewed  Constitutional:       General: She is not in acute distress  Appearance: Normal appearance  She is not ill-appearing, toxic-appearing or diaphoretic  HENT:      Head: Normocephalic and atraumatic  Eyes:      General: No scleral icterus  Right eye: No discharge  Left eye: No discharge  Conjunctiva/sclera: Conjunctivae normal    Cardiovascular:      Rate and Rhythm: Normal rate and regular rhythm  Pulses: Normal pulses  Heart sounds: Normal heart sounds  No murmur heard  No gallop  Pulmonary:      Effort: Pulmonary effort is normal  No respiratory distress  Breath sounds: Normal breath sounds  No stridor  No wheezing, rhonchi or rales  Musculoskeletal:      Right shoulder: Tenderness and bony tenderness present  Decreased range of motion  Decreased strength  Cervical back: Spasms and bony tenderness present  Right lower leg: No edema  Left lower leg: No edema  Neurological:      General: No focal deficit present  Mental Status: She is alert and oriented to person, place, and time  Psychiatric:         Mood and Affect: Mood normal          Behavior: Behavior normal          Thought Content:  Thought content normal          Judgment: Judgment normal

## 2022-06-03 NOTE — ASSESSMENT & PLAN NOTE
Check x-rays given age and TTP over spinous process to r/o compression fracture; steroids and muscle relaxer; defers PT at this time

## 2022-06-03 NOTE — TELEPHONE ENCOUNTER
Sent to [facility]  It was classified as print so it was not ever sent to the pharmacy as there was no confirmation attached to previous order

## 2022-06-13 DIAGNOSIS — R60.0 LOCALIZED EDEMA: Primary | ICD-10-CM

## 2022-06-13 RX ORDER — POTASSIUM CHLORIDE 20 MEQ/1
20 TABLET, EXTENDED RELEASE ORAL DAILY
Qty: 30 TABLET | Refills: 0 | Status: SHIPPED | OUTPATIENT
Start: 2022-06-13 | End: 2022-07-19 | Stop reason: SDUPTHER

## 2022-06-15 ENCOUNTER — TELEPHONE (OUTPATIENT)
Dept: NEPHROLOGY | Facility: CLINIC | Age: 77
End: 2022-06-15

## 2022-06-15 NOTE — TELEPHONE ENCOUNTER
Called to schedule follow up appointment with Dwight in [place]  Voicemail box is full  This is the first attempt

## 2022-06-17 NOTE — TELEPHONE ENCOUNTER
Called to schedule follow up appointment with Dr Asha Guo in [place]  Voicemail box is full  This is the second attempt

## 2022-07-07 DIAGNOSIS — E78.2 MIXED HYPERLIPIDEMIA: ICD-10-CM

## 2022-07-07 RX ORDER — SIMVASTATIN 10 MG
TABLET ORAL
Qty: 90 TABLET | Refills: 0 | Status: SHIPPED | OUTPATIENT
Start: 2022-07-07 | End: 2022-10-07 | Stop reason: SDUPTHER

## 2022-07-19 DIAGNOSIS — R60.0 LOCALIZED EDEMA: ICD-10-CM

## 2022-07-19 RX ORDER — POTASSIUM CHLORIDE 20 MEQ/1
20 TABLET, EXTENDED RELEASE ORAL DAILY
Qty: 30 TABLET | Refills: 1 | Status: SHIPPED | OUTPATIENT
Start: 2022-07-19 | End: 2022-09-23 | Stop reason: SDUPTHER

## 2022-08-17 ENCOUNTER — OFFICE VISIT (OUTPATIENT)
Dept: PODIATRY | Facility: CLINIC | Age: 77
End: 2022-08-17
Payer: COMMERCIAL

## 2022-08-17 VITALS
HEIGHT: 66 IN | WEIGHT: 245 LBS | DIASTOLIC BLOOD PRESSURE: 72 MMHG | SYSTOLIC BLOOD PRESSURE: 136 MMHG | BODY MASS INDEX: 39.37 KG/M2

## 2022-08-17 DIAGNOSIS — E11.9 TYPE 2 DIABETES MELLITUS WITHOUT COMPLICATION, WITHOUT LONG-TERM CURRENT USE OF INSULIN (HCC): Primary | ICD-10-CM

## 2022-08-17 PROCEDURE — 99212 OFFICE O/P EST SF 10 MIN: CPT | Performed by: PODIATRIST

## 2022-08-17 PROCEDURE — 1160F RVW MEDS BY RX/DR IN RCRD: CPT | Performed by: PODIATRIST

## 2022-08-17 NOTE — PROGRESS NOTES
Patient presents for palliative diabetic foot care  No acute disorder noted  There are no palpable pedal pulses  Treatment consisted of trimming of multiple dystrophic toenails  Patient is rescheduled in 3 months

## 2022-08-21 DIAGNOSIS — E03.9 HYPOTHYROIDISM, UNSPECIFIED TYPE: ICD-10-CM

## 2022-08-22 RX ORDER — LEVOTHYROXINE SODIUM 0.05 MG/1
TABLET ORAL
Qty: 90 TABLET | Refills: 0 | Status: SHIPPED | OUTPATIENT
Start: 2022-08-22

## 2022-08-23 DIAGNOSIS — F32.A DEPRESSION, UNSPECIFIED DEPRESSION TYPE: ICD-10-CM

## 2022-08-23 RX ORDER — CITALOPRAM 40 MG/1
40 TABLET ORAL DAILY
Qty: 90 TABLET | Refills: 0 | Status: SHIPPED | OUTPATIENT
Start: 2022-08-23

## 2022-08-25 DIAGNOSIS — R60.9 EDEMA, UNSPECIFIED TYPE: ICD-10-CM

## 2022-08-25 DIAGNOSIS — I10 ESSENTIAL HYPERTENSION: ICD-10-CM

## 2022-08-25 RX ORDER — FUROSEMIDE 40 MG/1
TABLET ORAL
Qty: 30 TABLET | Refills: 0 | Status: SHIPPED | OUTPATIENT
Start: 2022-08-25 | End: 2022-10-07 | Stop reason: SDUPTHER

## 2022-09-12 ENCOUNTER — EPISODE CHANGES (OUTPATIENT)
Dept: CASE MANAGEMENT | Facility: OTHER | Age: 77
End: 2022-09-12

## 2022-09-12 ENCOUNTER — PATIENT OUTREACH (OUTPATIENT)
Dept: FAMILY MEDICINE CLINIC | Facility: CLINIC | Age: 77
End: 2022-09-12

## 2022-09-12 NOTE — PROGRESS NOTES
Outpatient Care Management Note:    Chart review completed for the following sections:   Recent Vital Signs   Allergies/Contradictions   Medication Review    History    SDOH    Problem List   Immunizations   Past hospitalizations and major procedures, including surgery   Significant past illnesses and treatment history including: Medications/Infusions/Transfusions   Relevant past medications related to the patient's condition    Based on RN care manager chart review, patient does qualify for [facility]

## 2022-09-14 ENCOUNTER — PATIENT OUTREACH (OUTPATIENT)
Dept: FAMILY MEDICINE CLINIC | Facility: CLINIC | Age: 77
End: 2022-09-14

## 2022-09-14 NOTE — PROGRESS NOTES
Outpatient Care Management Note:    [facility] with my contact information, introducing myself and requesting a call back

## 2022-09-16 ENCOUNTER — PATIENT OUTREACH (OUTPATIENT)
Dept: FAMILY MEDICINE CLINIC | Facility: CLINIC | Age: 77
End: 2022-09-16

## 2022-09-16 NOTE — LETTER
Date: 09/16/22    Dear Indira Osman,   My name is Fide Lay  I am a registered nurse care manager working with   [address] [facility] [address]   I have not been able to reach you and would like to set a time that I can talk with you over the phone  My work is to help patients that have complex medical conditions get the care they need  This includes patients who may have been in the hospital or emergency room  Please call me with any questions you may have  I look forward to speaking with you    Sincerely,  Fide Rosanne  [phone]  Outpatient Care Manager  Copy:  (primary care physician name and address)

## 2022-09-16 NOTE — PROGRESS NOTES
Outpatient Care Management Note:    Voice mail message left for [facility], with my contact information, requesting a call back  Unable to reach letter mailed to patient

## 2022-09-23 DIAGNOSIS — R60.0 LOCALIZED EDEMA: ICD-10-CM

## 2022-09-25 RX ORDER — POTASSIUM CHLORIDE 20 MEQ/1
20 TABLET, EXTENDED RELEASE ORAL DAILY
Qty: 30 TABLET | Refills: 1 | Status: SHIPPED | OUTPATIENT
Start: 2022-09-25

## 2022-09-26 ENCOUNTER — PATIENT OUTREACH (OUTPATIENT)
Dept: FAMILY MEDICINE CLINIC | Facility: CLINIC | Age: 77
End: 2022-09-26

## 2022-09-26 NOTE — PROGRESS NOTES
Outpatient Care Management Note: Deny received my letter and returned my call  She declined participation in [facility] program       Patient does not consent to the [facility] at this time but would like to be contacted at a later date

## 2022-10-07 ENCOUNTER — IMMUNIZATIONS (OUTPATIENT)
Dept: FAMILY MEDICINE CLINIC | Facility: CLINIC | Age: 77
End: 2022-10-07
Payer: COMMERCIAL

## 2022-10-07 DIAGNOSIS — Z23 NEED FOR INFLUENZA VACCINATION: Primary | ICD-10-CM

## 2022-10-07 DIAGNOSIS — I10 ESSENTIAL HYPERTENSION: ICD-10-CM

## 2022-10-07 DIAGNOSIS — R60.9 EDEMA, UNSPECIFIED TYPE: ICD-10-CM

## 2022-10-07 DIAGNOSIS — E78.2 MIXED HYPERLIPIDEMIA: ICD-10-CM

## 2022-10-07 PROCEDURE — G0008 ADMIN INFLUENZA VIRUS VAC: HCPCS | Performed by: FAMILY MEDICINE

## 2022-10-07 PROCEDURE — 90662 IIV NO PRSV INCREASED AG IM: CPT | Performed by: FAMILY MEDICINE

## 2022-10-08 RX ORDER — SIMVASTATIN 10 MG
10 TABLET ORAL DAILY
Qty: 90 TABLET | Refills: 0 | Status: SHIPPED | OUTPATIENT
Start: 2022-10-08

## 2022-10-08 RX ORDER — FUROSEMIDE 40 MG/1
40 TABLET ORAL DAILY
Qty: 30 TABLET | Refills: 1 | Status: SHIPPED | OUTPATIENT
Start: 2022-10-08

## 2022-10-21 ENCOUNTER — APPOINTMENT (OUTPATIENT)
Dept: LAB | Facility: MEDICAL CENTER | Age: 77
End: 2022-10-21
Payer: COMMERCIAL

## 2022-10-21 DIAGNOSIS — N18.9 CHRONIC RENAL IMPAIRMENT, UNSPECIFIED CKD STAGE: ICD-10-CM

## 2022-10-21 LAB
ANION GAP SERPL CALCULATED.3IONS-SCNC: 3 MMOL/L (ref 4–13)
BUN SERPL-MCNC: 39 MG/DL (ref 5–25)
CALCIUM SERPL-MCNC: 9.8 MG/DL (ref 8.3–10.1)
CHLORIDE SERPL-SCNC: 104 MMOL/L (ref 96–108)
CO2 SERPL-SCNC: 32 MMOL/L (ref 21–32)
CREAT SERPL-MCNC: 1.54 MG/DL (ref 0.6–1.3)
CREAT UR-MCNC: 36.7 MG/DL
GFR SERPL CREATININE-BSD FRML MDRD: 31 ML/MIN/1.73SQ M
GLUCOSE P FAST SERPL-MCNC: 125 MG/DL (ref 65–99)
MICROALBUMIN UR-MCNC: <5 MG/L (ref 0–20)
MICROALBUMIN/CREAT 24H UR: <14 MG/G CREATININE (ref 0–30)
POTASSIUM SERPL-SCNC: 4.1 MMOL/L (ref 3.5–5.3)
SODIUM SERPL-SCNC: 139 MMOL/L (ref 135–147)

## 2022-10-21 PROCEDURE — 36415 COLL VENOUS BLD VENIPUNCTURE: CPT

## 2022-10-21 PROCEDURE — 80048 BASIC METABOLIC PNL TOTAL CA: CPT

## 2022-11-02 ENCOUNTER — OFFICE VISIT (OUTPATIENT)
Dept: NEPHROLOGY | Facility: CLINIC | Age: 77
End: 2022-11-02

## 2022-11-02 VITALS — HEIGHT: 66 IN | BODY MASS INDEX: 41.14 KG/M2 | WEIGHT: 256 LBS

## 2022-11-02 DIAGNOSIS — I12.9 NEPHROSCLEROSIS, STAGE 1-4 OR UNSPECIFIED CHRONIC KIDNEY DISEASE: Primary | ICD-10-CM

## 2022-11-02 PROBLEM — E11.65 TYPE 2 DIABETES MELLITUS WITH HYPERGLYCEMIA, WITHOUT LONG-TERM CURRENT USE OF INSULIN (HCC): Status: RESOLVED | Noted: 2020-03-12 | Resolved: 2022-11-02

## 2022-11-02 PROBLEM — E11.22 TYPE 2 DIABETES MELLITUS WITH CHRONIC KIDNEY DISEASE, WITHOUT LONG-TERM CURRENT USE OF INSULIN (HCC): Status: RESOLVED | Noted: 2021-07-07 | Resolved: 2022-11-02

## 2022-11-02 PROBLEM — E11.9 TYPE 2 DIABETES MELLITUS WITHOUT COMPLICATION, WITHOUT LONG-TERM CURRENT USE OF INSULIN (HCC): Status: RESOLVED | Noted: 2019-09-07 | Resolved: 2022-11-02

## 2022-11-02 PROBLEM — N18.32 STAGE 3B CHRONIC KIDNEY DISEASE (HCC): Status: RESOLVED | Noted: 2017-10-12 | Resolved: 2022-11-02

## 2022-11-02 NOTE — LETTER
November 2, 2022     Jayant Trimble, [address] [facility]  [address] [facility]    Patient: Eboni Arambula   YOB: 1945   Date of Visit: 11/2/2022       Dear Dr Beth Weiner:    Thank you for referring Eboni Arambula to me for evaluation  Below are my notes for this consultation  If you have questions, please do not hesitate to call me  I look forward to following your patient along with you  Sincerely,        Azar Sloan MD        CC: No Recipients  Azar Sloan MD  11/2/2022 11:32 AM  Sign when Signing Visit  NEPHROLOGY PROGRESS NOTE    Eboni Arambula 76 y o  female MRN: [id number]  Unit/Bed#:  Encounter: [id number]  Reason for Consult:  Chronic renal insufficiency    Patient is here for follow-up she looks well states that she has not had any changes in her medical condition or medications and no hospitalizations  No specific complaints today other than chronic shoulder pain on the right  ASSESSMENT/PLAN:  1  Renal    Patient mild chronic renal insufficiency likely due to hypertensive nephrosclerosis  Urinalysis shows no proteinuria and she actually had a microalbumin screen that was negative  Creatinine stable at 1 5 over last couple years without progression  Blood pressure is under good control  The patient avoids anti-inflammatories given the risk to her kidneys  There are fluctuations in her creatinine that her likely related to her diuretic use but her latest creatinine is 1 5 at her baseline  Continue current medications  Labs and follow-up as scheduled    Patient had been a diabetic in the past but for many years is not diabetic and this had nothing to do with her kidney dysfunction  She was told to call if there is any problems or concerns before next visit  I recommended for her pain she could try turmeric or CBD products  SUBJECTIVE:  Review of Systems   Constitutional: Negative for chills, fever, malaise/fatigue and night sweats  HENT: Negative  Eyes: Negative  Cardiovascular: Negative for chest pain, dyspnea on exertion, leg swelling and orthopnea  Respiratory: Negative  Negative for cough, shortness of breath, sputum production and wheezing  Musculoskeletal:        Patient with arthralgias but has significant pain in her right shoulder makes it difficult to lift her arm  Gastrointestinal: Negative for abdominal pain, diarrhea, nausea and vomiting  Genitourinary: Negative for dysuria, flank pain, hematuria and incomplete emptying  Neurological: Negative for dizziness, focal weakness, headaches and weakness  Psychiatric/Behavioral: Negative for altered mental status, depression, hallucinations and hypervigilance  OBJECTIVE:  Current Weight: Weight - Scale: 116 kg (256 lb)  [email or web address]:     Height 5' 5 7" (1 669 m), weight 116 kg (256 lb), not currently breastfeeding  , Body mass index is 41 7 kg/m²  [unfilled]    Physical Exam: Ht 5' 5 7" (1 669 m)   Wt 116 kg (256 lb)   BMI 41 70 kg/m²   Physical Exam  Constitutional:       General: She is not in acute distress  Appearance: She is not ill-appearing, toxic-appearing or diaphoretic  HENT:      Head: Normocephalic and atraumatic  Nose: Nose normal       Mouth/Throat:      Mouth: Mucous membranes are moist    Eyes:      General: No scleral icterus  Extraocular Movements: Extraocular movements intact  Cardiovascular:      Rate and Rhythm: Normal rate and regular rhythm  Heart sounds: No friction rub  No gallop  Comments: No significant edema  Pulmonary:      Effort: Pulmonary effort is normal  No respiratory distress  Breath sounds: No wheezing, rhonchi or rales  Abdominal:      General: Bowel sounds are normal  There is no distension  Palpations: Abdomen is soft  Tenderness: There is no abdominal tenderness  There is no rebound  Musculoskeletal:      Cervical back: Normal range of motion and neck supple  Neurological:      General: No focal deficit present  Mental Status: She is alert and oriented to person, place, and time  Mental status is at baseline  Psychiatric:         Mood and Affect: Mood normal          Behavior: Behavior normal          Thought Content:  Thought content normal          Judgment: Judgment normal          Medications:    Current Outpatient Medications:   •  albuterol (ProAir HFA) 90 mcg/act inhaler, Inhale 2 puffs every 6 (six) hours as needed for wheezing, Disp: 8 5 g, Rfl: 3  •  allopurinol (ZYLOPRIM) 100 mg tablet, Take 100 mg by mouth daily Takes 2 tablets a day, Disp: , Rfl:   •  cholecalciferol (VITAMIN D3) 1,000 units tablet, Take 1,000 Units by mouth daily, Disp: , Rfl:   •  citalopram (CeleXA) 40 mg tablet, Take 1 tablet (40 mg total) by mouth daily, Disp: 90 tablet, Rfl: 0  •  febuxostat (ULORIC) 40 mg tablet, , Disp: , Rfl:   •  furosemide (LASIX) 40 mg tablet, Take 1 tablet (40 mg total) by mouth daily, Disp: 30 tablet, Rfl: 1  •  gabapentin (NEURONTIN) 100 mg capsule, Take 1 tablet by mouth 2 (two) times a day, Disp: , Rfl:   •  levothyroxine 50 mcg tablet, TAKE ONE TABLET BY MOUTH EVERY DAY, Disp: 90 tablet, Rfl: 0  •  metoprolol tartrate (LOPRESSOR) 25 mg tablet, Take 1 tablet (25 mg total) by mouth in the morning, Disp: 30 tablet, Rfl: 2  •  potassium chloride (K-DUR,KLOR-CON) 20 mEq tablet, Take 1 tablet (20 mEq total) by mouth daily, Disp: 30 tablet, Rfl: 1  •  predniSONE 5 mg tablet, Take 5 mg by mouth daily, Disp: , Rfl:   •  simvastatin (ZOCOR) 10 mg tablet, Take 1 tablet (10 mg total) by mouth daily, Disp: 90 tablet, Rfl: 0  •  aspirin (ECOTRIN LOW STRENGTH) 81 mg EC tablet, Take 1 tablet by mouth daily   (Patient not taking: Reported on 11/2/2022), Disp: , Rfl:   •  methocarbamol (ROBAXIN) 500 mg tablet, Take 1 tablet (500 mg total) by mouth every 6 (six) hours (Patient not taking: Reported on 11/2/2022), Disp: 30 tablet, Rfl: 0  •  mupirocin (BACTROBAN) 2 % ointment, Apply topically 2 (two) times a day (Patient not taking: No sig reported), Disp: 22 g, Rfl: 0  •  predniSONE 10 mg tablet, Take 5 tabs PO daily x 1 day; take 4 tabs QD x 1 day; take 3 tabs QD x 1 day; take 2 tabs QD x 1 day; take 1 tab QD x 1 day (Patient not taking: Reported on 11/2/2022), Disp: 30 tablet, Rfl: 0    Laboratory Results:  Lab Results   Component Value Date    WBC 9 26 05/11/2022    HGB 12 1 05/11/2022    HCT 38 9 05/11/2022    MCV 99 (H) 05/11/2022     05/11/2022     Lab Results   Component Value Date    SODIUM 139 10/21/2022    K 4 1 10/21/2022     10/21/2022    CO2 32 10/21/2022    BUN 39 (H) 10/21/2022    CREATININE 1 54 (H) 10/21/2022    GLUC 104 12/08/2020    CALCIUM 9 8 10/21/2022     Lab Results   Component Value Date    CALCIUM 9 8 10/21/2022    PHOS 3 0 12/08/2020     No results found for: LABPROT

## 2022-11-02 NOTE — PROGRESS NOTES
NEPHROLOGY PROGRESS NOTE    Marily Anderson 76 y o  female MRN: [id number]  Unit/Bed#:  Encounter: [id number]  Reason for Consult:  Chronic renal insufficiency    Patient is here for follow-up she looks well states that she has not had any changes in her medical condition or medications and no hospitalizations  No specific complaints today other than chronic shoulder pain on the right  ASSESSMENT/PLAN:  1  Renal    Patient mild chronic renal insufficiency likely due to hypertensive nephrosclerosis  Urinalysis shows no proteinuria and she actually had a microalbumin screen that was negative  Creatinine stable at 1 5 over last couple years without progression  Blood pressure is under good control  The patient avoids anti-inflammatories given the risk to her kidneys  There are fluctuations in her creatinine that her likely related to her diuretic use but her latest creatinine is 1 5 at her baseline  Continue current medications  Labs and follow-up as scheduled    Patient had been a diabetic in the past but for many years is not diabetic and this had nothing to do with her kidney dysfunction  She was told to call if there is any problems or concerns before next visit  I recommended for her pain she could try turmeric or CBD products  SUBJECTIVE:  Review of Systems   Constitutional: Negative for chills, fever, malaise/fatigue and night sweats  HENT: Negative  Eyes: Negative  Cardiovascular: Negative for chest pain, dyspnea on exertion, leg swelling and orthopnea  Respiratory: Negative  Negative for cough, shortness of breath, sputum production and wheezing  Musculoskeletal:        Patient with arthralgias but has significant pain in her right shoulder makes it difficult to lift her arm  Gastrointestinal: Negative for abdominal pain, diarrhea, nausea and vomiting  Genitourinary: Negative for dysuria, flank pain, hematuria and incomplete emptying     Neurological: Negative for dizziness, focal weakness, headaches and weakness  Psychiatric/Behavioral: Negative for altered mental status, depression, hallucinations and hypervigilance  OBJECTIVE:  Current Weight: Weight - Scale: 116 kg (256 lb)  [email or web address]:     Height 5' 5 7" (1 669 m), weight 116 kg (256 lb), not currently breastfeeding  , Body mass index is 41 7 kg/m²  [unfilled]    Physical Exam: Ht 5' 5 7" (1 669 m)   Wt 116 kg (256 lb)   BMI 41 70 kg/m²   Physical Exam  Constitutional:       General: She is not in acute distress  Appearance: She is not ill-appearing, toxic-appearing or diaphoretic  HENT:      Head: Normocephalic and atraumatic  Nose: Nose normal       Mouth/Throat:      Mouth: Mucous membranes are moist    Eyes:      General: No scleral icterus  Extraocular Movements: Extraocular movements intact  Cardiovascular:      Rate and Rhythm: Normal rate and regular rhythm  Heart sounds: No friction rub  No gallop  Comments: No significant edema  Pulmonary:      Effort: Pulmonary effort is normal  No respiratory distress  Breath sounds: No wheezing, rhonchi or rales  Abdominal:      General: Bowel sounds are normal  There is no distension  Palpations: Abdomen is soft  Tenderness: There is no abdominal tenderness  There is no rebound  Musculoskeletal:      Cervical back: Normal range of motion and neck supple  Neurological:      General: No focal deficit present  Mental Status: She is alert and oriented to person, place, and time  Mental status is at baseline  Psychiatric:         Mood and Affect: Mood normal          Behavior: Behavior normal          Thought Content:  Thought content normal          Judgment: Judgment normal          Medications:    Current Outpatient Medications:   •  albuterol (ProAir HFA) 90 mcg/act inhaler, Inhale 2 puffs every 6 (six) hours as needed for wheezing, Disp: 8 5 g, Rfl: 3  •  allopurinol (ZYLOPRIM) 100 mg tablet, Take 100 mg by mouth daily Takes 2 tablets a day, Disp: , Rfl:   •  cholecalciferol (VITAMIN D3) 1,000 units tablet, Take 1,000 Units by mouth daily, Disp: , Rfl:   •  citalopram (CeleXA) 40 mg tablet, Take 1 tablet (40 mg total) by mouth daily, Disp: 90 tablet, Rfl: 0  •  febuxostat (ULORIC) 40 mg tablet, , Disp: , Rfl:   •  furosemide (LASIX) 40 mg tablet, Take 1 tablet (40 mg total) by mouth daily, Disp: 30 tablet, Rfl: 1  •  gabapentin (NEURONTIN) 100 mg capsule, Take 1 tablet by mouth 2 (two) times a day, Disp: , Rfl:   •  levothyroxine 50 mcg tablet, TAKE ONE TABLET BY MOUTH EVERY DAY, Disp: 90 tablet, Rfl: 0  •  metoprolol tartrate (LOPRESSOR) 25 mg tablet, Take 1 tablet (25 mg total) by mouth in the morning, Disp: 30 tablet, Rfl: 2  •  potassium chloride (K-DUR,KLOR-CON) 20 mEq tablet, Take 1 tablet (20 mEq total) by mouth daily, Disp: 30 tablet, Rfl: 1  •  predniSONE 5 mg tablet, Take 5 mg by mouth daily, Disp: , Rfl:   •  simvastatin (ZOCOR) 10 mg tablet, Take 1 tablet (10 mg total) by mouth daily, Disp: 90 tablet, Rfl: 0  •  aspirin (ECOTRIN LOW STRENGTH) 81 mg EC tablet, Take 1 tablet by mouth daily   (Patient not taking: Reported on 11/2/2022), Disp: , Rfl:   •  methocarbamol (ROBAXIN) 500 mg tablet, Take 1 tablet (500 mg total) by mouth every 6 (six) hours (Patient not taking: Reported on 11/2/2022), Disp: 30 tablet, Rfl: 0  •  mupirocin (BACTROBAN) 2 % ointment, Apply topically 2 (two) times a day (Patient not taking: No sig reported), Disp: 22 g, Rfl: 0  •  predniSONE 10 mg tablet, Take 5 tabs PO daily x 1 day; take 4 tabs QD x 1 day; take 3 tabs QD x 1 day; take 2 tabs QD x 1 day; take 1 tab QD x 1 day (Patient not taking: Reported on 11/2/2022), Disp: 30 tablet, Rfl: 0    Laboratory Results:  Lab Results   Component Value Date    WBC 9 26 05/11/2022    HGB 12 1 05/11/2022    HCT 38 9 05/11/2022    MCV 99 (H) 05/11/2022     05/11/2022     Lab Results   Component Value Date    SODIUM 139 10/21/2022    K 4 1 10/21/2022     10/21/2022    CO2 32 10/21/2022    BUN 39 (H) 10/21/2022    CREATININE 1 54 (H) 10/21/2022    GLUC 104 12/08/2020    CALCIUM 9 8 10/21/2022     Lab Results   Component Value Date    CALCIUM 9 8 10/21/2022    PHOS 3 0 12/08/2020     No results found for: LABPROT

## 2022-11-02 NOTE — PATIENT INSTRUCTIONS
You are here for follow-up I am glad to hear your health is doing well  With respect your kidney function your creatinine is stable at 1 5 over the last few years without progression  There was also no protein in the urine so that tells me you do not have a serious kidney disease because they cause a lot of protein in the urine  Also in the past your diabetic you are no longer diabetic and I can tell you diabetes had nothing to do with this because there was no protein in the urine which is good  You likely have nephrosclerosis and were treating it with routine health care blood pressure control and monitoring  We discussed some options for pain treatment specially in her right arm you could look in the pharmacy for CBD products or something called turmeric which if you take them and they help great if you take them and they do not help then done by them again    Labs and follow-up as scheduled please call if you have any questions or concerns before next visit

## 2022-11-03 ENCOUNTER — OFFICE VISIT (OUTPATIENT)
Dept: FAMILY MEDICINE CLINIC | Facility: CLINIC | Age: 77
End: 2022-11-03

## 2022-11-03 VITALS
SYSTOLIC BLOOD PRESSURE: 130 MMHG | HEIGHT: 66 IN | DIASTOLIC BLOOD PRESSURE: 80 MMHG | RESPIRATION RATE: 16 BRPM | OXYGEN SATURATION: 95 % | BODY MASS INDEX: 3.86 KG/M2 | HEART RATE: 70 BPM | WEIGHT: 24 LBS | TEMPERATURE: 97.7 F

## 2022-11-03 DIAGNOSIS — E78.2 MIXED HYPERLIPIDEMIA: ICD-10-CM

## 2022-11-03 DIAGNOSIS — M79.7 FIBROMYALGIA: ICD-10-CM

## 2022-11-03 DIAGNOSIS — I12.9 NEPHROSCLEROSIS, STAGE 1-4 OR UNSPECIFIED CHRONIC KIDNEY DISEASE: ICD-10-CM

## 2022-11-03 DIAGNOSIS — M1A.9XX1 CHRONIC GOUT WITH TOPHUS, UNSPECIFIED CAUSE, UNSPECIFIED SITE: ICD-10-CM

## 2022-11-03 DIAGNOSIS — D69.3 IDIOPATHIC THROMBOCYTOPENIC PURPURA (HCC): ICD-10-CM

## 2022-11-03 DIAGNOSIS — E03.9 HYPOTHYROIDISM, UNSPECIFIED TYPE: ICD-10-CM

## 2022-11-03 DIAGNOSIS — E11.9 TYPE 2 DIABETES MELLITUS WITHOUT COMPLICATION, WITHOUT LONG-TERM CURRENT USE OF INSULIN (HCC): Primary | ICD-10-CM

## 2022-11-03 DIAGNOSIS — M35.3 PMR (POLYMYALGIA RHEUMATICA) (HCC): ICD-10-CM

## 2022-11-03 DIAGNOSIS — I10 ESSENTIAL HYPERTENSION: ICD-10-CM

## 2022-11-03 NOTE — PROGRESS NOTES
Name: Ben Wall      : 1945      MRN: [id number]  Encounter Provider: Marisol Whitten DO  Encounter Date: 11/3/2022   Encounter department: [address] [place] [address]  Type 2 diabetes mellitus without complication, without long-term current use of insulin [facility])  Assessment & Plan:    Lab Results   Component Value Date    HGBA1C 6 2 (H) 2022   Most recent A1c from May 11th was 6 2%  Currently diet controlled (no longer on Januvia due to possible nephrotoxicity)  Patient is due for labs  We will recheck A1c and microalbumin in near future  Will call with results    Orders:  -     Comprehensive metabolic panel; Future; Expected date: 2022  -     Hemoglobin A1C; Future; Expected date: 2022  -     Microalbumin / creatinine urine ratio; Future; Expected date: 2022  -     Comprehensive metabolic panel; Future; Expected date: 04/15/2023  -     Hemoglobin A1C; Future; Expected date: 04/15/2023    2  Mixed hyperlipidemia  Assessment & Plan:  Cholesterol from May 11th 156, LDL 54  Doing well on simvastatin 10 mg daily    Orders:  -     Lipid Panel with Direct LDL reflex; Future; Expected date: 04/15/2023    3  Essential hypertension  -     CBC and differential; Future; Expected date: 04/15/2023  -     TSH, 3rd generation with Free T4 reflex; Future; Expected date: 04/15/2023    4  Hypothyroidism, unspecified type  Assessment & Plan:  TSH from May 11th was normal   Doing well on levothyroxine 50      5  Nephrosclerosis, stage 1-4 or unspecified chronic kidney disease  Assessment & Plan:  Lab Results   Component Value Date    EGFR 31 10/21/2022    EGFR 22 2022    EGFR 30 2022    CREATININE 1 54 (H) 10/21/2022    CREATININE 2 07 (H) 2022    CREATININE 1 61 (H) 2022   Recent creatinine 1 54 with GFR of 37  Continue follow-up with Nephrology as directed    Orders:  -     Comprehensive metabolic panel;  Future; Expected date: 04/15/2023    6  Idiopathic thrombocytopenic purpura ([facility] [address] )  Assessment & Plan:  Platelets have been stable  Most recently 269 K      7  Fibromyalgia  Assessment & Plan:  History of fibromyalgia and polymyalgia rheumatica  Still being followed by Rheumatology  Stable on prednisone 5 mg daily      8  Chronic gout with tophus, unspecified cause, unspecified site  Assessment & Plan:  Stable on allopurinol 200 mg daily  Being followed by Rheumatology      9  PMR (polymyalgia rheumatica) ([facility])  Assessment & Plan:  History of polymyalgia rheumatica and elevated sed rate  Currently stable on prednisone 5 mg daily  Continue follow-up with Rheumatology as directed           Patient had [place] vaccination  Patient had Pneumovax  Last tetanus booster 2019  Patient had flu shot this season  Patient had Shingrix    Check labs in near future (CMP, A1c, microalbumin)  Will call with results    SIX MONTHS, FASTING BLOOD WORK PRIOR (labs ordered)  Subjective      Patient presents recheck chronic medical problems today  Compliant with prescribed medications  Still being followed by    Rheumatology,  and Nephrology  Review of Systems   Respiratory: Negative  Cardiovascular: Negative  Gastrointestinal: Negative  Genitourinary: Negative          Past Medical History:   Diagnosis Date   • Abnormal blood chemistry     last assessed 11/13/2013   • Angina pectoris ([facility] [address] )     last assessed 11/012/13   • Arthritis     last assessed 11/13/2013   • Asthma    • Athlete's foot     resolved 10/11/17   • Cataract     last assessed 03/18/2016   • Diabetes mellitus ([facility] [address] )     type 2   • Diabetes mellitus with kidney disease ([facility] [address] )     last assessed 10/03/17   • Eczema    • Gait disorder     last assessed 11/12/2013   • Idiopathic thrombocytopenia purpura ([facility] [address] )     last assessed 08/06/15   • Overactive bladder    • Post menopausal syndrome     resolved 10/11/17   • Restless legs syndrome     last assessed 10/24/2013   • Subclinical hypothyroidism     last assessed 12/11/17   • Urinary frequency     resolved 12/18/2014     Past Surgical History:   Procedure Laterality Date   • ACHILLES TENDON REPAIR     • REPLACEMENT TOTAL KNEE       Family History   Problem Relation Age of Onset   • Leukemia Mother    • Eczema Father    • Diabetes type II Father    • Diabetes type II Maternal Aunt      Social History     Socioeconomic History   • Marital status: Single     Spouse name: None   • Number of children: None   • Years of education: None   • Highest education level: None   Occupational History   • None   Tobacco Use   • Smoking status: Never Smoker   • Smokeless tobacco: Never Used   • Tobacco comment: per allscripts; former smoker   Vaping Use   • Vaping Use: Never used   Substance and Sexual Activity   • Alcohol use: Yes     Comment: occasional   • Drug use: No   • Sexual activity: None   Other Topics Concern   • None   Social History Narrative   • None     Social Determinants of Health     Financial Resource Strain: Not on file   Food Insecurity: Not on file   Transportation Needs: Not on file   Physical Activity: Not on file   Stress: Not on file   Social Connections: Not on file   Intimate Partner Violence: Not on file   Housing Stability: Not on file     Current Outpatient Medications on File Prior to Visit   Medication Sig   • albuterol (ProAir HFA) 90 mcg/act inhaler Inhale 2 puffs every 6 (six) hours as needed for wheezing   • allopurinol (ZYLOPRIM) 100 mg tablet Take 100 mg by mouth daily Takes 2 tablets a day   • cholecalciferol (VITAMIN D3) 1,000 units tablet Take 1,000 Units by mouth daily   • citalopram (CeleXA) 40 mg tablet Take 1 tablet (40 mg total) by mouth daily   • febuxostat (ULORIC) 40 mg tablet    • furosemide (LASIX) 40 mg tablet Take 1 tablet (40 mg total) by mouth daily   • gabapentin (NEURONTIN) 100 mg capsule Take 1 tablet by mouth 2 (two) times a day   • levothyroxine 50 mcg tablet TAKE ONE TABLET BY MOUTH EVERY DAY   • metoprolol tartrate (LOPRESSOR) 25 mg tablet Take 1 tablet (25 mg total) by mouth in the morning   • potassium chloride (K-DUR,KLOR-CON) 20 mEq tablet Take 1 tablet (20 mEq total) by mouth daily   • predniSONE 5 mg tablet Take 5 mg by mouth daily   • simvastatin (ZOCOR) 10 mg tablet Take 1 tablet (10 mg total) by mouth daily   • aspirin (ECOTRIN LOW STRENGTH) 81 mg EC tablet Take 1 tablet by mouth daily   (Patient not taking: No sig reported)   • methocarbamol (ROBAXIN) 500 mg tablet Take 1 tablet (500 mg total) by mouth every 6 (six) hours (Patient not taking: No sig reported)   • mupirocin (BACTROBAN) 2 % ointment Apply topically 2 (two) times a day (Patient not taking: No sig reported)   • predniSONE 10 mg tablet Take 5 tabs PO daily x 1 day; take 4 tabs QD x 1 day; take 3 tabs QD x 1 day; take 2 tabs QD x 1 day; take 1 tab QD x 1 day (Patient not taking: No sig reported)     Allergies   Allergen Reactions   • Ace Inhibitors Angioedema   • Prochlorperazine Anaphylaxis     lethargy, tongue and throat swelling   • Ramipril Anaphylaxis     angioedema   • Angiotensin Receptor Blockers      Immunization History   Administered Date(s) Administered   • COVID-19 PFIZER VACCINE 0 3 ML IM 03/08/2021, 03/29/2021, 11/04/2021   • INFLUENZA 11/14/2012, 03/18/2013, 01/01/2014, 03/03/2014, 11/19/2014, 11/18/2015, 11/25/2016, 10/12/2017   • Influenza Split High Dose Preservative Free IM 11/18/2015, 11/25/2016, 10/12/2017   • Influenza, high dose seasonal 0 7 mL 10/08/2018, 10/08/2019, 09/18/2020, 10/12/2021, 10/07/2022   • Influenza, seasonal, injectable 09/09/2011, 10/15/2012, 09/25/2013, 01/01/2014   • Pneumococcal Conjugate 13-Valent 08/06/2015   • Pneumococcal Polysaccharide PPV23 10/15/2008   • Tdap 08/06/2015, 08/23/2019       Objective     /80 (BP Location: Left arm, Patient Position: Sitting, Cuff Size: Large)   Pulse 70   Temp 97 7 °F (36 5 °C) (Temporal)   Resp 16   Ht 5' 5 75" (1 67 m)   Wt 10 9 kg (24 lb) SpO2 95%   BMI 3 90 kg/m²     Physical Exam  Cardiovascular:      Rate and Rhythm: Normal rate and regular rhythm  Heart sounds: Normal heart sounds  Comments: Carotids: no bruits  Ext: no edema  Pulmonary:      Effort: Pulmonary effort is normal  No respiratory distress  Breath sounds: No wheezing or rales  Psychiatric:         Behavior: Behavior normal          Thought Content:  Thought content normal        Frank Almeida DO

## 2022-11-03 NOTE — ASSESSMENT & PLAN NOTE
History of fibromyalgia and polymyalgia rheumatica  Still being followed by Rheumatology    Stable on prednisone 5 mg daily

## 2022-11-03 NOTE — ASSESSMENT & PLAN NOTE
History of polymyalgia rheumatica and elevated sed rate  Currently stable on prednisone 5 mg daily    Continue follow-up with Rheumatology as directed

## 2022-11-03 NOTE — ASSESSMENT & PLAN NOTE
Lab Results   Component Value Date    EGFR 31 10/21/2022    EGFR 22 05/11/2022    EGFR 30 03/23/2022    CREATININE 1 54 (H) 10/21/2022    CREATININE 2 07 (H) 05/11/2022    CREATININE 1 61 (H) 03/23/2022   Recent creatinine 1 54 with GFR of 37   Continue follow-up with Nephrology as directed

## 2022-11-03 NOTE — ASSESSMENT & PLAN NOTE
Lab Results   Component Value Date    HGBA1C 6 2 (H) 05/11/2022   Most recent A1c from May 11th was 6 2%  Currently diet controlled (no longer on Januvia due to possible nephrotoxicity)  Patient is due for labs  We will recheck A1c and microalbumin in near future    Will call with results

## 2022-11-04 NOTE — TELEPHONE ENCOUNTER
Called preferred phone number for patient and left a message to see if she would like to follow up with her PCP regarding her recent ER visit  Please advise  unable to assess immunization status...

## 2022-11-09 ENCOUNTER — APPOINTMENT (OUTPATIENT)
Dept: LAB | Facility: MEDICAL CENTER | Age: 77
End: 2022-11-09

## 2022-11-09 DIAGNOSIS — E11.9 TYPE 2 DIABETES MELLITUS WITHOUT COMPLICATION, WITHOUT LONG-TERM CURRENT USE OF INSULIN (HCC): ICD-10-CM

## 2022-11-09 LAB
ALBUMIN SERPL BCP-MCNC: 3.6 G/DL (ref 3.5–5)
ALP SERPL-CCNC: 56 U/L (ref 46–116)
ALT SERPL W P-5'-P-CCNC: 17 U/L (ref 12–78)
ANION GAP SERPL CALCULATED.3IONS-SCNC: 4 MMOL/L (ref 4–13)
AST SERPL W P-5'-P-CCNC: 13 U/L (ref 5–45)
BILIRUB SERPL-MCNC: 0.46 MG/DL (ref 0.2–1)
BUN SERPL-MCNC: 38 MG/DL (ref 5–25)
CALCIUM SERPL-MCNC: 9.3 MG/DL (ref 8.3–10.1)
CHLORIDE SERPL-SCNC: 106 MMOL/L (ref 96–108)
CO2 SERPL-SCNC: 30 MMOL/L (ref 21–32)
CREAT SERPL-MCNC: 1.79 MG/DL (ref 0.6–1.3)
EST. AVERAGE GLUCOSE BLD GHB EST-MCNC: 146 MG/DL
GFR SERPL CREATININE-BSD FRML MDRD: 26 ML/MIN/1.73SQ M
GLUCOSE P FAST SERPL-MCNC: 106 MG/DL (ref 65–99)
HBA1C MFR BLD: 6.7 %
POTASSIUM SERPL-SCNC: 3.4 MMOL/L (ref 3.5–5.3)
PROT SERPL-MCNC: 7 G/DL (ref 6.4–8.4)
SODIUM SERPL-SCNC: 140 MMOL/L (ref 135–147)

## 2022-11-14 ENCOUNTER — DOCUMENTATION (OUTPATIENT)
Dept: NEPHROLOGY | Facility: CLINIC | Age: 77
End: 2022-11-14

## 2022-11-14 NOTE — PROGRESS NOTES
Sent [employer] message to schedule May follow up appointment with Dr Lilia Gatica in [facility]  This is the 2nd attempt  Recall card mailed 11/9

## 2022-11-16 ENCOUNTER — TELEPHONE (OUTPATIENT)
Dept: NEPHROLOGY | Facility: CLINIC | Age: 77
End: 2022-11-16

## 2022-11-16 ENCOUNTER — OFFICE VISIT (OUTPATIENT)
Dept: PODIATRY | Facility: CLINIC | Age: 77
End: 2022-11-16

## 2022-11-16 VITALS
DIASTOLIC BLOOD PRESSURE: 72 MMHG | BODY MASS INDEX: 38.89 KG/M2 | WEIGHT: 242 LBS | SYSTOLIC BLOOD PRESSURE: 134 MMHG | HEIGHT: 66 IN

## 2022-11-16 DIAGNOSIS — B35.1 ONYCHOMYCOSIS: ICD-10-CM

## 2022-11-16 DIAGNOSIS — E11.9 TYPE 2 DIABETES MELLITUS WITHOUT COMPLICATION, WITHOUT LONG-TERM CURRENT USE OF INSULIN (HCC): Primary | ICD-10-CM

## 2022-11-16 DIAGNOSIS — I73.9 PERIPHERAL VASCULAR DISEASE, UNSPECIFIED (HCC): ICD-10-CM

## 2022-11-16 NOTE — PROGRESS NOTES
Assessment/Plan:    Discussed principles of diabetic foot care  There are no palpable pedal pulses  Sensorium is intact per [place] monofilament  Vibratory sense is absent  All toenails are mycotic and were trimmed  Trimmed hyperkeratotic lesion right hallux  Patient is rescheduled in 10 weeks  No problem-specific Assessment & Plan notes found for this encounter  Diagnoses and all orders for this visit:    Type 2 diabetes mellitus without complication, without long-term current use of insulin ([facility])    Peripheral vascular disease, unspecified (HCC)    Onychomycosis          Subjective:      Patient ID: Stevie Delcid is a 76 y o  female  HPI     Patient, a 70-year-old type 2 diabetic female presents for yearly diabetic foot exam   Patient denies significant numbness or tingling in her feet  She relates no pain in her feet  Toenails are elongated and thick  She is difficulty cutting her nails  Patient ambulates with a cane  I personally reviewed and A1c dated 11/09/2022  It was 6 7  I personally reviewed an A1c dated 05/11/2022  It was 6 2  The following portions of the patient's history were reviewed and updated as appropriate: allergies, current medications, past family history, past medical history, past social history, past surgical history and problem list     Review of Systems   Genitourinary:        History of renal disease   Musculoskeletal: Positive for arthralgias and gait problem  Objective:      /72   Ht 5' 5 75" (1 67 m)   Wt 110 kg (242 lb)   BMI 39 36 kg/m²          Physical Exam  Cardiovascular:      Pulses: Pulses are weak  Dorsalis pedis pulses are 0 on the right side and 0 on the left side  Posterior tibial pulses are 0 on the right side and 0 on the left side  Feet:      Right foot:      Skin integrity: Callus present  No ulcer, skin breakdown, erythema, warmth or dry skin        Left foot:      Skin integrity: No ulcer, skin breakdown, erythema, warmth, callus or dry skin  Diabetic Foot Exam    Patient's shoes and socks removed  Right Foot/Ankle   Right Foot Inspection  Skin Exam: skin normal, skin intact, callus and callus  No dry skin, no warmth, no erythema, no maceration, no abnormal color, no pre-ulcer and no ulcer  Toe Exam: ROM and strength within normal limits  Sensory   Vibration: absent  Proprioception: intact  Monofilament testing: intact    Vascular  Capillary refills: elevated  The right DP pulse is 0  The right PT pulse is 0  Right Toe  - Comprehensive Exam  Ecchymosis: none  Arch: normal  Hammertoes: absent  Claw Toes: absent  Swelling: none   Tenderness: none         Left Foot/Ankle  Left Foot Inspection  Skin Exam: skin normal and skin intact  No dry skin, no warmth, no erythema, no maceration, normal color, no pre-ulcer, no ulcer and no callus  Toe Exam: ROM and strength within normal limits  Sensory   Vibration: absent  Proprioception: intact  Monofilament testing: intact    Vascular  Capillary refills: elevated  The left DP pulse is 0  The left PT pulse is 0       Left Toe  - Comprehensive Exam  Ecchymosis: none  Arch: normal  Hammertoes: absent  Claw toes: absent  Swelling: none   Tenderness: none           Assign Risk Category  No deformity present  No loss of protective sensation  Weak pulses  Risk: 1

## 2022-11-16 NOTE — TELEPHONE ENCOUNTER
Left voice mail to schedule appointment with Dr Selene Duarte in the [facility] location  This is for their May appointment  This is the 3rd attempt

## 2022-11-18 DIAGNOSIS — F32.A DEPRESSION, UNSPECIFIED DEPRESSION TYPE: ICD-10-CM

## 2022-11-18 RX ORDER — CITALOPRAM 40 MG/1
TABLET ORAL
Qty: 90 TABLET | Refills: 0 | Status: SHIPPED | OUTPATIENT
Start: 2022-11-18

## 2022-11-30 DIAGNOSIS — R60.0 LOCALIZED EDEMA: ICD-10-CM

## 2022-11-30 DIAGNOSIS — I10 ESSENTIAL HYPERTENSION: ICD-10-CM

## 2022-11-30 RX ORDER — POTASSIUM CHLORIDE 20 MEQ/1
20 TABLET, EXTENDED RELEASE ORAL DAILY
Qty: 30 TABLET | Refills: 5 | Status: SHIPPED | OUTPATIENT
Start: 2022-11-30

## 2022-12-01 DIAGNOSIS — E03.9 HYPOTHYROIDISM, UNSPECIFIED TYPE: ICD-10-CM

## 2022-12-01 RX ORDER — LEVOTHYROXINE SODIUM 0.05 MG/1
50 TABLET ORAL DAILY
Qty: 90 TABLET | Refills: 1 | Status: SHIPPED | OUTPATIENT
Start: 2022-12-01

## 2022-12-21 DIAGNOSIS — R60.9 EDEMA, UNSPECIFIED TYPE: ICD-10-CM

## 2022-12-21 RX ORDER — FUROSEMIDE 40 MG/1
40 TABLET ORAL DAILY
Qty: 30 TABLET | Refills: 1 | Status: SHIPPED | OUTPATIENT
Start: 2022-12-21

## 2023-01-03 DIAGNOSIS — E78.2 MIXED HYPERLIPIDEMIA: ICD-10-CM

## 2023-01-03 RX ORDER — SIMVASTATIN 10 MG
10 TABLET ORAL DAILY
Qty: 90 TABLET | Refills: 0 | Status: SHIPPED | OUTPATIENT
Start: 2023-01-03

## 2023-02-08 ENCOUNTER — OFFICE VISIT (OUTPATIENT)
Dept: PODIATRY | Facility: CLINIC | Age: 78
End: 2023-02-08

## 2023-02-08 VITALS
SYSTOLIC BLOOD PRESSURE: 134 MMHG | WEIGHT: 247.2 LBS | BODY MASS INDEX: 39.73 KG/M2 | DIASTOLIC BLOOD PRESSURE: 72 MMHG | HEIGHT: 66 IN

## 2023-02-08 DIAGNOSIS — B35.1 ONYCHOMYCOSIS: ICD-10-CM

## 2023-02-08 DIAGNOSIS — E11.9 TYPE 2 DIABETES MELLITUS WITHOUT COMPLICATION, WITHOUT LONG-TERM CURRENT USE OF INSULIN (HCC): Primary | ICD-10-CM

## 2023-02-08 DIAGNOSIS — I73.9 PERIPHERAL VASCULAR DISEASE, UNSPECIFIED (HCC): ICD-10-CM

## 2023-02-08 RX ORDER — TOLTERODINE 4 MG/1
CAPSULE, EXTENDED RELEASE ORAL
COMMUNITY

## 2023-02-08 RX ORDER — HYDROCODONE BITARTRATE AND ACETAMINOPHEN 5; 300 MG/1; MG/1
TABLET ORAL
COMMUNITY

## 2023-02-08 RX ORDER — ETODOLAC 400 MG/1
TABLET, FILM COATED ORAL EVERY 12 HOURS
COMMUNITY

## 2023-02-08 RX ORDER — ROSUVASTATIN CALCIUM 5 MG/1
TABLET, COATED ORAL
COMMUNITY

## 2023-02-08 RX ORDER — OXYCODONE HYDROCHLORIDE AND ACETAMINOPHEN 5; 325 MG/1; MG/1
TABLET ORAL
COMMUNITY

## 2023-02-08 RX ORDER — CLONAZEPAM 0.5 MG/1
TABLET ORAL
COMMUNITY

## 2023-02-08 RX ORDER — BETAMETHASONE DIPROPIONATE 0.5 MG/G
CREAM TOPICAL
COMMUNITY

## 2023-02-08 RX ORDER — PANTOPRAZOLE SODIUM 40 MG/1
TABLET, DELAYED RELEASE ORAL
COMMUNITY

## 2023-02-08 NOTE — PROGRESS NOTES
Patient presents for palliative diabetic foot care  No acute disorder noted  There are no palpable pedal pulses  All toenails are thickened and dystrophic secondary to onychomycosis  Treatment consisted of nail debridement

## 2023-02-22 DIAGNOSIS — F32.A DEPRESSION, UNSPECIFIED DEPRESSION TYPE: ICD-10-CM

## 2023-02-22 RX ORDER — CITALOPRAM 40 MG/1
40 TABLET ORAL DAILY
Qty: 90 TABLET | Refills: 0 | Status: SHIPPED | OUTPATIENT
Start: 2023-02-22

## 2023-03-02 DIAGNOSIS — R60.9 EDEMA, UNSPECIFIED TYPE: ICD-10-CM

## 2023-03-02 RX ORDER — FUROSEMIDE 40 MG/1
40 TABLET ORAL DAILY
Qty: 30 TABLET | Refills: 1 | Status: SHIPPED | OUTPATIENT
Start: 2023-03-02

## 2023-03-29 DIAGNOSIS — E78.2 MIXED HYPERLIPIDEMIA: ICD-10-CM

## 2023-03-29 RX ORDER — SIMVASTATIN 10 MG
TABLET ORAL
Qty: 90 TABLET | Refills: 0 | Status: SHIPPED | OUTPATIENT
Start: 2023-03-29

## 2023-04-28 DIAGNOSIS — R60.9 EDEMA, UNSPECIFIED TYPE: ICD-10-CM

## 2023-04-28 RX ORDER — FUROSEMIDE 40 MG/1
TABLET ORAL
Qty: 30 TABLET | Refills: 0 | Status: SHIPPED | OUTPATIENT
Start: 2023-04-28

## 2023-05-11 ENCOUNTER — PREP FOR PROCEDURE (OUTPATIENT)
Dept: OBGYN CLINIC | Facility: OTHER | Age: 78
End: 2023-05-11

## 2023-05-11 DIAGNOSIS — M25.511 RIGHT SHOULDER PAIN, UNSPECIFIED CHRONICITY: ICD-10-CM

## 2023-05-11 DIAGNOSIS — M19.011 PRIMARY OSTEOARTHRITIS OF RIGHT SHOULDER: Primary | ICD-10-CM

## 2023-05-16 ENCOUNTER — ANESTHESIA EVENT (OUTPATIENT)
Dept: PERIOP | Facility: HOSPITAL | Age: 78
End: 2023-05-16
Payer: COMMERCIAL

## 2023-05-19 DIAGNOSIS — F32.A DEPRESSION, UNSPECIFIED DEPRESSION TYPE: ICD-10-CM

## 2023-05-19 RX ORDER — CITALOPRAM 40 MG/1
TABLET ORAL
Qty: 90 TABLET | Refills: 0 | Status: SHIPPED | OUTPATIENT
Start: 2023-05-19

## 2023-05-22 ENCOUNTER — OFFICE VISIT (OUTPATIENT)
Dept: NEPHROLOGY | Facility: CLINIC | Age: 78
End: 2023-05-22

## 2023-05-22 VITALS
HEIGHT: 66 IN | BODY MASS INDEX: 37.28 KG/M2 | OXYGEN SATURATION: 97 % | HEART RATE: 70 BPM | SYSTOLIC BLOOD PRESSURE: 130 MMHG | WEIGHT: 232 LBS | DIASTOLIC BLOOD PRESSURE: 78 MMHG

## 2023-05-22 DIAGNOSIS — E66.01 MORBID (SEVERE) OBESITY DUE TO EXCESS CALORIES (HCC): ICD-10-CM

## 2023-05-22 DIAGNOSIS — I12.9 NEPHROSCLEROSIS, STAGE 1-4 OR UNSPECIFIED CHRONIC KIDNEY DISEASE: Primary | ICD-10-CM

## 2023-05-22 PROBLEM — I50.42 CHRONIC COMBINED SYSTOLIC (CONGESTIVE) AND DIASTOLIC (CONGESTIVE) HEART FAILURE (HCC): Status: ACTIVE | Noted: 2023-05-22

## 2023-05-22 PROBLEM — N18.4 CHRONIC RENAL DISEASE, STAGE IV (HCC): Status: RESOLVED | Noted: 2022-06-03 | Resolved: 2023-05-22

## 2023-05-22 RX ORDER — CHLORHEXIDINE GLUCONATE 213 G/1000ML
1 SOLUTION TOPICAL SEE ADMIN INSTRUCTIONS
COMMUNITY

## 2023-05-22 NOTE — PROGRESS NOTES
NEPHROLOGY PROGRESS NOTE    Silviano Chavez [de-identified] y o  female MRN: [id number]  Unit/Bed#:  Encounter: [id number]  Reason for Consult: Renal insufficiency    The patient is here for routine follow-up with me  She has been doing well she had a very nice surprise 78th birthday party with her family and friends  She is preparing to undergo elective right shoulder replacement in a couple weeks  Otherwise no complaints for me  ASSESSMENT/PLAN:  1  Renal    Patient is chronic renal insufficiency due to nephrosclerosis given lack of proteinuria  In the past she had been diabetic on medications now she is well controlled just by dietary means and has excellent hemoglobin A1c  Last urine protein estimation was negative so she does not have prior history of diabetic nephropathy and in fact most serious intrinsic diseases result in proteinuria so that is why states she has nephrosclerosis  At this point focus on blood pressure control  Monitor blood sugars  Avoid NSAIDs which she does    Latest creatinine is 1 6 which is stable at her baseline with no progression over couple years  There are fluctuations from time to time that could be related to changes in volume status that she is on diuretic  Prior to her surgery just told her not to take her furosemide or potassium the day of surgery  She does have clearance to undergo elective shoulder replacement I will forward a copy of this letter to her orthopedic surgeon  Follow-up in 6 months and she is told to call if there is any problems or concerns before next visit  SUBJECTIVE:  Review of Systems   Constitutional: Negative for chills, diaphoresis, fever and malaise/fatigue  HENT: Negative  Eyes: Negative  Cardiovascular: Negative for chest pain, dyspnea on exertion, leg swelling and orthopnea  Respiratory: Negative for cough, shortness of breath, sputum production and wheezing      Musculoskeletal:        Pain in her right shoulder and limited "range of motion  Gastrointestinal: Negative for abdominal pain, diarrhea, nausea and vomiting  Genitourinary: Negative for dysuria, flank pain, hematuria and incomplete emptying  Neurological: Negative for dizziness, focal weakness, headaches and weakness  Psychiatric/Behavioral: Negative for altered mental status, hallucinations and hypervigilance  The patient is not nervous/anxious  OBJECTIVE:  Current Weight: Weight - Scale: 105 kg (232 lb)  [email or web address]:     Blood pressure 130/78, pulse 70, height 5' 5 75\" (1 67 m), weight 105 kg (232 lb), SpO2 97 %, not currently breastfeeding  , Body mass index is 37 73 kg/m²  [unfilled]    Physical Exam: /78 (BP Location: Left arm, Patient Position: Sitting, Cuff Size: Standard)   Pulse 70   Ht 5' 5 75\" (1 67 m)   Wt 105 kg (232 lb)   SpO2 97%   BMI 37 73 kg/m²   Physical Exam  Constitutional:       General: She is not in acute distress  Appearance: Normal appearance  She is not ill-appearing or toxic-appearing  HENT:      Head: Normocephalic and atraumatic  Nose: Nose normal       Mouth/Throat:      Mouth: Mucous membranes are moist    Eyes:      General: No scleral icterus  Extraocular Movements: Extraocular movements intact  Conjunctiva/sclera: Conjunctivae normal    Cardiovascular:      Rate and Rhythm: Normal rate and regular rhythm  Heart sounds: No friction rub  No gallop  Comments: No pitting edema  Pulmonary:      Effort: Pulmonary effort is normal  No respiratory distress  Breath sounds: No wheezing, rhonchi or rales  Abdominal:      General: Bowel sounds are normal  There is no distension  Palpations: Abdomen is soft  Tenderness: There is no abdominal tenderness  There is no rebound  Musculoskeletal:      Cervical back: Normal range of motion and neck supple  Neurological:      General: No focal deficit present        Mental Status: She is alert and oriented to person, " place, and time  Mental status is at baseline  Psychiatric:         Mood and Affect: Mood normal          Behavior: Behavior normal          Thought Content:  Thought content normal          Judgment: Judgment normal          Medications:    Current Outpatient Medications:   •  albuterol (ProAir HFA) 90 mcg/act inhaler, Inhale 2 puffs every 6 (six) hours as needed for wheezing, Disp: 8 5 g, Rfl: 3  •  allopurinol (ZYLOPRIM) 100 mg tablet, Take 100 mg by mouth daily Takes 2 tablets a day, Disp: , Rfl:   •  chlorhexidine (Hibiclens) 4 % external liquid, Hibiclens 4 % topical liquid  wash surgical area 5 days prior to surgery, Disp: , Rfl:   •  cholecalciferol (VITAMIN D3) 1,000 units tablet, Take 1,000 Units by mouth daily, Disp: , Rfl:   •  citalopram (CeleXA) 40 mg tablet, TAKE ONE TABLET BY MOUTH EVERY DAY, Disp: 90 tablet, Rfl: 0  •  furosemide (LASIX) 40 mg tablet, Take 1 tablet by mouth daily, Disp: 30 tablet, Rfl: 0  •  gabapentin (NEURONTIN) 100 mg capsule, Take 1 tablet by mouth 2 (two) times a day, Disp: , Rfl:   •  levothyroxine 50 mcg tablet, Take 1 tablet (50 mcg total) by mouth daily, Disp: 90 tablet, Rfl: 1  •  metoprolol tartrate (LOPRESSOR) 25 mg tablet, Take 1 tablet (25 mg total) by mouth in the morning, Disp: 30 tablet, Rfl: 5  •  potassium chloride (K-DUR,KLOR-CON) 20 mEq tablet, Take 1 tablet (20 mEq total) by mouth daily, Disp: 30 tablet, Rfl: 5  •  predniSONE 5 mg tablet, Take 5 mg by mouth daily, Disp: , Rfl:   •  simvastatin (ZOCOR) 10 mg tablet, Take 1 tablet by mouth daily, Disp: 90 tablet, Rfl: 0  •  aspirin (ECOTRIN LOW STRENGTH) 81 mg EC tablet, Take 1 tablet by mouth daily   (Patient not taking: Reported on 11/2/2022), Disp: , Rfl:   •  betamethasone, augmented, (DIPROLENE-AF) 0 05 % cream, betamethasone, augmented 0 05 % topical cream (Patient not taking: Reported on 2/8/2023), Disp: , Rfl:   •  clonazePAM (KlonoPIN) 0 5 mg tablet, clonazepam 0 5 mg tablet (Patient not taking: Reported on 2/8/2023), Disp: , Rfl:   •  etodolac (LODINE) 400 MG tablet, Every 12 hours (Patient not taking: Reported on 2/8/2023), Disp: , Rfl:   •  febuxostat (ULORIC) 40 mg tablet, , Disp: , Rfl:   •  Fexofenadine HCl (ALLEGRA ALLERGY PO), Allegra, Disp: , Rfl:   •  HYDROcodone-acetaminophen (XODOL) 5-300 MG per tablet, Vicodin 5 mg-300 mg tablet (Patient not taking: Reported on 2/8/2023), Disp: , Rfl:   •  mupirocin (BACTROBAN) 2 % ointment, Apply topically 2 (two) times a day (Patient not taking: Reported on 4/4/2022), Disp: 22 g, Rfl: 0  •  oxyCODONE-acetaminophen (PERCOCET) 5-325 mg per tablet, Percocet 5 mg-325 mg tablet  Take 1-2  tablets by oral route every 4-6 hours as needed (Patient not taking: Reported on 2/8/2023), Disp: , Rfl:   •  pantoprazole (PROTONIX) 40 mg tablet, pantoprazole 40 mg tablet,delayed release, Disp: , Rfl:   •  predniSONE 10 mg tablet, Take 5 tabs PO daily x 1 day; take 4 tabs QD x 1 day; take 3 tabs QD x 1 day; take 2 tabs QD x 1 day; take 1 tab QD x 1 day (Patient not taking: Reported on 11/2/2022), Disp: 30 tablet, Rfl: 0  •  Propoxyphene N-Acetaminophen (DARVOCET-N 100 PO), Darvocet-N 100 100 mg-650 mg tablet  Take 1 tablet by mouth every 4-6 hours as needed for pain (Patient not taking: Reported on 2/8/2023), Disp: , Rfl:   •  rosuvastatin (CRESTOR) 5 mg tablet, Crestor 5 mg tablet, Disp: , Rfl:   •  tolterodine (DETROL LA) 4 mg 24 hr capsule, Detrol LA 4 mg capsule,extended release, Disp: , Rfl:     Laboratory Results:  Lab Results   Component Value Date    WBC 9 26 05/11/2022    HGB 12 1 05/11/2022    HCT 38 9 05/11/2022    MCV 99 (H) 05/11/2022     05/11/2022     Lab Results   Component Value Date    SODIUM 140 11/09/2022    K 3 4 (L) 11/09/2022     11/09/2022    CO2 30 11/09/2022    BUN 38 (H) 11/09/2022    CREATININE 1 79 (H) 11/09/2022    GLUC 104 12/08/2020    CALCIUM 9 3 11/09/2022     Lab Results   Component Value Date    CALCIUM 9 3 11/09/2022    PHOS 3 0 12/08/2020     No results found for: LABPROT

## 2023-05-22 NOTE — LETTER
May 22, 2023     Teofilo DO Gurinder  [address]  [address] [place] [address]    Patient: Gerard Spencer   YOB: 1945   Date of Visit: 5/22/2023       Dear Dr Gorge Gonzalez:    Thank you for referring Gerard Spencer to me for evaluation  Below are my notes for this consultation  If you have questions, please do not hesitate to call me  I look forward to following your patient along with you  Sincerely,        Josiane Preston MD        CC: MD Josiane Burkett MD  5/22/2023 11:15 AM  Sign when Signing Visit  [address] [facility] [address] [facility] [de-identified] y o  female MRN: [id number]  Unit/Bed#:  Encounter: [id number]  Reason for Consult: Renal insufficiency    The patient is here for routine follow-up with me  She has been doing well she had a very nice surprise 78th birthday party with her family and friends  She is preparing to undergo elective right shoulder replacement in a couple weeks  Otherwise no complaints for me  ASSESSMENT/PLAN:  1  Renal    Patient is chronic renal insufficiency due to nephrosclerosis given lack of proteinuria  In the past she had been diabetic on medications now she is well controlled just by dietary means and has excellent hemoglobin A1c  Last urine protein estimation was negative so she does not have prior history of diabetic nephropathy and in fact most serious intrinsic diseases result in proteinuria so that is why states she has nephrosclerosis  At this point focus on blood pressure control  Monitor blood sugars  Avoid NSAIDs which she does    Latest creatinine is 1 6 which is stable at her baseline with no progression over couple years  There are fluctuations from time to time that could be related to changes in volume status that she is on diuretic  Prior to her surgery just told her not to take her furosemide or potassium the day of surgery      She does have clearance to undergo elective shoulder replacement I will forward a "copy of this letter to her orthopedic surgeon  Follow-up in 6 months and she is told to call if there is any problems or concerns before next visit  SUBJECTIVE:  Review of Systems   Constitutional: Negative for chills, diaphoresis, fever and malaise/fatigue  HENT: Negative  Eyes: Negative  Cardiovascular: Negative for chest pain, dyspnea on exertion, leg swelling and orthopnea  Respiratory: Negative for cough, shortness of breath, sputum production and wheezing  Musculoskeletal:        Pain in her right shoulder and limited range of motion  Gastrointestinal: Negative for abdominal pain, diarrhea, nausea and vomiting  Genitourinary: Negative for dysuria, flank pain, hematuria and incomplete emptying  Neurological: Negative for dizziness, focal weakness, headaches and weakness  Psychiatric/Behavioral: Negative for altered mental status, hallucinations and hypervigilance  The patient is not nervous/anxious  OBJECTIVE:  Current Weight: Weight - Scale: 105 kg (232 lb)  [email or web address]:     Blood pressure 130/78, pulse 70, height 5' 5 75\" (1 67 m), weight 105 kg (232 lb), SpO2 97 %, not currently breastfeeding  , Body mass index is 37 73 kg/m²  [unfilled]    Physical Exam: /78 (BP Location: Left arm, Patient Position: Sitting, Cuff Size: Standard)   Pulse 70   Ht 5' 5 75\" (1 67 m)   Wt 105 kg (232 lb)   SpO2 97%   BMI 37 73 kg/m²   Physical Exam  Constitutional:       General: She is not in acute distress  Appearance: Normal appearance  She is not ill-appearing or toxic-appearing  HENT:      Head: Normocephalic and atraumatic  Nose: Nose normal       Mouth/Throat:      Mouth: Mucous membranes are moist    Eyes:      General: No scleral icterus  Extraocular Movements: Extraocular movements intact  Conjunctiva/sclera: Conjunctivae normal    Cardiovascular:      Rate and Rhythm: Normal rate and regular rhythm  Heart sounds: No friction rub   " No gallop  Comments: No pitting edema  Pulmonary:      Effort: Pulmonary effort is normal  No respiratory distress  Breath sounds: No wheezing, rhonchi or rales  Abdominal:      General: Bowel sounds are normal  There is no distension  Palpations: Abdomen is soft  Tenderness: There is no abdominal tenderness  There is no rebound  Musculoskeletal:      Cervical back: Normal range of motion and neck supple  Neurological:      General: No focal deficit present  Mental Status: She is alert and oriented to person, place, and time  Mental status is at baseline  Psychiatric:         Mood and Affect: Mood normal          Behavior: Behavior normal          Thought Content:  Thought content normal          Judgment: Judgment normal          Medications:    Current Outpatient Medications:   •  albuterol (ProAir HFA) 90 mcg/act inhaler, Inhale 2 puffs every 6 (six) hours as needed for wheezing, Disp: 8 5 g, Rfl: 3  •  allopurinol (ZYLOPRIM) 100 mg tablet, Take 100 mg by mouth daily Takes 2 tablets a day, Disp: , Rfl:   •  chlorhexidine (Hibiclens) 4 % external liquid, Hibiclens 4 % topical liquid  wash surgical area 5 days prior to surgery, Disp: , Rfl:   •  cholecalciferol (VITAMIN D3) 1,000 units tablet, Take 1,000 Units by mouth daily, Disp: , Rfl:   •  citalopram (CeleXA) 40 mg tablet, TAKE ONE TABLET BY MOUTH EVERY DAY, Disp: 90 tablet, Rfl: 0  •  furosemide (LASIX) 40 mg tablet, Take 1 tablet by mouth daily, Disp: 30 tablet, Rfl: 0  •  gabapentin (NEURONTIN) 100 mg capsule, Take 1 tablet by mouth 2 (two) times a day, Disp: , Rfl:   •  levothyroxine 50 mcg tablet, Take 1 tablet (50 mcg total) by mouth daily, Disp: 90 tablet, Rfl: 1  •  metoprolol tartrate (LOPRESSOR) 25 mg tablet, Take 1 tablet (25 mg total) by mouth in the morning, Disp: 30 tablet, Rfl: 5  •  potassium chloride (K-DUR,KLOR-CON) 20 mEq tablet, Take 1 tablet (20 mEq total) by mouth daily, Disp: 30 tablet, Rfl: 5  • predniSONE 5 mg tablet, Take 5 mg by mouth daily, Disp: , Rfl:   •  simvastatin (ZOCOR) 10 mg tablet, Take 1 tablet by mouth daily, Disp: 90 tablet, Rfl: 0  •  aspirin (ECOTRIN LOW STRENGTH) 81 mg EC tablet, Take 1 tablet by mouth daily   (Patient not taking: Reported on 11/2/2022), Disp: , Rfl:   •  betamethasone, augmented, (DIPROLENE-AF) 0 05 % cream, betamethasone, augmented 0 05 % topical cream (Patient not taking: Reported on 2/8/2023), Disp: , Rfl:   •  clonazePAM (KlonoPIN) 0 5 mg tablet, clonazepam 0 5 mg tablet (Patient not taking: Reported on 2/8/2023), Disp: , Rfl:   •  etodolac (LODINE) 400 MG tablet, Every 12 hours (Patient not taking: Reported on 2/8/2023), Disp: , Rfl:   •  febuxostat (ULORIC) 40 mg tablet, , Disp: , Rfl:   •  Fexofenadine HCl (ALLEGRA ALLERGY PO), Allegra, Disp: , Rfl:   •  HYDROcodone-acetaminophen (XODOL) 5-300 MG per tablet, Vicodin 5 mg-300 mg tablet (Patient not taking: Reported on 2/8/2023), Disp: , Rfl:   •  mupirocin (BACTROBAN) 2 % ointment, Apply topically 2 (two) times a day (Patient not taking: Reported on 4/4/2022), Disp: 22 g, Rfl: 0  •  oxyCODONE-acetaminophen (PERCOCET) 5-325 mg per tablet, Percocet 5 mg-325 mg tablet  Take 1-2  tablets by oral route every 4-6 hours as needed (Patient not taking: Reported on 2/8/2023), Disp: , Rfl:   •  pantoprazole (PROTONIX) 40 mg tablet, pantoprazole 40 mg tablet,delayed release, Disp: , Rfl:   •  predniSONE 10 mg tablet, Take 5 tabs PO daily x 1 day; take 4 tabs QD x 1 day; take 3 tabs QD x 1 day; take 2 tabs QD x 1 day; take 1 tab QD x 1 day (Patient not taking: Reported on 11/2/2022), Disp: 30 tablet, Rfl: 0  •  Propoxyphene N-Acetaminophen (DARVOCET-N 100 PO), Darvocet-N 100 100 mg-650 mg tablet  Take 1 tablet by mouth every 4-6 hours as needed for pain (Patient not taking: Reported on 2/8/2023), Disp: , Rfl:   •  rosuvastatin (CRESTOR) 5 mg tablet, Crestor 5 mg tablet, Disp: , Rfl:   •  tolterodine (DETROL LA) 4 mg 24 hr capsule, Detrol LA 4 mg capsule,extended release, Disp: , Rfl:     Laboratory Results:  Lab Results   Component Value Date    WBC 9 26 05/11/2022    HGB 12 1 05/11/2022    HCT 38 9 05/11/2022    MCV 99 (H) 05/11/2022     05/11/2022     Lab Results   Component Value Date    SODIUM 140 11/09/2022    K 3 4 (L) 11/09/2022     11/09/2022    CO2 30 11/09/2022    BUN 38 (H) 11/09/2022    CREATININE 1 79 (H) 11/09/2022    GLUC 104 12/08/2020    CALCIUM 9 3 11/09/2022     Lab Results   Component Value Date    CALCIUM 9 3 11/09/2022    PHOS 3 0 12/08/2020     No results found for: LABPROT

## 2023-05-22 NOTE — PATIENT INSTRUCTIONS
You are here for follow-up you look great you did tell me that you are going to have a right shoulder replacement I hope that helps with your pain in your bowling game  With respect your kidney function we have been monitoring this the creatinines the blood test is 1 6 that stable going back a few years sometimes there is fluctuations around it but the most recent one is at your baseline  In the past there was no protein in the urine so you do not have serious intrinsic kidney disease you likely just have a little aging or nephrosclerosis  Things have not gotten worse her blood pressure is excellent  Of course you have clearance to undergo your surgery and I will forward a note to your surgeon  The day of surgery I just would not take your potassium or your furosemide  Please call me if you have any problems or questions before next visit

## 2023-05-24 ENCOUNTER — OFFICE VISIT (OUTPATIENT)
Dept: PODIATRY | Facility: CLINIC | Age: 78
End: 2023-05-24

## 2023-05-24 VITALS
SYSTOLIC BLOOD PRESSURE: 145 MMHG | BODY MASS INDEX: 38.41 KG/M2 | HEIGHT: 66 IN | WEIGHT: 239 LBS | HEART RATE: 56 BPM | DIASTOLIC BLOOD PRESSURE: 85 MMHG

## 2023-05-24 DIAGNOSIS — E11.9 TYPE 2 DIABETES MELLITUS WITHOUT COMPLICATION, WITHOUT LONG-TERM CURRENT USE OF INSULIN (HCC): Primary | ICD-10-CM

## 2023-05-24 DIAGNOSIS — B35.1 ONYCHOMYCOSIS: ICD-10-CM

## 2023-05-24 NOTE — PROGRESS NOTES
Patient presents for palliative diabetic foot care  No acute disorder noted  Treatment consisted of trimming of multiple dystrophic and mycotic nails  Also trimmed pinch callus right hallux and hyperkeratotic lesion at the tip of the right third toe  Patient is rescheduled in 3 months

## 2023-05-25 NOTE — PRE-PROCEDURE INSTRUCTIONS
Pre-Surgery Instructions:   Medication Instructions   • albuterol (ProAir HFA) 90 mcg/act inhaler Uses PRN- OK to take day of surgery   • allopurinol (ZYLOPRIM) 100 mg tablet Take day of surgery  • chlorhexidine (Hibiclens) 4 % external liquid Instructions provided by MD   • cholecalciferol (VITAMIN D3) 1,000 units tablet Stop taking 7 days prior to surgery  • citalopram (CeleXA) 40 mg tablet Take day of surgery  • furosemide (LASIX) 40 mg tablet Hold day of surgery  • gabapentin (NEURONTIN) 100 mg capsule Take day of surgery  • levothyroxine 50 mcg tablet Take day of surgery  • metoprolol tartrate (LOPRESSOR) 25 mg tablet Take day of surgery  • mupirocin (BACTROBAN) 2 % ointment Instructions provided by MD   • potassium chloride (K-DUR,KLOR-CON) 20 mEq tablet Hold day of surgery  • predniSONE 5 mg tablet Take day of surgery  • simvastatin (ZOCOR) 10 mg tablet Take day of surgery  Medication instructions for day surgery reviewed  Please use only a sip of water to take your instructed medications  Avoid all over the counter vitamins, supplements and NSAIDS for one week prior to surgery per anesthesia guidelines  Tylenol is ok to take as needed  You will receive a call one business day prior to surgery with an arrival time and hospital directions  If your surgery is scheduled on a Monday, the hospital will be calling you on the Friday prior to your surgery  If you have not heard from anyone by 8pm, please call the hospital supervisor through the hospital  at [phone]  Gina Canavan [phone])  Do not eat or drink anything after midnight the night before your surgery, including candy, mints, lifesavers, or chewing gum  Do not drink alcohol 24hrs before your surgery  Try not to smoke at least 24hrs before your surgery  Follow the pre surgery showering instructions as listed in the [facility] Surgical Experience Booklet” or otherwise provided by your surgeon's office   Do not shave the surgical area 24 hours before surgery  Do not apply any lotions, creams, including makeup, cologne, deodorant, or perfumes after showering on the day of your surgery  No contact lenses, eye make-up, or artificial eyelashes  Remove nail polish, including gel polish, and any artificial, gel, or acrylic nails if possible  Remove all jewelry including rings and body piercing jewelry  Wear causal clothing that is easy to take on and off  Consider your type of surgery  Keep any valuables, jewelry, piercings at home  Please bring any specially ordered equipment (sling, braces) if indicated  Arrange for a responsible person to drive you to and from the hospital on the day of your surgery  Visitor Guidelines discussed  Call the surgeon's office with any new illnesses, exposures, or additional questions prior to surgery  Please reference your [facility] Surgical Experience Booklet” for additional information to prepare for your upcoming surgery  See Geriatric Assessment below       • Cognitive Assessment: No Concerns  • CAM: N/A  • TUG <15 sec:N/A  • Falls (last 6 months): No Falls  Uses cane/walker  • Hand  score: N/A  -Attempt 1:  -Attempt 2:  -Attempt 3:  • Idris Total Score: 22  • PHQ- 9 Depression Scale:0  • Nutrition Assessment Score:14  Albumin=4 1  • METS:9 25  • Health goals:  -What are your overall health goals? Weight Loss    -What brings you strength? Sister    -What activities are important to you?  Painting,reading and gardening

## 2023-05-26 DIAGNOSIS — R60.9 EDEMA, UNSPECIFIED TYPE: ICD-10-CM

## 2023-05-26 RX ORDER — FUROSEMIDE 40 MG/1
TABLET ORAL
Qty: 30 TABLET | Refills: 2 | Status: SHIPPED | OUTPATIENT
Start: 2023-05-26

## 2023-06-01 ENCOUNTER — APPOINTMENT (OUTPATIENT)
Dept: LAB | Facility: MEDICAL CENTER | Age: 78
End: 2023-06-01
Payer: COMMERCIAL

## 2023-06-01 DIAGNOSIS — M25.511 RIGHT SHOULDER PAIN, UNSPECIFIED CHRONICITY: ICD-10-CM

## 2023-06-01 DIAGNOSIS — M19.011 PRIMARY OSTEOARTHRITIS OF RIGHT SHOULDER: ICD-10-CM

## 2023-06-01 LAB
ABO GROUP BLD: NORMAL
BLD GP AB SCN SERPL QL: NEGATIVE
CREAT UR-MCNC: 17.5 MG/DL
MICROALBUMIN UR-MCNC: <5 MG/L (ref 0–20)
MICROALBUMIN/CREAT 24H UR: <29 MG/G CREATININE (ref 0–30)
RH BLD: NEGATIVE
SPECIMEN EXPIRATION DATE: NORMAL

## 2023-06-01 PROCEDURE — 86901 BLOOD TYPING SEROLOGIC RH(D): CPT

## 2023-06-01 PROCEDURE — 36415 COLL VENOUS BLD VENIPUNCTURE: CPT

## 2023-06-01 PROCEDURE — 86900 BLOOD TYPING SEROLOGIC ABO: CPT

## 2023-06-01 PROCEDURE — 86850 RBC ANTIBODY SCREEN: CPT

## 2023-06-06 LAB
ABO GROUP BLD: NORMAL
BLD GP AB SCN SERPL QL: NEGATIVE
RH BLD: NEGATIVE
SPECIMEN EXPIRATION DATE: NORMAL

## 2023-06-07 ENCOUNTER — APPOINTMENT (OUTPATIENT)
Dept: RADIOLOGY | Facility: HOSPITAL | Age: 78
End: 2023-06-07
Payer: COMMERCIAL

## 2023-06-07 ENCOUNTER — ANESTHESIA (OUTPATIENT)
Dept: PERIOP | Facility: HOSPITAL | Age: 78
End: 2023-06-07
Payer: COMMERCIAL

## 2023-06-07 ENCOUNTER — HOSPITAL ENCOUNTER (OUTPATIENT)
Facility: HOSPITAL | Age: 78
Setting detail: OUTPATIENT SURGERY
Discharge: HOME | End: 2023-06-08
Attending: ORTHOPAEDIC SURGERY | Admitting: ORTHOPAEDIC SURGERY
Payer: COMMERCIAL

## 2023-06-07 DIAGNOSIS — M19.211 SECONDARY OSTEOARTHRITIS OF RIGHT SHOULDER DUE TO ROTATOR CUFF ARTHROPATHY: Primary | ICD-10-CM

## 2023-06-07 PROBLEM — M12.811 ROTATOR CUFF TEAR ARTHROPATHY OF RIGHT SHOULDER: Status: ACTIVE | Noted: 2023-06-07

## 2023-06-07 PROBLEM — E66.01 CLASS 3 SEVERE OBESITY DUE TO EXCESS CALORIES WITH SERIOUS COMORBIDITY AND BODY MASS INDEX (BMI) OF 40.0 TO 44.9 IN ADULT (HCC): Status: RESOLVED | Noted: 2020-03-12 | Resolved: 2023-06-07

## 2023-06-07 PROBLEM — E66.813 CLASS 3 SEVERE OBESITY DUE TO EXCESS CALORIES WITH SERIOUS COMORBIDITY AND BODY MASS INDEX (BMI) OF 40.0 TO 44.9 IN ADULT (HCC): Status: RESOLVED | Noted: 2020-03-12 | Resolved: 2023-06-07

## 2023-06-07 PROBLEM — E66.9 OBESITY (BMI 35.0-39.9 WITHOUT COMORBIDITY): Status: ACTIVE | Noted: 2023-06-07

## 2023-06-07 PROBLEM — M75.101 ROTATOR CUFF TEAR ARTHROPATHY OF RIGHT SHOULDER: Status: ACTIVE | Noted: 2023-06-07

## 2023-06-07 LAB
GLUCOSE SERPL-MCNC: 131 MG/DL (ref 65–140)
GLUCOSE SERPL-MCNC: 145 MG/DL (ref 65–140)

## 2023-06-07 PROCEDURE — C1776 JOINT DEVICE (IMPLANTABLE): HCPCS | Performed by: ORTHOPAEDIC SURGERY

## 2023-06-07 PROCEDURE — C1713 ANCHOR/SCREW BN/BN,TIS/BN: HCPCS | Performed by: ORTHOPAEDIC SURGERY

## 2023-06-07 PROCEDURE — 82948 REAGENT STRIP/BLOOD GLUCOSE: CPT

## 2023-06-07 PROCEDURE — 73020 X-RAY EXAM OF SHOULDER: CPT

## 2023-06-07 DEVICE — 5.5X40MM PERIPHERAL SCREW, LOCKING
Type: IMPLANTABLE DEVICE | Site: SHOULDER | Status: FUNCTIONAL
Brand: ARTHREX®

## 2023-06-07 DEVICE — 24MM BASEPLATE, MONOBLOCK POST
Type: IMPLANTABLE DEVICE | Site: SHOULDER | Status: FUNCTIONAL
Brand: ARTHREX®

## 2023-06-07 DEVICE — 5.5X20MM PERIPHERAL SCREW, LOCKING
Type: IMPLANTABLE DEVICE | Site: SHOULDER | Status: FUNCTIONAL
Brand: ARTHREX®

## 2023-06-07 DEVICE — 33 +4 LAT/24 GLENOSPHERE
Type: IMPLANTABLE DEVICE | Site: SHOULDER | Status: FUNCTIONAL
Brand: ARTHREX®

## 2023-06-07 DEVICE — 5.5X36MM PERIPHERAL SCREW, LOCKING
Type: IMPLANTABLE DEVICE | Site: SHOULDER | Status: FUNCTIONAL
Brand: ARTHREX®

## 2023-06-07 DEVICE — UNIVERS REVERS HUMERAL STEM, SIZE 10
Type: IMPLANTABLE DEVICE | Site: SHOULDER | Status: FUNCTIONAL
Brand: ARTHREX®

## 2023-06-07 DEVICE — UNIVERS REVERS SUTURE CUP, 33 (+2 RIGHT)
Type: IMPLANTABLE DEVICE | Site: SHOULDER | Status: FUNCTIONAL
Brand: ARTHREX®

## 2023-06-07 DEVICE — 4.5X20MM PERIPHERAL SCREW, NON-LOCKING
Type: IMPLANTABLE DEVICE | Site: SHOULDER | Status: FUNCTIONAL
Brand: ARTHREX®

## 2023-06-07 DEVICE — HUMERAL INSERT XS 33+3MM
Type: IMPLANTABLE DEVICE | Site: SHOULDER | Status: FUNCTIONAL
Brand: ARTHREX®

## 2023-06-07 RX ORDER — ONDANSETRON 2 MG/ML
4 INJECTION INTRAMUSCULAR; INTRAVENOUS ONCE AS NEEDED
Status: DISCONTINUED | OUTPATIENT
Start: 2023-06-07 | End: 2023-06-07 | Stop reason: HOSPADM

## 2023-06-07 RX ORDER — SODIUM CHLORIDE, SODIUM LACTATE, POTASSIUM CHLORIDE, CALCIUM CHLORIDE 600; 310; 30; 20 MG/100ML; MG/100ML; MG/100ML; MG/100ML
100 INJECTION, SOLUTION INTRAVENOUS CONTINUOUS
Status: DISCONTINUED | OUTPATIENT
Start: 2023-06-07 | End: 2023-06-08 | Stop reason: HOSPADM

## 2023-06-07 RX ORDER — ROPIVACAINE HYDROCHLORIDE 5 MG/ML
INJECTION, SOLUTION EPIDURAL; INFILTRATION; PERINEURAL AS NEEDED
Status: DISCONTINUED | OUTPATIENT
Start: 2023-06-07 | End: 2023-06-07

## 2023-06-07 RX ORDER — CETIRIZINE HYDROCHLORIDE 10 MG/1
10 TABLET ORAL DAILY
COMMUNITY

## 2023-06-07 RX ORDER — ASPIRIN 325 MG
325 TABLET ORAL DAILY
Status: DISCONTINUED | OUTPATIENT
Start: 2023-06-08 | End: 2023-06-08 | Stop reason: HOSPADM

## 2023-06-07 RX ORDER — SODIUM CHLORIDE 9 MG/ML
125 INJECTION, SOLUTION INTRAVENOUS CONTINUOUS
Status: DISCONTINUED | OUTPATIENT
Start: 2023-06-07 | End: 2023-06-08 | Stop reason: HOSPADM

## 2023-06-07 RX ORDER — MAGNESIUM HYDROXIDE 1200 MG/15ML
LIQUID ORAL AS NEEDED
Status: DISCONTINUED | OUTPATIENT
Start: 2023-06-07 | End: 2023-06-07 | Stop reason: HOSPADM

## 2023-06-07 RX ORDER — CEFAZOLIN SODIUM 1 G/50ML
1000 SOLUTION INTRAVENOUS ONCE
Status: COMPLETED | OUTPATIENT
Start: 2023-06-07 | End: 2023-06-07

## 2023-06-07 RX ORDER — ONDANSETRON 2 MG/ML
INJECTION INTRAMUSCULAR; INTRAVENOUS AS NEEDED
Status: DISCONTINUED | OUTPATIENT
Start: 2023-06-07 | End: 2023-06-07

## 2023-06-07 RX ORDER — ROCURONIUM BROMIDE 10 MG/ML
INJECTION, SOLUTION INTRAVENOUS AS NEEDED
Status: DISCONTINUED | OUTPATIENT
Start: 2023-06-07 | End: 2023-06-07

## 2023-06-07 RX ORDER — FENTANYL CITRATE 50 UG/ML
INJECTION, SOLUTION INTRAMUSCULAR; INTRAVENOUS AS NEEDED
Status: DISCONTINUED | OUTPATIENT
Start: 2023-06-07 | End: 2023-06-07

## 2023-06-07 RX ORDER — LIDOCAINE HYDROCHLORIDE 20 MG/ML
INJECTION, SOLUTION EPIDURAL; INFILTRATION; INTRACAUDAL; PERINEURAL AS NEEDED
Status: DISCONTINUED | OUTPATIENT
Start: 2023-06-07 | End: 2023-06-07

## 2023-06-07 RX ORDER — CEFAZOLIN SODIUM 1 G/50ML
1000 SOLUTION INTRAVENOUS EVERY 8 HOURS
Status: COMPLETED | OUTPATIENT
Start: 2023-06-07 | End: 2023-06-08

## 2023-06-07 RX ORDER — FENTANYL CITRATE/PF 50 MCG/ML
25 SYRINGE (ML) INJECTION
Status: DISCONTINUED | OUTPATIENT
Start: 2023-06-07 | End: 2023-06-07 | Stop reason: HOSPADM

## 2023-06-07 RX ORDER — PROPOFOL 10 MG/ML
INJECTION, EMULSION INTRAVENOUS AS NEEDED
Status: DISCONTINUED | OUTPATIENT
Start: 2023-06-07 | End: 2023-06-07

## 2023-06-07 RX ORDER — DEXAMETHASONE SODIUM PHOSPHATE 10 MG/ML
INJECTION, SOLUTION INTRAMUSCULAR; INTRAVENOUS AS NEEDED
Status: DISCONTINUED | OUTPATIENT
Start: 2023-06-07 | End: 2023-06-07

## 2023-06-07 RX ORDER — DOCUSATE SODIUM 100 MG/1
100 CAPSULE, LIQUID FILLED ORAL 2 TIMES DAILY
Status: DISCONTINUED | OUTPATIENT
Start: 2023-06-07 | End: 2023-06-08 | Stop reason: HOSPADM

## 2023-06-07 RX ORDER — ONDANSETRON 2 MG/ML
4 INJECTION INTRAMUSCULAR; INTRAVENOUS EVERY 6 HOURS PRN
Status: DISCONTINUED | OUTPATIENT
Start: 2023-06-07 | End: 2023-06-08 | Stop reason: HOSPADM

## 2023-06-07 RX ORDER — ACETAMINOPHEN 325 MG/1
650 TABLET ORAL EVERY 6 HOURS PRN
Status: DISCONTINUED | OUTPATIENT
Start: 2023-06-07 | End: 2023-06-08 | Stop reason: HOSPADM

## 2023-06-07 RX ORDER — MIDAZOLAM HYDROCHLORIDE 2 MG/2ML
INJECTION, SOLUTION INTRAMUSCULAR; INTRAVENOUS AS NEEDED
Status: DISCONTINUED | OUTPATIENT
Start: 2023-06-07 | End: 2023-06-07

## 2023-06-07 RX ORDER — EPHEDRINE SULFATE 50 MG/ML
INJECTION INTRAVENOUS AS NEEDED
Status: DISCONTINUED | OUTPATIENT
Start: 2023-06-07 | End: 2023-06-07

## 2023-06-07 RX ORDER — SENNOSIDES 8.6 MG
1 TABLET ORAL DAILY
Status: DISCONTINUED | OUTPATIENT
Start: 2023-06-08 | End: 2023-06-08 | Stop reason: HOSPADM

## 2023-06-07 RX ADMIN — SODIUM CHLORIDE 125 ML/HR: 0.9 INJECTION, SOLUTION INTRAVENOUS at 11:54

## 2023-06-07 RX ADMIN — DEXAMETHASONE SODIUM PHOSPHATE 10 MG: 10 INJECTION INTRAMUSCULAR; INTRAVENOUS at 13:51

## 2023-06-07 RX ADMIN — SUGAMMADEX 300 MG: 100 INJECTION, SOLUTION INTRAVENOUS at 15:27

## 2023-06-07 RX ADMIN — DOCUSATE SODIUM 100 MG: 100 CAPSULE, LIQUID FILLED ORAL at 17:21

## 2023-06-07 RX ADMIN — ROCURONIUM BROMIDE 40 MG: 10 INJECTION, SOLUTION INTRAVENOUS at 13:51

## 2023-06-07 RX ADMIN — CEFAZOLIN SODIUM 1000 MG: 1 SOLUTION INTRAVENOUS at 22:09

## 2023-06-07 RX ADMIN — FENTANYL CITRATE 50 MCG: 50 INJECTION INTRAMUSCULAR; INTRAVENOUS at 13:34

## 2023-06-07 RX ADMIN — ROPIVACAINE HYDROCHLORIDE 30 MG: 5 INJECTION EPIDURAL; INFILTRATION; PERINEURAL at 13:38

## 2023-06-07 RX ADMIN — SODIUM CHLORIDE, SODIUM LACTATE, POTASSIUM CHLORIDE, AND CALCIUM CHLORIDE 100 ML/HR: .6; .31; .03; .02 INJECTION, SOLUTION INTRAVENOUS at 17:21

## 2023-06-07 RX ADMIN — FENTANYL CITRATE 50 MCG: 50 INJECTION INTRAMUSCULAR; INTRAVENOUS at 13:42

## 2023-06-07 RX ADMIN — ROCURONIUM BROMIDE 10 MG: 10 INJECTION, SOLUTION INTRAVENOUS at 14:21

## 2023-06-07 RX ADMIN — CEFAZOLIN SODIUM 2000 MG: 1 SOLUTION INTRAVENOUS at 13:48

## 2023-06-07 RX ADMIN — MIDAZOLAM 1 MG: 1 INJECTION INTRAMUSCULAR; INTRAVENOUS at 13:34

## 2023-06-07 RX ADMIN — MIDAZOLAM 1 MG: 1 INJECTION INTRAMUSCULAR; INTRAVENOUS at 13:41

## 2023-06-07 RX ADMIN — EPHEDRINE SULFATE 5 MG: 50 INJECTION, SOLUTION INTRAVENOUS at 14:32

## 2023-06-07 RX ADMIN — ONDANSETRON 4 MG: 2 INJECTION INTRAMUSCULAR; INTRAVENOUS at 15:10

## 2023-06-07 RX ADMIN — EPHEDRINE SULFATE 5 MG: 50 INJECTION, SOLUTION INTRAVENOUS at 14:21

## 2023-06-07 RX ADMIN — EPHEDRINE SULFATE 5 MG: 50 INJECTION, SOLUTION INTRAVENOUS at 14:06

## 2023-06-07 RX ADMIN — LIDOCAINE HYDROCHLORIDE 100 MG: 20 INJECTION, SOLUTION EPIDURAL; INFILTRATION; INTRACAUDAL at 13:51

## 2023-06-07 RX ADMIN — PROPOFOL 150 MG: 10 INJECTION, EMULSION INTRAVENOUS at 13:51

## 2023-06-07 RX ADMIN — SODIUM CHLORIDE: 0.9 INJECTION, SOLUTION INTRAVENOUS at 14:46

## 2023-06-07 NOTE — INTERVAL H&P NOTE
H&P reviewed  After examining the patient I find no changes in the patients condition since the H&P had been written      Vitals:    06/07/23 1137   BP: 149/68   Pulse: 62   Resp: 14   Temp: 97 8 °F (36 6 °C)   SpO2: 96%

## 2023-06-07 NOTE — ANESTHESIA POSTPROCEDURE EVALUATION
"Post-Op Assessment Note    CV Status:  Stable  Pain Score: 2    Pain management: adequate     Mental Status:  Alert and awake   Hydration Status:  Euvolemic and stable   PONV Controlled:  Controlled   Airway Patency:  Patent      Post Op Vitals Reviewed: Yes      Staff: Anesthesiologist         No notable events documented      BP      Temp     Pulse     Resp      SpO2      /65   Pulse 64   Temp (!) 97 3 °F (36 3 °C)   Resp 14   Ht 5' 7\" (1 702 m)   Wt 108 kg (237 lb 7 oz)   SpO2 94%   BMI 37 19 kg/m²     "

## 2023-06-07 NOTE — OP NOTE
Right Reverse Total Shoulder Replacement    Patient Name: Hany Cheatham  MRN: [id number]  Date of Surgery 6/7/2023    Surgeon: Mackenzie Beck MD  Assistant: Irven Lesch [facility]      Anesthesia: Scalene block plus general anesthesia  Preoperative diagnosis: Right shoulder degenerative joint disease  Rotator cuff tear arthropathy  Postoperative diagnosis: Right shoulder degenerative joint disease  Rotator cuff tear arthropathy  Operative procedure: Right reverse total shoulder arthroplasty    Implants:   Implant Name Type Inv  Item Serial No   Lot No  LRB No  Used Action   BASEPLATE 37BB MONOBLOCK POST - [id number]  BASEPLATE 08XA MONOBLOCK POST  [employer] [id number] Right 1 Implanted   SCREW PERIPHERAL 4 5 X 20MM NON LCK - [id number]  SCREW PERIPHERAL 4 5 X 20MM NON LCK  [employer] [id number] Right 1 Implanted   SCREW PERIPHERAL 5 5 X 36MM LCK - [id number]  SCREW PERIPHERAL 5 5 X 36MM LCK  [employer] [id number] Right 1 Implanted   SCREW PERIPHERAL 5 5 X 40MM LCK - [id number]  SCREW PERIPHERAL 5 5 X 40MM LCK  [employer] [id number] Right 1 Implanted   SCREW PERIPHERAL 5 5 X 20MM LCK - [id number]  SCREW PERIPHERAL 5 5 X 20MM LCK  [employer] [id number] Right 1 Implanted   GLENOSPHERE 33 +4 LAT/24 - [id number]  GLENOSPHERE 33 +4 LAT/24  [employer] [id number] Right 1 Implanted   STEM HUM SZ 10 UNIVERS REVERS - [id number]  STEM HUM SZ 10 UNIVERS REVERS  [employer] [id number] Right 1 Implanted   UNIVERSAL REVERSE SUTURECUP 33 +2 RIGHT      [id number] Right 1 Implanted   UNIVERSAL REVERSE MODULAR GELNOPID SYSTEM HUMERAL INSERT 33 +3     [id number] Right 1 Implanted       Drains: None    Estimated blood loss: 50 cc  Antibiotics: Given preoperatively    Clinical note: Hany Cheatham is a [de-identified] y o  female who presents with severe right shoulder pain and disability  Physical exam investigations was consistent with degenerative joint disease  The patient had been treated nonoperatively and failed to improve  Nonoperative versus operative options reviewed  The patient did understand the situation and did wish to proceed with operative management    Description of procedure: The patient was identified as Baron Tian and the right shoulder as the surgical site  Anesthesia was administered  The patient was placed in modified beachchair position with the right shoulder and upper extremity prepped and draped in usual fashion for shoulder surgery  Utilizing a deltopectoral approach, sharp dissection was carried down through skin  Hemostasis was obtained using electrocautery  The deltoid and cephalic vein were then identified and retracted laterally with the pectoralis muscle retracted medially  The conjoined tendon was identified and retracted medially  The bicipital groove was then identified and incised  The biceps tendon was elevated from the wound and was found to have degenerative changes with partial tearing and inflammation  The biceps tendon was then divided and tenodesed to the pectoralis major utilizing #2 FiberWire  Appropriate retractors were placed and The subscapularis tendon was lifted off the humerus and tagged  A complete tear of the supraspinatus and infraspinous tendinosis identified  End-stage arthritis of the humeral head was noted  The proximal cutting guide was then utilized to create a 30° retroverted cut  Retractors were then placed about the glenoid  The glenoid was found to be in satisfactory condition for placement of the glenosphere  Utilizing the cannulated system, the glenoid was prepared with appropriate soft tissue releases  The baseplate was impacted and 2 locking 2 nonlocking screws were utilized to fix it in place  A glenosphere was then selected and impacted and secured with the locking screw  The proximal humerus was machined up to accept the trial stem selecting the 135° angle    A trial reduction was carried out during appropriate soft tissue tension range of motion and stability  The trial humeral stem was then removed and after placement of 2 #5 Ethibond sutures through drill holes and the final stem and cup was impacted into the proximal humerus  The shoulder was reduced for the last time and found to be satisfactory for range of motion and stability  The wound was copiously irrigated with pulse lavage followed by repair of the subscapularis utilizing the previously placed #5 Ethibond sutures  The deltoid and pectoralis was allowed to fall closed  Subcutaneous closure was then carried out utilizing interrupted 2-0 Vicryl sutures followed by 3-0 Monocryl as a subcuticular stitch and Steri-Strips  A soft absorbent bandage and shoulder immobilizer was then applied  The patient was then transferred to a stretcher in the supine position  There were no complications  Throughout the procedure, assistance by Jenna Johnson PA-C was required  She was required to aid in positioning the patient preoperatively and intraoperatively she was required to manipulate the patient's right upper extremity as well as various retractors and other equipment under my guidance  On completion of procedure, she was required to aid in closure of the wound and application of bandage  She was also required to aid in transfer the patient to recovery  X-ray: An AP view of the right shoulder was obtained in recovery  This demonstrated appropriate positioning of the total shoulder arthroplasty components  There was no evidence loosening or failure  There was air in the soft tissues consistent with immediate postoperative status the radiographs         Ghassan Rojas MD    Date: 6/7/2023  Time: 3:34 PM

## 2023-06-07 NOTE — ANESTHESIA PREPROCEDURE EVALUATION
Procedure:  REVERSE TOTAL SHOULDER REPLACEMENT (Right: Shoulder)    Relevant Problems   CARDIO   (+) Chest pain   (+) Hyperlipidemia      ENDO   (+) Hypothyroidism   (+) Type 2 diabetes mellitus without complication, without long-term current use of insulin ([facility])      /RENAL   (+) CRF (chronic renal failure), stage 4 (severe) ([facility])      HEMATOLOGY   (+) Anemia   (+) Idiopathic thrombocytopenic purpura (HCC)      MUSCULOSKELETAL   (+) Arthritis   (+) Chronic gout   (+) Fibromyalgia   (+) Idiopathic chronic gout with tophus   (+) Low back pain   (+) Osteoarthritis of multiple joints   (+) Primary osteoarthritis of both hands      NEURO/PSYCH   (+) Depression, recurrent ([facility])   (+) Fibromyalgia   (+) Intermittent claudication ([facility])      Cardiovascular and Mediastinum   (+) Chronic combined systolic (congestive) and diastolic (congestive) heart failure ([facility])      Musculoskeletal and Integument   (+) Rotator cuff tear arthropathy of right shoulder      Other   (+) Obesity (BMI 35 0-39 9 without comorbidity)   (+) PMR (polymyalgia rheumatica) ([facility])        Physical Exam    Airway    Mallampati score: III  TM Distance: >3 FB  Neck ROM: full     Dental   Comment: Partial upper, upper dentures,     Cardiovascular  Rhythm: regular, Rate: normal, Cardiovascular exam normal    Pulmonary  Pulmonary exam normal Breath sounds clear to auscultation,     Other Findings        Anesthesia Plan  ASA Score- 3     Anesthesia Type- general with ASA Monitors  Additional Monitors:   Airway Plan: ETT  Comment: ISB --for postop pain control--requested by surgeon, and discussed with patient  Medical and Nephrology consults on chart  EF=45%  Plan Factors-    Chart reviewed  EKG reviewed  Existing labs reviewed  Patient summary reviewed  Patient is not a current smoker  Patient not instructed to abstain from smoking on day of procedure  Patient did not smoke on day of surgery      There is medical exclusion for perioperative obstructive sleep apnea risk education  Induction- intravenous  Postoperative Plan- Plan for postoperative opioid use  Planned trial extubation    Informed Consent- Anesthetic plan and risks discussed with patient

## 2023-06-07 NOTE — ANESTHESIA PROCEDURE NOTES
Peripheral Block    Patient location during procedure: pre-op  Start time: 6/7/2023 1:34 PM  Reason for block: at surgeon's request and post-op pain management  Staffing  Performed by: Segundo Muir DO  Authorized by: Segundo Muir DO    Preanesthetic Checklist  Completed: patient identified, IV checked, site marked, risks and benefits discussed, surgical consent, monitors and equipment checked, pre-op evaluation and timeout performed  Peripheral Block  Patient position: supine  Prep: ChloraPrep  Patient monitoring: heart rate, continuous pulse ox and frequent blood pressure checks  Block type: interscalene  Laterality: right  Injection technique: single-shot  Procedures: ultrasound guided, Ultrasound guidance required for the procedure to increase accuracy and safety of medication placement and decrease risk of complications   and nerve stimulator  Ultrasound permanent image saved  Needle  Needle type: Stimuplex   Needle gauge: 21 G  Needle length: 2 in  Needle localization: nerve stimulator and ultrasound guidance  Assessment  Injection assessment: incremental injection, negative aspiration for CSF, negative aspiration for heme and no paresthesia on injection  Paresthesia pain: none  Heart rate change: no  Slow fractionated injection: yes  Post-procedure:  site cleaned  patient tolerated the procedure well with no immediate complications

## 2023-06-08 VITALS
BODY MASS INDEX: 37.27 KG/M2 | OXYGEN SATURATION: 97 % | HEIGHT: 67 IN | WEIGHT: 237.44 LBS | HEART RATE: 62 BPM | SYSTOLIC BLOOD PRESSURE: 134 MMHG | TEMPERATURE: 97.7 F | RESPIRATION RATE: 18 BRPM | DIASTOLIC BLOOD PRESSURE: 60 MMHG

## 2023-06-08 LAB
ANION GAP SERPL CALCULATED.3IONS-SCNC: 8 MMOL/L (ref 4–13)
BUN SERPL-MCNC: 30 MG/DL (ref 5–25)
CALCIUM SERPL-MCNC: 8.8 MG/DL (ref 8.4–10.2)
CHLORIDE SERPL-SCNC: 104 MMOL/L (ref 96–108)
CO2 SERPL-SCNC: 28 MMOL/L (ref 21–32)
CREAT SERPL-MCNC: 1.54 MG/DL (ref 0.6–1.3)
GFR SERPL CREATININE-BSD FRML MDRD: 31 ML/MIN/1.73SQ M
GLUCOSE SERPL-MCNC: 205 MG/DL (ref 65–140)
POTASSIUM SERPL-SCNC: 4.5 MMOL/L (ref 3.5–5.3)
SODIUM SERPL-SCNC: 140 MMOL/L (ref 135–147)

## 2023-06-08 PROCEDURE — 80048 BASIC METABOLIC PNL TOTAL CA: CPT | Performed by: PHYSICIAN ASSISTANT

## 2023-06-08 PROCEDURE — 99222 1ST HOSP IP/OBS MODERATE 55: CPT

## 2023-06-08 PROCEDURE — 97535 SELF CARE MNGMENT TRAINING: CPT

## 2023-06-08 PROCEDURE — 97167 OT EVAL HIGH COMPLEX 60 MIN: CPT

## 2023-06-08 RX ORDER — ACETAMINOPHEN 325 MG/1
650 TABLET ORAL EVERY 6 HOURS PRN
Refills: 0
Start: 2023-06-08

## 2023-06-08 RX ORDER — GABAPENTIN 100 MG/1
100 CAPSULE ORAL 2 TIMES DAILY
Status: DISCONTINUED | OUTPATIENT
Start: 2023-06-08 | End: 2023-06-08 | Stop reason: HOSPADM

## 2023-06-08 RX ORDER — LORATADINE 10 MG/1
10 TABLET ORAL DAILY
Status: DISCONTINUED | OUTPATIENT
Start: 2023-06-08 | End: 2023-06-08 | Stop reason: HOSPADM

## 2023-06-08 RX ORDER — SENNOSIDES 8.6 MG
8.6 TABLET ORAL DAILY
Refills: 0
Start: 2023-06-08

## 2023-06-08 RX ORDER — POTASSIUM CHLORIDE 20 MEQ/1
20 TABLET, EXTENDED RELEASE ORAL DAILY
Status: DISCONTINUED | OUTPATIENT
Start: 2023-06-08 | End: 2023-06-08 | Stop reason: HOSPADM

## 2023-06-08 RX ORDER — PANTOPRAZOLE SODIUM 40 MG/1
40 TABLET, DELAYED RELEASE ORAL
Status: DISCONTINUED | OUTPATIENT
Start: 2023-06-08 | End: 2023-06-08 | Stop reason: HOSPADM

## 2023-06-08 RX ORDER — CITALOPRAM 20 MG/1
40 TABLET ORAL DAILY
Status: DISCONTINUED | OUTPATIENT
Start: 2023-06-08 | End: 2023-06-08 | Stop reason: HOSPADM

## 2023-06-08 RX ORDER — LEVOTHYROXINE SODIUM 0.05 MG/1
50 TABLET ORAL
Status: DISCONTINUED | OUTPATIENT
Start: 2023-06-08 | End: 2023-06-08 | Stop reason: HOSPADM

## 2023-06-08 RX ORDER — ASPIRIN 325 MG
325 TABLET ORAL DAILY
Refills: 0
Start: 2023-06-08

## 2023-06-08 RX ORDER — PRAVASTATIN SODIUM 20 MG
20 TABLET ORAL
Status: DISCONTINUED | OUTPATIENT
Start: 2023-06-08 | End: 2023-06-08 | Stop reason: HOSPADM

## 2023-06-08 RX ORDER — ALBUTEROL SULFATE 90 UG/1
2 AEROSOL, METERED RESPIRATORY (INHALATION) EVERY 4 HOURS PRN
Status: DISCONTINUED | OUTPATIENT
Start: 2023-06-08 | End: 2023-06-08 | Stop reason: HOSPADM

## 2023-06-08 RX ORDER — DOCUSATE SODIUM 100 MG/1
100 CAPSULE, LIQUID FILLED ORAL 2 TIMES DAILY
Refills: 0
Start: 2023-06-08

## 2023-06-08 RX ORDER — PREDNISONE 5 MG/1
5 TABLET ORAL DAILY
Status: DISCONTINUED | OUTPATIENT
Start: 2023-06-08 | End: 2023-06-08 | Stop reason: HOSPADM

## 2023-06-08 RX ORDER — ALLOPURINOL 100 MG/1
100 TABLET ORAL DAILY
Status: DISCONTINUED | OUTPATIENT
Start: 2023-06-08 | End: 2023-06-08 | Stop reason: HOSPADM

## 2023-06-08 RX ORDER — FUROSEMIDE 40 MG/1
40 TABLET ORAL DAILY
Status: DISCONTINUED | OUTPATIENT
Start: 2023-06-08 | End: 2023-06-08 | Stop reason: HOSPADM

## 2023-06-08 RX ADMIN — SENNOSIDES 8.6 MG: 8.6 TABLET, FILM COATED ORAL at 08:18

## 2023-06-08 RX ADMIN — ASPIRIN 325 MG ORAL TABLET 325 MG: 325 PILL ORAL at 08:18

## 2023-06-08 RX ADMIN — ALLOPURINOL 100 MG: 100 TABLET ORAL at 09:22

## 2023-06-08 RX ADMIN — DOCUSATE SODIUM 100 MG: 100 CAPSULE, LIQUID FILLED ORAL at 08:18

## 2023-06-08 RX ADMIN — LEVOTHYROXINE SODIUM 50 MCG: 50 TABLET ORAL at 09:22

## 2023-06-08 RX ADMIN — PREDNISONE 5 MG: 5 TABLET ORAL at 09:22

## 2023-06-08 RX ADMIN — METOPROLOL TARTRATE 25 MG: 25 TABLET, FILM COATED ORAL at 09:22

## 2023-06-08 RX ADMIN — GABAPENTIN 100 MG: 100 CAPSULE ORAL at 09:23

## 2023-06-08 RX ADMIN — PANTOPRAZOLE SODIUM 40 MG: 40 TABLET, DELAYED RELEASE ORAL at 09:22

## 2023-06-08 RX ADMIN — CEFAZOLIN SODIUM 1000 MG: 1 SOLUTION INTRAVENOUS at 05:16

## 2023-06-08 RX ADMIN — LORATADINE 10 MG: 10 TABLET ORAL at 09:23

## 2023-06-08 RX ADMIN — POTASSIUM CHLORIDE 20 MEQ: 1500 TABLET, EXTENDED RELEASE ORAL at 09:22

## 2023-06-08 RX ADMIN — CITALOPRAM HYDROBROMIDE 40 MG: 20 TABLET ORAL at 09:22

## 2023-06-08 NOTE — ASSESSMENT & PLAN NOTE
Lab Results   Component Value Date    HGBA1C 6 6 (H) 05/04/2023     · Managed via diet control  · Diabetic diet, monitor sugars

## 2023-06-08 NOTE — PLAN OF CARE
Problem: OCCUPATIONAL THERAPY ADULT  Goal: Performs self-care activities at highest level of function for planned discharge setting  See evaluation for individualized goals  Description: Treatment Interventions: ADL retraining, Functional transfer training, UE strengthening/ROM, Endurance training, Patient/family training, Equipment evaluation/education, Neuromuscular reeducation, Compensatory technique education, Energy conservation, Activityengagement          See flowsheet documentation for full assessment, interventions and recommendations  Outcome: Progressing  Note: Limitation: Decreased ADL status, Decreased UE strength, Decreased Safe judgement during ADL, Decreased endurance, Decreased self-care trans  Prognosis: Good  Assessment: Volodymyr Joseph is a [de-identified] y o  female seen for OT evaluation s/p admit to [facility] on 6/7/2023 w/ Rotator cuff tear arthropathy of right shoulder  Now s/p R total shoulder  Precautions as follow: Follow TSR ROM protocol ,Sling x 4 weeks, Max ER 30 degrees, No Active IR, AAROM FF to 100 degrees  Comorbidities affecting pt's functional performance at time of assessment include: obesity and previous surgery  Pt with active OT orders and activity orders for Activity as tolerated  Personal factors affecting pt at time of IE include:[place] home environment, difficulty performing ADLs, WBS, fall risk  and functional decline   Prior to admission, pt lives with sister in a single level home with 4 [place]  Walk in shower with seat and grab bars, raised toilets with grab bars  Pt was I with ADLs, IADLs and mobility with cane/ RW  Drives  Reports 0 falls in the past 6 months  Sister able to assist following DC   Upon evaluation: Pt currently requires modA for UB ADLs, [place] for LB ADLs, supervision for toileting, unable to assess for bed mobility, and supervision for functional mobility/transfers 2* the following deficits impacting occupational performance:weakness, decreased UE ROM, decreased strength , decreased balance, decreased activity tolerance, increased pain and orthopedic restrictions  Pt to benefit from continued skilled OT tx while in the hospital to address deficits as defined above and maximize level of functional independence w ADL's and functional mobility  Occupational Performance areas to address include: bathing/shower, toilet hygiene, dressing, health maintenance, functional mobility and clothing management  From OT standpoint, recommendation at time of d/c would be home with OPPT  OT to follow pt on caseload 3-5x/wk       OT Discharge Recommendation: Home with outpatient rehabilitation

## 2023-06-08 NOTE — PROGRESS NOTES
Orthopedic Total Shoulder Progress Note  ([facility] - Dr Titi Rapp)    SUBJECTIVE:  Post-Operative Day:  LOS: 0 days   RIGHT total shoulder arthroplasty  Systemic or Specific Complaints: Patient sitting up in bed  Medications covering pain  Pt has been OOB and to bathroom on own  OBJECTIVE:  Vital signs in last 24 hours:  Temp:  [97 3 °F (36 3 °C)-98 1 °F (36 7 °C)] 98 1 °F (36 7 °C)  HR:  [53-78] 72  Resp:  [14-22] 20  BP: (105-153)/(57-98) 138/64  General: Alert, appears stated age,cooperative   Neurovascular: Neurvascular intact Axillary, Radial, Ulnar and Median Nerves  Radial pulse Plus 2  Sling well fitting   Wound: Incision looks good, Minimal drainage from wound  Range of Motion: Patient in a sling and well fitting     Data Review:  CBC:   Lab Results   Component Value Date    HCT 38 9 05/11/2022    HCT 40 5 08/06/2015    HGB 12 1 05/11/2022    HGB 13 6 08/06/2015     05/11/2022     08/06/2015    RBC 3 94 05/11/2022    RBC 4 46 08/06/2015    WBC 9 26 05/11/2022    WBC 8 92 08/06/2015       ASSESSMENT & PLAN:  Status post- RIGHT total shoulder arthroplasty:   Pain Relief: Pain well controled with oral medications  Continues current post-op course    Activity: OOB as tolerates  Follow TSR ROM protocol  Sling x 4 weeks  PT - Max ER 30 degrees, No Active IR, AAROM FF to 100 degrees  D/C Home with outpatient PT  Please help arrange if not already made  DVT prophylaxis  Follow up in office in 3 weeks  Appointment should already be made         Fady James PA-C  Date: 6/8/2023  Time: 7:50 AM

## 2023-06-08 NOTE — ASSESSMENT & PLAN NOTE
Lab Results   Component Value Date    CREATININE 1 54 (H) 06/08/2023    CREATININE 1 79 (H) 11/09/2022    CREATININE 1 54 (H) 10/21/2022    EGFR 31 06/08/2023    EGFR 26 11/09/2022    EGFR 31 10/21/2022   · Follows Dr Pippa Pinzon, OP visit 5/2023 reviewed  · Cr at baseline  · Without signs of urinary retention  · Avoid nephrotoxins, maintain adequate BP control

## 2023-06-08 NOTE — PLAN OF CARE
Problem: MOBILITY - ADULT  Goal: Maintain or return to baseline ADL function  Description: INTERVENTIONS:  -  Assess patient's ability to carry out ADLs; assess patient's baseline for ADL function and identify physical deficits which impact ability to perform ADLs (bathing, care of mouth/teeth, toileting, grooming, dressing, etc )  - Assess/evaluate cause of self-care deficits   - Assess range of motion  - Assess patient's mobility; develop plan if impaired  - Assess patient's need for assistive devices and provide as appropriate  - Encourage maximum independence but intervene and supervise when necessary  - Involve family in performance of ADLs  - Assess for home care needs following discharge   - Consider OT consult to assist with ADL evaluation and planning for discharge  - Provide patient education as appropriate  Outcome: Progressing     Problem: PAIN - ADULT  Goal: Verbalizes/displays adequate comfort level or baseline comfort level  Description: Interventions:  - Encourage patient to monitor pain and request assistance  - Assess pain using appropriate pain scale  - Administer analgesics based on type and severity of pain and evaluate response  - Implement non-pharmacological measures as appropriate and evaluate response  - Consider cultural and social influences on pain and pain management  - Notify physician/advanced practitioner if interventions unsuccessful or patient reports new pain  Outcome: Progressing     Problem: Knowledge Deficit  Goal: Patient/family/caregiver demonstrates understanding of disease process, treatment plan, medications, and discharge instructions  Description: Complete learning assessment and assess knowledge base    Interventions:  - Provide teaching at level of understanding  - Provide teaching via preferred learning methods  Outcome: Progressing     Problem: DISCHARGE PLANNING  Goal: Discharge to home or other facility with appropriate resources  Description: INTERVENTIONS:  - Identify barriers to discharge w/patient and caregiver  - Arrange for needed discharge resources and transportation as appropriate  - Identify discharge learning needs (meds, wound care, etc )  - Arrange for interpretive services to assist at discharge as needed  - Refer to Case Management Department for coordinating discharge planning if the patient needs post-hospital services based on physician/advanced practitioner order or complex needs related to functional status, cognitive ability, or social support system  Outcome: Progressing

## 2023-06-08 NOTE — ASSESSMENT & PLAN NOTE
· Follows at [facility] for idiopathic chronic gout and fibromylagia  · Continue prednisone 5 mg daily and gabapentin 100 mg bid

## 2023-06-08 NOTE — PLAN OF CARE
Problem: Prexisting or High Potential for Compromised Skin Integrity  Goal: Skin integrity is maintained or improved  Description: INTERVENTIONS:  - Identify patients at risk for skin breakdown  - Assess and monitor skin integrity  - Assess and monitor nutrition and hydration status  - Monitor labs   - Assess for incontinence   - Turn and reposition patient  - Assist with mobility/ambulation  - Relieve pressure over bony prominences  - Avoid friction and shearing  - Provide appropriate hygiene as needed including keeping skin clean and dry  - Evaluate need for skin moisturizer/barrier cream  - Collaborate with interdisciplinary team   - Patient/family teaching  - Consider wound care consult   Outcome: Progressing     Problem: MOBILITY - ADULT  Goal: Maintain or return to baseline ADL function  Description: INTERVENTIONS:  -  Assess patient's ability to carry out ADLs; assess patient's baseline for ADL function and identify physical deficits which impact ability to perform ADLs (bathing, care of mouth/teeth, toileting, grooming, dressing, etc )  - Assess/evaluate cause of self-care deficits   - Assess range of motion  - Assess patient's mobility; develop plan if impaired  - Assess patient's need for assistive devices and provide as appropriate  - Encourage maximum independence but intervene and supervise when necessary  - Involve family in performance of ADLs  - Assess for home care needs following discharge   - Consider OT consult to assist with ADL evaluation and planning for discharge  - Provide patient education as appropriate  Outcome: Progressing  Goal: Maintains/Returns to pre admission functional level  Description: INTERVENTIONS:  - Perform BMAT or MOVE assessment daily    - Set and communicate daily mobility goal to care team and patient/family/caregiver  - Collaborate with rehabilitation services on mobility goals if consulted  - Perform Range of Motion 3 times a day    - Reposition patient every 2 hours   - Dangle patient 3 times a day  - Stand patient 3 times a day  - Ambulate patient 3 times a day  - Out of bed to chair 3 times a day   - Out of bed for meals 3 times a day  - Out of bed for toileting  - Record patient progress and toleration of activity level   Outcome: Progressing     Problem: PAIN - ADULT  Goal: Verbalizes/displays adequate comfort level or baseline comfort level  Description: Interventions:  - Encourage patient to monitor pain and request assistance  - Assess pain using appropriate pain scale  - Administer analgesics based on type and severity of pain and evaluate response  - Implement non-pharmacological measures as appropriate and evaluate response  - Consider cultural and social influences on pain and pain management  - Notify physician/advanced practitioner if interventions unsuccessful or patient reports new pain  Outcome: Progressing     Problem: INFECTION - ADULT  Goal: Absence or prevention of progression during hospitalization  Description: INTERVENTIONS:  - Assess and monitor for signs and symptoms of infection  - Monitor lab/diagnostic results  - Monitor all insertion sites, i e  indwelling lines, tubes, and drains  - Monitor endotracheal if appropriate and nasal secretions for changes in amount and color  - [place] appropriate cooling/warming therapies per order  - Administer medications as ordered  - Instruct and encourage patient and family to use good hand hygiene technique  - Identify and instruct in appropriate isolation precautions for identified infection/condition  Outcome: Progressing     Problem: SAFETY ADULT  Goal: Patient will remain free of falls  Description: INTERVENTIONS:  - Educate patient/family on patient safety including physical limitations  - Instruct patient to call for assistance with activity   - Consult OT/PT to assist with strengthening/mobility   - Keep Call bell within reach  - Keep bed low and locked with side rails adjusted as appropriate  - Keep care items and personal belongings within reach  - Initiate and maintain comfort rounds  - Make Fall Risk Sign visible to staff  - Offer Toileting every 2 Hours, in advance of need  - Initiate/Maintain bed alarm  - Obtain necessary fall risk management equipment:   - Apply yellow socks and bracelet for high fall risk patients  - Consider moving patient to room near nurses station  Outcome: Progressing     Problem: DISCHARGE PLANNING  Goal: Discharge to home or other facility with appropriate resources  Description: INTERVENTIONS:  - Identify barriers to discharge w/patient and caregiver  - Arrange for needed discharge resources and transportation as appropriate  - Identify discharge learning needs (meds, wound care, etc )  - Arrange for interpretive services to assist at discharge as needed  - Refer to Case Management Department for coordinating discharge planning if the patient needs post-hospital services based on physician/advanced practitioner order or complex needs related to functional status, cognitive ability, or social support system  Outcome: Progressing     Problem: Knowledge Deficit  Goal: Patient/family/caregiver demonstrates understanding of disease process, treatment plan, medications, and discharge instructions  Description: Complete learning assessment and assess knowledge base    Interventions:  - Provide teaching at level of understanding  - Provide teaching via preferred learning methods  Outcome: Progressing

## 2023-06-08 NOTE — ASSESSMENT & PLAN NOTE
Wt Readings from Last 3 Encounters:   06/07/23 108 kg (237 lb 7 oz)   05/24/23 108 kg (239 lb)   05/22/23 105 kg (232 lb)     · Echo 2/2021 with combined systolic and diastolic CHF  · Maintained on Lopressor 25 mg daily, Lasix 40 mg daily and daily potassium supplementation 20 mEq daily- will resume  · Euvolemic  · Monitor I/O, weights

## 2023-06-08 NOTE — ASSESSMENT & PLAN NOTE
· Status post day #1 right total shoulder arthroplasty  · Pain control, anticoagulation, and disposition managed per primary team  · Oral PPI for GI prophylaxis while patient on ASA and steroid

## 2023-06-08 NOTE — CONSULTS
[address] [facility]  Consult  Name: Lois West [de-identified] y o  female I MRN: [id number]  Unit/Bed#: E2 -01 I Date of Admission: 6/7/2023   Date of Service: 6/8/2023 I Hospital Day: 0    Inpatient consult to Internal Medicine  Consult performed by: Mirna Vora PA-C  Consult ordered by: Volodymyr Padilla PA-C          Assessment/Plan   * Rotator cuff tear arthropathy of right shoulder  Assessment & Plan  · Status post day #1 right total shoulder arthroplasty  · Pain control, anticoagulation, and disposition managed per primary team  · Oral PPI for GI prophylaxis while patient on ASA and steroid    Nephrosclerosis, stage 1-4 or unspecified chronic kidney disease  Assessment & Plan  Lab Results   Component Value Date    CREATININE 1 54 (H) 06/08/2023    CREATININE 1 79 (H) 11/09/2022    CREATININE 1 54 (H) 10/21/2022    EGFR 31 06/08/2023    EGFR 26 11/09/2022    EGFR 31 10/21/2022   · Follows Dr Jessenia Gaspar, OP visit 5/2023 reviewed  · Cr at baseline  · Without signs of urinary retention  · Avoid nephrotoxins, maintain adequate BP control    Chronic combined systolic (congestive) and diastolic (congestive) heart failure (HCC)  Assessment & Plan  Wt Readings from Last 3 Encounters:   06/07/23 108 kg (237 lb 7 oz)   05/24/23 108 kg (239 lb)   05/22/23 105 kg (232 lb)     · Echo 2/2021 with combined systolic and diastolic CHF  · Maintained on Lopressor 25 mg daily, Lasix 40 mg daily and daily potassium supplementation 20 mEq daily- will resume  · Euvolemic  · Monitor I/O, weights      Obesity (BMI 35 0-39 9 without comorbidity)  Assessment & Plan  · Noted, reviewed recent hgbA1c and lipid panel  · Contributes to all aspects of care    Type 2 diabetes mellitus without complication, without long-term current use of insulin ([facility] )  Assessment & Plan    Lab Results   Component Value Date    HGBA1C 6 6 (H) 05/04/2023     · Managed via diet control  · Diabetic diet, monitor sugars    Hypothyroidism  Assessment & Plan  · Continue synthroid 50 mcg every morning    Hyperlipidemia  Assessment & Plan  · Resume with inpatient formulary pravastatin 20 mg in lieu of simvastatin 10 mg daily    Fibromyalgia  Assessment & Plan  · Follows at [facility] for idiopathic chronic gout and fibromylagia  · Continue prednisone 5 mg daily and gabapentin 100 mg bid    Depression, recurrent (HCC)  Assessment & Plan  · Mood stable, resume Celexa 40 mg daily    Chronic gout  Assessment & Plan  · Resume allopurinol 100 mg daily      VTE Prophylaxis:   per primary, on  mg daily    Recommendations for Discharge:  · Repeat CBC and BMP x 1 week post discharge  · GI prophylaxis while on aspirin    Total Time Spent on Date of Encounter in care of patient: 55 minutes This time was spent on one or more of the following: performing physical exam; counseling and coordination of care; obtaining or reviewing history; documenting in the medical record; reviewing/ordering tests, medications or procedures; communicating with other healthcare professionals and discussing with patient's family/caregivers  Collaboration of Care: Were Recommendations Directly Discussed with Primary Treatment Team?     History of Present Illness:  Adolfo Hou is a [de-identified] y o  female who is originally admitted to the Orthopedic service due to right total shoulder arthroplasty  We are consulted for medical management  Patient reports she is feeling well  Has some numbness of her right ring and pinky finger but otherwise without significant pain  Denies fever, chills, chest pain, shortness of breath, wheezing, abdominal pain, nausea or vomiting  She ate a little breakfast, she is not much of a breakfast person  She is over all feeling well  She has not had a bowel movement but is passing gas  Has urinated without difficulty  Review of Systems:  Review of Systems   Constitutional: Negative for appetite change, chills, fatigue and fever  HENT: Negative for rhinorrhea and sore throat  Respiratory: Negative for cough, shortness of breath and wheezing  Cardiovascular: Negative for chest pain, palpitations and leg swelling  Gastrointestinal: Negative for abdominal pain, constipation, diarrhea, nausea and vomiting  Genitourinary: Negative for decreased urine volume, dysuria and hematuria  Musculoskeletal: Positive for arthralgias  Allergic/Immunologic: Positive for environmental allergies  Neurological: Negative for dizziness and headaches  Hematological: Bruises/bleeds easily  All other systems reviewed and are negative  Past Medical and Surgical History:   Past Medical History:   Diagnosis Date   • Abnormal blood chemistry     last assessed 11/13/2013   • Angina pectoris ([facility] )     last assessed 11/012/13   • Arthritis     last assessed 11/13/2013   • Asthma    • Athlete's foot     resolved 10/11/17   • Cataract     last assessed 03/18/2016   • Chronic kidney disease     Stage III   • Diabetes mellitus ([facility] )     type 2   • Diabetes mellitus with kidney disease ([facility] )     last assessed 10/03/17   • Disease of thyroid gland    • Eczema    • Gait disorder     last assessed 11/12/2013   • Gout    • Hammer toe    • Hyperlipidemia    • Hypertension    • Idiopathic thrombocytopenia purpura ([facility] )     last assessed 08/06/15   • Overactive bladder    • Post menopausal syndrome     resolved 10/11/17   • Restless legs syndrome     last assessed 10/24/2013   • Subclinical hypothyroidism     last assessed 12/11/17   • Urinary frequency     resolved 12/18/2014       Past Surgical History:   Procedure Laterality Date   • ACHILLES TENDON REPAIR Right    • REPLACEMENT TOTAL KNEE Bilateral        Meds/Allergies:  all medications and allergies reviewed    Allergies:    Allergies   Allergen Reactions   • Ace Inhibitors Angioedema   • Angiotensin Receptor Blockers Anaphylaxis and Tongue Swelling   • Prochlorperazine Anaphylaxis     lethargy, tongue "and throat swelling   • Ramipril Anaphylaxis and Angioedema   • Pollen Extract Hives       Social History:  Marital Status: Single  Substance Use History:   Social History     Substance and Sexual Activity   Alcohol Use Yes    Comment: Socially     Social History     Tobacco Use   Smoking Status Never   Smokeless Tobacco Never   Tobacco Comments    per allscripts; former smoker     Social History     Substance and Sexual Activity   Drug Use Never       Family History:  Family History   Problem Relation Age of Onset   • Leukemia Mother    • Eczema Father    • Diabetes type II Father    • Diabetes type II Maternal Aunt        Physical Exam:   Vitals:   Blood Pressure: 134/60 (06/08/23 0805)  Pulse: 62 (06/08/23 0805)  Temperature: 97 7 °F (36 5 °C) (06/08/23 0805)  Temp Source: Temporal (06/08/23 0805)  Respirations: 18 (06/08/23 0805)  Height: 5' 7\" (170 2 cm) (05/25/23 1000)  Weight - Scale: 108 kg (237 lb 7 oz) (06/07/23 1137)  SpO2: 97 % (06/08/23 0805)    Physical Exam  Vitals and nursing note reviewed  Constitutional:       General: She is not in acute distress  Appearance: Normal appearance  She is well-developed  She is obese  She is not ill-appearing or diaphoretic  HENT:      Head: Normocephalic and atraumatic  Eyes:      Extraocular Movements: Extraocular movements intact  Conjunctiva/sclera: Conjunctivae normal    Cardiovascular:      Rate and Rhythm: Normal rate and regular rhythm  Heart sounds: Normal heart sounds  Pulmonary:      Effort: Pulmonary effort is normal  No respiratory distress  Breath sounds: Normal breath sounds  No wheezing, rhonchi or rales  Comments: Room air, non labored breathing  Abdominal:      General: Bowel sounds are normal       Palpations: Abdomen is soft  Tenderness: There is no abdominal tenderness  Musculoskeletal:      Right lower leg: No edema  Left lower leg: No edema        Comments: SCD's in place bilaterally   Skin:     " General: Skin is warm and dry  Findings: Bruising (right arm) present  Comments: Right shoulder incision C/D/I   Neurological:      General: No focal deficit present  Mental Status: She is alert and oriented to person, place, and time  Psychiatric:         Mood and Affect: Mood normal          Behavior: Behavior normal         Additional Data:   Lab Results:    Results from last 7 days   Lab Units 06/08/23  0505   ANION GAP mmol/L 8   BUN mg/dL 30*   CALCIUM mg/dL 8 8   CHLORIDE mmol/L 104   CO2 mmol/L 28   CREATININE mg/dL 1 54*   GLUCOSE RANDOM mg/dL 205*   POTASSIUM mmol/L 4 5   SODIUM mmol/L 140     Lab Results   Component Value Date/Time    HGBA1C 6 6 (H) 05/04/2023 01:13 PM    HGBA1C 6 7 (H) 11/09/2022 08:36 AM    HGBA1C 6 2 (H) 05/11/2022 09:32 AM    HGBA1C 6 1 (H) 01/13/2021 10:25 AM    HGBA1C 6 1 01/13/2021 12:00 AM    HGBA1C 6 8 (H) 09/14/2020 08:58 AM     Results from last 7 days   Lab Units 06/07/23  1554 06/07/23  1143   POC GLUCOSE mg/dl 145* 131     Imaging: Reviewed radiology reports from this admission including: xray(s)  XR shoulder right 1 view    (Results Pending)     Reviewed echocardiogram from 2021  EKG, Pathology, and Other Studies Reviewed on Admission:   · EKG: No EKG obtained  ** Please Note: This note may have been constructed using a voice recognition system   **

## 2023-06-08 NOTE — OCCUPATIONAL THERAPY NOTE
Occupational Therapy Evaluation + Treatment     Patient Name: Onel Powell  GBHVK'F Date: 6/8/2023  Problem List  Principal Problem:    Rotator cuff tear arthropathy of right shoulder  Active Problems:    Chronic gout    Depression, recurrent (HCC)    Fibromyalgia    Hyperlipidemia    Hypothyroidism    Type 2 diabetes mellitus without complication, without long-term current use of insulin (HCC)    Nephrosclerosis, stage 1-4 or unspecified chronic kidney disease    Chronic combined systolic (congestive) and diastolic (congestive) heart failure (HCC)    Obesity (BMI 35 0-39 9 without comorbidity)    Past Medical History  Past Medical History:   Diagnosis Date    Abnormal blood chemistry     last assessed 11/13/2013    Angina pectoris ([facility] )     last assessed 11/012/13    Arthritis     last assessed 11/13/2013    Asthma     Athlete's foot     resolved 10/11/17    Cataract     last assessed 03/18/2016    Chronic kidney disease     Stage III    Diabetes mellitus ([facility] )     type 2    Diabetes mellitus with kidney disease ([facility] )     last assessed 10/03/17    Disease of thyroid gland     Eczema     Gait disorder     last assessed 11/12/2013    Gout     Hammer toe     Hyperlipidemia     Hypertension     Idiopathic thrombocytopenia purpura ([facility] )     last assessed 08/06/15    Overactive bladder     Post menopausal syndrome     resolved 10/11/17    Restless legs syndrome     last assessed 10/24/2013    Subclinical hypothyroidism     last assessed 12/11/17    Urinary frequency     resolved 12/18/2014     Past Surgical History  Past Surgical History:   Procedure Laterality Date    ACHILLES TENDON REPAIR Right     REPLACEMENT TOTAL KNEE Bilateral         06/08/23 0918   OT Last Visit   OT Visit Date 06/08/23   Note Type   Note type Evaluation   Additional Comments Pt greeted seated in chair and agreeable to skilled OT evaluation     Pain Assessment   Pain Assessment Tool 0-10   Pain Score No Pain   Restrictions/Precautions Weight Bearing Precautions Per Order Yes   RUE Weight Bearing Per Order NWB   Braces or Orthoses Sling  (ABD sling x 4 wks )   Other Precautions Fall Risk  (Follow TSR ROM protocol ,Sling x 4 weeks, Max ER 30 degrees, No Active IR, AAROM FF to 100 degrees )   Home Living   Type of [address] [place] [address] One level;Stairs to enter with rails  (4 [place] with rail)   Bathroom Shower/Tub Walk-in shower   Bathroom Toilet Raised   Bathroom Equipment Grab bars in shower; Shower chair;Grab bars around toilet   Home Equipment Walker;Cane   Additional Comments Prior to admission, pt lives with sister in a single level home with 4 [place]  Walk in shower with seat and grab bars, raised toilets with grab bars  Pt was I with ADLs, IADLs and mobility with cane/ RW  Drives  Reports 0 falls in the past 6 months  Sister able to assist following DC  Prior Function   Level of [place] Independent with ADLs; Independent with functional mobility; Independent with IADLS   Lives With Family  (Sister)   Receives Help From Family   IADLs Independent with driving; Independent with meal prep; Independent with medication management   Falls in the last 6 months 0   Vocational Retired   Lifestyle   Autonomy I with ADLs, IADLS and mobility  Reciprocal Relationships Sister   ADL   Where Assessed Chair   Eating Assistance 5  Supervision/Setup   Grooming Assistance 5  Supervision/Setup   UB Bathing Assistance 3  Moderate Assistance   LB Bathing Assistance 4  Minimal Assistance   [address] 3  Moderate Assistance   LB Dressing Assistance 4  [address]  5  Supervision/Setup   Bed Mobility   Supine to Sit Unable to assess   Transfers   Sit to Stand 5  Supervision   Stand to Sit 5  Supervision   Functional Mobility   Functional Mobility 5  Supervision   Additional Comments cane, functional distances     Balance   Static Sitting Good   Dynamic Sitting Fair +   Static Standing Fair   Dynamic Standing Fair - Ambulatory Fair -   Activity Tolerance   Activity Tolerance Patient tolerated treatment well   Medical Staff Made Aware RN Mar Cruz   Nurse Made Aware yes   RUE Assessment   RUE Assessment X  (restrictions due to TSR)   LUE Assessment   LUE Assessment WFL   Hand Function   Gross Motor Coordination Impaired   Fine Motor Coordination Functional   Psychosocial   Psychosocial (WDL) WDL   Cognition   Overall Cognitive Status WFL   Arousal/Participation Cooperative   Attention Within functional limits   Orientation Level Oriented X4   Memory Within functional limits   Following Commands Follows all commands and directions without difficulty   Assessment   Limitation Decreased ADL status; Decreased UE strength;Decreased Safe judgement during ADL;Decreased endurance;Decreased self-care trans   Prognosis Good   Assessment Venancio Santos is a [de-identified] y o  female seen for OT evaluation s/p admit to [facility] on 6/7/2023 w/ Rotator cuff tear arthropathy of right shoulder  Now s/p R total shoulder  Precautions as follow: Follow TSR ROM protocol ,Sling x 4 weeks, Max ER 30 degrees, No Active IR, AAROM FF to 100 degrees  Comorbidities affecting pt's functional performance at time of assessment include: obesity and previous surgery  Pt with active OT orders and activity orders for Activity as tolerated  Personal factors affecting pt at time of IE include:[place] home environment, difficulty performing ADLs, WBS, fall risk  and functional decline   Prior to admission, pt lives with sister in a single level home with 4 [place]  Walk in shower with seat and grab bars, raised toilets with grab bars  Pt was I with ADLs, IADLs and mobility with cane/ RW  Drives  Reports 0 falls in the past 6 months  Sister able to assist following DC   Upon evaluation: Pt currently requires modA for UB ADLs, Anne [place] for LB ADLs, supervision for toileting, unable to assess for bed mobility, and supervision for functional mobility/transfers 2* the following deficits impacting occupational performance:weakness, decreased UE ROM, decreased strength , decreased balance, decreased activity tolerance, increased pain and orthopedic restrictions  Pt to benefit from continued skilled OT tx while in the hospital to address deficits as defined above and maximize level of functional independence w ADL's and functional mobility  Occupational Performance areas to address include: bathing/shower, toilet hygiene, dressing, health maintenance, functional mobility and clothing management  From OT standpoint, recommendation at time of d/c would be home with OPPT  OT to follow pt on caseload 3-5x/wk  Goals   Patient Goals to go home today   STG Time Frame 3-5   Short Term Goal #1 Pt will complete UB dressing/self care w/ mod I using adaptive device and DME as needed   Short Term Goal #2 Pt will complete LB dressing/self care w/ mod I using adaptive device and DME as needed   Short Term Goal  Pt will complete toileting w/ mod I w/ G hygiene/thoroughness using DME as needed   LTG Time Frame 10-14   Long Term Goal #1 Pt will improve functional transfers to Mod I on/off all surfaces using DME as needed w/ G balance/safety   Long Term Goal #2 Pt will improve functional mobility during ADL/IADL/leisure tasks to Mod I using DME as needed w/ G balance/safety   Long Term Goal Pt will demonstrate G carryover of pt/caregiver education and training as appropriate w/o cues w/ good tolerance to increase safety during functional tasks   Plan   Treatment Interventions ADL retraining;Functional transfer training;UE strengthening/ROM; Endurance training;Patient/family training;Equipment evaluation/education; Neuromuscular reeducation; Compensatory technique education; Energy conservation; Activityengagement   Goal Expiration Date 06/22/23   OT Treatment Day 0   OT Frequency 3-5x/wk   Recommendation   OT Discharge Recommendation Home with outpatient rehabilitation   Additional Comments  The patient's raw score on the AM-PAC Daily Activity Inpatient Short Form is 19  A raw score of greater than or equal to 19 suggests the patient may benefit from discharge to home  Please refer to the recommendation of the Occupational Therapist for safe discharge planning  AM-PAC Daily Activity Inpatient   Lower Body Dressing 3   Bathing 3   Toileting 3   Upper Body Dressing 2   Grooming 4   Eating 4   Daily Activity Raw Score 19   Daily Activity Standardized Score (Calc for Raw Score >=11) 40 22   AM-PAC Applied Cognition Inpatient   Following a Speech/Presentation 4   Understanding Ordinary Conversation 4   Taking Medications 4   Remembering Where Things Are Placed or Put Away 4   Remembering List of 4-5 Errands 4   Taking Care of Complicated Tasks 4   Applied Cognition Raw Score 24   Applied Cognition Standardized Score 62 21   Additional Treatment Session   Start Time 0930   End Time 3091   Treatment Assessment edu and training on all precautions and restrictions  edu on ABD sling donning and doffing  edu and training on UB dressing  pt demo good understaning and follow through  hand outs provided     Additional Treatment Day Astrid 1, OT

## 2023-06-09 NOTE — DISCHARGE SUMMARY
DISCHARGE SUMMARY      Patient Name: Elham Champion  Patient MRN: [id number]  Admitting Provider: Joann Gilford, MD  Discharging Provider: No att  providers found  Primary Care Physician at Discharge: Neal [place] [phone]  Admission Date: 6/7/2023       Discharge Date: 6/8/2023    Admission Diagnosis  Primary osteoarthritis of right shoulder [M19 011]  Right shoulder pain, unspecified chronicity [M25 511]    Discharge Diagnoses  Principal Problem:    Rotator cuff tear arthropathy of right shoulder  Active Problems:    Chronic gout    Depression, recurrent ([facility] )    Fibromyalgia    Hyperlipidemia    Hypothyroidism    Type 2 diabetes mellitus without complication, without long-term current use of insulin ([facility])    Nephrosclerosis, stage 1-4 or unspecified chronic kidney disease    Chronic combined systolic (congestive) and diastolic (congestive) heart failure (HCC)    Obesity (BMI 35 0-39 9 without comorbidity)  Resolved Problems:    * No resolved hospital problems  [facility] Course  Elham Champion was admitted to [place] [address]  on 6/7/2023, with diagnosis of osteoarthritis in her Right shoulder  On the day of admission, she was brought to surgery, successfully underwent a Right shoulder replacement  She tolerated the procedure well  Following surgery, she was taken to the recovery room, at which time, there was a consult placed for [facility] Internal Medicine Service for the patient's medical management  After a short stay in the recovery room, she was transferred to the orthopedic floor at which time, she was resumed on her routine home medications per the hospitalist group  On postop day #1, she was able to start some physical therapy and was able to tolerate it well  At this time, she was in stable condition, showing no sign of deep vein thrombosis or pulmonary embolism  Incision was healing well at the time and showed no signs of infection       DISCHARGE DIAGNOSIS: Primary osteoarthritis of right shoulder [M19 011]  Right shoulder pain, unspecified chronicity [M25 511]  She is now status post Right total  Shoulder replacement, which she underwent during the hospital stay  Medications  See after visit summary for reconciled discharge medications provided to patient and family  Allergies  Allergies   Allergen Reactions   • Ace Inhibitors Angioedema   • Angiotensin Receptor Blockers Anaphylaxis and Tongue Swelling   • Prochlorperazine Anaphylaxis     lethargy, tongue and throat swelling   • Ramipril Anaphylaxis and Angioedema   • Pollen Extract Hives       Outpatient Follow-Up  Future Appointments   Date Time Provider Mendoza Duffy   9/6/2023 11:15 AM Analia Bonds DPM POD [facility]       Consults   Medical Management - [address] [place] [address] Internal medicine Service    Labs  Lab Results   Component Value Date    HCT 38 9 05/11/2022     Lab Results   Component Value Date    BUN 30 (H) 06/08/2023    CALCIUM 8 8 06/08/2023     06/08/2023    CO2 28 06/08/2023    CREATININE 1 54 (H) 06/08/2023    GLUCOSE 167 (H) 08/06/2015    K 4 5 06/08/2023     12/11/2017        Discharge Disposition  Home    Operative Procedures Performed  Procedure(s):  REVERSE TOTAL SHOULDER REPLACEMENT      Discharge Instructions  ASA x 3 weeks for DVT prophylaxis   Sling x 4 weeks  Follow up x 3 weeks in the office  Suture ends trimmed POD #10 to skin level  ?     Lauren Palacios PA-C  1:05 PM, 6/9/2023

## 2023-06-22 ENCOUNTER — TELEPHONE (OUTPATIENT)
Dept: NEPHROLOGY | Facility: CLINIC | Age: 78
End: 2023-06-22

## 2023-06-26 DIAGNOSIS — E78.2 MIXED HYPERLIPIDEMIA: ICD-10-CM

## 2023-06-26 RX ORDER — SIMVASTATIN 10 MG
10 TABLET ORAL EVERY MORNING
Qty: 90 TABLET | Refills: 1 | Status: SHIPPED | OUTPATIENT
Start: 2023-06-26

## 2023-07-24 ENCOUNTER — APPOINTMENT (EMERGENCY)
Dept: RADIOLOGY | Facility: HOSPITAL | Age: 78
DRG: 507 | End: 2023-07-24
Payer: COMMERCIAL

## 2023-07-24 ENCOUNTER — HOSPITAL ENCOUNTER (INPATIENT)
Facility: HOSPITAL | Age: 78
LOS: 8 days | Discharge: HOME HEALTH | DRG: 507 | End: 2023-08-01
Attending: EMERGENCY MEDICINE | Admitting: STUDENT IN AN ORGANIZED HEALTH CARE EDUCATION/TRAINING PROGRAM
Payer: COMMERCIAL

## 2023-07-24 DIAGNOSIS — M25.519 SHOULDER PAIN: ICD-10-CM

## 2023-07-24 DIAGNOSIS — M12.811 ROTATOR CUFF TEAR ARTHROPATHY OF RIGHT SHOULDER: ICD-10-CM

## 2023-07-24 DIAGNOSIS — T84.59XA: ICD-10-CM

## 2023-07-24 DIAGNOSIS — M75.101 ROTATOR CUFF TEAR ARTHROPATHY OF RIGHT SHOULDER: ICD-10-CM

## 2023-07-24 DIAGNOSIS — R65.10 SIRS (SYSTEMIC INFLAMMATORY RESPONSE SYNDROME) (HCC): ICD-10-CM

## 2023-07-24 DIAGNOSIS — A41.9 SEPSIS WITHOUT ACUTE ORGAN DYSFUNCTION, DUE TO UNSPECIFIED ORGANISM (HCC): Primary | ICD-10-CM

## 2023-07-24 DIAGNOSIS — I12.9 NEPHROSCLEROSIS, STAGE 1-4 OR UNSPECIFIED CHRONIC KIDNEY DISEASE: ICD-10-CM

## 2023-07-24 LAB
ALBUMIN SERPL BCP-MCNC: 4 G/DL (ref 3.5–5)
ALP SERPL-CCNC: 52 U/L (ref 34–104)
ALT SERPL W P-5'-P-CCNC: 3 U/L (ref 7–52)
ANION GAP SERPL CALCULATED.3IONS-SCNC: 10 MMOL/L
APTT PPP: 23 SECONDS (ref 23–37)
AST SERPL W P-5'-P-CCNC: 17 U/L (ref 13–39)
BASOPHILS # BLD AUTO: 0.04 THOUSANDS/ÂΜL (ref 0–0.1)
BASOPHILS NFR BLD AUTO: 0 % (ref 0–1)
BILIRUB SERPL-MCNC: 0.49 MG/DL (ref 0.2–1)
BUN SERPL-MCNC: 32 MG/DL (ref 5–25)
CALCIUM SERPL-MCNC: 9 MG/DL (ref 8.4–10.2)
CHLORIDE SERPL-SCNC: 99 MMOL/L (ref 96–108)
CO2 SERPL-SCNC: 27 MMOL/L (ref 21–32)
CREAT SERPL-MCNC: 1.49 MG/DL (ref 0.6–1.3)
CRP SERPL QL: 73.3 MG/L
EOSINOPHIL # BLD AUTO: 0.12 THOUSAND/ÂΜL (ref 0–0.61)
EOSINOPHIL NFR BLD AUTO: 1 % (ref 0–6)
ERYTHROCYTE [DISTWIDTH] IN BLOOD BY AUTOMATED COUNT: 14.4 % (ref 11.6–15.1)
ERYTHROCYTE [SEDIMENTATION RATE] IN BLOOD: 61 MM/HOUR (ref 0–29)
GFR SERPL CREATININE-BSD FRML MDRD: 32 ML/MIN/1.73SQ M
GLUCOSE SERPL-MCNC: 144 MG/DL (ref 65–140)
HCT VFR BLD AUTO: 35.6 % (ref 34.8–46.1)
HGB BLD-MCNC: 11 G/DL (ref 11.5–15.4)
IMM GRANULOCYTES # BLD AUTO: 0.05 THOUSAND/UL (ref 0–0.2)
IMM GRANULOCYTES NFR BLD AUTO: 0 % (ref 0–2)
INR PPP: 0.97 (ref 0.84–1.19)
LACTATE SERPL-SCNC: 1.7 MMOL/L (ref 0.5–2)
LYMPHOCYTES # BLD AUTO: 1.71 THOUSANDS/ÂΜL (ref 0.6–4.47)
LYMPHOCYTES NFR BLD AUTO: 15 % (ref 14–44)
MCH RBC QN AUTO: 30.7 PG (ref 26.8–34.3)
MCHC RBC AUTO-ENTMCNC: 30.9 G/DL (ref 31.4–37.4)
MCV RBC AUTO: 99 FL (ref 82–98)
MONOCYTES # BLD AUTO: 0.81 THOUSAND/ÂΜL (ref 0.17–1.22)
MONOCYTES NFR BLD AUTO: 7 % (ref 4–12)
NEUTROPHILS # BLD AUTO: 8.51 THOUSANDS/ÂΜL (ref 1.85–7.62)
NEUTS SEG NFR BLD AUTO: 77 % (ref 43–75)
NRBC BLD AUTO-RTO: 0 /100 WBCS
PLATELET # BLD AUTO: 194 THOUSANDS/UL (ref 149–390)
PMV BLD AUTO: 10.5 FL (ref 8.9–12.7)
POTASSIUM SERPL-SCNC: 4 MMOL/L (ref 3.5–5.3)
PROCALCITONIN SERPL-MCNC: 0.07 NG/ML
PROT SERPL-MCNC: 6.8 G/DL (ref 6.4–8.4)
PROTHROMBIN TIME: 12.9 SECONDS (ref 11.6–14.5)
RBC # BLD AUTO: 3.58 MILLION/UL (ref 3.81–5.12)
SODIUM SERPL-SCNC: 136 MMOL/L (ref 135–147)
WBC # BLD AUTO: 11.24 THOUSAND/UL (ref 4.31–10.16)

## 2023-07-24 PROCEDURE — 85025 COMPLETE CBC W/AUTO DIFF WBC: CPT

## 2023-07-24 PROCEDURE — 93005 ELECTROCARDIOGRAM TRACING: CPT

## 2023-07-24 PROCEDURE — 99285 EMERGENCY DEPT VISIT HI MDM: CPT | Performed by: EMERGENCY MEDICINE

## 2023-07-24 PROCEDURE — 86140 C-REACTIVE PROTEIN: CPT

## 2023-07-24 PROCEDURE — 96365 THER/PROPH/DIAG IV INF INIT: CPT

## 2023-07-24 PROCEDURE — 87040 BLOOD CULTURE FOR BACTERIA: CPT

## 2023-07-24 PROCEDURE — 96375 TX/PRO/DX INJ NEW DRUG ADDON: CPT

## 2023-07-24 PROCEDURE — 85652 RBC SED RATE AUTOMATED: CPT

## 2023-07-24 PROCEDURE — 84145 PROCALCITONIN (PCT): CPT

## 2023-07-24 PROCEDURE — 85610 PROTHROMBIN TIME: CPT

## 2023-07-24 PROCEDURE — 99284 EMERGENCY DEPT VISIT MOD MDM: CPT

## 2023-07-24 PROCEDURE — 85730 THROMBOPLASTIN TIME PARTIAL: CPT

## 2023-07-24 PROCEDURE — 73030 X-RAY EXAM OF SHOULDER: CPT

## 2023-07-24 PROCEDURE — 36415 COLL VENOUS BLD VENIPUNCTURE: CPT

## 2023-07-24 PROCEDURE — 80053 COMPREHEN METABOLIC PANEL: CPT

## 2023-07-24 PROCEDURE — 83605 ASSAY OF LACTIC ACID: CPT

## 2023-07-24 RX ORDER — ACETAMINOPHEN 325 MG/1
650 TABLET ORAL ONCE
Status: COMPLETED | OUTPATIENT
Start: 2023-07-24 | End: 2023-07-24

## 2023-07-24 RX ORDER — HYDROMORPHONE HCL/PF 1 MG/ML
0.5 SYRINGE (ML) INJECTION ONCE
Status: COMPLETED | OUTPATIENT
Start: 2023-07-24 | End: 2023-07-24

## 2023-07-24 RX ORDER — CEFEPIME HYDROCHLORIDE 2 G/50ML
2000 INJECTION, SOLUTION INTRAVENOUS ONCE
Status: COMPLETED | OUTPATIENT
Start: 2023-07-24 | End: 2023-07-25

## 2023-07-24 RX ADMIN — CEFEPIME HYDROCHLORIDE 2000 MG: 2 INJECTION, SOLUTION INTRAVENOUS at 23:32

## 2023-07-24 RX ADMIN — ACETAMINOPHEN 325MG 650 MG: 325 TABLET ORAL at 22:37

## 2023-07-24 RX ADMIN — HYDROMORPHONE HYDROCHLORIDE 0.5 MG: 1 INJECTION, SOLUTION INTRAMUSCULAR; INTRAVENOUS; SUBCUTANEOUS at 22:38

## 2023-07-25 PROBLEM — R35.0 INCREASED FREQUENCY OF URINATION: Status: RESOLVED | Noted: 2018-01-07 | Resolved: 2023-07-25

## 2023-07-25 PROBLEM — R05.9 COUGH: Status: RESOLVED | Noted: 2017-12-29 | Resolved: 2023-07-25

## 2023-07-25 PROBLEM — S22.42XA FRACTURE OF MULTIPLE RIBS OF LEFT SIDE: Status: RESOLVED | Noted: 2020-12-02 | Resolved: 2023-07-25

## 2023-07-25 PROBLEM — M25.511 ACUTE PAIN OF RIGHT SHOULDER: Status: RESOLVED | Noted: 2022-06-03 | Resolved: 2023-07-25

## 2023-07-25 PROBLEM — D72.829 LEUKOCYTOSIS: Status: RESOLVED | Noted: 2019-09-07 | Resolved: 2023-07-25

## 2023-07-25 PROBLEM — H92.03 OTALGIA OF BOTH EARS: Status: RESOLVED | Noted: 2022-04-27 | Resolved: 2023-07-25

## 2023-07-25 PROBLEM — M89.8X6 PAIN IN LEFT TIBIA: Status: RESOLVED | Noted: 2020-03-23 | Resolved: 2023-07-25

## 2023-07-25 PROBLEM — M54.2 NECK PAIN: Status: RESOLVED | Noted: 2022-06-03 | Resolved: 2023-07-25

## 2023-07-25 PROBLEM — M54.50 LOW BACK PAIN: Status: RESOLVED | Noted: 2017-06-15 | Resolved: 2023-07-25

## 2023-07-25 PROBLEM — B35.4 TINEA CORPORIS: Status: RESOLVED | Noted: 2020-07-20 | Resolved: 2023-07-25

## 2023-07-25 PROBLEM — S01.01XA SCALP LACERATION: Status: RESOLVED | Noted: 2019-08-31 | Resolved: 2023-07-25

## 2023-07-25 PROBLEM — Z91.89 AT RISK FOR DELIRIUM: Status: RESOLVED | Noted: 2020-12-03 | Resolved: 2023-07-25

## 2023-07-25 PROBLEM — L73.9 FOLLICULITIS: Status: RESOLVED | Noted: 2018-10-08 | Resolved: 2023-07-25

## 2023-07-25 PROBLEM — R26.9 GAIT ABNORMALITY: Status: RESOLVED | Noted: 2020-01-20 | Resolved: 2023-07-25

## 2023-07-25 PROBLEM — N18.4 CRF (CHRONIC RENAL FAILURE), STAGE 4 (SEVERE) (HCC): Status: RESOLVED | Noted: 2022-06-03 | Resolved: 2023-07-25

## 2023-07-25 PROBLEM — A41.9 SEPSIS (HCC): Status: ACTIVE | Noted: 2020-12-03

## 2023-07-25 PROBLEM — M25.552 PAIN OF LEFT HIP: Status: RESOLVED | Noted: 2017-09-08 | Resolved: 2023-07-25

## 2023-07-25 PROBLEM — R07.9 CHEST PAIN: Status: RESOLVED | Noted: 2019-09-07 | Resolved: 2023-07-25

## 2023-07-25 PROBLEM — H26.9 CATARACT: Status: RESOLVED | Noted: 2020-12-01 | Resolved: 2023-07-25

## 2023-07-25 LAB
ANION GAP SERPL CALCULATED.3IONS-SCNC: 8 MMOL/L
ATRIAL RATE: 74 BPM
BUN SERPL-MCNC: 33 MG/DL (ref 5–25)
CALCIUM SERPL-MCNC: 8.7 MG/DL (ref 8.4–10.2)
CHLORIDE SERPL-SCNC: 101 MMOL/L (ref 96–108)
CO2 SERPL-SCNC: 27 MMOL/L (ref 21–32)
CREAT SERPL-MCNC: 1.57 MG/DL (ref 0.6–1.3)
ERYTHROCYTE [DISTWIDTH] IN BLOOD BY AUTOMATED COUNT: 14.5 % (ref 11.6–15.1)
GFR SERPL CREATININE-BSD FRML MDRD: 30 ML/MIN/1.73SQ M
GLUCOSE SERPL-MCNC: 112 MG/DL (ref 65–140)
GLUCOSE SERPL-MCNC: 125 MG/DL (ref 65–140)
GLUCOSE SERPL-MCNC: 126 MG/DL (ref 65–140)
GLUCOSE SERPL-MCNC: 148 MG/DL (ref 65–140)
GLUCOSE SERPL-MCNC: 183 MG/DL (ref 65–140)
HCT VFR BLD AUTO: 34.5 % (ref 34.8–46.1)
HGB BLD-MCNC: 10.4 G/DL (ref 11.5–15.4)
MCH RBC QN AUTO: 30.7 PG (ref 26.8–34.3)
MCHC RBC AUTO-ENTMCNC: 30.1 G/DL (ref 31.4–37.4)
MCV RBC AUTO: 102 FL (ref 82–98)
P AXIS: 69 DEGREES
PLATELET # BLD AUTO: 191 THOUSANDS/UL (ref 149–390)
PMV BLD AUTO: 11.1 FL (ref 8.9–12.7)
POTASSIUM SERPL-SCNC: 3.3 MMOL/L (ref 3.5–5.3)
PR INTERVAL: 240 MS
QRS AXIS: -53 DEGREES
QRSD INTERVAL: 122 MS
QT INTERVAL: 388 MS
QTC INTERVAL: 430 MS
RBC # BLD AUTO: 3.39 MILLION/UL (ref 3.81–5.12)
SODIUM SERPL-SCNC: 136 MMOL/L (ref 135–147)
T WAVE AXIS: 43 DEGREES
VENTRICULAR RATE: 74 BPM
WBC # BLD AUTO: 12.17 THOUSAND/UL (ref 4.31–10.16)

## 2023-07-25 PROCEDURE — 85027 COMPLETE CBC AUTOMATED: CPT

## 2023-07-25 PROCEDURE — 99223 1ST HOSP IP/OBS HIGH 75: CPT | Performed by: STUDENT IN AN ORGANIZED HEALTH CARE EDUCATION/TRAINING PROGRAM

## 2023-07-25 PROCEDURE — 82948 REAGENT STRIP/BLOOD GLUCOSE: CPT

## 2023-07-25 PROCEDURE — 93010 ELECTROCARDIOGRAM REPORT: CPT | Performed by: INTERNAL MEDICINE

## 2023-07-25 PROCEDURE — 80048 BASIC METABOLIC PNL TOTAL CA: CPT

## 2023-07-25 PROCEDURE — 97163 PT EVAL HIGH COMPLEX 45 MIN: CPT

## 2023-07-25 PROCEDURE — 0XJ23ZZ INSPECTION OF RIGHT SHOULDER REGION, PERCUTANEOUS APPROACH: ICD-10-PCS | Performed by: ORTHOPAEDIC SURGERY

## 2023-07-25 PROCEDURE — 97167 OT EVAL HIGH COMPLEX 60 MIN: CPT

## 2023-07-25 RX ORDER — FUROSEMIDE 40 MG/1
40 TABLET ORAL DAILY
Status: DISCONTINUED | OUTPATIENT
Start: 2023-07-25 | End: 2023-07-26

## 2023-07-25 RX ORDER — GABAPENTIN 100 MG/1
100 CAPSULE ORAL 2 TIMES DAILY
Status: DISCONTINUED | OUTPATIENT
Start: 2023-07-25 | End: 2023-08-01 | Stop reason: HOSPADM

## 2023-07-25 RX ORDER — CEFTRIAXONE 2 G/50ML
2000 INJECTION, SOLUTION INTRAVENOUS EVERY 24 HOURS
Status: DISCONTINUED | OUTPATIENT
Start: 2023-07-25 | End: 2023-07-25

## 2023-07-25 RX ORDER — INSULIN LISPRO 100 [IU]/ML
1-6 INJECTION, SOLUTION INTRAVENOUS; SUBCUTANEOUS
Status: DISCONTINUED | OUTPATIENT
Start: 2023-07-25 | End: 2023-08-01 | Stop reason: HOSPADM

## 2023-07-25 RX ORDER — ONDANSETRON 2 MG/ML
4 INJECTION INTRAMUSCULAR; INTRAVENOUS EVERY 6 HOURS PRN
Status: DISCONTINUED | OUTPATIENT
Start: 2023-07-25 | End: 2023-07-28

## 2023-07-25 RX ORDER — SODIUM CHLORIDE, SODIUM GLUCONATE, SODIUM ACETATE, POTASSIUM CHLORIDE, MAGNESIUM CHLORIDE, SODIUM PHOSPHATE, DIBASIC, AND POTASSIUM PHOSPHATE .53; .5; .37; .037; .03; .012; .00082 G/100ML; G/100ML; G/100ML; G/100ML; G/100ML; G/100ML; G/100ML
75 INJECTION, SOLUTION INTRAVENOUS CONTINUOUS
Status: DISPENSED | OUTPATIENT
Start: 2023-07-25 | End: 2023-07-26

## 2023-07-25 RX ORDER — OXYCODONE HYDROCHLORIDE 5 MG/1
5 TABLET ORAL EVERY 6 HOURS PRN
Status: DISCONTINUED | OUTPATIENT
Start: 2023-07-25 | End: 2023-07-25 | Stop reason: SDUPTHER

## 2023-07-25 RX ORDER — ALLOPURINOL 100 MG/1
100 TABLET ORAL 2 TIMES DAILY
Status: DISCONTINUED | OUTPATIENT
Start: 2023-07-25 | End: 2023-08-01 | Stop reason: HOSPADM

## 2023-07-25 RX ORDER — POLYETHYLENE GLYCOL 3350 17 G/17G
17 POWDER, FOR SOLUTION ORAL DAILY
Status: DISCONTINUED | OUTPATIENT
Start: 2023-07-25 | End: 2023-08-01 | Stop reason: HOSPADM

## 2023-07-25 RX ORDER — VANCOMYCIN HYDROCHLORIDE 500 MG/100ML
500 INJECTION, SOLUTION INTRAVENOUS EVERY 12 HOURS
Status: DISCONTINUED | OUTPATIENT
Start: 2023-07-25 | End: 2023-07-25 | Stop reason: DRUGHIGH

## 2023-07-25 RX ORDER — LORATADINE 10 MG/1
10 TABLET ORAL DAILY
Status: DISCONTINUED | OUTPATIENT
Start: 2023-07-25 | End: 2023-08-01 | Stop reason: HOSPADM

## 2023-07-25 RX ORDER — ACETAMINOPHEN 325 MG/1
650 TABLET ORAL EVERY 6 HOURS PRN
Status: DISCONTINUED | OUTPATIENT
Start: 2023-07-25 | End: 2023-07-26

## 2023-07-25 RX ORDER — LEVOTHYROXINE SODIUM 0.05 MG/1
50 TABLET ORAL
Status: DISCONTINUED | OUTPATIENT
Start: 2023-07-25 | End: 2023-08-01 | Stop reason: HOSPADM

## 2023-07-25 RX ORDER — ENOXAPARIN SODIUM 100 MG/ML
40 INJECTION SUBCUTANEOUS DAILY
Status: DISCONTINUED | OUTPATIENT
Start: 2023-07-25 | End: 2023-07-28

## 2023-07-25 RX ORDER — PREDNISONE 5 MG/1
5 TABLET ORAL EVERY MORNING
Status: DISCONTINUED | OUTPATIENT
Start: 2023-07-25 | End: 2023-08-01 | Stop reason: HOSPADM

## 2023-07-25 RX ORDER — VANCOMYCIN HYDROCHLORIDE 1 G/200ML
1000 INJECTION, SOLUTION INTRAVENOUS EVERY 24 HOURS
Status: DISCONTINUED | OUTPATIENT
Start: 2023-07-26 | End: 2023-07-25

## 2023-07-25 RX ORDER — CITALOPRAM 20 MG/1
40 TABLET ORAL EVERY MORNING
Status: DISCONTINUED | OUTPATIENT
Start: 2023-07-25 | End: 2023-08-01 | Stop reason: HOSPADM

## 2023-07-25 RX ORDER — MAGNESIUM HYDROXIDE/ALUMINUM HYDROXICE/SIMETHICONE 120; 1200; 1200 MG/30ML; MG/30ML; MG/30ML
30 SUSPENSION ORAL EVERY 6 HOURS PRN
Status: DISCONTINUED | OUTPATIENT
Start: 2023-07-25 | End: 2023-08-01 | Stop reason: HOSPADM

## 2023-07-25 RX ORDER — OXYCODONE HYDROCHLORIDE 5 MG/1
5 TABLET ORAL EVERY 4 HOURS PRN
Status: DISCONTINUED | OUTPATIENT
Start: 2023-07-25 | End: 2023-07-28

## 2023-07-25 RX ORDER — POTASSIUM CHLORIDE 20 MEQ/1
40 TABLET, EXTENDED RELEASE ORAL ONCE
Status: COMPLETED | OUTPATIENT
Start: 2023-07-25 | End: 2023-07-25

## 2023-07-25 RX ADMIN — OXYCODONE HYDROCHLORIDE 5 MG: 5 TABLET ORAL at 20:12

## 2023-07-25 RX ADMIN — OXYCODONE HYDROCHLORIDE 5 MG: 5 TABLET ORAL at 16:33

## 2023-07-25 RX ADMIN — LEVOTHYROXINE SODIUM 50 MCG: 50 TABLET ORAL at 06:21

## 2023-07-25 RX ADMIN — ALLOPURINOL 100 MG: 100 TABLET ORAL at 09:29

## 2023-07-25 RX ADMIN — ACETAMINOPHEN 325MG 650 MG: 325 TABLET ORAL at 07:39

## 2023-07-25 RX ADMIN — POTASSIUM CHLORIDE 40 MEQ: 1500 TABLET, EXTENDED RELEASE ORAL at 09:30

## 2023-07-25 RX ADMIN — VANCOMYCIN HYDROCHLORIDE 2000 MG: 1 INJECTION, POWDER, LYOPHILIZED, FOR SOLUTION INTRAVENOUS at 01:51

## 2023-07-25 RX ADMIN — OXYCODONE HYDROCHLORIDE 5 MG: 5 TABLET ORAL at 04:16

## 2023-07-25 RX ADMIN — POLYETHYLENE GLYCOL 3350 17 G: 17 POWDER, FOR SOLUTION ORAL at 09:29

## 2023-07-25 RX ADMIN — CITALOPRAM HYDROBROMIDE 40 MG: 20 TABLET ORAL at 09:29

## 2023-07-25 RX ADMIN — ALLOPURINOL 100 MG: 100 TABLET ORAL at 20:12

## 2023-07-25 RX ADMIN — INSULIN LISPRO 1 UNITS: 100 INJECTION, SOLUTION INTRAVENOUS; SUBCUTANEOUS at 16:36

## 2023-07-25 RX ADMIN — FUROSEMIDE 40 MG: 40 TABLET ORAL at 09:29

## 2023-07-25 RX ADMIN — SODIUM CHLORIDE, SODIUM GLUCONATE, SODIUM ACETATE, POTASSIUM CHLORIDE, MAGNESIUM CHLORIDE, SODIUM PHOSPHATE, DIBASIC, AND POTASSIUM PHOSPHATE 75 ML/HR: .53; .5; .37; .037; .03; .012; .00082 INJECTION, SOLUTION INTRAVENOUS at 11:26

## 2023-07-25 RX ADMIN — GABAPENTIN 100 MG: 100 CAPSULE ORAL at 09:30

## 2023-07-25 RX ADMIN — ENOXAPARIN SODIUM 40 MG: 40 INJECTION SUBCUTANEOUS at 09:31

## 2023-07-25 RX ADMIN — PREDNISONE 5 MG: 5 TABLET ORAL at 09:29

## 2023-07-25 RX ADMIN — LORATADINE 10 MG: 10 TABLET ORAL at 09:29

## 2023-07-25 RX ADMIN — OXYCODONE HYDROCHLORIDE 5 MG: 5 TABLET ORAL at 10:15

## 2023-07-25 RX ADMIN — MORPHINE SULFATE 2 MG: 2 INJECTION, SOLUTION INTRAMUSCULAR; INTRAVENOUS at 13:31

## 2023-07-25 RX ADMIN — GABAPENTIN 100 MG: 100 CAPSULE ORAL at 20:12

## 2023-07-25 NOTE — ASSESSMENT & PLAN NOTE
Lab Results   Component Value Date    HGBA1C 6.6 (H) 05/04/2023     Plan:  • BG goal 140-200 while inpatient, ACHS sliding scale, Carb coverage diet  • Long acting: none  • PTA regimen: none

## 2023-07-25 NOTE — UTILIZATION REVIEW
Initial Clinical Review    Admission: Date/Time/Statement:   Admission Orders (From admission, onward)     Ordered        07/24/23 2355  INPATIENT ADMISSION  Once                      Orders Placed This Encounter   Procedures   • INPATIENT ADMISSION     Standing Status:   Standing     Number of Occurrences:   1     Order Specific Question:   Level of Care     Answer:   Med Surg [16]     Order Specific Question:   Estimated length of stay     Answer:   More than 2 Midnights     Order Specific Question:   Certification     Answer:   I certify that inpatient services are medically necessary for this patient for a duration of greater than two midnights. See H&P and MD Progress Notes for additional information about the patient's course of treatment. ED Arrival Information     Expected   -    Arrival   7/24/2023 22:08    Acuity   Urgent            Means of arrival   Ambulance    Escorted by   [facility]    Service   Hospitalist    Admission type   Emergency            Arrival complaint   -           Chief Complaint   Patient presents with   • Shoulder Pain     Pt reports sudden right shoulder pain that began today. Hx of surgery on that shoulder in June. Pt also reports back pain. Initial Presentation: 78 y.o. female to the ED from home via EMS with complaitns of right shoulder pain s/p right shoulder replacement June 7th. Pain radiates to scapula. Admitted to inpatient for sepsis, chf. H/O HTN, T2DM, CKD, PMR, OA. Having fevers at home. ON arrival, WBCs 11.24, Sed rate and ESR are elevated. Blood cultures pending. Started on IV abx. ORtho consult. Given IV dilaudid in the ED. Date: 7/25   Day 2: Rule out septic joint. IR for aspiration and ID consult. Blood cultures pending. Continue with IV abx. ORtho consult :Blood cultures pending. Continue with IV abx. ID consult:  X-ray without hardware loosening.   Inflammatory markers are elevated but difficult to interpret in the patient with chronic rheumatologic disease. Monitor off abx for now. REcheck CBC, BMP, follow up IR aspiration. Date: 7/26    Day 3: Has surpassed a 2nd midnight with active treatments and services, which include pending IR aspiration, cultures. ED Triage Vitals   Temperature Pulse Respirations Blood Pressure SpO2   07/24/23 2211 07/24/23 2211 07/24/23 2211 07/24/23 2211 07/24/23 2211   (!) 101.3 °F (38.5 °C) 76 18 131/82 95 %      Temp Source Heart Rate Source Patient Position - Orthostatic VS BP Location FiO2 (%)   07/24/23 2211 07/24/23 2334 07/24/23 2211 07/24/23 2211 --   Oral Monitor Lying Right arm       Pain Score       07/24/23 2237       10 - Worst Possible Pain          Wt Readings from Last 1 Encounters:   07/25/23 105 kg (231 lb 14.8 oz)     Additional Vital Signs:   Time Temp Pulse Resp BP SpO2 O2 Device Patient Position - Orthostatic VS   07/25/23 1015 -- -- -- 130/59 -- -- Lying   07/25/23 0720 97.6 °F (36.4 °C) 75 18 93/49 Abnormal  92 % None (Room air) Lying   07/25/23 0051 97.9 °F (36.6 °C) 82 20 101/57 96 % None (Room air) Lying   07/25/23 0028 -- 79 18 116/53 92 % None (Room air) Lying   07/24/23 2334 -- 73 18 119/58 93 % None (Room air) Lying   07/24/23 2252 -- -- -- -- -- None (Room air) --   07/24/23 2211 101.3 °F (38.5 °C) Abnormal  76 18 131/82 95 % None (Room air) Lying       Pertinent Labs/Diagnostic Test Results:   7/25 EKG:Sinus rhythm with 1st degree A-V block  Left axis deviation  Intraventricular conduction delay  Abnormal ECG  When compared with ECG of 07-SEP-2019 13:55,  Right bundle branch block is no longer Present  XR shoulder 2+ views RIGHT   ED Interpretation by Puneet Gonsales MD (07/24 2307)   No acute osseous abnormality as interpreted by myself. Final Result by Domingo Uribe MD (07/25 1008)      Unremarkable appearance of reverse total shoulder arthroplasty.       Workstation performed: [id number]               Results from last 7 days   Lab Units 07/25/23  8576 07/24/23  2224   WBC Thousand/uL 12.17* 11.24*   HEMOGLOBIN g/dL 10.4* 11.0*   HEMATOCRIT % 34.5* 35.6   PLATELETS Thousands/uL 191 194   NEUTROS ABS Thousands/µL  --  8.51*         Results from last 7 days   Lab Units 07/25/23  0501 07/24/23  2224   SODIUM mmol/L 136 136   POTASSIUM mmol/L 3.3* 4.0   CHLORIDE mmol/L 101 99   CO2 mmol/L 27 27   ANION GAP mmol/L 8 10   BUN mg/dL 33* 32*   CREATININE mg/dL 1.57* 1.49*   EGFR ml/min/1.73sq m 30 32   CALCIUM mg/dL 8.7 9.0     Results from last 7 days   Lab Units 07/24/23  2224   AST U/L 17   ALT U/L 3*   ALK PHOS U/L 52   TOTAL PROTEIN g/dL 6.8   ALBUMIN g/dL 4.0   TOTAL BILIRUBIN mg/dL 0.49     Results from last 7 days   Lab Units 07/25/23  1058 07/25/23  0634   POC GLUCOSE mg/dl 126 148*     Results from last 7 days   Lab Units 07/25/23  0501 07/24/23  2224   GLUCOSE RANDOM mg/dL 125 144*         Results from last 7 days   Lab Units 07/24/23  2224   PROTIME seconds 12.9   INR  0.97   PTT seconds 23       Results from last 7 days   Lab Units 07/24/23  2224   PROCALCITONIN ng/ml 0.07     Results from last 7 days   Lab Units 07/24/23  2224   LACTIC ACID mmol/L 1.7       Results from last 7 days   Lab Units 07/24/23  2224   CRP mg/L 73.3*   SED RATE mm/hour 61*       ED Treatment:   Medication Administration from 07/24/2023 2207 to 07/25/2023 0051       Date/Time Order Dose Route Action     07/24/2023 2237 EDT acetaminophen (TYLENOL) tablet 650 mg 650 mg Oral Given     07/24/2023 2238 EDT HYDROmorphone (DILAUDID) injection 0.5 mg 0.5 mg Intravenous Given     07/24/2023 2332 EDT cefepime (MAXIPIME) IVPB (premix in dextrose) 2,000 mg 50 mL 2,000 mg Intravenous New Bag        Past Medical History:   Diagnosis Date   • Abnormal blood chemistry     last assessed 11/13/2013   • Angina pectoris ([address])     last assessed 11/012/13   • Arthritis     last assessed 11/13/2013   • Asthma    • Athlete's foot     resolved 10/11/17   • Cataract     last assessed 03/18/2016   • Chronic kidney disease     Stage III   • Diabetes mellitus ([facility])     type 2   • Diabetes mellitus with kidney disease ([facility])     last assessed 10/03/17   • Disease of thyroid gland    • Eczema    • Gait disorder     last assessed 11/12/2013   • Gout    • Hammer toe    • Hyperlipidemia    • Hypertension    • Idiopathic thrombocytopenia purpura ([address] )     last assessed 08/06/15   • Overactive bladder    • Post menopausal syndrome     resolved 10/11/17   • Restless legs syndrome     last assessed 10/24/2013   • Subclinical hypothyroidism     last assessed 12/11/17   • Urinary frequency     resolved 12/18/2014       Admitting Diagnosis: Shoulder pain [M25.519]  SIRS (systemic inflammatory response syndrome) ([facility]) [R65.10]  Age/Sex: 78 y.o. female  Admission Orders:  Scheduled Medications:  allopurinol, 100 mg, Oral, BID  cefTRIAXone, 2,000 mg, Intravenous, Q24H  citalopram, 40 mg, Oral, QAM  enoxaparin, 40 mg, Subcutaneous, Daily  furosemide, 40 mg, Oral, Daily  gabapentin, 100 mg, Oral, BID  insulin lispro, 1-6 Units, Subcutaneous, TID AC  levothyroxine, 50 mcg, Oral, Early Morning  loratadine, 10 mg, Oral, Daily  polyethylene glycol, 17 g, Oral, Daily  predniSONE, 5 mg, Oral, QAM  [START ON 7/26/2023] vancomycin, 1,000 mg, Intravenous, Q24H      Continuous IV Infusions:  multi-electrolyte, 75 mL/hr, Intravenous, Continuous      PRN Meds:  acetaminophen, 650 mg, Oral, Q6H PRN  aluminum-magnesium hydroxide-simethicone, 30 mL, Oral, Q6H PRN  morphine injection, 2 mg, Intravenous, Q6H PRN  ondansetron, 4 mg, Intravenous, Q6H PRN  oxyCODONE, 5 mg, Oral, Q6H PRN  oxyCODONE, 2.5 mg, Oral, Q6H PRN        IP CONSULT TO PHARMACY  IP CONSULT TO ORTHOPEDIC SURGERY  IP CONSULT TO INFECTIOUS DISEASES  INPATIENT CONSULT TO IR    Network Utilization Review Department  ATTENTION: Please call with any questions or concerns to [phone] and carefully listen to the prompts so that you are directed to the right person.  All voicemails are confidential.  Jenn Zuluaga all requests for admission clinical reviews, approved or denied determinations and any other requests to dedicated fax number below belonging to the campus where the patient is receiving treatment.  List of dedicated fax numbers for the Facilities:  [place] DENIALS (Administrative/Medical Necessity) [phone] [address] (Maternity/NICU/Pediatrics) [phone]   [address] [place] [address] Drive [phone]   [facility] [address] [facility] [phone]   [address] [place] [address] [address] [place] [address] [id number] [place] [address] [phone]   [address] [facility] [address] [address] [address]  [phone]

## 2023-07-25 NOTE — PLAN OF CARE

## 2023-07-25 NOTE — CONSULTS
Consultation - Infectious Disease   Candice Mae 78 y.o. female MRN: [id number]  Unit/Bed#: E2 -01 Encounter: [id number]    IMPRESSION & RECOMMENDATIONS:   1. R shoulder pain. In setting of recent reverse shoulder arthroplasty. Patient describes sudden onset of intense pain and immediate loss of ROM. I personally reviewed R shoulder xray which showed unremarkable appearance of surgical site without hardware complication, unremarkable soft tissues, and no osseous lesions. Consider patient may have a SIRS response to acute injury not detected on xray. Would recommend further imaging with MRI for better assessment of tendons, labrum, and musculature. Orthopedic surgery is following. Aspiration attempt was unsuccessful. Patient has been referred to IR for second attempt. She has been started on ceftriaxone and vancomycin which she is tolerating without difficutly. Given patient's clinical stability, will stop antibiotic treatment for now to improve quality of aspiration culture.   -stop IV ceftriaxone  -stop IV vancomycin  -monitor patient off antibiotics while clinically stable  -check CBCD and BMP tomorrow  -follow up IR aspiration attempt  -recommend R shoulder MRI  -serial R shoulder exams  -continue follow up with orthopedic surgery    2. SIRS. Present on admission. Fever and leukocytosis. Consider possible R shoulder infection. Consider inflammatory response to R shoulder injury not detected on xray. No other clear source appreciated. No acute respiratory or  complaints. Low suspicion for GI source despite her recent diarrhea. Blood cultures are pending. Recommend monitoring patient off additional antibiotics for now.  -monitor patient off antibiotics while clinically stable  -check CBCD and BMP tomorrow  -follow up blood cultures  -monitor vitals  -R shoulder work up as above  -supportive care     3. Recent R reverse total shoulder replacement. In patient with severe OA and rotator cuff repair.  Now with acute onset pain and change in ROM as above.  -continue follow up with orthopedic surgery    4. Type 2 diabetes mellitus without long term insulin use. Patient's last HbA1c was 6.6% on 5/4/2023. Elevated blood glucose is risk factor for infection. Recommend tight glycemic control.  -blood glucose management per primary service    5. CKD stage 3b. Upon review of patient's medical record it appears her baseline creatinine is approximately 1.5-1.7. Creatinine has remained within or below baseline during this hospitalization. -monitor creatinine  -dose adjust antibiotics for renal function as needed  -avoid nephrotoxins    6. Obesity. BMI = 36.32. This can effect antibiotic dosing.  -monitor patient weight and dose adjust antibiotics as needed     I have spent 80 minutes with patient, other providers, and in review of records today in completing this consultation. Total time spent included review of testing, ordering medications and tests, communicating with other providers, documentation time, and counseling/providing education to patient as detailed in my note. HISTORY OF PRESENT ILLNESS:  Reason for Consult: shoulder pain  HPI: Linnea Flores is a 78y.o. year old female who presented to the [address] [place] [address] ED on 2/9/2023 with fever and sudden onset R shoulder pain. Patient reported she had shoulder surgery in 6/2023 after a rotator cuff tear. Is also on chronic steroid for polymyalgia rheumatica. Pain started 2 days prior and was getting worse, was radiating to the scapula. Pain was not relieved with oxycodone. Upon arrival to the ED the patient's temperature was 101. 3. HR was 76. RR was 18. O2 saturation was 95% on room air. BP was 131/82. WBC count was 11.24. Lactic acid was 1.7. Creatinine was 1.49 with GFR = 32. Glucose was 144. Blood cultures were sent. R shoulder XR was performed which showed no acute osseous abnormality. The patient was given cefepime.  She was admitted for additional medical management. After her admission she was started on vancomycin and ceftriaxone as her antibiotic regimen. Orthopedic surgery assessed patient and bedside aspiration was attempted but unsuccessful. IR has been consulted to aspiration for culture to be sent. We have been asked for formal consult for shoulder pain. The patient has CHF, PMR, DMII, CKD stage 3b, anemia, arhtritis, asthma, hyperlipidemia, subclinical hypothyroidism, HTN, gout, cataract, gait disorder, hammer toe, eczema, ITP, OAB, RLS, and urinary frequency. She has a history of B/L TKR, R achilles tendon repair, and R reveres total shoulder replacement. She has allergies to ace inhibitors, ARBs, prochlorperazine, ramipril, and pollen. REVIEW OF SYSTEMS:  Patient reports she's a little drowsy because she just had a dose of pain medication. Reports she has been taking it quite often over last few days because pain has become so intense. She reports the pain is along the top of her R shoulder and extends down the scapula and into the mid-back. She also has pain when turning her head side to side, reports neck is equally tender to touch on R side. Has had no issues with her R shoulder surgical incision site, never saw any drainage or pus. She had fever and chills at home and upon admission but no recurrence. She has no active chills, sweats, shakes. She denies nausea, vomiting, abdominal pain. Reports she's been eating her normal diet. She's had diarrhea for approximately 1 week, describes as small pellets. She reports no acute changes to her breathing. A complete 12 point system-based review of systems is otherwise negative.     PAST MEDICAL HISTORY:  Past Medical History:   Diagnosis Date   • Abnormal blood chemistry     last assessed 11/13/2013   • Angina pectoris ([address])     last assessed 11/012/13   • Arthritis     last assessed 11/13/2013   • Asthma    • Athlete's foot     resolved 10/11/17   • Cataract     last assessed 03/18/2016   • Chronic kidney disease     Stage III   • Diabetes mellitus ([address])     type 2   • Diabetes mellitus with kidney disease ([address])     last assessed 10/03/17   • Disease of thyroid gland    • Eczema    • Gait disorder     last assessed 2013   • Gout    • Hammer toe    • Hyperlipidemia    • Hypertension    • Idiopathic thrombocytopenia purpura ([address])     last assessed 08/06/15   • Overactive bladder    • Post menopausal syndrome     resolved 10/11/17   • Restless legs syndrome     last assessed 10/24/2013   • Subclinical hypothyroidism     last assessed 17   • Urinary frequency     resolved 2014     Past Surgical History:   Procedure Laterality Date   • ACHILLES TENDON REPAIR Right    • MD ARTHROPLASTY GLENOHUMERAL JOINT TOTAL SHOULDER Right 2023    Procedure: REVERSE TOTAL SHOULDER REPLACEMENT;  Surgeon: Baldemar Rodriguez MD;  Location: [facility];  Service: Orthopedics   • REPLACEMENT TOTAL KNEE Bilateral      FAMILY HISTORY:  Non-contributory    SOCIAL HISTORY:  Social History   Social History     Substance and Sexual Activity   Alcohol Use Yes    Comment: Socially     Social History     Substance and Sexual Activity   Drug Use Never     Social History     Tobacco Use   Smoking Status Never   Smokeless Tobacco Never   Tobacco Comments    per allscripts; former smoker     ALLERGIES:  Allergies   Allergen Reactions   • Ace Inhibitors Angioedema   • Angiotensin Receptor Blockers Anaphylaxis and Tongue Swelling   • Prochlorperazine Anaphylaxis     lethargy, tongue and throat swelling   • Ramipril Anaphylaxis and Angioedema   • Pollen Extract Hives     MEDICATIONS:  All current active medications have been reviewed.     ANTIBIOTICS:  Cefepime 2  Vancomycin 2  Antibiotic 2    PHYSICAL EXAM:  Temp:  [97 °F (36.1 °C)-101.3 °F (38.5 °C)] 97 °F (36.1 °C)  HR:  [73-82] 75  Resp:  [17-20] 17  BP: ()/(49-82) 109/62  SpO2:  [92 %-96 %] 94 %  Temp (24hrs), Av.5 °F (36.9 °C), Min:97 °F (36.1 °C), Max:101.3 °F (38.5 °C)  Current: Temperature: (!) 97 °F (36.1 °C)    Intake/Output Summary (Last 24 hours) at 7/25/2023 1533  Last data filed at 7/25/2023 1001  Gross per 24 hour   Intake --   Output 400 ml   Net -400 ml     General Appearance:  Appearing well, nontoxic, and in no acute distress. She appears somewhat comfortable sitting up in bed but expresses pain immediately with moving. Head:  Normocephalic, without obvious abnormality, atraumatic. Eyes:  Conjunctiva pink and sclera anicteric, both eyes. Nose: Nares normal, mucosa normal, no drainage. Throat: Oropharynx moist without lesions. Neck: Supple, symmetrical, tender along R side and along shoulder towards scapular region. Patient with full chin to chest and extension posteriorly, very limited active ROM with side to side. Lungs:   Clear to auscultation bilaterally, respirations unlabored on room air. Chest Wall:  No tenderness or deformity. Heart:  RRR; no murmur, rub or gallop. Abdomen:   Soft, obese, non-tender, non-distended, positive bowel sounds throughout. Extremities: No cyanosis or clubbing, mild B/L LE edema. R shoulder attempt at ROM unsuccessful, incision is well healed without overlying erythema/edema, no active drainage/bleeding, dry gauze intact over site of attempted aspiration. Skin: No rashes or lesions noted on exposed skin. Lymph nodes: Cervical, supraclavicular nodes normal, no tenderness with palpation along anterior proximal chest.   Neurologic: Alert and oriented times 3, follows commands, speech is organized and appropriate, symmetric facial movement. LABS, IMAGING, & OTHER STUDIES:  Lab Results:  I have personally reviewed pertinent labs.   Results from last 7 days   Lab Units 07/25/23  0501 07/24/23  2224   WBC Thousand/uL 12.17* 11.24*   HEMOGLOBIN g/dL 10.4* 11.0*   PLATELETS Thousands/uL 191 194     Results from last 7 days   Lab Units 07/25/23  0501 07/24/23  2224   POTASSIUM mmol/L 3.3* 4.0   CHLORIDE mmol/L 101 99   CO2 mmol/L 27 27   BUN mg/dL 33* 32*   CREATININE mg/dL 1.57* 1.49*   EGFR ml/min/1.73sq m 30 32   CALCIUM mg/dL 8.7 9.0   AST U/L  --  17   ALT U/L  --  3*   ALK PHOS U/L  --  52     Results from last 7 days   Lab Units 07/24/23  2237 07/24/23  2224   BLOOD CULTURE  Received in Microbiology Lab. Culture in Progress. Received in Microbiology Lab. Culture in Progress. Results from last 7 days   Lab Units 07/24/23  2224   PROCALCITONIN ng/ml 0.07     Results from last 7 days   Lab Units 07/24/23  2224   CRP mg/L 73.3*     Imaging Studies:   I have personally reviewed pertinent imaging study reports and images in PACS. XR SHOULD 2+ VIEWS RIGHT 7/25/2023 - I personally reviewed these images which showed unremarkable soft tissues, no displacement or fracture, and unremarkable appearance of reverse total shoulder arthroplasty or obvious hardware complication. Other Studies:   Blood cultures are pending.

## 2023-07-25 NOTE — ASSESSMENT & PLAN NOTE
Wt Readings from Last 3 Encounters:   07/25/23 104 kg (230 lb 6.1 oz)   06/07/23 108 kg (237 lb 7 oz)   05/24/23 108 kg (239 lb)     Appears euvolemic    Plan:  • Continue metoprolol 25mg daily, lasix 40mg daily  • Monitor daily weight, I/Os, cardiac diet (sodium restriction), monitor K>4, Mg>2

## 2023-07-25 NOTE — PLAN OF CARE
Problem: OCCUPATIONAL THERAPY ADULT  Goal: Performs self-care activities at highest level of function for planned discharge setting. See evaluation for individualized goals. Description: Treatment Interventions: ADL retraining, Functional transfer training, UE strengthening/ROM, Endurance training, Patient/family training, Equipment evaluation/education, Compensatory technique education          See flowsheet documentation for full assessment, interventions and recommendations. Note: Limitation: Decreased ADL status, Decreased UE strength, Decreased UE ROM, Decreased Safe judgement during ADL, Decreased endurance, Decreased high-level ADLs  Prognosis: Fair  Assessment: Pt is a 77y/o female admitted to the hospital 2* symptoms of R shr pain, fever; pt with hx recent(last month-6/7/23) R TSR. Pt noted with sepsis; s/p attempted aspiration(7/25). Pt is currently NWB with her R UE; sling recently d/c'ed per pt's outpt therapy. Pt with PMH CKD, DM, gout, b/l TKR, R Archilles tendon repair. PTA pt states independence with her ADLs, transfers, ambulation--with RW(inside), SPC(outside); +, neg home alone, +falls=1; getting outpt P.T. During initial eval, pt demonstrated deficits with her functional balance, functional mobility, ADL status, transfer safety, b/l UE ROM/strength, and activity tolerance(currently fair=15-20mins). Pt would benefit from continued OT tx for the above deficits. 3-5xwk/1-2wks. The patient's raw score on the AM-PAC Daily Activity Inpatient Short Form is 14. A raw score of less than 19 suggests the patient may benefit from discharge to post-acute rehabilitation services. Please refer to the recommendation of the Occupational Therapist for safe discharge planning.      OT Discharge Recommendation: Post acute rehabilitation services

## 2023-07-25 NOTE — CONSULTS
ORTHOPEDIC CONSULT  ? Patient Name: Servando Stinson  Patient MRN: [id number]  Date of Service: 7/25/2023   Date / Time Note Created: 7/25/2023 11:14 AM   Referring Physician: * No referring provider recorded for this case *  Provider Creating Note: Yuan Hale PA-C   PCP: Ron Bearden  Attending Provider: Yves Molina MD  Admitting Diagnosis: Shoulder pain [M25.519]  SIRS (systemic inflammatory response syndrome) ([address]) [R65.10]     Reason for Consult:   Shoulder pain  ? Assessment / Plan:   Principal Problem:    Sepsis ([address])  Active Problems:    Anemia    Stage 3b chronic kidney disease (HCC)    Type 2 diabetes mellitus without complication, without long-term current use of insulin (HCC)    PMR (polymyalgia rheumatica) (HCC)    Chronic combined systolic (congestive) and diastolic (congestive) heart failure (HCC)    Rotator cuff tear arthropathy of right shoulder  Resolved Problems:    * No resolved hospital problems. *      HPI   Servando Stinson is a 78y.o. year old female who presents with sudden onset of right shoulder pain and fever that began yesterday AM.  States fever was 102. She waited until the evening and then called EMS where she was brought to [facility]. Seen in ER and appears she was started on Vancomycin and Ceftriaxon. Blood cultures taken and still pending. States she was in therapy and was able to move shoulder fine but now unable to even lift a few degrees without pain. Source: the patient    Review of Systems  Negative for chest pain and shortness of breath  Fever: yes  Temp 102. Overall body aches.    Review of all other systems is negative    Allergies   Allergies   Allergen Reactions   • Ace Inhibitors Angioedema   • Angiotensin Receptor Blockers Anaphylaxis and Tongue Swelling   • Prochlorperazine Anaphylaxis     lethargy, tongue and throat swelling   • Ramipril Anaphylaxis and Angioedema   • Pollen Extract Hives        Medications  Scheduled Meds:  Current Facility-Administered Medications   Medication Dose Route Frequency Provider Last Rate   • acetaminophen  650 mg Oral Q6H PRN Maximo Bryant PA-C     • allopurinol  100 mg Oral BID Maximo Bryant PA-C     • aluminum-magnesium hydroxide-simethicone  30 mL Oral Q6H PRN Maximo Bryant PA-C     • cefTRIAXone  2,000 mg Intravenous Q24H Maximo Bryant PA-C     • citalopram  40 mg Oral QAM Maximo Bryant PA-C     • enoxaparin  40 mg Subcutaneous Daily Maximo Bryant PA-C     • furosemide  40 mg Oral Daily Tanna Sultana MD     • gabapentin  100 mg Oral BID Maximo Bryant PA-C     • insulin lispro  1-6 Units Subcutaneous TID AC Maximo Bryant PA-C     • levothyroxine  50 mcg Oral Early Morning Maximo Bryant PA-C     • loratadine  10 mg Oral Daily Maximo Bryant PA-C     • morphine injection  2 mg Intravenous Q6H PRN Tanna Sultana MD     • multi-electrolyte  75 mL/hr Intravenous Continuous Tanna Sultana MD     • ondansetron  4 mg Intravenous Q6H PRN Maximo Bryant PA-C     • oxyCODONE  5 mg Oral Q6H PRN Maximo Bryant PA-C     • oxyCODONE  2.5 mg Oral Q6H PRN Maximo Bryant PA-C     • polyethylene glycol  17 g Oral Daily Maximo Bryant PA-C     • predniSONE  5 mg Oral QAM Maximo Bryant PA-C     • [START ON 7/26/2023] vancomycin  1,000 mg Intravenous Q24H Tanna Sultana MD       Continuous Infusions:multi-electrolyte, 75 mL/hr      PRN Meds:.•  acetaminophen  •  aluminum-magnesium hydroxide-simethicone  •  morphine injection  •  ondansetron  •  oxyCODONE  •  oxyCODONE    Historical Information   Past Medical History:   Diagnosis Date   • Abnormal blood chemistry     last assessed 11/13/2013   • Angina pectoris ([address])     last assessed 11/012/13   • Arthritis     last assessed 11/13/2013   • Asthma    • Athlete's foot     resolved 10/11/17   • Cataract     last assessed 03/18/2016   • Chronic kidney disease     Stage III   • Diabetes mellitus ([address])     type 2   • Diabetes mellitus with kidney disease ([address])     last assessed 10/03/17   • Disease of thyroid gland    • Eczema    • Gait disorder     last assessed 11/12/2013   • Gout    • Hammer toe    • Hyperlipidemia    • Hypertension    • Idiopathic thrombocytopenia purpura ([address])     last assessed 08/06/15   • Overactive bladder    • Post menopausal syndrome     resolved 10/11/17   • Restless legs syndrome     last assessed 10/24/2013   • Subclinical hypothyroidism     last assessed 12/11/17   • Urinary frequency     resolved 12/18/2014     Past Surgical History:   Procedure Laterality Date   • ACHILLES TENDON REPAIR Right    • WI ARTHROPLASTY GLENOHUMERAL JOINT TOTAL SHOULDER Right 6/7/2023    Procedure: REVERSE TOTAL SHOULDER REPLACEMENT;  Surgeon: Julieta Garcia MD;  Location: [facility];  Service: Orthopedics   • REPLACEMENT TOTAL KNEE Bilateral      Social History   Social History     Substance and Sexual Activity   Alcohol Use Yes    Comment: Socially     Social History     Substance and Sexual Activity   Drug Use Never     Social History     Tobacco Use   Smoking Status Never   Smokeless Tobacco Never   Tobacco Comments    per allscripts; former smoker       Vital Signs  Vitals:    07/25/23 1015   BP: 130/59   Pulse:    Resp:    Temp:    SpO2:         Physical Exam  General Appearance:    Alert, cooperative, no distress, appears stated age. Appears tired.    Head:    Normocephalic, without obvious abnormality, atraumatic  Eyes:    PERRL, conjunctiva/corneas clear, EOM's intact  Nose:   Nares normal, septum midline, mucosa normal, no drainage     or sinus tenderness  Back:     Symmetric, midline  Lungs:     Clear to auscultation bilaterally, respirations unlabored  Chest Wall:    No tenderness or deformity   Heart:    Regular rate and rhythm, S1 and S2 normal, no murmur, rub    or gallop  Abdomen:     Soft, non-tender, bowel sounds active all four quadrants,     no masses, no organomegaly  Extremities:  + pain with palpation of the shoulders (rigth side more then left)  Limited ROM and reduced AROM  Pulses:   2+ and symmetric all extremities  Skin:   Skin color, texture, turgor normal, no rashes or lesions  Lymph nodes:   Cervical, supraclavicular, and axillary nodes normal  Neurologic:   CNII-XII intact, normal strength, sensation and reflexes     throughout    Laboratory Studies  CBC:   Lab Results   Component Value Date    WBC 12.17 (H) 07/25/2023    HGB 10.4 (L) 07/25/2023    HCT 34.5 (L) 07/25/2023     (H) 07/25/2023     07/25/2023    RBC 3.39 (L) 07/25/2023    MCH 30.7 07/25/2023    MCHC 30.1 (L) 07/25/2023    RDW 14.5 07/25/2023    MPV 11.1 07/25/2023    NRBC 0 07/24/2023     CMP:   Lab Results   Component Value Date     07/25/2023    CO2 27 07/25/2023    BUN 33 (H) 07/25/2023    CREATININE 1.57 (H) 07/25/2023    CALCIUM 8.7 07/25/2023    AST 17 07/24/2023    ALT 3 (L) 07/24/2023    ALKPHOS 52 07/24/2023    EGFR 30 07/25/2023     PT/INR:   Lab Results   Component Value Date    INR 0.97 07/24/2023     ESR:   Lab Results   Component Value Date    ESR 61 (H) 07/24/2023     CRP:   Lab Results   Component Value Date    CRP 73.3 (H) 07/24/2023       Imaging and Other Studies  I have personally reviewed pertinent films in PACS    Xrays show intact prosthetic positioning. No signs of loosening or effusion.   ------------------------------------------------------------------------------------------------------    IMPRESSION:  6 weeks s/p reverse shoulder replacement with Dr Ginger Reyes  - Pt with increase shoulder pain as well as all over body pain. Unsure if shoulder is source but need to rule out. - Aspiration by bedside attempted but unable to collect a sample. Consult in for IR stat to perform the aspiration and send for C&S, Cell count and hold for 10 DAYS SINCE ABX WERE ALREADY STARTED  - UA ordered yesterday but results are not back  - Blood Cx ordered but no results back  -     ?   Patient was independently evaluated at 11:14 AM. Signed by Yuan Hale PA-C

## 2023-07-25 NOTE — OCCUPATIONAL THERAPY NOTE
Occupational Therapy Evaluation     Patient Name: Mary Carmen Cleary  ABXLX'L Date: 7/25/2023  Problem List  Principal Problem:    Sepsis ([address])  Active Problems:    Anemia    Stage 3b chronic kidney disease ([address])    Type 2 diabetes mellitus without complication, without long-term current use of insulin (HCC)    PMR (polymyalgia rheumatica) (HCC)    Chronic combined systolic (congestive) and diastolic (congestive) heart failure ([address])    Rotator cuff tear arthropathy of right shoulder    Past Medical History  Past Medical History:   Diagnosis Date    Abnormal blood chemistry     last assessed 11/13/2013    Angina pectoris ([address])     last assessed 11/012/13    Arthritis     last assessed 11/13/2013    Asthma     Athlete's foot     resolved 10/11/17    Cataract     last assessed 03/18/2016    Chronic kidney disease     Stage III    Diabetes mellitus ([address])     type 2    Diabetes mellitus with kidney disease ([address])     last assessed 10/03/17    Disease of thyroid gland     Eczema     Gait disorder     last assessed 11/12/2013    Gout     Hammer toe     Hyperlipidemia     Hypertension     Idiopathic thrombocytopenia purpura ([address])     last assessed 08/06/15    Overactive bladder     Post menopausal syndrome     resolved 10/11/17    Restless legs syndrome     last assessed 10/24/2013    Subclinical hypothyroidism     last assessed 12/11/17    Urinary frequency     resolved 12/18/2014     Past Surgical History  Past Surgical History:   Procedure Laterality Date    ACHILLES TENDON REPAIR Right     TX ARTHROPLASTY GLENOHUMERAL JOINT TOTAL SHOULDER Right 6/7/2023    Procedure: REVERSE TOTAL SHOULDER REPLACEMENT;  Surgeon: Irene June MD;  Location: [facility];  Service: Orthopedics    REPLACEMENT TOTAL KNEE Bilateral            07/25/23 1110   Note Type   Note type Evaluation   Pain Assessment   Pain Assessment Tool 0-10   Pain Score 7   Pain Location/Orientation Location: Generalized;Orientation: Right;Location: Back;Location: Buttocks; Location: Abdomen; Location: Shoulder   Restrictions/Precautions   Weight Bearing Precautions Per Order Yes   RUE Weight Bearing Per Order NWB  (per PA, Karmen, orthopedic)   Other Precautions Fall Risk;Pain;Cognitive; Chair Alarm; Bed Alarm;Multiple lines   Home Living   Type of [facility] [address] One level  (4 no)   Bathroom Shower/Tub Walk-in shower   Bathroom Toilet Raised   Bathroom Equipment Grab bars in shower;Grab bars around toilet; Shower chair   Home Equipment Walker;Cane   Prior Function   Lives With Other (Comment)  (sister)   Falls in the last 6 months 1 to 4  (1)   Comments PTA pt states independence with her ADLs, transfers, ambulation--with RW(inside), SPC(outside); +, neg home alone, +falls=1; getting outpt P.T. Lifestyle   Autonomy PTA pt states independence with her ADLs, transfers, ambulation--with RW(inside), SPC(outside); +, neg home alone, +falls=1   Reciprocal Relationships supportive sister   Service to Others retired   Intrinsic Gratification painting   Subjective   Subjective "I have trouble with my left shoulder since I have been using it so much more."   ADL   Where Assessed Edge of bed   Eating Assistance 5  Supervision/Setup   Grooming Assistance 5  Supervision/Setup   UB Bathing Assistance 3  Moderate Assistance   LB Bathing Assistance 3  Moderate Assistance   [address] [facility] 3  Moderate [facility] 2  Maximal [address]  2  Maximal Assistance   Bed Mobility   Rolling R 4  Minimal assistance   Additional items Assist x 1; Increased time required;Verbal cues;LE management   Rolling L 4  Minimal assistance   Additional items Assist x 1; Increased time required;Verbal cues;LE management   Supine to Sit 4  Minimal assistance   Additional items Assist x 2; Increased time required;Verbal cues;LE management   Sit to Supine 3  Moderate assistance   Additional items Assist x 2; Increased time required;Verbal cues;LE management   Transfers   Sit to Stand 3  Moderate assistance   Additional items Assist x 2; Increased time required;Verbal cues   Stand to Sit 3  Moderate assistance   Additional items Assist x 2; Increased time required;Verbal cues   Functional Mobility   Functional Mobility 3  Moderate assistance   Additional Comments x2   Additional items Hand hold assistance   Balance   Static Sitting Fair   Dynamic Sitting Fair -   Static Standing Poor +   Dynamic Standing Poor   Activity Tolerance   Activity Tolerance Patient limited by fatigue;Patient limited by pain   Medical Staff Made Aware nsg, P.T.   RUE Assessment   RUE Assessment X  (shr=NT, limited elbow flex(i.e.90*); hand/wrist=WFLs)   RUE Strength   RUE Overall Strength   (shr=NT, elbow=3-/5, hand/wrist=3+/5)   LUE Assessment   LUE Assessment X  (limited shr AROM(i.e.flex=30-40*); elbow-distal=WFLs)   LUE Strength   LUE Overall Strength   (shr=3-/5, elbow-distal=3+/5 throughout)   Hand Function   Gross Motor Coordination Functional   Fine Motor Coordination Functional   Sensation   Light Touch No apparent deficits   Proprioception   Proprioception No apparent deficits   Vision-Basic Assessment   Current Vision   (glasses)   Vision - Complex Assessment   Acuity Able to read clock/calendar on wall without difficulty   Psychosocial   Psychosocial (WDL) X   Patient Behaviors/Mood Flat affect; Cooperative   Perception   Inattention/Neglect Appears intact   Cognition   Overall Cognitive Status WFL   Arousal/Participation Alert   Attention Within functional limits   Orientation Level Oriented X4   Memory Within functional limits   Following Commands Follows one step commands without difficulty   Assessment   Limitation Decreased ADL status; Decreased UE strength;Decreased UE ROM; Decreased Safe judgement during ADL;Decreased endurance;Decreased high-level ADLs   Prognosis Fair   Assessment Pt is a 79y/o female admitted to the hospital 2* symptoms of R shr pain, fever; pt with hx recent(last month-6/7/23) R TSR. Pt noted with sepsis; s/p attempted aspiration(7/25). Pt is currently NWB with her R UE; sling recently d/c'ed per pt's outpt therapy. Pt with PMH CKD, DM, gout, b/l TKR, R Archilles tendon repair. PTA pt states independence with her ADLs, transfers, ambulation--with RW(inside), SPC(outside); +, neg home alone, +falls=1; getting outpt P.T. During initial eval, pt demonstrated deficits with her functional balance, functional mobility, ADL status, transfer safety, b/l UE ROM/strength, and activity tolerance(currently fair=15-20mins). Pt would benefit from continued OT tx for the above deficits. 3-5xwk/1-2wks. The patient's raw score on the AM-PAC Daily Activity Inpatient Short Form is 14. A raw score of less than 19 suggests the patient may benefit from discharge to post-acute rehabilitation services. Please refer to the recommendation of the Occupational Therapist for safe discharge planning. Goals   Patient Goals "to feel better"   STG Time Frame   (1-7 days)   Short Term Goal #1 Pt will demonstrate supervision with their bed mobility to facilitate EOB ADLs. Short Term Goal #2 Pt will demonstrate improved activity tolerance to good(20-30mins) and standing tolerance to 3-5mins to assist with ADLs. Short Term Goal  Pt will demonstrate proper walker/transfer safety(maintaining R UE NWB status) 100% of the time. LTG Time Frame   (7-14 days)   Long Term Goal #1 Pt will demonstrate g/g- balance with all functional activities. Long Term Goal #2 Pt will demonstrate mod I with their UE and LE bathing/dresssing. Long Term Goal Pt will independently demonstrate knowledge and application of R UE precautions(i.e.NWB status, TSR protocol) 100% of the time. Plan   Treatment Interventions ADL retraining;Functional transfer training;UE strengthening/ROM; Endurance training;Patient/family training;Equipment evaluation/education; Compensatory technique education   Goal Expiration Date 08/08/23   OT Treatment Day 0   OT Frequency 3-5x/wk   Recommendation   OT Discharge Recommendation Post acute rehabilitation services   AM-PAC Daily Activity Inpatient   Lower Body Dressing 2   Bathing 2   Toileting 2   Upper Body Dressing 2   Grooming 3   Eating 3   Daily Activity Raw Score 14   Daily Activity Standardized Score (Calc for Raw Score >=11) 33.39   AM-PAC Applied Cognition Inpatient   Following a Speech/Presentation 4   Understanding Ordinary Conversation 4   Taking Medications 3   Remembering Where Things Are Placed or Put Away 3   Remembering List of 4-5 Errands 3   Taking Care of Complicated Tasks 3   Applied Cognition Raw Score 20   Applied Cognition Standardized Score 41.76   Melvi Olsen

## 2023-07-25 NOTE — ASSESSMENT & PLAN NOTE
Lab Results   Component Value Date    EGFR 32 07/24/2023    EGFR 31 06/08/2023    EGFR 26 11/09/2022    CREATININE 1.49 (H) 07/24/2023    CREATININE 1.54 (H) 06/08/2023    CREATININE 1.79 (H) 11/09/2022     In the setting of diabetic/hypertensive nephropathy   Baseline Cr: 1.5-1.7    Plan:  • Admit Cr: 1.49 > trend  • Monitor renal function, renal dose medications, maintain normotension/euvolemia, avoid nephrotoxic agents, I&Os

## 2023-07-25 NOTE — ED ATTENDING ATTESTATION
7/24/2023  IDenisse MD, saw and evaluated the patient. I have discussed the patient with the resident/non-physician practitioner and agree with the resident's/non-physician practitioner's findings, Plan of Care, and MDM as documented in the resident's/non-physician practitioner's note, except where noted. All available labs and Radiology studies were reviewed. I was present for key portions of any procedure(s) performed by the resident/non-physician practitioner and I was immediately available to provide assistance. At this point I agree with the current assessment done in the Emergency Department. I have conducted an independent evaluation of this patient a history and physical is as follows:      Final Diagnosis:  1. Shoulder pain    2. SIRS (systemic inflammatory response syndrome) [facility])      Chief Complaint   Patient presents with   • Shoulder Pain     Pt reports sudden right shoulder pain that began today. Hx of surgery on that shoulder in June. Pt also reports back pain. 23-year-old female history of diabetes, hypertension, hyperlipidemia, chronic kidney disease who presents with right shoulder pain. Patient had shoulder replacement surgery on 6/7/23. Patient was doing well. However, today, woke up with worsening right shoulder pain which has continued to worsen throughout the day. Patient was feeling feverish and noticed a fever this evening so she called EMS. No upper respiratory symptoms or urinary complaints. PMH:  -Diabetes, hypertension, hyperlipidemia, chronic kidney disease  PSH:  -Right shoulder replacement      PE:   Vitals:    07/24/23 2208 07/24/23 2211 07/24/23 2334   BP:  131/82 119/58   BP Location:  Right arm Left arm   Pulse:  76 73   Resp:  18 18   Temp:  (!) 101.3 °F (38.5 °C)    TempSrc:  Oral    SpO2:  95% 93%   Weight: 105 kg (232 lb 9.4 oz)           Constitutional: She appears well-developed. She is cooperative. No distress.    HENT:   Mouth/Throat: Uvula is midline, oropharynx is clear and moist and mucous membranes are normal.   Eyes: Pupils are equal, round, and reactive to light. Conjunctivae and EOM are normal.   Neck: Trachea normal. No thyroid mass and no thyromegaly present. Cardiovascular: Normal rate, regular rhythm, normal heart sounds. No murmur heard. Pulmonary/Chest: Effort normal and breath sounds normal.   Abdominal: Soft. Normal appearance and bowel sounds are normal. There is no tenderness. There is no rebound, no guarding. Neurological: She is alert. Skin: Skin is warm, dry and intact. MSK: Surgical site is well-healing. No obvious swelling, effusion, deformity to right shoulder. Tender along the posterior aspect and anterior aspect of the shoulder. Pain with even small movements of the shoulder. Psychiatric: She has a normal mood and affect. Her speech is normal and behavior is normal. Thought content normal.      A:  -56-year-old female who presents with right shoulder pain and fever. P:  -Concern for septic arthritis of the right shoulder given recent surgery. -CBC to evaluate for evidence of leukocytosis. -CMP to evaluate renal function, electrolytes, LFTs in the setting of possible sepsis. -Lactic acid to evaluate for endorgan ischemia.  -Blood cultures x2 to evaluate for sepsis. -Urinalysis to evaluate for UTI as possible source. -Xray R shoulder to evaluate hardware and joint. - 13 point ROS was performed and all are normal unless stated in the history above. - Nursing note reviewed. Vitals reviewed. - Orders placed by myself and/or advanced practitioner / resident.    - Previous chart was reviewed  - No language barrier.   - History obtained from patient. - There are no limitations to the history obtained. - Critical care time: Not applicable for this patient.           Medications   acetaminophen (TYLENOL) tablet 650 mg (650 mg Oral Given 7/24/23 2237)   HYDROmorphone (DILAUDID) injection 0.5 mg (0.5 mg Intravenous Given 7/24/23 2238)   cefepime (MAXIPIME) IVPB (premix in dextrose) 2,000 mg 50 mL (2,000 mg Intravenous New Bag 7/24/23 2332)     XR shoulder 2+ views RIGHT   ED Interpretation   No acute osseous abnormality as interpreted by myself.         Orders Placed This Encounter   Procedures   • Blood culture #1   • Blood culture #2   • XR shoulder 2+ views RIGHT   • CBC and differential   • Comprehensive metabolic panel   • Lactic acid   • Procalcitonin   • Protime-INR   • APTT   • UA w Reflex to Microscopic w Reflex to Culture   • Sedimentation rate, automated   • C-reactive protein   • ECG 12 lead   • INPATIENT ADMISSION     Labs Reviewed   CBC AND DIFFERENTIAL - Abnormal       Result Value Ref Range Status    WBC 11.24 (*) 4.31 - 10.16 Thousand/uL Final    RBC 3.58 (*) 3.81 - 5.12 Million/uL Final    Hemoglobin 11.0 (*) 11.5 - 15.4 g/dL Final    Hematocrit 35.6  34.8 - 46.1 % Final    MCV 99 (*) 82 - 98 fL Final    MCH 30.7  26.8 - 34.3 pg Final    MCHC 30.9 (*) 31.4 - 37.4 g/dL Final    RDW 14.4  11.6 - 15.1 % Final    MPV 10.5  8.9 - 12.7 fL Final    Platelets 853  537 - 390 Thousands/uL Final    nRBC 0  /100 WBCs Final    Neutrophils Relative 77 (*) 43 - 75 % Final    Immat GRANS % 0  0 - 2 % Final    Lymphocytes Relative 15  14 - 44 % Final    Monocytes Relative 7  4 - 12 % Final    Eosinophils Relative 1  0 - 6 % Final    Basophils Relative 0  0 - 1 % Final    Neutrophils Absolute 8.51 (*) 1.85 - 7.62 Thousands/µL Final    Immature Grans Absolute 0.05  0.00 - 0.20 Thousand/uL Final    Lymphocytes Absolute 1.71  0.60 - 4.47 Thousands/µL Final    Monocytes Absolute 0.81  0.17 - 1.22 Thousand/µL Final    Eosinophils Absolute 0.12  0.00 - 0.61 Thousand/µL Final    Basophils Absolute 0.04  0.00 - 0.10 Thousands/µL Final   COMPREHENSIVE METABOLIC PANEL - Abnormal    Sodium 136  135 - 147 mmol/L Final    Potassium 4.0  3.5 - 5.3 mmol/L Final    Chloride 99  96 - 108 mmol/L Final    CO2 27  21 - 32 mmol/L Final    ANION GAP 10  mmol/L Final    BUN 32 (*) 5 - 25 mg/dL Final    Creatinine 1.49 (*) 0.60 - 1.30 mg/dL Final    Comment: Standardized to IDMS reference method    Glucose 144 (*) 65 - 140 mg/dL Final    Comment: If the patient is fasting, the ADA then defines impaired fasting glucose as > 100 mg/dL and diabetes as > or equal to 123 mg/dL. Calcium 9.0  8.4 - 10.2 mg/dL Final    AST 17  13 - 39 U/L Final    ALT 3 (*) 7 - 52 U/L Final    Comment: Specimen collection should occur prior to Sulfasalazine administration due to the potential for falsely depressed results. Alkaline Phosphatase 52  34 - 104 U/L Final    Total Protein 6.8  6.4 - 8.4 g/dL Final    Albumin 4.0  3.5 - 5.0 g/dL Final    Total Bilirubin 0.49  0.20 - 1.00 mg/dL Final    Comment: Use of this assay is not recommended for patients undergoing treatment with eltrombopag due to the potential for falsely elevated results. N-acetyl-p-benzoquinone imine (metabolite of Acetaminophen) will generate erroneously low results in samples for patients that have taken an overdose of Acetaminophen.     eGFR 32  ml/min/1.73sq m Final    Narrative:     [place] guidelines for Chronic Kidney Disease (CKD):   •  Stage 1 with normal or high GFR (GFR > 90 mL/min/1.73 square meters)  •  Stage 2 Mild CKD (GFR = 60-89 mL/min/1.73 square meters)  •  Stage 3A Moderate CKD (GFR = 45-59 mL/min/1.73 square meters)  •  Stage 3B Moderate CKD (GFR = 30-44 mL/min/1.73 square meters)  •  Stage 4 Severe CKD (GFR = 15-29 mL/min/1.73 square meters)  •  Stage 5 End Stage CKD (GFR <15 mL/min/1.73 square meters)  Note: GFR calculation is accurate only with a steady state creatinine   SEDIMENTATION RATE - Abnormal    Sed Rate 61 (*) 0 - 29 mm/hour Final   C-REACTIVE PROTEIN - Abnormal    CRP 73.3 (*) <3.0 mg/L Final   LACTIC ACID, PLASMA (W/REFLEX IF RESULT > 2.0) - Normal    LACTIC ACID 1.7  0.5 - 2.0 mmol/L Final    Narrative:     Result may be elevated if tourniquet was used during collection. PROCALCITONIN TEST - Normal    Procalcitonin 0.07  <=0.25 ng/ml Final    Comment: Suspected Lower Respiratory Tract Infection (LRTI):  - LESS than or EQUAL to 0.25 ng/mL:   low likelihood for bacterial LRTI; antibiotics DISCOURAGED.  - GREATER than 0.25 ng/mL:   increased likelihood for bacterial LRTI; antibiotics ENCOURAGED. Suspected Sepsis:  - Strongly consider initiating antibiotics in ALL UNSTABLE patients. - LESS than or EQUAL to 0.5 ng/mL:   low likelihood for bacterial sepsis; antibiotics DISCOURAGED.  - GREATER than 0.5 ng/mL:   increased likelihood for bacterial sepsis; antibiotics ENCOURAGED.  - GREATER than 2 ng/mL:   high risk for severe sepsis / septic shock; antibiotics strongly ENCOURAGED. Decisions on antibiotic use should not be based solely on Procalcitonin (PCT) levels. If PCT is low but uncertainty exists with stopping antibiotics, repeat PCT in 6-24 hours to confirm the low level. If antibiotics are administered (regardless if initial PCT was high or low), repeat PCT every 1-2 days to consider early antibiotic cessation (when GREATER than 80% decrease from the peak OR when PCT drops below designated cutoffs, whichever comes first), so long as the infection is NOT one that typically requires prolonged treatment durations (e.g., bone/joint infections, endocarditis, Staph. aureus bacteremia).     Situations of FALSE-POSITIVE Procalcitonin values:  1) Newborns < 67 hours old  2) Massive stress from severe trauma / burns, major surgery, acute pancreatitis, cardiogenic / hemorrhagic shock, sickle cell crisis, or other organ perfusion abnormalities  3) Malaria and some Candidal infections  4) Treatment with agents that stimulate cytokines (e.g., OKT3, anti-lymphocyte globulins, alemtuzumab, IL-2, granulocyte transfusion [NOT GCSFs])  5) Chronic renal disease causes elevated baseline levels (consider GREATER than 0.75 ng/mL as an abnormal cut-off); initiating HD/CRRT may cause transient decreases  6) Paraneoplastic syndromes from medullary thyroid or SCLC, some forms of vasculitis, and acute [id number] disease    Situations of FALSE-NEGATIVE Procalcitonin values:  1) Too early in clinical course for PCT to have reached its peak (may repeat in 6-24 hours to confirm low level)  2) Localized infection WITHOUT systemic (SIRS / sepsis) response (e.g., an abscess, osteomyelitis, cystitis)  3) Mycobacteria (e.g., Tuberculosis, MAC)  4) Cystic fibrosis exacerbations     PROTIME-INR - Normal    Protime 12.9  11.6 - 14.5 seconds Final    INR 0.97  0.84 - 1.19 Final   APTT - Normal    PTT 23  23 - 37 seconds Final    Comment: Therapeutic Heparin Range =  60-90 seconds   BLOOD CULTURE   BLOOD CULTURE   UA W REFLEX TO MICROSCOPIC WITH REFLEX TO CULTURE     Time reflects when diagnosis was documented in both MDM as applicable and the Disposition within this note     Time User Action Codes Description Comment    7/24/2023 11:54 PM Rissa Muñiz Add [M25.519] Shoulder pain     7/24/2023 11:54 PM Yokubaitis, David Add [R65.10] SIRS (systemic inflammatory response syndrome) [facility])       ED Disposition     ED Disposition   Admit    Condition   Stable    Date/Time   Mon Jul 24, 2023 11:54 PM    Comment   Case was discussed with MARLON and the patient's admission status was agreed to be Admission Status: inpatient status to the service of Dr. Edson Rosenbaum . Follow-up Information    None       Patient's Medications   Discharge Prescriptions    No medications on file     No discharge procedures on file. Prior to Admission Medications   Prescriptions Last Dose Informant Patient Reported? Taking?    Fexofenadine HCl (ALLEGRA PO)   Yes No   Sig: Take 1 capsule by mouth daily   acetaminophen (TYLENOL) 325 mg tablet   No No   Sig: Take 2 tablets (650 mg total) by mouth every 6 (six) hours as needed for mild pain   albuterol (ProAir HFA) 90 mcg/act inhaler   No No   Sig: Inhale 2 puffs every 6 (six) hours as needed for wheezing   allopurinol (ZYLOPRIM) 100 mg tablet   Yes No   Sig: Take 100 mg by mouth 2 (two) times a day   aspirin 325 mg tablet   No No   Sig: Take 1 tablet (325 mg total) by mouth daily   cetirizine (ZyrTEC) 10 mg tablet   Yes No   Sig: Take 10 mg by mouth daily   cholecalciferol (VITAMIN D3) 1,000 units tablet  Self Yes No   Sig: Take 1,000 Units by mouth daily   citalopram (CeleXA) 40 mg tablet   No No   Sig: TAKE ONE TABLET BY MOUTH EVERY DAY   Patient taking differently: Take 40 mg by mouth every morning   docusate sodium (COLACE) 100 mg capsule   No No   Sig: Take 1 capsule (100 mg total) by mouth 2 (two) times a day   furosemide (LASIX) 40 mg tablet   No No   Sig: TAKE ONE TABLET BY MOUTH EVERY DAY   gabapentin (NEURONTIN) 100 mg capsule  Self Yes No   Sig: Take 1 tablet by mouth 2 (two) times a day   levothyroxine 50 mcg tablet   No No   Sig: Take 1 tablet (50 mcg total) by mouth daily   Patient taking differently: Take 50 mcg by mouth every morning   metoprolol tartrate (LOPRESSOR) 25 mg tablet   No No   Sig: Take 1 tablet (25 mg total) by mouth in the morning   Patient taking differently: Take 25 mg by mouth every morning   potassium chloride (K-DUR,KLOR-CON) 20 mEq tablet   No No   Sig: Take 1 tablet (20 mEq total) by mouth daily   Patient taking differently: Take 20 mEq by mouth every morning   predniSONE 5 mg tablet   Yes No   Sig: Take 5 mg by mouth every morning   senna (SENOKOT) 8.6 mg   No No   Sig: Take 1 tablet (8.6 mg total) by mouth daily   simvastatin (ZOCOR) 10 mg tablet   No No   Sig: Take 1 tablet (10 mg total) by mouth every morning      Facility-Administered Medications: None       Portions of the record may have been created with voice recognition software. Occasional wrong word or "sound a like" substitutions may have occurred due to the inherent limitations of voice recognition software.  Read the chart carefully and recognize, using context, where substitutions have occurred.         ED Course         Critical Care Time  Procedures

## 2023-07-25 NOTE — PROGRESS NOTES
Sarita Gutierrez is a 78 y.o. female who is currently ordered Vancomycin IV with management by the Pharmacy Consult service. Relevant clinical data and objective / subjective history reviewed. Vancomycin Assessment:  Indication and Goal AUC/Trough: Bone/joint infection (goal -600, trough >10), -600, trough >10  Clinical Status: stable  Micro:   pending  Renal Function:  SCr: 1.49 mg/dL  CrCl: 37.6 mL/min  Renal replacement: Not on dialysis  Days of Therapy: 1  Current Dose: 2000mg loading dose  Vancomycin Plan:  New Dosinmg q12 hours  Estimated AUC: 443 mcg*hr/mL  Estimated Trough: 16.2 mcg/mL  Next Level:  with am labs  Renal Function Monitoring: Daily BMP and [place] will continue to follow closely for s/sx of nephrotoxicity, infusion reactions and appropriateness of therapy. BMP and CBC will be ordered per protocol. We will continue to follow the patient’s culture results and clinical progress daily.     Birdie Colbert, Pharmacist

## 2023-07-25 NOTE — ASSESSMENT & PLAN NOTE
SIRS met with fever, leukocytosis, presumed source being her shoulder. Worsening R shoulder pain over days now with fevers.  No other complaints   Leukocytosis, procal/lactate WNL, ESR/CRP elevation   XR shoulder reviewed by me prosthetic appears in-place without osseous abnormalities, await rads read     Plan:  • Orthopedics consulted  • ABX: vanco/ceftriaxone narrow as appropriate  • IVF: bolus PRN  • Trend fever/WBC/procal curves  • Follow cultures

## 2023-07-25 NOTE — ED PROVIDER NOTES
History  Chief Complaint   Patient presents with   • Shoulder Pain     Pt reports sudden right shoulder pain that began today. Hx of surgery on that shoulder in June. Pt also reports back pain. 69-year-old female with recent right shoulder replacement on June 7, presents emergency department due to significant pain and fevers. Patient notes that over the past few days she has had progressive worsening pain in the shoulder. She also notes that there is pain radiating into her scapula. She also had a little bit of pain in her left buttocks today as well. She denies any other complaints at this time than pain such as nausea, vomiting, cough, congestion, sore throat, abdominal pain, urinary complaints, or other complaints at this time. She took oxycodone earlier today with minimal relief in symptoms. Prior to Admission Medications   Prescriptions Last Dose Informant Patient Reported? Taking?    Fexofenadine HCl (ALLEGRA PO)   Yes No   Sig: Take 1 capsule by mouth daily   acetaminophen (TYLENOL) 325 mg tablet   No No   Sig: Take 2 tablets (650 mg total) by mouth every 6 (six) hours as needed for mild pain   albuterol (ProAir HFA) 90 mcg/act inhaler   No No   Sig: Inhale 2 puffs every 6 (six) hours as needed for wheezing   allopurinol (ZYLOPRIM) 100 mg tablet   Yes No   Sig: Take 100 mg by mouth 2 (two) times a day   aspirin 325 mg tablet   No No   Sig: Take 1 tablet (325 mg total) by mouth daily   cetirizine (ZyrTEC) 10 mg tablet   Yes No   Sig: Take 10 mg by mouth daily   cholecalciferol (VITAMIN D3) 1,000 units tablet  Self Yes No   Sig: Take 1,000 Units by mouth daily   citalopram (CeleXA) 40 mg tablet   No No   Sig: TAKE ONE TABLET BY MOUTH EVERY DAY   Patient taking differently: Take 40 mg by mouth every morning   docusate sodium (COLACE) 100 mg capsule   No No   Sig: Take 1 capsule (100 mg total) by mouth 2 (two) times a day   furosemide (LASIX) 40 mg tablet   No No   Sig: TAKE ONE TABLET BY MOUTH EVERY DAY   gabapentin (NEURONTIN) 100 mg capsule  Self Yes No   Sig: Take 1 tablet by mouth 2 (two) times a day   levothyroxine 50 mcg tablet   No No   Sig: Take 1 tablet (50 mcg total) by mouth daily   Patient taking differently: Take 50 mcg by mouth every morning   metoprolol tartrate (LOPRESSOR) 25 mg tablet   No No   Sig: Take 1 tablet (25 mg total) by mouth in the morning   Patient taking differently: Take 25 mg by mouth every morning   potassium chloride (K-DUR,KLOR-CON) 20 mEq tablet   No No   Sig: Take 1 tablet (20 mEq total) by mouth daily   Patient taking differently: Take 20 mEq by mouth every morning   predniSONE 5 mg tablet   Yes No   Sig: Take 5 mg by mouth every morning   senna (SENOKOT) 8.6 mg   No No   Sig: Take 1 tablet (8.6 mg total) by mouth daily   simvastatin (ZOCOR) 10 mg tablet   No No   Sig: Take 1 tablet (10 mg total) by mouth every morning      Facility-Administered Medications: None       Past Medical History:   Diagnosis Date   • Abnormal blood chemistry     last assessed 11/13/2013   • Angina pectoris ([address])     last assessed 11/012/13   • Arthritis     last assessed 11/13/2013   • Asthma    • Athlete's foot     resolved 10/11/17   • Cataract     last assessed 03/18/2016   • Chronic kidney disease     Stage III   • Diabetes mellitus ([address])     type 2   • Diabetes mellitus with kidney disease ([address])     last assessed 10/03/17   • Disease of thyroid gland    • Eczema    • Gait disorder     last assessed 11/12/2013   • Gout    • Hammer toe    • Hyperlipidemia    • Hypertension    • Idiopathic thrombocytopenia purpura ([address])     last assessed 08/06/15   • Overactive bladder    • Post menopausal syndrome     resolved 10/11/17   • Restless legs syndrome     last assessed 10/24/2013   • Subclinical hypothyroidism     last assessed 12/11/17   • Urinary frequency     resolved 12/18/2014       Past Surgical History:   Procedure Laterality Date   • ACHILLES TENDON REPAIR Right    • ME ARTHROPLASTY GLENOHUMERAL JOINT TOTAL SHOULDER Right 6/7/2023    Procedure: REVERSE TOTAL SHOULDER REPLACEMENT;  Surgeon: Nayeli Luke MD;  Location: [facility];  Service: Orthopedics   • REPLACEMENT TOTAL KNEE Bilateral        Family History   Problem Relation Age of Onset   • Leukemia Mother    • Eczema Father    • Diabetes type II Father    • Diabetes type II Maternal Aunt      I have reviewed and agree with the history as documented. E-Cigarette/Vaping   • E-Cigarette Use Never User      E-Cigarette/Vaping Substances   • Nicotine No    • THC No    • CBD No    • Flavoring No    • Other No    • Unknown No      Social History     Tobacco Use   • Smoking status: Never   • Smokeless tobacco: Never   • Tobacco comments:     per allscripts; former smoker   Vaping Use   • Vaping Use: Never used   Substance Use Topics   • Alcohol use: Yes     Comment: Socially   • Drug use: Never        Review of Systems   All other systems reviewed and are negative. Physical Exam  ED Triage Vitals   Temperature Pulse Respirations Blood Pressure SpO2   07/24/23 2211 07/24/23 2211 07/24/23 2211 07/24/23 2211 07/24/23 2211   (!) 101.3 °F (38.5 °C) 76 18 131/82 95 %      Temp Source Heart Rate Source Patient Position - Orthostatic VS BP Location FiO2 (%)   07/24/23 2211 07/24/23 2334 07/24/23 2211 07/24/23 2211 --   Oral Monitor Lying Right arm       Pain Score       07/24/23 2237       10 - Worst Possible Pain             Orthostatic Vital Signs  Vitals:    07/24/23 2211 07/24/23 2334   BP: 131/82 119/58   Pulse: 76 73   Patient Position - Orthostatic VS: Lying Lying       Physical Exam  Vitals and nursing note reviewed. Constitutional:       General: She is not in acute distress. Appearance: She is well-developed. HENT:      Head: Normocephalic and atraumatic. Eyes:      Conjunctiva/sclera: Conjunctivae normal.   Cardiovascular:      Rate and Rhythm: Normal rate and regular rhythm. Heart sounds: No murmur heard.   Pulmonary: Effort: Pulmonary effort is normal. No respiratory distress. Breath sounds: Normal breath sounds. Abdominal:      Palpations: Abdomen is soft. Tenderness: There is no abdominal tenderness. Musculoskeletal:         General: Tenderness ( Right shoulder and scapula) present. No swelling. Cervical back: Neck supple. Comments: No palpable right shoulder effusion. Significant increase in pain with attempted movement of right shoulder or elbow. Skin:     General: Skin is warm and dry. Capillary Refill: Capillary refill takes less than 2 seconds. Findings: No erythema. Comments: Incision site appears clean dry and intact. Neurological:      Mental Status: She is alert. Sensory: No sensory deficit.    Psychiatric:         Mood and Affect: Mood normal.         ED Medications  Medications   cefepime (MAXIPIME) IVPB (premix in dextrose) 2,000 mg 50 mL (2,000 mg Intravenous New Bag 7/24/23 2332)   acetaminophen (TYLENOL) tablet 650 mg (650 mg Oral Given 7/24/23 2237)   HYDROmorphone (DILAUDID) injection 0.5 mg (0.5 mg Intravenous Given 7/24/23 2238)       Diagnostic Studies  Results Reviewed     Procedure Component Value Units Date/Time    C-reactive protein [[id number]]  (Abnormal) Collected: 07/24/23 2224    Lab Status: Final result Specimen: Blood from Arm, Left Updated: 07/24/23 2331     CRP 73.3 mg/L     Comprehensive metabolic panel [[id number]]  (Abnormal) Collected: 07/24/23 2224    Lab Status: Final result Specimen: Blood from Arm, Left Updated: 07/24/23 2317     Sodium 136 mmol/L      Potassium 4.0 mmol/L      Chloride 99 mmol/L      CO2 27 mmol/L      ANION GAP 10 mmol/L      BUN 32 mg/dL      Creatinine 1.49 mg/dL      Glucose 144 mg/dL      Calcium 9.0 mg/dL      AST 17 U/L      ALT 3 U/L      Alkaline Phosphatase 52 U/L      Total Protein 6.8 g/dL      Albumin 4.0 g/dL      Total Bilirubin 0.49 mg/dL      eGFR 32 ml/min/1.73sq m     Narrative:      National Kidney Disease Foundation guidelines for Chronic Kidney Disease (CKD):   •  Stage 1 with normal or high GFR (GFR > 90 mL/min/1.73 square meters)  •  Stage 2 Mild CKD (GFR = 60-89 mL/min/1.73 square meters)  •  Stage 3A Moderate CKD (GFR = 45-59 mL/min/1.73 square meters)  •  Stage 3B Moderate CKD (GFR = 30-44 mL/min/1.73 square meters)  •  Stage 4 Severe CKD (GFR = 15-29 mL/min/1.73 square meters)  •  Stage 5 End Stage CKD (GFR <15 mL/min/1.73 square meters)  Note: GFR calculation is accurate only with a steady state creatinine    Procalcitonin [[id number]]  (Normal) Collected: 07/24/23 2224    Lab Status: Final result Specimen: Blood from Arm, Left Updated: 07/24/23 2303     Procalcitonin 0.07 ng/ml     Lactic acid [[id number]]  (Normal) Collected: 07/24/23 2224    Lab Status: Final result Specimen: Blood from Arm, Left Updated: 07/24/23 2256     LACTIC ACID 1.7 mmol/L     Narrative:      Result may be elevated if tourniquet was used during collection. Ansley Hernandez [[id number]]  (Normal) Collected: 07/24/23 2224    Lab Status: Final result Specimen: Blood from Arm, Left Updated: 07/24/23 2248     Protime 12.9 seconds      INR 0.97    APTT [[id number]]  (Normal) Collected: 07/24/23 2224    Lab Status: Final result Specimen: Blood from Arm, Left Updated: 07/24/23 2248     PTT 23 seconds     Blood culture #2 [[id number]] Collected: 07/24/23 2237    Lab Status:  In process Specimen: Blood from Arm, Left Updated: 07/24/23 2242    Sedimentation rate, automated [[id number]]  (Abnormal) Collected: 07/24/23 2224    Lab Status: Final result Specimen: Blood from Arm, Left Updated: 07/24/23 2236     Sed Rate 61 mm/hour     CBC and differential [[id number]]  (Abnormal) Collected: 07/24/23 2224    Lab Status: Final result Specimen: Blood from Arm, Left Updated: 07/24/23 2231     WBC 11.24 Thousand/uL      RBC 3.58 Million/uL      Hemoglobin 11.0 g/dL      Hematocrit 35.6 %      MCV 99 fL      MCH 30.7 pg      MCHC 30.9 g/dL      RDW 14.4 % MPV 10.5 fL      Platelets 245 Thousands/uL      nRBC 0 /100 WBCs      Neutrophils Relative 77 %      Immat GRANS % 0 %      Lymphocytes Relative 15 %      Monocytes Relative 7 %      Eosinophils Relative 1 %      Basophils Relative 0 %      Neutrophils Absolute 8.51 Thousands/µL      Immature Grans Absolute 0.05 Thousand/uL      Lymphocytes Absolute 1.71 Thousands/µL      Monocytes Absolute 0.81 Thousand/µL      Eosinophils Absolute 0.12 Thousand/µL      Basophils Absolute 0.04 Thousands/µL     Blood culture #1 [[id number]] Collected: 07/24/23 2224    Lab Status: In process Specimen: Blood from Arm, Left Updated: 07/24/23 2229    UA w Reflex to Microscopic w Reflex to Culture [[id number]]     Lab Status: No result Specimen: Urine                  XR shoulder 2+ views RIGHT   ED Interpretation by Nataliia Gomez MD (07/24 2309)   No acute osseous abnormality as interpreted by myself. Procedures  Procedures      ED Course  ED Course as of 07/24/23 2358   Mon Jul 24, 2023   2233 WBC(!): 11.24   2235 Neutrophils %(!): 77   2236 Sed Rate(!): 61   2322 Creatinine(!): 1.49  baseline                             SBIRT 22yo+    Flowsheet Row Most Recent Value   Initial Alcohol Screen: US AUDIT-C     1. How often do you have a drink containing alcohol? 1 Filed at: 07/24/2023 2240   2. How many drinks containing alcohol do you have on a typical day you are drinking? 1 Filed at: 07/24/2023 2240   3a. Male UNDER 65: How often do you have five or more drinks on one occasion? 0 Filed at: 07/24/2023 2240   3b. FEMALE Any Age, or MALE 65+: How often do you have 4 or more drinks on one occassion? 0 Filed at: 07/24/2023 2240   Audit-C Score 2 Filed at: 07/24/2023 2240   BELIA: How many times in the past year have you. .. Used an illegal drug or used a prescription medication for non-medical reasons?  Never Filed at: 07/24/2023 2240                Medical Decision Making  13-year-old female with recent shoulder replacement surgery presenting emergency department with fevers and increasing shoulder pain concerning for possible infection of the joint space. Will obtain labs, x-ray to evaluate. Treating symptomatically with pain medication. Initial work-up concerning with elevated inflammatory markers and white blood cell count. Will initiate cefepime and admit patient for further antibiotics as well as orthopedic consultation. No concern at this time for alternative source of infection. Amount and/or Complexity of Data Reviewed  Labs: ordered. Decision-making details documented in ED Course. Radiology: ordered and independent interpretation performed. Risk  OTC drugs. Prescription drug management. Decision regarding hospitalization. Disposition  Final diagnoses:   Shoulder pain   SIRS (systemic inflammatory response syndrome) ([address])     Time reflects when diagnosis was documented in both MDM as applicable and the Disposition within this note     Time User Action Codes Description Comment    7/24/2023 11:54 PM Nadia Sampson Add [M25.519] Shoulder pain     7/24/2023 11:54 PM Ryland Zelaya Add [R65.10] SIRS (systemic inflammatory response syndrome) [facility])       ED Disposition     ED Disposition   Admit    Condition   Stable    Date/Time   Mon Jul 24, 2023 11:54 PM    Comment   Case was discussed with MARLON and the patient's admission status was agreed to be Admission Status: inpatient status to the service of Dr. Brunilda Sapp . Follow-up Information    None         Patient's Medications   Discharge Prescriptions    No medications on file     No discharge procedures on file. PDMP Review       Value Time User    PDMP Reviewed  Yes 7/24/2023 10:12 PM Brittany Score, DO           ED Provider  Attending physically available and evaluated Emiliana Morgan. I managed the patient along with the ED Attending.     Electronically Signed by         Nadia Sampson MD  07/24/23 3101

## 2023-07-25 NOTE — PHYSICAL THERAPY NOTE
Physical Therapy Evaluation:    2 forms of pt ID verified:name,birthdate and pt ID molly    Patient's Name: Edna Duarte    Admitting Diagnosis  Shoulder pain [M25.519]  SIRS (systemic inflammatory response syndrome) ([address]) [R65.10]    Problem List  Patient Active Problem List   Diagnosis    Anemia    Chronic gout    Stage 3b chronic kidney disease ([address])    Combined form of senile cataract of left eye    Depression, recurrent ([address])    Fibromyalgia    Homocysteinemia    Hyperlipidemia    Hypothyroidism    Primary osteoarthritis of both hands    Vitamin D deficiency    Symptomatic varicose veins of both lower extremities    Intermittent claudication ([address])    Type 2 diabetes mellitus without complication, without long-term current use of insulin ([address])    Nephrosclerosis, stage 1-4 or unspecified chronic kidney disease    Arthritis    Idiopathic chronic gout with tophus    Idiopathic thrombocytopenic purpura ([address])    Osteoarthritis of multiple joints    Post menopausal syndrome    Restless legs syndrome    Ambulatory dysfunction with recurrent falls    Sepsis ([address])    Impaired vision    Bilateral hearing loss    PMR (polymyalgia rheumatica) (HCC)    Chronic combined systolic (congestive) and diastolic (congestive) heart failure (HCC)    Obesity (BMI 35.0-39.9 without comorbidity)    Rotator cuff tear arthropathy of right shoulder       Past Medical History  Past Medical History:   Diagnosis Date    Abnormal blood chemistry     last assessed 11/13/2013    Angina pectoris ([address])     last assessed 11/012/13    Arthritis     last assessed 11/13/2013    Asthma     Athlete's foot     resolved 10/11/17    Cataract     last assessed 03/18/2016    Chronic kidney disease     Stage III    Diabetes mellitus ([address])     type 2    Diabetes mellitus with kidney disease ([address])     last assessed 10/03/17    Disease of thyroid gland     Eczema     Gait disorder     last assessed 11/12/2013    Gout     Hammer toe     Hyperlipidemia Hypertension     Idiopathic thrombocytopenia purpura (HCC)     last assessed 08/06/15    Overactive bladder     Post menopausal syndrome     resolved 10/11/17    Restless legs syndrome     last assessed 10/24/2013    Subclinical hypothyroidism     last assessed 12/11/17    Urinary frequency     resolved 12/18/2014       Past Surgical History  Past Surgical History:   Procedure Laterality Date    ACHILLES TENDON REPAIR Right     PA ARTHROPLASTY GLENOHUMERAL JOINT TOTAL SHOULDER Right 6/7/2023    Procedure: REVERSE TOTAL SHOULDER REPLACEMENT;  Surgeon: Lul Downing MD;  Location: [facility];  Service: Orthopedics    REPLACEMENT TOTAL KNEE Bilateral         07/25/23 1155   PT Last Visit   PT Visit Date 07/25/23   Note Type   Note type Evaluation   Pain Assessment   Pain Assessment Tool 0-10   Pain Score 7   Pain Location/Orientation Orientation: Right;Location: Shoulder   Effect of Pain on Daily Activities use of RUE, WB RUE, mobility   Hospital Pain Intervention(s) Repositioned;Cold applied; Ambulation/increased activity; Elevated; Emotional support;Relaxation technique; Rest   Restrictions/Precautions   Weight Bearing Precautions Per Order Yes   RUE Weight Bearing Per Order NWB  (per ortho  PA, [place])   Other Precautions Bed Alarm; Chair Alarm;WBS;Multiple lines; Fall Risk;Pain  (body habitus)   Home Living   Type of [address] [place] [address]  One level  ([place])   Home Equipment Walker;Cane   Additional Comments pt lives with sister in [address] [place] [address] with [place], use of RW within the home and use of SPC outside of the home, was receiving outpt PT services PTA, reports x1 recent fall   Prior Function   Level of [place] Independent with ADLs; Independent with functional mobility; Needs assistance with IADLS   Lives With Family  (sister)   Receives Help From [facility] in the last 6 months 1 to 4  (x1)   General   Additional Pertinent History sepsis, recent R shoulder total replacement in June 2023   Family/Caregiver Present No   Cognition   Overall Cognitive Status WFL   Arousal/Participation Cooperative   Attention Attends with cues to redirect   Orientation Level Oriented to person;Oriented to place;Oriented to time;Oriented to situation   Following Commands Follows one step commands with increased time or repetition   Comments 2* inc pain and weakness   Subjective   Subjective Pt supine in bed resting comfortably in guarding position 2* RUE pain and discomfort. Pt willing and agreeable to work with PT services and to participate in therapy intervention. "I'm in so much pain, it is sometimes unbearable"   RLE Assessment   RLE Assessment   (3+/5 grossly throughout)   LLE Assessment   LLE Assessment   (at least 3+/5 grossly thorughout)   Coordination   Movements are Fluid and Coordinated 0   Coordination and Movement Description pain, dec BLE step length, ataxic and unsteady gait and movement pattern, limited mobility 2* inc pain and discomfort with inability to use RUE during PT eval   Sensation WFL   Light Touch   RLE Light Touch Grossly intact   LLE Light Touch Grossly intact   Bed Mobility   Supine to Sit 4  Minimal assistance   Additional items Assist x 2;HOB elevated; Bedrails; Increased time required;Verbal cues;LE management   Sit to Supine 3  Moderate assistance   Additional items Assist x 2;Bedrails; Increased time required;Verbal cues;LE management   Transfers   Sit to Stand 3  Moderate assistance   Additional items Assist x 2;Bedrails; Increased time required;Verbal cues   Stand to Sit 3  Moderate assistance   Additional items Assist x 2;Bedrails; Increased time required;Verbal cues   Ambulation/Elevation   Gait pattern Poor UE support; Improper Weight shift; Antalgic;Narrow GABY; Forward Flexion; Shuffling; Inconsistent nixon; Foward flexed; Short stride; Ataxia; Step to;Excessively slow   Gait Assistance 3  Moderate assist   Additional items Assist x 1;Verbal cues; Tactile cues   Assistive Device   (HHA)   Distance 4 sidesteps towards HOB with HHA; dec activity tolerance and limited ambulation distance 2* inc pain, weakness. Balance   Static Sitting Fair   Dynamic Sitting   (zero)   Static Standing Poor   Dynamic Standing Poor   Ambulatory Poor   Endurance Deficit   Endurance Deficit Yes   Endurance Deficit Description pain, weakness   Activity Tolerance   Activity Tolerance Patient limited by fatigue;Patient limited by pain  (poor)   Medical Staff Made Aware OTR Adin   Nurse Made Aware yes   Assessment   Prognosis Fair   Problem List Decreased strength;Decreased endurance; Impaired balance;Decreased mobility;Obesity; Decreased skin integrity;Pain;Orthopedic restrictions   Assessment Pt is a 77 yo female admitted [facility] 2* sepsis and recent R shoulder replacement surgical procedure June 2023. Pt lives with family in [place] , (+)[place], use of RW vs SPC interchangeably for mobility PTA, reports x1 recent fall. Pt currently is not at functional mobility baseline, needs significant A for mobility 2* inc pain and weakness,multiple lines, fall risk. Pt demonstrates moderate to minimal deficits during functional mobility and gait including dec endurance, dec balance, dec BLE strength, inc pain, ataxic and unsteady gait and movement pattern and needs min Ax2 for bed mobility, modAX2 for TT and modAx1 for GT with HHA.  Pt would cont to benefit from skilled inpt PT services to maximize functional independence and to dec caregiver burden upon being DC from the hospital.   Barriers to Discharge Inaccessible home environment  ([place])   Goals   Patient Goals to dec the pain   STG Expiration Date 08/04/23   Short Term Goal #1 in 7-10 days:  (1) Pt will be able to ambulate greater than 100 feet with use of appropriate and safe DME on various surfaces needing min to S level of A and abiding to NWB RUE precautions in order to A pt to return to PLOF, (2) activity tolerance:45 mins/45mins, (3) pt will be able to perform sit to stand transfers needing min to S level of A to and from various surfaces consistently in order to return to PLOF, (4) pt will be able to perform BM needing min to S level of A to A pt to return to PLOF, (5) (I) with BLE therapeutic ex HEP in various positions to A pt to inc balance,strength,mobility,endurance and to A to dec pain, (6) inc balance 1/2 grade in order to dec fall risk, (7) pt will be able to go up and down 4 steps needing min to S level of A in order to navigate [place] as able and as needed prior to D/C, (8) cont to provide pt and pt family education for safe D/C planning, (9) inc BLE strength 1/2 to 1 full grade in order to A pt to inc balance,strength,mobility,endurance and to A to dec pain   PT Treatment Day 0   Plan   Treatment/Interventions Functional transfer training;LE strengthening/ROM; Elevations; Therapeutic exercise; Endurance training;Patient/family training;Equipment eval/education; Bed mobility;Gait training;Continued evaluation;Spoke to nursing;OT   PT Frequency 3-5x/wk   Recommendation   PT Discharge Recommendation Post acute rehabilitation services   Equipment Recommended   (TBD)   AM-PAC Basic Mobility Inpatient   Turning in Flat Bed Without Bedrails 2   Lying on Back to Sitting on Edge of Flat Bed Without Bedrails 2   Moving Bed to Chair 2   Standing Up From Chair Using Arms 2   Walk in Room 2   Climb 3-5 Stairs With Railing 2   Basic Mobility Inpatient Raw Score 12   Basic Mobility Standardized Score 32.23   Highest Level Of Mobility   JH-HLM Goal 4: Move to chair/commode   JH-HLM Achieved 5: Stand (1 or more minutes)           @Gianna Go, PT, DPT@

## 2023-07-25 NOTE — ED NOTES
Pt states she is experiencing numbness in the fingertips of the right hand. Pt alert and oriented with full sensation in all extremities. Provider notified.      [address] Har[address] Rosario, RN  07/24/23 3023

## 2023-07-25 NOTE — PLAN OF CARE
Problem: PHYSICAL THERAPY ADULT  Goal: Performs mobility at highest level of function for planned discharge setting. See evaluation for individualized goals. Description: Treatment/Interventions: Functional transfer training, LE strengthening/ROM, Elevations, Therapeutic exercise, Endurance training, Patient/family training, Equipment eval/education, Bed mobility, Gait training, Continued evaluation, Spoke to nursing, OT  Equipment Recommended:  (TBD)       See flowsheet documentation for full assessment, interventions and recommendations. Note: Prognosis: Fair  Problem List: Decreased strength, Decreased endurance, Impaired balance, Decreased mobility, Obesity, Decreased skin integrity, Pain, Orthopedic restrictions  Assessment: Pt is a 77 yo female admitted to [facility] 2* sepsis and recent R shoulder replacement surgical procedure June 2023. Pt lives with family in [place], (+)[place], use of RW vs SPC interchangeably for mobility PTA, reports x1 recent fall. Pt currently is not at functional mobility baseline, needs significant A for mobility 2* inc pain and weakness,multiple lines, fall risk. Pt demonstrates moderate to minimal deficits during functional mobility and gait including dec endurance, dec balance, dec BLE strength, inc pain, ataxic and unsteady gait and movement pattern and needs min Ax2 for bed mobility, modAX2 for TT and modAx1 for GT with HHA. Pt would cont to benefit from skilled inpt PT services to maximize functional independence and to dec caregiver burden upon being DC from the hospital.  Barriers to Discharge: Inaccessible home environment ([place])     PT Discharge Recommendation: Post acute rehabilitation services    See flowsheet documentation for full assessment.

## 2023-07-25 NOTE — H&P
[address] [place] [address]  H&P  Name: Norris London 78 y.o. female I MRN: [id number]  Unit/Bed#: E2 -02 I Date of Admission: 7/24/2023   Date of Service: 7/25/2023 I Hospital Day: 1      Assessment/Plan   * Sepsis [facility])  Assessment & Plan  SIRS met with fever, leukocytosis, presumed source being her shoulder. Worsening R shoulder pain over days now with fevers.  No other complaints   Leukocytosis, procal/lactate WNL, ESR/CRP elevation   XR shoulder reviewed by me prosthetic appears in-place without osseous abnormalities, await rads read     Plan:  • Orthopedics consulted  • ABX: vanco/ceftriaxone narrow as appropriate  • IVF: bolus PRN  • Trend fever/WBC/procal curves  • Follow cultures      Rotator cuff tear arthropathy of right shoulder  Assessment & Plan  R shoulder replacement completed at beginning of June     Plan:  • Orthopedics consulted  • PT/OT      Chronic combined systolic (congestive) and diastolic (congestive) heart failure ([facility])  Assessment & Plan  Wt Readings from Last 3 Encounters:   07/25/23 104 kg (230 lb 6.1 oz)   06/07/23 108 kg (237 lb 7 oz)   05/24/23 108 kg (239 lb)     Appears euvolemic    Plan:  • Continue metoprolol 25mg daily, lasix 40mg daily  • Monitor daily weight, I/Os, cardiac diet (sodium restriction), monitor K>4, Mg>2         PMR (polymyalgia rheumatica) ([facility])  Assessment & Plan  Maintained on 5mg prednisone daily    Type 2 diabetes mellitus without complication, without long-term current use of insulin ([facility])  Assessment & Plan  Lab Results   Component Value Date    HGBA1C 6.6 (H) 05/04/2023     Plan:  • BG goal 140-200 while inpatient, ACHS sliding scale, Carb coverage diet  • Long acting: none  • PTA regimen: none    Stage 3b chronic kidney disease [facility])  Assessment & Plan  Lab Results   Component Value Date    EGFR 32 07/24/2023    EGFR 31 06/08/2023    EGFR 26 11/09/2022    CREATININE 1.49 (H) 07/24/2023    CREATININE 1.54 (H) 06/08/2023    CREATININE 1.79 (H) 11/09/2022     In the setting of diabetic/hypertensive nephropathy   Baseline Cr: 1.5-1.7    Plan:  • Admit Cr: 1.49 > trend  • Monitor renal function, renal dose medications, maintain normotension/euvolemia, avoid nephrotoxic agents, I&Os    Anemia  Assessment & Plan  Multifactorial, at baseline     VTE Pharmacologic Prophylaxis: VTE Score: 4 Moderate Risk (Score 3-4) - Pharmacological DVT Prophylaxis Ordered: enoxaparin (Lovenox). Code Status: Level 1 - Full Code   Discussion with family: update in AM/    Anticipated Length of Stay: Patient will be admitted on an inpatient basis with an anticipated length of stay of greater than 2 midnights secondary to sepsis. Total Time Spent on Date of Encounter in care of patient: 75 minutes This time was spent on one or more of the following: performing physical exam; counseling and coordination of care; obtaining or reviewing history; documenting in the medical record; reviewing/ordering tests, medications or procedures; communicating with other healthcare professionals and discussing with patient's family/caregivers. Chief Complaint: shoulder pain    History of Present Illness:  Tata sDouza is a 78 y.o. female with a PMH of HTN, T2DM, CKD, PMR, OA who presents with shoulder pain. History obtained from patient, chart review, discussion with ED resident. She states that over the past few days her shoulder has progressively gotten more and more painful. Now she is having fevers associated with it. No inciting events or traumatic injury. Her shoulder was doing well until this point. The pain is sharp and radiates into her scapula. Denies other complaints like cough/congestion/shortness of breath, abdominal pain/nausea/vomiting/diarrhea, urinary frequency/burning/hematuria. No known skin wounds. Review of Systems:  Review of Systems   Constitutional: Positive for chills and fever. Respiratory: Negative for shortness of breath.     Cardiovascular: Negative for chest pain and palpitations. Gastrointestinal: Negative for abdominal pain, diarrhea, nausea and vomiting. Musculoskeletal: Positive for arthralgias. Neurological: Negative for syncope. Past Medical and Surgical History:   Past Medical History:   Diagnosis Date   • Abnormal blood chemistry     last assessed 11/13/2013   • Angina pectoris ([address])     last assessed 11/012/13   • Arthritis     last assessed 11/13/2013   • Asthma    • Athlete's foot     resolved 10/11/17   • Cataract     last assessed 03/18/2016   • Chronic kidney disease     Stage III   • Diabetes mellitus ([address])     type 2   • Diabetes mellitus with kidney disease ([address])     last assessed 10/03/17   • Disease of thyroid gland    • Eczema    • Gait disorder     last assessed 11/12/2013   • Gout    • Hammer toe    • Hyperlipidemia    • Hypertension    • Idiopathic thrombocytopenia purpura ([address])     last assessed 08/06/15   • Overactive bladder    • Post menopausal syndrome     resolved 10/11/17   • Restless legs syndrome     last assessed 10/24/2013   • Subclinical hypothyroidism     last assessed 12/11/17   • Urinary frequency     resolved 12/18/2014       Past Surgical History:   Procedure Laterality Date   • ACHILLES TENDON REPAIR Right    • IL ARTHROPLASTY GLENOHUMERAL JOINT TOTAL SHOULDER Right 6/7/2023    Procedure: REVERSE TOTAL SHOULDER REPLACEMENT;  Surgeon: Apple Kulkarni MD;  Location: [facility];  Service: Orthopedics   • REPLACEMENT TOTAL KNEE Bilateral        Meds/Allergies:  Prior to Admission medications    Medication Sig Start Date End Date Taking?  Authorizing Provider   acetaminophen (TYLENOL) 325 mg tablet Take 2 tablets (650 mg total) by mouth every 6 (six) hours as needed for mild pain 6/8/23  Yes Willi Bennett PA-C   albuterol (ProAir HFA) 90 mcg/act inhaler Inhale 2 puffs every 6 (six) hours as needed for wheezing 1/5/22  Yes Madhav Acosta DO   allopurinol (ZYLOPRIM) 100 mg tablet Take 100 mg by mouth 2 (two) times a day 1/18/22  Yes Historical Provider, MD   aspirin 325 mg tablet Take 1 tablet (325 mg total) by mouth daily 6/8/23  Yes Blanca Biggs PA-C   cetirizine (ZyrTEC) 10 mg tablet Take 10 mg by mouth daily   Yes Historical Provider, MD   cholecalciferol (VITAMIN D3) 1,000 units tablet Take 1,000 Units by mouth daily   Yes Historical Provider, MD   citalopram (CeleXA) 40 mg tablet TAKE ONE TABLET BY MOUTH EVERY DAY  Patient taking differently: Take 40 mg by mouth every morning 5/19/23  Yes Lance Denise DO   Fexofenadine HCl (ALLEGRA PO) Take 1 capsule by mouth daily   Yes Historical Provider, MD   furosemide (LASIX) 40 mg tablet TAKE ONE TABLET BY MOUTH EVERY DAY 5/26/23  Yes Lance Denise DO   gabapentin (NEURONTIN) 100 mg capsule Take 1 tablet by mouth 2 (two) times a day   Yes Historical Provider, MD   levothyroxine 50 mcg tablet Take 1 tablet (50 mcg total) by mouth daily  Patient taking differently: Take 50 mcg by mouth every morning 12/1/22  Yes Lance Denise DO   metoprolol tartrate (LOPRESSOR) 25 mg tablet Take 1 tablet (25 mg total) by mouth in the morning  Patient taking differently: Take 25 mg by mouth every morning 11/30/22  Yes Lance Denise DO   potassium chloride (K-DUR,KLOR-CON) 20 mEq tablet Take 1 tablet (20 mEq total) by mouth daily  Patient taking differently: Take 20 mEq by mouth every morning 11/30/22  Yes Lance Denise DO   predniSONE 5 mg tablet Take 5 mg by mouth every morning   Yes Historical Provider, MD   simvastatin (ZOCOR) 10 mg tablet Take 1 tablet (10 mg total) by mouth every morning 6/26/23  Yes Lance Denise DO   docusate sodium (COLACE) 100 mg capsule Take 1 capsule (100 mg total) by mouth 2 (two) times a day  Patient not taking: Reported on 7/25/2023 6/8/23   Blanca Biggs PA-C   senna (SENOKOT) 8.6 mg Take 1 tablet (8.6 mg total) by mouth daily  Patient not taking: Reported on 7/25/2023 6/8/23   Blanca Biggs PA-C     I have reviewed home medications using recent Epic encounter. Allergies: Allergies   Allergen Reactions   • Ace Inhibitors Angioedema   • Angiotensin Receptor Blockers Anaphylaxis and Tongue Swelling   • Prochlorperazine Anaphylaxis     lethargy, tongue and throat swelling   • Ramipril Anaphylaxis and Angioedema   • Pollen Extract Hives       Social History:  Marital Status: Single   Occupation:   Patient Pre-hospital Living Situation: Home  Patient Pre-hospital Level of Mobility: walks  Patient Pre-hospital Diet Restrictions:   Substance Use History:   Social History     Substance and Sexual Activity   Alcohol Use Yes    Comment: Socially     Social History     Tobacco Use   Smoking Status Never   Smokeless Tobacco Never   Tobacco Comments    per allscripts; former smoker     Social History     Substance and Sexual Activity   Drug Use Never       Family History:  Family History   Problem Relation Age of Onset   • Leukemia Mother    • Eczema Father    • Diabetes type II Father    • Diabetes type II Maternal Aunt        Physical Exam:     Vitals:   Blood Pressure: 101/57 (07/25/23 0051)  Pulse: 82 (07/25/23 0051)  Temperature: 97.9 °F (36.6 °C) (07/25/23 0051)  Temp Source: Temporal (07/25/23 0051)  Respirations: 20 (07/25/23 0051)  Height: 5' 7" (170.2 cm) (07/25/23 0051)  Weight - Scale: 104 kg (230 lb 6.1 oz) (07/25/23 0051)  SpO2: 96 % (07/25/23 0051)    Physical Exam  Vitals and nursing note reviewed. Constitutional:       General: She is not in acute distress. Appearance: She is well-developed. HENT:      Head: Normocephalic and atraumatic. Eyes:      Conjunctiva/sclera: Conjunctivae normal.   Cardiovascular:      Rate and Rhythm: Normal rate and regular rhythm. Heart sounds: No murmur heard. Pulmonary:      Effort: Pulmonary effort is normal. No respiratory distress. Breath sounds: Normal breath sounds. Abdominal:      Palpations: Abdomen is soft. Tenderness: There is no abdominal tenderness.    Musculoskeletal:         General: No swelling. Cervical back: Neck supple. Comments: R shoulder tenderness, no palpable effusion, pain with active/passive ROM. Surgical site intact   Skin:     General: Skin is warm and dry. Capillary Refill: Capillary refill takes less than 2 seconds. Neurological:      Mental Status: She is alert. Psychiatric:         Mood and Affect: Mood normal.          Additional Data:     Lab Results:  Results from last 7 days   Lab Units 07/24/23  2224   WBC Thousand/uL 11.24*   HEMOGLOBIN g/dL 11.0*   HEMATOCRIT % 35.6   PLATELETS Thousands/uL 194   NEUTROS PCT % 77*   LYMPHS PCT % 15   MONOS PCT % 7   EOS PCT % 1     Results from last 7 days   Lab Units 07/24/23  2224   SODIUM mmol/L 136   POTASSIUM mmol/L 4.0   CHLORIDE mmol/L 99   CO2 mmol/L 27   BUN mg/dL 32*   CREATININE mg/dL 1.49*   ANION GAP mmol/L 10   CALCIUM mg/dL 9.0   ALBUMIN g/dL 4.0   TOTAL BILIRUBIN mg/dL 0.49   ALK PHOS U/L 52   ALT U/L 3*   AST U/L 17   GLUCOSE RANDOM mg/dL 144*     Results from last 7 days   Lab Units 07/24/23  2224   INR  0.97             Results from last 7 days   Lab Units 07/24/23  2224   LACTIC ACID mmol/L 1.7   PROCALCITONIN ng/ml 0.07       Lines/Drains:  Invasive Devices     Peripheral Intravenous Line  Duration           Peripheral IV 07/24/23 Left Antecubital <1 day    Peripheral IV 07/24/23 Left;Ventral (anterior) Forearm <1 day                    Imaging: Reviewed radiology reports from this admission including: xray(s) and Personally reviewed the following imaging: xray(s)  XR shoulder 2+ views RIGHT   ED Interpretation by Barak Agudelo MD (07/24 2309)   No acute osseous abnormality as interpreted by myself. EKG and Other Studies Reviewed on Admission:   · EKG: NSR. HR 74.    ** Please Note: This note has been constructed using a voice recognition system.  **

## 2023-07-26 ENCOUNTER — APPOINTMENT (INPATIENT)
Dept: CT IMAGING | Facility: HOSPITAL | Age: 78
DRG: 507 | End: 2023-07-26
Payer: COMMERCIAL

## 2023-07-26 ENCOUNTER — APPOINTMENT (INPATIENT)
Dept: RADIOLOGY | Facility: HOSPITAL | Age: 78
DRG: 507 | End: 2023-07-26
Payer: COMMERCIAL

## 2023-07-26 PROBLEM — R65.10 SIRS (SYSTEMIC INFLAMMATORY RESPONSE SYNDROME) (HCC): Status: ACTIVE | Noted: 2020-12-03

## 2023-07-26 PROBLEM — M54.9 ACUTE BACK PAIN: Status: ACTIVE | Noted: 2017-06-15

## 2023-07-26 LAB
ANION GAP SERPL CALCULATED.3IONS-SCNC: 6 MMOL/L
APPEARANCE FLD: ABNORMAL
BASOPHILS # BLD AUTO: 0.04 THOUSANDS/ÂΜL (ref 0–0.1)
BASOPHILS NFR BLD AUTO: 0 % (ref 0–1)
BILIRUB UR QL STRIP: NEGATIVE
BUN SERPL-MCNC: 27 MG/DL (ref 5–25)
CALCIUM SERPL-MCNC: 8.8 MG/DL (ref 8.4–10.2)
CHLORIDE SERPL-SCNC: 101 MMOL/L (ref 96–108)
CLARITY UR: CLEAR
CO2 SERPL-SCNC: 31 MMOL/L (ref 21–32)
COLOR FLD: ABNORMAL
COLOR UR: COLORLESS
CREAT SERPL-MCNC: 1.43 MG/DL (ref 0.6–1.3)
CRYSTALS SNV QL MICRO: NORMAL
EOSINOPHIL # BLD AUTO: 0.23 THOUSAND/ÂΜL (ref 0–0.61)
EOSINOPHIL NFR BLD AUTO: 2 % (ref 0–6)
ERYTHROCYTE [DISTWIDTH] IN BLOOD BY AUTOMATED COUNT: 14.4 % (ref 11.6–15.1)
GFR SERPL CREATININE-BSD FRML MDRD: 34 ML/MIN/1.73SQ M
GLUCOSE SERPL-MCNC: 100 MG/DL (ref 65–140)
GLUCOSE SERPL-MCNC: 135 MG/DL (ref 65–140)
GLUCOSE SERPL-MCNC: 144 MG/DL (ref 65–140)
GLUCOSE SERPL-MCNC: 222 MG/DL (ref 65–140)
GLUCOSE SERPL-MCNC: 94 MG/DL (ref 65–140)
GLUCOSE UR STRIP-MCNC: NEGATIVE MG/DL
HCT VFR BLD AUTO: 33.2 % (ref 34.8–46.1)
HGB BLD-MCNC: 10.2 G/DL (ref 11.5–15.4)
HGB UR QL STRIP.AUTO: NEGATIVE
HISTIOCYTES NFR SNV MANUAL: 3 %
IMM GRANULOCYTES # BLD AUTO: 0.06 THOUSAND/UL (ref 0–0.2)
IMM GRANULOCYTES NFR BLD AUTO: 1 % (ref 0–2)
KETONES UR STRIP-MCNC: NEGATIVE MG/DL
LEUKOCYTE ESTERASE UR QL STRIP: NEGATIVE
LYMPHOCYTES # BLD AUTO: 1.94 THOUSANDS/ÂΜL (ref 0.6–4.47)
LYMPHOCYTES # SNV MANUAL: 6 %
LYMPHOCYTES NFR BLD AUTO: 19 % (ref 14–44)
MCH RBC QN AUTO: 31.1 PG (ref 26.8–34.3)
MCHC RBC AUTO-ENTMCNC: 30.7 G/DL (ref 31.4–37.4)
MCV RBC AUTO: 101 FL (ref 82–98)
MONOCYTES # BLD AUTO: 0.73 THOUSAND/ÂΜL (ref 0.17–1.22)
MONOCYTES NFR BLD AUTO: 7 % (ref 4–12)
MONOCYTES NFR SNV MANUAL: 8 %
NEUTROPHILS # BLD AUTO: 7.03 THOUSANDS/ÂΜL (ref 1.85–7.62)
NEUTROPHILS NFR SNV MANUAL: 83 %
NEUTS SEG NFR BLD AUTO: 71 % (ref 43–75)
NITRITE UR QL STRIP: NEGATIVE
NRBC BLD AUTO-RTO: 0 /100 WBCS
PH UR STRIP.AUTO: 5.5 [PH]
PLATELET # BLD AUTO: 164 THOUSANDS/UL (ref 149–390)
PMV BLD AUTO: 10.1 FL (ref 8.9–12.7)
POTASSIUM SERPL-SCNC: 3.5 MMOL/L (ref 3.5–5.3)
PROCALCITONIN SERPL-MCNC: 0.11 NG/ML
PROT UR STRIP-MCNC: NEGATIVE MG/DL
RBC # BLD AUTO: 3.28 MILLION/UL (ref 3.81–5.12)
RBC # SNV MANUAL: ABNORMAL /UL (ref 0–10)
SARS-COV-2 RNA RESP QL NAA+PROBE: NEGATIVE
SITE: ABNORMAL
SODIUM SERPL-SCNC: 138 MMOL/L (ref 135–147)
SP GR UR STRIP.AUTO: 1.01 (ref 1–1.03)
TOTAL CELLS COUNTED SPEC: 100
UROBILINOGEN UR STRIP-ACNC: <2 MG/DL
WBC # BLD AUTO: 10.03 THOUSAND/UL (ref 4.31–10.16)
WBC # FLD MANUAL: 7552 /UL (ref 0–200)

## 2023-07-26 PROCEDURE — 81003 URINALYSIS AUTO W/O SCOPE: CPT

## 2023-07-26 PROCEDURE — 84145 PROCALCITONIN (PCT): CPT

## 2023-07-26 PROCEDURE — 87070 CULTURE OTHR SPECIMN AEROBIC: CPT | Performed by: PHYSICIAN ASSISTANT

## 2023-07-26 PROCEDURE — 87635 SARS-COV-2 COVID-19 AMP PRB: CPT | Performed by: ORTHOPAEDIC SURGERY

## 2023-07-26 PROCEDURE — 89050 BODY FLUID CELL COUNT: CPT | Performed by: PHYSICIAN ASSISTANT

## 2023-07-26 PROCEDURE — 77002 NEEDLE LOCALIZATION BY XRAY: CPT

## 2023-07-26 PROCEDURE — 73201 CT UPPER EXTREMITY W/DYE: CPT

## 2023-07-26 PROCEDURE — 89060 EXAM SYNOVIAL FLUID CRYSTALS: CPT | Performed by: PHYSICIAN ASSISTANT

## 2023-07-26 PROCEDURE — 99232 SBSQ HOSP IP/OBS MODERATE 35: CPT | Performed by: STUDENT IN AN ORGANIZED HEALTH CARE EDUCATION/TRAINING PROGRAM

## 2023-07-26 PROCEDURE — 89051 BODY FLUID CELL COUNT: CPT | Performed by: PHYSICIAN ASSISTANT

## 2023-07-26 PROCEDURE — 87205 SMEAR GRAM STAIN: CPT | Performed by: PHYSICIAN ASSISTANT

## 2023-07-26 PROCEDURE — 82948 REAGENT STRIP/BLOOD GLUCOSE: CPT

## 2023-07-26 PROCEDURE — 85025 COMPLETE CBC W/AUTO DIFF WBC: CPT | Performed by: STUDENT IN AN ORGANIZED HEALTH CARE EDUCATION/TRAINING PROGRAM

## 2023-07-26 PROCEDURE — G1004 CDSM NDSC: HCPCS

## 2023-07-26 PROCEDURE — 72131 CT LUMBAR SPINE W/O DYE: CPT

## 2023-07-26 PROCEDURE — 80048 BASIC METABOLIC PNL TOTAL CA: CPT | Performed by: STUDENT IN AN ORGANIZED HEALTH CARE EDUCATION/TRAINING PROGRAM

## 2023-07-26 PROCEDURE — 0R9J3ZX DRAINAGE OF RIGHT SHOULDER JOINT, PERCUTANEOUS APPROACH, DIAGNOSTIC: ICD-10-PCS | Performed by: RADIOLOGY

## 2023-07-26 RX ORDER — SODIUM CHLORIDE, SODIUM GLUCONATE, SODIUM ACETATE, POTASSIUM CHLORIDE, MAGNESIUM CHLORIDE, SODIUM PHOSPHATE, DIBASIC, AND POTASSIUM PHOSPHATE .53; .5; .37; .037; .03; .012; .00082 G/100ML; G/100ML; G/100ML; G/100ML; G/100ML; G/100ML; G/100ML
75 INJECTION, SOLUTION INTRAVENOUS CONTINUOUS
Status: DISPENSED | OUTPATIENT
Start: 2023-07-26 | End: 2023-07-27

## 2023-07-26 RX ORDER — LIDOCAINE HYDROCHLORIDE 10 MG/ML
7 INJECTION, SOLUTION EPIDURAL; INFILTRATION; INTRACAUDAL; PERINEURAL
Status: DISCONTINUED | OUTPATIENT
Start: 2023-07-26 | End: 2023-08-01 | Stop reason: HOSPADM

## 2023-07-26 RX ORDER — ACETAMINOPHEN 325 MG/1
975 TABLET ORAL EVERY 8 HOURS SCHEDULED
Status: DISCONTINUED | OUTPATIENT
Start: 2023-07-26 | End: 2023-08-01 | Stop reason: HOSPADM

## 2023-07-26 RX ORDER — FUROSEMIDE 40 MG/1
40 TABLET ORAL DAILY
Status: DISCONTINUED | OUTPATIENT
Start: 2023-07-28 | End: 2023-07-29

## 2023-07-26 RX ORDER — LIDOCAINE 50 MG/G
1 PATCH TOPICAL DAILY
Status: DISCONTINUED | OUTPATIENT
Start: 2023-07-27 | End: 2023-08-01 | Stop reason: HOSPADM

## 2023-07-26 RX ORDER — CHLORHEXIDINE GLUCONATE 0.12 MG/ML
15 RINSE ORAL ONCE
Status: CANCELLED | OUTPATIENT
Start: 2023-07-27 | End: 2023-07-26

## 2023-07-26 RX ORDER — METHOCARBAMOL 500 MG/1
500 TABLET, FILM COATED ORAL EVERY 6 HOURS PRN
Status: DISCONTINUED | OUTPATIENT
Start: 2023-07-26 | End: 2023-08-01 | Stop reason: HOSPADM

## 2023-07-26 RX ORDER — CHLORHEXIDINE GLUCONATE 4 G/100ML
SOLUTION TOPICAL DAILY PRN
Status: CANCELLED | OUTPATIENT
Start: 2023-07-27

## 2023-07-26 RX ADMIN — GABAPENTIN 100 MG: 100 CAPSULE ORAL at 18:15

## 2023-07-26 RX ADMIN — CITALOPRAM HYDROBROMIDE 40 MG: 20 TABLET ORAL at 09:30

## 2023-07-26 RX ADMIN — SODIUM CHLORIDE, SODIUM GLUCONATE, SODIUM ACETATE, POTASSIUM CHLORIDE, MAGNESIUM CHLORIDE, SODIUM PHOSPHATE, DIBASIC, AND POTASSIUM PHOSPHATE 75 ML/HR: .53; .5; .37; .037; .03; .012; .00082 INJECTION, SOLUTION INTRAVENOUS at 18:15

## 2023-07-26 RX ADMIN — INSULIN LISPRO 2 UNITS: 100 INJECTION, SOLUTION INTRAVENOUS; SUBCUTANEOUS at 16:38

## 2023-07-26 RX ADMIN — LORATADINE 10 MG: 10 TABLET ORAL at 09:30

## 2023-07-26 RX ADMIN — ACETAMINOPHEN 325MG 650 MG: 325 TABLET ORAL at 09:33

## 2023-07-26 RX ADMIN — PREDNISONE 5 MG: 5 TABLET ORAL at 09:29

## 2023-07-26 RX ADMIN — ACETAMINOPHEN 325MG 975 MG: 325 TABLET ORAL at 18:15

## 2023-07-26 RX ADMIN — OXYCODONE HYDROCHLORIDE 5 MG: 5 TABLET ORAL at 10:02

## 2023-07-26 RX ADMIN — FUROSEMIDE 40 MG: 40 TABLET ORAL at 09:29

## 2023-07-26 RX ADMIN — GABAPENTIN 100 MG: 100 CAPSULE ORAL at 09:30

## 2023-07-26 RX ADMIN — OXYCODONE HYDROCHLORIDE 5 MG: 5 TABLET ORAL at 16:46

## 2023-07-26 RX ADMIN — OXYCODONE HYDROCHLORIDE 5 MG: 5 TABLET ORAL at 21:38

## 2023-07-26 RX ADMIN — IOHEXOL 100 ML: 350 INJECTION, SOLUTION INTRAVENOUS at 13:17

## 2023-07-26 RX ADMIN — ENOXAPARIN SODIUM 40 MG: 40 INJECTION SUBCUTANEOUS at 09:30

## 2023-07-26 RX ADMIN — LEVOTHYROXINE SODIUM 50 MCG: 50 TABLET ORAL at 05:57

## 2023-07-26 RX ADMIN — ALLOPURINOL 100 MG: 100 TABLET ORAL at 18:15

## 2023-07-26 RX ADMIN — ALLOPURINOL 100 MG: 100 TABLET ORAL at 09:30

## 2023-07-26 RX ADMIN — OXYCODONE HYDROCHLORIDE 5 MG: 5 TABLET ORAL at 05:59

## 2023-07-26 NOTE — ASSESSMENT & PLAN NOTE
Recent right total shoulder replacement  Presented with worsening shoulder pain, concern for possible infection  Joint aspiration completed on 07/26, WBC noted to be greater than 7000  Discussed with Ortho and infectious disease, planning for incision and drainage, washout and antibiotic beads with revision  N.p.o. midnight  Hold antibiotics per ID to increase the yield of cultures

## 2023-07-26 NOTE — ASSESSMENT & PLAN NOTE
Lab Results   Component Value Date    EGFR 34 07/26/2023    EGFR 30 07/25/2023    EGFR 32 07/24/2023    CREATININE 1.43 (H) 07/26/2023    CREATININE 1.57 (H) 07/25/2023    CREATININE 1.49 (H) 07/24/2023     In the setting of diabetic/hypertensive nephropathy   • Renal functions currently stable at baseline of 1.5

## 2023-07-26 NOTE — ASSESSMENT & PLAN NOTE
Presented with fevers, leukocytosis concerning for possible prosthetic joint infection with recent right shoulder replacement  Hold further antibiotics, per infectious disease  Sepsis resolving

## 2023-07-26 NOTE — PROGRESS NOTES
Progress Note - Infectious Disease   Harshad Acosta 78 y.o. female MRN: [id number]  Unit/Bed#: E2 -01 Encounter: [id number]    Impression/Plan:  1. R shoulder pain. In setting of recent reverse shoulder arthroplasty. Patient describes sudden onset of intense pain and immediate loss of ROM. R shoulder xray showed unremarkable appearance of surgical site without hardware complication, unremarkable soft tissues, and no osseous lesions. Consider patient may have a SIRS response to acute injury not detected on xray. Would recommend further imaging for better assessment of tendons, labrum, and musculature. Per MRI tech her hardware will obscure the view. Will consider CT with contrast instead. Orthopedic surgery is following. Aspiration attempt was unsuccessful. Patient has been referred to IR for second attempt. Given patient's clinical stability, antibiotic treatment was discontinued. This will also improve quality of aspiration culture. This morning she remains clinically stable but continues to have significant shoulder pain.  -monitor patient off antibiotics while clinically stable  -monitor CBCD and BMP  -follow up IR aspiration attempt  -recommend R shoulder imaging, may need CT with contrast if MRI imaging will be unreliable due to hardware  -serial R shoulder exams  -continue follow up with orthopedic surgery     2. SIRS. Present on admission. Fever and leukocytosis. Consider possible R shoulder infection. Consider inflammatory response to R shoulder injury not detected on xray. No other clear source appreciated. No acute respiratory or  complaints. Low suspicion for GI source despite her recent diarrhea. Blood cultures are pending. Recommend monitoring patient off additional antibiotics for now.  -monitor patient off antibiotics while clinically stable  -monitor CBCD and BMP  -follow up blood cultures  -monitor vitals  -R shoulder work up as above  -supportive care      3.  Recent R reverse total shoulder replacement. In patient with severe OA and rotator cuff repair. Now with acute onset pain and change in ROM as above.  -continue follow up with orthopedic surgery     4. Type 2 diabetes mellitus without long term insulin use. Patient's last HbA1c was 6.6% on 2023. Elevated blood glucose is risk factor for infection. Recommend tight glycemic control.  -blood glucose management per primary service     5. CKD stage 3b. Upon review of patient's medical record it appears her baseline creatinine is approximately 1.5-1.7. Creatinine has remained within or below baseline during this hospitalization. -monitor creatinine  -dose adjust antibiotics for renal function as needed  -avoid nephrotoxins     6. Obesity. BMI = 35.81. This can effect antibiotic dosing.  -monitor patient weight and dose adjust antibiotics as needed     Above plan was discussed in detail with patient at the bedside. Above plan was discussed in detail with SLIM. Antibiotics:  No current antibiotic use    Subjective:  Patient reports her R shoulder pain and change in ROM is persistent. Reports it's been hard to sleep because the pain is so intense. Still feels the pain is more in posterior aspect of the shoulder and extends up to the R lateral neck region. She has no fever, chills, sweats, shakes; no nausea, vomiting, abdominal pain; she had BM this morning but no diarrhea; no cough, shortness of breath, or chest pain. No new symptoms. Objective:  Vitals:  Temp:  [97 °F (36.1 °C)-97.6 °F (36.4 °C)] 97.4 °F (36.3 °C)  HR:  [70-75] 70  Resp:  [17-18] 17  BP: ()/(49-62) 118/57  SpO2:  [92 %-94 %] 94 %  Temp (24hrs), Av.3 °F (36.3 °C), Min:97 °F (36.1 °C), Max:97.6 °F (36.4 °C)  Current: Temperature: (!) 97.4 °F (36.3 °C)    Physical Exam:   General Appearance:  Alert, interactive, nontoxic, no acute distress. She appeared tearful sitting up in bed. Throat: Oropharynx moist without lesions.     Lungs:   Clear to auscultation bilaterally; no wheezes, rhonchi or rales; respirations unlabored on room air. Heart:  RRR; no murmur, rub or gallop. Abdomen:   Soft, non-tender, non-distended, positive bowel sounds. Extremities: R shoulder with no erythema or edema, surgical incision site well healed without active drainage/bleeding, no leakage at site of aspiration attempt. Patient unable to actively flex or extend the arm, unable to rotate internally or externally, minimally tolerates passive ROM. Skin: No new rashes noted on exposed skin. Labs, Imaging, & Other studies:   All pertinent labs and imaging studies were personally reviewed  Results from last 7 days   Lab Units 07/26/23  0438 07/25/23  0501 07/24/23  2224   WBC Thousand/uL 10.03 12.17* 11.24*   HEMOGLOBIN g/dL 10.2* 10.4* 11.0*   PLATELETS Thousands/uL 164 191 194     Results from last 7 days   Lab Units 07/25/23  0501 07/24/23  2224   POTASSIUM mmol/L 3.3* 4.0   CHLORIDE mmol/L 101 99   CO2 mmol/L 27 27   BUN mg/dL 33* 32*   CREATININE mg/dL 1.57* 1.49*   EGFR ml/min/1.73sq m 30 32   CALCIUM mg/dL 8.7 9.0   AST U/L  --  17   ALT U/L  --  3*   ALK PHOS U/L  --  52     Results from last 7 days   Lab Units 07/24/23  2237 07/24/23  2224   BLOOD CULTURE  Received in Microbiology Lab. Culture in Progress. Received in Microbiology Lab. Culture in Progress.

## 2023-07-26 NOTE — CASE MANAGEMENT
Case Management Assessment & Discharge Planning Note    Patient name Sarita Gutierrez  Location [place] [address] /E2 MS 63-* MRN [id number]  : 1945 Date 2023       Current Admission Date: 2023  Current Admission Diagnosis:Sepsis [facility])   Patient Active Problem List    Diagnosis Date Noted   • Obesity (BMI 35.0-39.9 without comorbidity) 2023   • Rotator cuff tear arthropathy of right shoulder 2023   • Chronic combined systolic (congestive) and diastolic (congestive) heart failure ([address]) 2023   • PMR (polymyalgia rheumatica) ([address]) 2022   • Sepsis ([address]) 2020   • Impaired vision 2020   • Bilateral hearing loss 2020   • Idiopathic thrombocytopenic purpura ([address]) 2020   • Post menopausal syndrome 2020   • Restless legs syndrome 2020   • Ambulatory dysfunction with recurrent falls 2020   • Nephrosclerosis, stage 1-4 or unspecified chronic kidney disease 2020   • Arthritis 2019   • Type 2 diabetes mellitus without complication, without long-term current use of insulin ([address]) 2019   • Idiopathic chronic gout with tophus 2019   • Osteoarthritis of multiple joints 2019   • Symptomatic varicose veins of both lower extremities 10/15/2018   • Intermittent claudication ([address]) 10/15/2018   • Stage 3b chronic kidney disease ([address]) 10/12/2017   • Combined form of senile cataract of left eye 2016   • Fibromyalgia 2016   • Vitamin D deficiency 2015   • Chronic gout 2015   • Primary osteoarthritis of both hands 2015   • Homocysteinemia 2014   • Anemia 2013   • Hypothyroidism 2013   • Depression, recurrent ([address]) 2012   • Hyperlipidemia 2012      LOS (days): 2  Geometric Mean LOS (GMLOS) (days): 3.50  Days to GMLOS:2     OBJECTIVE:    Risk of Unplanned Readmission Score: 17.04         Current admission status: Inpatient       Preferred Pharmacy:   [address] [place] [address] [id number] - [address]  [address] [address]  Phone: [phone] Fax: [phone]    Primary Care Provider: Jose A Higgins DO    Primary Insurance: [address] [facility] HOSPITAL REP  Secondary Insurance:     ASSESSMENT:  [address] [facility] Edson Representative - Sister   Primary Phone: [phone] [facility])                              Patient Information  Admitted from[de-identified] Home  Mental Status: Alert  During Assessment patient was accompanied by: Not accompanied during assessment  Assessment information provided by[de-identified] Patient  Primary Caregiver: Self  Support Systems: Family members, [address] of Residence: [address] [place] [address] do you live in?: [address] entry access options.  Select all that apply.: Stairs  Number of steps to enter home.: 4  Type of Current Residence: [place]  In the last 12 months, was there a time when you were not able to pay the mortgage or rent on time?: No  In the last 12 months, how many places have you lived?: 1  In the last 12 months, was there a time when you did not have a steady place to sleep or slept in a shelter (including now)?: No  Homeless/housing insecurity resource given?: N/A  Living Arrangements: Lives w/ Extended Family (sister)    Activities of Daily Living Prior to Admission  Functional Status: Independent  Completes ADLs independently?: Yes  Ambulates independently?: Yes  Does patient use assisted devices?: Yes  Assisted Devices (DME) used: Roma Bill  Does patient currently own DME?: Yes  What DME does the patient currently own?: Roma Bill  Does patient have a history of Outpatient Therapy (PT/OT)?: Yes  Does the patient have a history of Short-Term Rehab?: Yes [facility]  Does patient have a history of HHC?: Yes  Does patient currently have [address]?: No         Patient Information Continued  Income Source: SSI/SSD  Does patient have prescription coverage?: Yes  Within the past 12 months, you worried that your food would run out before you got the money to buy more.: Never true  Within the past 12 months, the food you bought just didn't last and you didn't have money to get more.: Never true  Food insecurity resource given?: N/A  Does patient receive dialysis treatments?: No  Does patient have a history of substance abuse?: No  Does patient have a history of Mental Health Diagnosis?: Yes  Is patient receiving treatment for mental health?: Yes  Has patient received inpatient treatment related to mental health in the last 2 years?: No         Means of Transportation  Means of Transport to Appts[de-identified] Drives Self  In the past 12 months, has lack of transportation kept you from medical appointments or from getting medications?: No  In the past 12 months, has lack of transportation kept you from meetings, work, or from getting things needed for daily living?: No  Was application for public transport provided?: N/A        DISCHARGE DETAILS:    Discharge planning discussed with[de-identified] Patient  Freedom of Choice: Yes  Comments - Freedom of Choice: Agreeable to short term rehab. Pt reports only wanting to go to [facility].  Referral sent  CM contacted family/caregiver?: No- see comments (declined need at this time)  Were Treatment Team discharge recommendations reviewed with patient/caregiver?: Yes  Did patient/caregiver verbalize understanding of patient care needs?: N/A- going to facility  Were patient/caregiver advised of the risks associated with not following Treatment Team discharge recommendations?: Yes         Requested [address] [facility]         Is the patient interested in [address] at discharge?: No    DME Referral Provided  Referral made for DME?: No    Other Referral/Resources/Interventions Provided:  Interventions: Short Term Rehab  Referral Comments: Referral placed via aidin         Treatment Team Recommendation: Short Term Rehab  Discharge Destination Plan[de-identified] Short Term Rehab

## 2023-07-26 NOTE — PLAN OF CARE

## 2023-07-26 NOTE — ASSESSMENT & PLAN NOTE
Patient reportedly had worsening acute on chronic back pain  Patient without red flag symptoms  CT lumbar spine completed, showing multi level degenerative changes, moderate foraminal stenosis.   No acute fracture  Recommend conservative management with multimodal pain approach

## 2023-07-26 NOTE — PHYSICAL THERAPY NOTE
PHYSICAL THERAPY NOTE          Patient Name: Harshad Acosta  FJCEA'[id number] Date: 7/26/2023 07/26/23 1315   PT Last Visit   PT Visit Date 07/26/23   Note Type   Note Type Cancelled Session   Cancel Reasons Patient off floor/test   Pt pending CT of shoulder and L spine. Will monitor results and progress with POC as appropriate.    Margarita Vaughan, PT

## 2023-07-26 NOTE — ASSESSMENT & PLAN NOTE
Lab Results   Component Value Date    HGBA1C 6.6 (H) 05/04/2023     • Previous A1c reviewed, currently at goal  • Continue to monitor on sliding scale

## 2023-07-26 NOTE — PROGRESS NOTES
[address] [facility]  Progress Note  Name: Dora Bazzi  MRN: [id number]  Unit/Bed#: E2 -01 I Date of Admission: 7/24/2023   Date of Service: 7/26/2023 I Hospital Day: 2    Assessment/Plan   * [facility])  Assessment & Plan  Presented with fevers, leukocytosis concerning for possible prosthetic joint infection with recent right shoulder replacement  Hold further antibiotics, per infectious disease      Rotator cuff tear arthropathy of right shoulder  Assessment & Plan  Recent right total shoulder replacement  Presented with worsening shoulder pain, concern for possible infection  Joint aspiration completed on 07/26, WBC noted to be greater than 7000  Discussed with Ortho and infectious disease, planning for incision and drainage, washout and antibiotic beads with revision  N.p.o. midnight  Hold antibiotics per ID to increase the yield of cultures      Chronic combined systolic (congestive) and diastolic (congestive) heart failure (H[facility])  Assessment & Plan  Wt Readings from Last 3 Encounters:   07/26/23 104 kg (228 lb 9.9 oz)   06/07/23 108 kg (237 lb 7 oz)   05/24/23 108 kg (239 lb)     Continue metoprolol and oral Lasix 40 mg daily  Currently appears euvolemic        PMR (polymyalgia rheumatica) ([facility])  Assessment & Plan  Maintained on 5mg prednisone daily    Type 2 diabetes mellitus without complication, without long-term current use of insulin ([facility])  Assessment & Plan  Lab Results   Component Value Date    HGBA1C 6.6 (H) 05/04/2023     • Previous A1c reviewed, currently at goal  • Continue to monitor on sliding scale    Acute back pain  Assessment & Plan  Patient reportedly had worsening acute on chronic back pain  Patient without red flag symptoms  CT lumbar spine completed, showing multi level degenerative changes, moderate foraminal stenosis.   No acute fracture  Recommend conservative management with multimodal pain approach    Stage 3b chronic kidney disease ([address])  Assessment & Plan  Lab Results   Component Value Date    EGFR 34 2023    EGFR 30 2023    EGFR 32 2023    CREATININE 1.43 (H) 2023    CREATININE 1.57 (H) 2023    CREATININE 1.49 (H) 2023     In the setting of diabetic/hypertensive nephropathy   • Renal functions currently stable at baseline of 1.5    Anemia  Assessment & Plan  Hemoglobin currently stable, no evidence of bleeding             VTE Pharmacologic Prophylaxis:   Pharmacologic: Enoxaparin (Lovenox)  Mechanical VTE Prophylaxis in Place: Yes    Patient Centered Rounds: I have performed bedside rounds with nursing staff today. Current Length of Stay: 2 day(s)    Current Patient Status: Inpatient   Certification Statement: The patient will continue to require additional inpatient hospital stay due to Pending surgery with ortho    Discharge Plan: pending    Code Status: Level 1 - Full Code      Subjective:   Continues to have right shoulder pain and back pain. Currently without fevers. Sister at bedside with questions answered. Objective:     Vitals:   Temp (24hrs), Av.5 °F (36.4 °C), Min:97.1 °F (36.2 °C), Max:98 °F (36.7 °C)    Temp:  [97.1 °F (36.2 °C)-98 °F (36.7 °C)] 98 °F (36.7 °C)  HR:  [70-90] 90  Resp:  [17-18] 18  BP: (118-143)/(57-85) 143/69  SpO2:  [94 %-96 %] 96 %  Body mass index is 35.81 kg/m². Input and Output Summary (last 24 hours): Intake/Output Summary (Last 24 hours) at 2023 1706  Last data filed at 2023 1701  Gross per 24 hour   Intake --   Output 450 ml   Net -450 ml       Physical Exam:     Physical Exam  Vitals and nursing note reviewed. Constitutional:       Appearance: She is obese. HENT:      Head: Normocephalic. Eyes:      General: No scleral icterus. Conjunctiva/sclera: Conjunctivae normal.   Cardiovascular:      Rate and Rhythm: Normal rate. Pulmonary:      Effort: Pulmonary effort is normal. No respiratory distress. Breath sounds: No wheezing.    Abdominal: Palpations: Abdomen is soft. Musculoskeletal:         General: Tenderness ( lower back) present. No swelling. Right lower leg: No edema. Left lower leg: No edema. Skin:     General: Skin is warm. Neurological:      General: No focal deficit present. Mental Status: She is alert. Additional Data:     Labs:    Results from last 7 days   Lab Units 07/26/23  0438   WBC Thousand/uL 10.03   HEMOGLOBIN g/dL 10.2*   HEMATOCRIT % 33.2*   PLATELETS Thousands/uL 164   NEUTROS PCT % 71   LYMPHS PCT % 19   MONOS PCT % 7   EOS PCT % 2     Results from last 7 days   Lab Units 07/26/23  0615 07/25/23  0501 07/24/23  2224   SODIUM mmol/L 138   < > 136   POTASSIUM mmol/L 3.5   < > 4.0   CHLORIDE mmol/L 101   < > 99   CO2 mmol/L 31   < > 27   BUN mg/dL 27*   < > 32*   CREATININE mg/dL 1.43*   < > 1.49*   ANION GAP mmol/L 6   < > 10   CALCIUM mg/dL 8.8   < > 9.0   ALBUMIN g/dL  --   --  4.0   TOTAL BILIRUBIN mg/dL  --   --  0.49   ALK PHOS U/L  --   --  52   ALT U/L  --   --  3*   AST U/L  --   --  17   GLUCOSE RANDOM mg/dL 94   < > 144*    < > = values in this interval not displayed. Results from last 7 days   Lab Units 07/24/23  2224   INR  0.97     Results from last 7 days   Lab Units 07/26/23  1605 07/26/23  1102 07/26/23  0617 07/25/23  2039 07/25/23  1543 07/25/23  1058 07/25/23  0634   POC GLUCOSE mg/dl 222* 144* 100 112 183* 126 148*         Results from last 7 days   Lab Units 07/26/23  0438 07/24/23  2224   LACTIC ACID mmol/L  --  1.7   PROCALCITONIN ng/ml 0.11 0.07           * I Have Reviewed All Lab Data Listed Above. * Additional Pertinent Lab Tests Reviewed: [address] [place] [address] Admission Reviewed    Imaging:    CT spine lumbar wo contrast    Result Date: 7/26/2023  Impression: Diffuse osteopenia of the bony structures. Multilevel degenerative change within the lower thoracic spine and throughout the lumbar spine.  Partial fusion of the posterior elements somewhat diffusely with fusion of the L5 and S1 endplates. Mild to moderate canal stenosis and foraminal narrowing. No acute osseous abnormality. Workstation performed: [id number]     FL guided needle plac bx/asp/inj    Result Date: 7/26/2023  Impression: Technically successful right artificial glenohumeral joint aspiration yielding 3 mL of bloody fluid. All aspirate obtained was sent for laboratory testing as ordered. The above findings and procedure were reviewed with Dr. Zulma Elkins. Procedure was performed by Luci Talley. SOULEYMANE Williamson under the direct supervision of Dr. Zulma Elkins. Workstation performed: [id number]     XR shoulder 2+ views RIGHT    Result Date: 7/25/2023  Impression: Unremarkable appearance of reverse total shoulder arthroplasty. Workstation performed: [id number]       Recent Cultures (last 7 days):     Results from last 7 days   Lab Units 07/24/23 2237 07/24/23  2224   BLOOD CULTURE  No Growth at 24 hrs. No Growth at 24 hrs.        Last 24 Hours Medication List:   Current Facility-Administered Medications   Medication Dose Route Frequency Provider Last Rate   • acetaminophen  975 mg Oral Q8H Ceci Romero MD     • allopurinol  100 mg Oral BID Dewain HunnewellSOULEYMANE     • aluminum-magnesium hydroxide-simethicone  30 mL Oral Q6H PRN Dewain HunnewellSOULEYMANE     • citalopram  40 mg Oral QAM Dewain HunnewellSOULEYMANE     • enoxaparin  40 mg Subcutaneous Daily Dewain HunnewellSOULEYMANE     • [START ON 7/28/2023] furosemide  40 mg Oral Daily Antonina Gipson MD     • gabapentin  100 mg Oral BID Dewain HunnewellSOULEYMANE     • insulin lispro  1-6 Units Subcutaneous TID AC Dewain HunnewellSOULEYMANE     • iohexol  50 mL Other Once in imaging Enedina Daugherty PA-C     • levothyroxine  50 mcg Oral Early Morning Dewain HunnewellSOULEYMANE     • [START ON 7/27/2023] lidocaine  1 patch Topical Daily Antonina Gipson MD     • lidocaine (PF)  7 mL Injection Once in imaging Enedina Daugherty PA-C     • loratadine  10 mg Oral Daily Dewain HunnewellSOULEYMANE     • methocarbamol  500 mg Oral Q6H PRN Aaron Foster MD     • morphine injection  2 mg Intravenous Q6H PRN Aaron Foster MD     • ondansetron  4 mg Intravenous Q6H PRN Savage Barber PA-C     • oxyCODONE  5 mg Oral Q4H PRN Ness Villalobos MD     • oxyCODONE  2.5 mg Oral Q6H PRN Savage Barber PA-C     • polyethylene glycol  17 g Oral Daily Savage Barber PA-C     • predniSONE  5 mg Oral QAM Savage Barber PA-C          Today, Patient Was Seen By: Aaron Foster MD    ** Please Note: Dictation voice to text software may have been used in the creation of this document.  **

## 2023-07-26 NOTE — CONSULTS
Our infectious disease consultant has stopped antibiotics; since vancomycin is discontinued, pharmacy will sign off as consultants for vanco management. Thank you!

## 2023-07-26 NOTE — PLAN OF CARE
Problem: PAIN - ADULT  Goal: Verbalizes/displays adequate comfort level or baseline comfort level  Description: Interventions:  - Encourage patient to monitor pain and request assistance  - Assess pain using appropriate pain scale  - Administer analgesics based on type and severity of pain and evaluate response  - Implement non-pharmacological measures as appropriate and evaluate response  - Consider cultural and social influences on pain and pain management  - Notify physician/advanced practitioner if interventions unsuccessful or patient reports new pain  Outcome: Progressing     Problem: INFECTION - ADULT  Goal: Absence or prevention of progression during hospitalization  Description: INTERVENTIONS:  - Assess and monitor for signs and symptoms of infection  - Monitor lab/diagnostic results  - Monitor all insertion sites, i.e. indwelling lines, tubes, and drains  - Monitor endotracheal if appropriate and nasal secretions for changes in amount and color  - [place] appropriate cooling/warming therapies per order  - Administer medications as ordered  - Instruct and encourage patient and family to use good hand hygiene technique  - Identify and instruct in appropriate isolation precautions for identified infection/condition  Outcome: Progressing  Goal: Absence of fever/infection during neutropenic period  Description: INTERVENTIONS:  - Monitor WBC    Outcome: Progressing     Problem: Knowledge Deficit  Goal: Patient/family/caregiver demonstrates understanding of disease process, treatment plan, medications, and discharge instructions  Description: Complete learning assessment and assess knowledge base.   Interventions:  - Provide teaching at level of understanding  - Provide teaching via preferred learning methods  Outcome: Progressing

## 2023-07-26 NOTE — PROGRESS NOTES
Orthopedic Total Shoulder Progress Note  ([facility] - Dr Stephani Morales)    SUBJECTIVE:    LOS: 2 days   RIGHT reverse total shoulder arthroplasty. Admitted with fever, shoulder pain and body aches. Aspiration of the right shoulder demonstrated 7K WBC's. Cultures and blood cultures pending. Presently off antibiotics. Systemic or Specific Complaints: Patient sitting up in bed. Medications covering pain. Pt has been OOB and to bathroom on own. OBJECTIVE:  Vital signs in last 24 hours:  Temp:  [97.1 °F (36.2 °C)-97.4 °F (36.3 °C)] 97.1 °F (36.2 °C)  HR:  [70-78] 78  Resp:  [17-18] 18  BP: (118-135)/(57-85) 135/85  General: Alert, appears stated age,cooperative   Neurovascular: Neurvascular intact Axillary, Radial, Ulnar and Median Nerves  Radial pulse Plus 2. Wound: Incision looks good, No erythema, No drainage. ++ tender. Range of Motion: ++ pain with motion. Data Review:  CBC:   Lab Results   Component Value Date    WBC 10.03 07/26/2023    WBC 8.92 08/06/2015    RBC 3.28 (L) 07/26/2023    RBC 4.46 08/06/2015    HGB 10.2 (L) 07/26/2023    HGB 13.6 08/06/2015    HCT 33.2 (L) 07/26/2023    HCT 40.5 08/06/2015     07/26/2023     08/06/2015 07/24/2023 2224 07/26/2023 1301 Blood culture #1 [[id number]]   Blood from Arm, Left    Preliminary result Component Value   Blood Culture No Growth at 24 hrs. P          07/24/2023 2237 07/26/2023 1301 Blood culture #2 [[id number]]   Blood from Arm, Left    Preliminary result Component Value   Blood Culture No Growth at 24 hrs.  P          07/26/2023 0744 07/26/2023 1247 Synovial Fluid Diff [[id number]]   Synovial Fluid from Joint, Right Shoulder    Final result Component Value Units   Total Counted 100    Neutrophil % Synovial 83 %   Lymph % Synovial 6 %   Monocyte % Synovial 8 %   Histiocyte % Synovial 3 %          07/26/2023 0744 07/26/2023 1216 Synovial fluid white cell count w/ diff [[id number]]   (Abnormal)   Synovial Fluid from Joint, Right Shoulder Final result Component Value Units   Site Synovial    Color, Fluid Bloody    Clarity, Fluid Cloudy Abnormal     WBC, Fluid 7,552 High              ASSESSMENT & PLAN:  Status post- RIGHT total shoulder arthroplasty: New onset pain and fever. Septic Left reverse TSR. Pain Relief: Pain well controled with oral medications. Plan:    Discussed non op and operative options with the patient. Risks and benefits reviewed. All questions answered. For I&D with antibiotic beads, possible revision / bearing exchange with Dr. Kathrine Pink (I am recovering from Rotator cuff repair and unable to do surgery at this time)    ID will hold abx to help with cultures to be taken intraoperatively.       Julieta Garcia MD  Date: 7/26/2023  Time: 3:36 PM

## 2023-07-27 ENCOUNTER — APPOINTMENT (INPATIENT)
Dept: RADIOLOGY | Facility: HOSPITAL | Age: 78
DRG: 507 | End: 2023-07-27
Payer: COMMERCIAL

## 2023-07-27 ENCOUNTER — ANESTHESIA EVENT (INPATIENT)
Dept: PERIOP | Facility: HOSPITAL | Age: 78
DRG: 501 | End: 2023-07-27
Payer: COMMERCIAL

## 2023-07-27 ENCOUNTER — ANESTHESIA (INPATIENT)
Dept: PERIOP | Facility: HOSPITAL | Age: 78
DRG: 501 | End: 2023-07-27
Payer: COMMERCIAL

## 2023-07-27 LAB
GLUCOSE SERPL-MCNC: 111 MG/DL (ref 65–140)
GLUCOSE SERPL-MCNC: 120 MG/DL (ref 65–140)
GLUCOSE SERPL-MCNC: 169 MG/DL (ref 65–140)
GLUCOSE SERPL-MCNC: 169 MG/DL (ref 65–140)

## 2023-07-27 PROCEDURE — 87077 CULTURE AEROBIC IDENTIFY: CPT | Performed by: ORTHOPAEDIC SURGERY

## 2023-07-27 PROCEDURE — 82948 REAGENT STRIP/BLOOD GLUCOSE: CPT

## 2023-07-27 PROCEDURE — 99232 SBSQ HOSP IP/OBS MODERATE 35: CPT | Performed by: STUDENT IN AN ORGANIZED HEALTH CARE EDUCATION/TRAINING PROGRAM

## 2023-07-27 PROCEDURE — 0R9J0ZZ DRAINAGE OF RIGHT SHOULDER JOINT, OPEN APPROACH: ICD-10-PCS | Performed by: ORTHOPAEDIC SURGERY

## 2023-07-27 PROCEDURE — 87070 CULTURE OTHR SPECIMN AEROBIC: CPT | Performed by: ORTHOPAEDIC SURGERY

## 2023-07-27 PROCEDURE — 87205 SMEAR GRAM STAIN: CPT | Performed by: ORTHOPAEDIC SURGERY

## 2023-07-27 PROCEDURE — 87186 SC STD MICRODIL/AGAR DIL: CPT | Performed by: STUDENT IN AN ORGANIZED HEALTH CARE EDUCATION/TRAINING PROGRAM

## 2023-07-27 PROCEDURE — C1713 ANCHOR/SCREW BN/BN,TIS/BN: HCPCS | Performed by: ORTHOPAEDIC SURGERY

## 2023-07-27 PROCEDURE — 88307 TISSUE EXAM BY PATHOLOGIST: CPT | Performed by: PATHOLOGY

## 2023-07-27 PROCEDURE — 3E0U029 INTRODUCTION OF OTHER ANTI-INFECTIVE INTO JOINTS, OPEN APPROACH: ICD-10-PCS | Performed by: ORTHOPAEDIC SURGERY

## 2023-07-27 PROCEDURE — 0KB50ZZ EXCISION OF RIGHT SHOULDER MUSCLE, OPEN APPROACH: ICD-10-PCS | Performed by: ORTHOPAEDIC SURGERY

## 2023-07-27 PROCEDURE — 87075 CULTR BACTERIA EXCEPT BLOOD: CPT | Performed by: ORTHOPAEDIC SURGERY

## 2023-07-27 PROCEDURE — 99232 SBSQ HOSP IP/OBS MODERATE 35: CPT | Performed by: NURSE PRACTITIONER

## 2023-07-27 PROCEDURE — 73020 X-RAY EXAM OF SHOULDER: CPT

## 2023-07-27 DEVICE — STIMULAN® RAPID CURE PROVIDED STERILE FOR SINGLE PATIENT USE. STIMULAN® RAPID CURE CONTAINS CALCIUM SULFATE POWDER AND MIXING SOLUTION IN PRE-MEASURED QUANTITIES SO THAT WHEN MIXED TOGETHER IN A STERILE MIXING BOWL, THE RESULTANT PASTE IS TO BE DIGITALLY PACKED INTO OPEN BONE VOID/GAP TO SET INSITU OR PLACED INTO THE MOULD PROVIDED, THE MIXTURE SETS TO FORM BEADS. THE BIODEGRADABLE, RADIOPAQUE BEADS ARE RESORBED IN APPROXIMATELY 30 – 60 DAYS WHEN USED IN ACCORDANCE WITH THE DEVICE LABELLING. STIMULAN® RAPID CURE IS MANUFACTURED FROM SYNTHETIC IMPLANT GRADE CALCIUM SULFATE DIHYDRATE(CASO4.2H2O) THAT RESORBS AND IS REPLACED WITH BONE DURING THE HEALING PROCESS. ALSO, AS THE BONE VOID FILLER BEADS ARE BIODEGRADABLE AND BIOCOMPATIBLE, THEY MAY BE USED AT AN INFECTED SITE.
Type: IMPLANTABLE DEVICE | Site: SHOULDER | Status: FUNCTIONAL
Brand: STIMULAN® RAPID CURE

## 2023-07-27 RX ORDER — SODIUM CHLORIDE 9 MG/ML
INJECTION, SOLUTION INTRAVENOUS CONTINUOUS PRN
Status: DISCONTINUED | OUTPATIENT
Start: 2023-07-27 | End: 2023-07-27

## 2023-07-27 RX ORDER — FENTANYL CITRATE 50 UG/ML
INJECTION, SOLUTION INTRAMUSCULAR; INTRAVENOUS AS NEEDED
Status: DISCONTINUED | OUTPATIENT
Start: 2023-07-27 | End: 2023-07-27

## 2023-07-27 RX ORDER — TOBRAMYCIN 1.2 G/30ML
INJECTION, POWDER, LYOPHILIZED, FOR SOLUTION INTRAVENOUS AS NEEDED
Status: DISCONTINUED | OUTPATIENT
Start: 2023-07-27 | End: 2023-07-27 | Stop reason: HOSPADM

## 2023-07-27 RX ORDER — ROCURONIUM BROMIDE 10 MG/ML
INJECTION, SOLUTION INTRAVENOUS AS NEEDED
Status: DISCONTINUED | OUTPATIENT
Start: 2023-07-27 | End: 2023-07-27

## 2023-07-27 RX ORDER — LIDOCAINE HCL/PF 100 MG/5ML
SYRINGE (ML) INJECTION AS NEEDED
Status: DISCONTINUED | OUTPATIENT
Start: 2023-07-27 | End: 2023-07-27

## 2023-07-27 RX ORDER — ASPIRIN 325 MG
325 TABLET ORAL DAILY
Status: DISCONTINUED | OUTPATIENT
Start: 2023-07-28 | End: 2023-08-01 | Stop reason: HOSPADM

## 2023-07-27 RX ORDER — MAGNESIUM HYDROXIDE 1200 MG/15ML
LIQUID ORAL AS NEEDED
Status: DISCONTINUED | OUTPATIENT
Start: 2023-07-27 | End: 2023-07-27 | Stop reason: HOSPADM

## 2023-07-27 RX ORDER — PROPOFOL 10 MG/ML
INJECTION, EMULSION INTRAVENOUS AS NEEDED
Status: DISCONTINUED | OUTPATIENT
Start: 2023-07-27 | End: 2023-07-27

## 2023-07-27 RX ORDER — CEFAZOLIN SODIUM 2 G/50ML
2000 SOLUTION INTRAVENOUS ONCE
Status: COMPLETED | OUTPATIENT
Start: 2023-07-27 | End: 2023-07-27

## 2023-07-27 RX ORDER — LABETALOL HYDROCHLORIDE 5 MG/ML
INJECTION, SOLUTION INTRAVENOUS AS NEEDED
Status: DISCONTINUED | OUTPATIENT
Start: 2023-07-27 | End: 2023-07-27

## 2023-07-27 RX ORDER — OXYCODONE HYDROCHLORIDE 5 MG/1
5 TABLET ORAL EVERY 4 HOURS PRN
Status: DISCONTINUED | OUTPATIENT
Start: 2023-07-27 | End: 2023-08-01 | Stop reason: HOSPADM

## 2023-07-27 RX ORDER — SODIUM CHLORIDE, SODIUM LACTATE, POTASSIUM CHLORIDE, CALCIUM CHLORIDE 600; 310; 30; 20 MG/100ML; MG/100ML; MG/100ML; MG/100ML
100 INJECTION, SOLUTION INTRAVENOUS CONTINUOUS
Status: DISCONTINUED | OUTPATIENT
Start: 2023-07-27 | End: 2023-07-28

## 2023-07-27 RX ORDER — SENNOSIDES 8.6 MG
1 TABLET ORAL DAILY
Status: DISCONTINUED | OUTPATIENT
Start: 2023-07-28 | End: 2023-08-01 | Stop reason: HOSPADM

## 2023-07-27 RX ORDER — VANCOMYCIN HYDROCHLORIDE 1 G/20ML
INJECTION, POWDER, LYOPHILIZED, FOR SOLUTION INTRAVENOUS AS NEEDED
Status: DISCONTINUED | OUTPATIENT
Start: 2023-07-27 | End: 2023-07-27 | Stop reason: HOSPADM

## 2023-07-27 RX ORDER — FENTANYL CITRATE 50 UG/ML
25 INJECTION, SOLUTION INTRAMUSCULAR; INTRAVENOUS
Status: DISCONTINUED | OUTPATIENT
Start: 2023-07-27 | End: 2023-07-27 | Stop reason: HOSPADM

## 2023-07-27 RX ORDER — ONDANSETRON 2 MG/ML
4 INJECTION INTRAMUSCULAR; INTRAVENOUS EVERY 6 HOURS PRN
Status: DISCONTINUED | OUTPATIENT
Start: 2023-07-27 | End: 2023-08-01 | Stop reason: HOSPADM

## 2023-07-27 RX ORDER — ACETAMINOPHEN 325 MG/1
650 TABLET ORAL EVERY 6 HOURS PRN
Status: DISCONTINUED | OUTPATIENT
Start: 2023-07-27 | End: 2023-08-01 | Stop reason: HOSPADM

## 2023-07-27 RX ORDER — OXYCODONE HYDROCHLORIDE 10 MG/1
10 TABLET ORAL EVERY 6 HOURS PRN
Status: DISCONTINUED | OUTPATIENT
Start: 2023-07-27 | End: 2023-07-30

## 2023-07-27 RX ORDER — CEFAZOLIN SODIUM 1 G/50ML
1000 SOLUTION INTRAVENOUS EVERY 8 HOURS
Status: DISCONTINUED | OUTPATIENT
Start: 2023-07-27 | End: 2023-07-31

## 2023-07-27 RX ORDER — DOCUSATE SODIUM 100 MG/1
100 CAPSULE, LIQUID FILLED ORAL 2 TIMES DAILY
Status: DISCONTINUED | OUTPATIENT
Start: 2023-07-27 | End: 2023-08-01 | Stop reason: HOSPADM

## 2023-07-27 RX ORDER — ONDANSETRON 2 MG/ML
INJECTION INTRAMUSCULAR; INTRAVENOUS AS NEEDED
Status: DISCONTINUED | OUTPATIENT
Start: 2023-07-27 | End: 2023-07-27

## 2023-07-27 RX ORDER — ONDANSETRON 2 MG/ML
4 INJECTION INTRAMUSCULAR; INTRAVENOUS EVERY 6 HOURS PRN
Status: DISCONTINUED | OUTPATIENT
Start: 2023-07-27 | End: 2023-07-27 | Stop reason: HOSPADM

## 2023-07-27 RX ADMIN — ONDANSETRON 4 MG: 2 INJECTION INTRAMUSCULAR; INTRAVENOUS at 14:59

## 2023-07-27 RX ADMIN — LIDOCAINE 1 PATCH: 50 PATCH CUTANEOUS at 09:43

## 2023-07-27 RX ADMIN — ALLOPURINOL 100 MG: 100 TABLET ORAL at 09:40

## 2023-07-27 RX ADMIN — CEFAZOLIN SODIUM 1000 MG: 1 SOLUTION INTRAVENOUS at 23:38

## 2023-07-27 RX ADMIN — ACETAMINOPHEN 325MG 975 MG: 325 TABLET ORAL at 05:59

## 2023-07-27 RX ADMIN — CITALOPRAM HYDROBROMIDE 40 MG: 20 TABLET ORAL at 09:40

## 2023-07-27 RX ADMIN — ROCURONIUM BROMIDE 10 MG: 10 INJECTION, SOLUTION INTRAVENOUS at 14:45

## 2023-07-27 RX ADMIN — OXYCODONE HYDROCHLORIDE 5 MG: 5 TABLET ORAL at 21:04

## 2023-07-27 RX ADMIN — DOCUSATE SODIUM 100 MG: 100 CAPSULE, LIQUID FILLED ORAL at 21:03

## 2023-07-27 RX ADMIN — SODIUM CHLORIDE, SODIUM LACTATE, POTASSIUM CHLORIDE, AND CALCIUM CHLORIDE 100 ML/HR: .6; .31; .03; .02 INJECTION, SOLUTION INTRAVENOUS at 17:24

## 2023-07-27 RX ADMIN — FENTANYL CITRATE 50 MCG: 50 INJECTION INTRAMUSCULAR; INTRAVENOUS at 14:52

## 2023-07-27 RX ADMIN — LABETALOL HYDROCHLORIDE 2.5 MG: 5 INJECTION, SOLUTION INTRAVENOUS at 14:52

## 2023-07-27 RX ADMIN — LORATADINE 10 MG: 10 TABLET ORAL at 09:40

## 2023-07-27 RX ADMIN — FENTANYL CITRATE 25 MCG: 50 INJECTION INTRAMUSCULAR; INTRAVENOUS at 16:20

## 2023-07-27 RX ADMIN — ROCURONIUM BROMIDE 10 MG: 10 INJECTION, SOLUTION INTRAVENOUS at 14:52

## 2023-07-27 RX ADMIN — ROCURONIUM BROMIDE 30 MG: 10 INJECTION, SOLUTION INTRAVENOUS at 14:22

## 2023-07-27 RX ADMIN — SODIUM CHLORIDE: 0.9 INJECTION, SOLUTION INTRAVENOUS at 15:15

## 2023-07-27 RX ADMIN — PROPOFOL 150 MG: 10 INJECTION, EMULSION INTRAVENOUS at 14:22

## 2023-07-27 RX ADMIN — FENTANYL CITRATE 25 MCG: 50 INJECTION INTRAMUSCULAR; INTRAVENOUS at 16:09

## 2023-07-27 RX ADMIN — FENTANYL CITRATE 25 MCG: 50 INJECTION INTRAMUSCULAR; INTRAVENOUS at 14:45

## 2023-07-27 RX ADMIN — CEFAZOLIN SODIUM 2000 MG: 2 SOLUTION INTRAVENOUS at 14:55

## 2023-07-27 RX ADMIN — FENTANYL CITRATE 25 MCG: 50 INJECTION INTRAMUSCULAR; INTRAVENOUS at 14:20

## 2023-07-27 RX ADMIN — GABAPENTIN 100 MG: 100 CAPSULE ORAL at 09:40

## 2023-07-27 RX ADMIN — SUGAMMADEX 200 MG: 100 INJECTION, SOLUTION INTRAVENOUS at 15:44

## 2023-07-27 RX ADMIN — ACETAMINOPHEN 325MG 975 MG: 325 TABLET ORAL at 21:05

## 2023-07-27 RX ADMIN — LEVOTHYROXINE SODIUM 50 MCG: 50 TABLET ORAL at 05:59

## 2023-07-27 RX ADMIN — FENTANYL CITRATE 25 MCG: 50 INJECTION INTRAMUSCULAR; INTRAVENOUS at 16:03

## 2023-07-27 RX ADMIN — LIDOCAINE HYDROCHLORIDE 80 MG: 20 INJECTION INTRAVENOUS at 14:22

## 2023-07-27 RX ADMIN — OXYCODONE HYDROCHLORIDE 5 MG: 5 TABLET ORAL at 06:01

## 2023-07-27 RX ADMIN — MORPHINE SULFATE 2 MG: 2 INJECTION, SOLUTION INTRAMUSCULAR; INTRAVENOUS at 17:30

## 2023-07-27 RX ADMIN — PREDNISONE 5 MG: 5 TABLET ORAL at 09:40

## 2023-07-27 NOTE — INTERVAL H&P NOTE
H&P reviewed. After examining the patient I find no changes in the patients condition since the H&P had been written.     Vitals:    07/27/23 0738   BP: 114/55   Pulse: 64   Resp: 18   Temp: 97.5 °F (36.4 °C)   SpO2: 92%

## 2023-07-27 NOTE — OP NOTE
OPERATIVE REPORT  PATIENT NAME: Tata Dsouza    :  1945  MRN: [id number]  Pt Location: [facility] OR ROOM 04    SURGERY DATE: 2023    Surgeon(s) and Role:     * Sincere Orlando MD - Primary     * Judy Rodriguez PA-C - Assisting    Preop Diagnosis:  Shoulder pain [M25.519]  Sepsis without acute organ dysfunction, due to unspecified organism [facility]) [A41.9]  Inflammatory reaction due to other internal joint prosthesis, initial encounter ([address]) Steve Wood    Post-Op Diagnosis Codes: * Shoulder pain [M25.519]     * Sepsis without acute organ dysfunction, due to unspecified organism ([address]) [A41.9]     * Inflammatory reaction due to other internal joint prosthesis, initial encounter ([address]) Steve Wood    Procedure(s):  Right - INCISION AND DRAINAGE (I&D) SHOULDER ANTIBIOTIC STIMULAN BEADS (VANCOMYCIN AND TOBRAMYCIN) POSSIBLE POLY EXCHANGE (ARTHREX)    Specimen(s):  ID Type Source Tests Collected by Time Destination   1 : right shoulder deep tissue Tissue Wound TISSUE EXAM Sincere Orlando MD 2023 1507    A : deep tissue Wound Wound ANAEROBIC CULTURE AND GRAM STAIN, WOUND CULTURE Sincere Orlando MD 2023 1502        Estimated Blood Loss:   30 mL    Drains:  * No LDAs found *    Anesthesia Type:   General    Operative Indications:  Shoulder pain [M25.519]  Sepsis without acute organ dysfunction, due to unspecified organism ([address]) [A41.9]  Inflammatory reaction due to other internal joint prosthesis, initial encounter ([address]) Steve Wood      Operative Findings:  No gross purulent fluid was seen. Inflammatory tissue seen about the prosthesis. Stable prosthesis without evidence of loosening. No instability noted with provocative testing of the implant. Complications:   None    Procedure and Technique:  The patient was seen and evaluated in the preop holding area. I identified the right shoulder as the operative site. She confirmed the right shoulder was the operative site.   I introduced myself to her as Dr. Sophie Avalos, a partner of her surgeon, Dr. Mary Smith. I once again reviewed the rationale for proceeding with the incision and drainage. I explained to her that we would be placing absorbable antibiotic beads. I explained to her that we may revise the prosthesis if needed. Once in the operating theater, she was placed under general anesthesia. A general tracheal tube was placed. IV access was secured by anesthesia. She was positioned in the modified beachchair position with bony prominences padded. The right arm and shoulder were prepped and draped in the usual orthopedic sterile fashion. A timeout was correct before beginning the case. A marking pen was then used to trace the initial incision. The initial incision was then opened. Circumferentially flaps were raised. Minimal fluid was noted in the subdeltoid area. The deltopectoral interval was identified and developed from proximal to distal.  The pectoralis major tendon was  from the conjoined tendon. The conjoined tendon was freed from any adhesions. The conjoined tendon and pectoralis major were retracted medially. A deltoid retractor was placed. There was noted to be minimal fluid about the prosthesis. No gross purulent fluid was noted. Inflammatory tissue was noted about the greater tuberosity. Deep cultures were taken. Tissue cultures were taken from the joint. Inflammatory tissue was debrided from the greater tuberosity and the reverse total shoulder. The reverse total shoulder was identified and found to be stable. No evidence of loosening of the humeral prosthesis was noted. No evidence of loosening of the polyethylene from the humeral component was noted. The joint was stable throughout a range of motion and to stress testing. 9 L of fluid was then pulse lavaged through the joint. Chlorhexidine solution was used to perform a final rinse.   Absorbable antibiotic beads with Vanco and tobramycin were placed deep to the deltoid and around the prosthesis. The deltopectoral interval was allowed to close. A running #1 PDS suture was then used to approximate the deltopectoral interval.  Final irrigation was performed. 2-0 PDS was used to close the deep layer of the skin. 3-0 nylon was used to close skin. A sterile dressing was applied. The assistance of Margo Banuelos, was required to facilitate with positioning the patient, exposure during the procedure, placement of the antibiotic beads, closure of the wound, dressing the wound, and getting the patient to and from the operating theater. No qualified resident was available to assist.   I was present for the entire procedure. The cultures from the shoulder joint will be held for 28 days to rule out P. acne infection.     Patient Disposition:  PACU         SIGNATURE: Sharmaine Castillo MD  DATE: July 27, 2023  TIME: 3:24 PM

## 2023-07-27 NOTE — ANESTHESIA PREPROCEDURE EVALUATION
Procedure:  INCISION AND DRAINAGE (I&D) SHOULDER ANTIBIOTIC STIMULAN BEADS (VANCOMYCIN AND TOBRAMYCIN) POSSIBLE POLY EXCHANGE (ARTHREX) (Right: Shoulder)    Relevant Problems   CARDIO   (+) Hyperlipidemia   (+) Nephrosclerosis, stage 1-4 or unspecified chronic kidney disease   (+) Symptomatic varicose veins of both lower extremities      ENDO   (+) Hypothyroidism   (+) Type 2 diabetes mellitus without complication, without long-term current use of insulin (HCC)      /RENAL   (+) Stage 3b chronic kidney disease (HCC)      HEMATOLOGY   (+) Anemia   (+) Idiopathic thrombocytopenic purpura (HCC)      MUSCULOSKELETAL   (+) Acute back pain   (+) Arthritis   (+) Chronic gout   (+) Fibromyalgia   (+) Idiopathic chronic gout with tophus   (+) Osteoarthritis of multiple joints   (+) Primary osteoarthritis of both hands      NEURO/PSYCH   (+) Depression, recurrent ([facility])   (+) Fibromyalgia   (+) Intermittent claudication (HCC)      Cardiovascular and Mediastinum   (+) Chronic combined systolic (congestive) and diastolic (congestive) heart failure (HCC)      Musculoskeletal and Integument   (+) Rotator cuff tear arthropathy of right shoulder (s/p repair now with infection)      Other   (+) Homocysteinemia   (+) Obesity (BMI 35.0-39.9 without comorbidity)   (+) PMR (polymyalgia rheumatica) ([facility])   (+) SIRS (systemic inflammatory response syndrome) ([address])   2021 Echo:  •  Left Ventricle: Systolic function is mildly decreased with an ejection   fraction of 45%. •  Left Ventricle: Mild global hypokinesis. •  Left Atrium: Left atrium cavity is mildly dilated. •  Right Ventricle: Right ventricle cavity is normal.   •  Right Ventricle: Systolic function is normal.   •  Mitral Valve: There is moderate annular calcification. •  Pulmonic Valve: Normal pulmonary artery pressure. •  Left Ventricle: There is grade II (moderate) diastolic dysfunction and   elevated left atrial pressure.              Anesthesia Plan  ASA Score- 3 Anesthesia Type- general with ASA Monitors. Additional Monitors:   Airway Plan: ETT. Plan Factors-    Chart reviewed. Existing labs reviewed. Obstructive sleep apnea risk education given perioperatively. Induction- intravenous. Postoperative Plan- Plan for postoperative opioid use. Informed Consent- Anesthetic plan and risks discussed with patient.

## 2023-07-27 NOTE — ASSESSMENT & PLAN NOTE
Wt Readings from Last 3 Encounters:   07/26/23 104 kg (228 lb 9.9 oz)   06/07/23 108 kg (237 lb 7 oz)   05/24/23 108 kg (239 lb)     Continue metoprolol and oral Lasix 40 mg daily  Currently appears euvolemic

## 2023-07-27 NOTE — PHYSICAL THERAPY NOTE
07/27/23 1306   PT Last Visit   PT Visit Date 07/27/23   Note Type   Note Type Cancelled Session   Cancel Reasons Patient to operating room     Physical Therapy Cancellation Note    Chart review performed. At this time, PT treatment session cancelled , patient going to OR for washout today. . PT will follow and provide PT interventions as appropriate.     Rusty Colon, PTA

## 2023-07-27 NOTE — PLAN OF CARE
Problem: Potential for Falls  Goal: Patient will remain free of falls  Description: INTERVENTIONS:  - Educate patient/family on patient safety including physical limitations  - Instruct patient to call for assistance with activity   - Consult OT/PT to assist with strengthening/mobility   - Keep Call bell within reach  - Keep bed low and locked with side rails adjusted as appropriate  - Keep care items and personal belongings within reach  - Initiate and maintain comfort rounds  - Make Fall Risk Sign visible to staff  - Offer Toileting every 2 Hours, in advance of need  - Initiate/Maintain bed alarm  - Apply yellow socks and bracelet for high fall risk patients  - Consider moving patient to room near nurses station  Outcome: Progressing     Problem: PAIN - ADULT  Goal: Verbalizes/displays adequate comfort level or baseline comfort level  Description: Interventions:  - Encourage patient to monitor pain and request assistance  - Assess pain using appropriate pain scale  - Administer analgesics based on type and severity of pain and evaluate response  - Implement non-pharmacological measures as appropriate and evaluate response  - Consider cultural and social influences on pain and pain management  - Notify physician/advanced practitioner if interventions unsuccessful or patient reports new pain  Outcome: Progressing     Problem: INFECTION - ADULT  Goal: Absence or prevention of progression during hospitalization  Description: INTERVENTIONS:  - Assess and monitor for signs and symptoms of infection  - Monitor lab/diagnostic results  - Monitor all insertion sites, i.e. indwelling lines, tubes, and drains  - Monitor endotracheal if appropriate and nasal secretions for changes in amount and color  - [place] appropriate cooling/warming therapies per order  - Administer medications as ordered  - Instruct and encourage patient and family to use good hand hygiene technique  - Identify and instruct in appropriate isolation precautions for identified infection/condition  Outcome: Progressing     Problem: SAFETY ADULT  Goal: Patient will remain free of falls  Description: INTERVENTIONS:  - Educate patient/family on patient safety including physical limitations  - Instruct patient to call for assistance with activity   - Consult OT/PT to assist with strengthening/mobility   - Keep Call bell within reach  - Keep bed low and locked with side rails adjusted as appropriate  - Keep care items and personal belongings within reach  - Initiate and maintain comfort rounds  - Make Fall Risk Sign visible to staff  - Offer Toileting every 2 Hours, in advance of need  - Initiate/Maintain bed alarm  - Apply yellow socks and bracelet for high fall risk patients  - Consider moving patient to room near nurses station  Outcome: Progressing

## 2023-07-27 NOTE — PROGRESS NOTES
[facility]  Progress Note  Name: Amy Sharif  MRN: [id number]  Unit/Bed#: OR POOL I Date of Admission: 7/24/2023   Date of Service: 7/27/2023 I Hospital Day: 3    Assessment/Plan   * SIRS (systemic inflammatory response syndrome) ([facility])  Assessment & Plan  Presented with fevers, leukocytosis concerning for possible prosthetic joint infection with recent right shoulder replacement  Hold further antibiotics, per infectious disease      Rotator cuff tear arthropathy of right shoulder  Assessment & Plan  Recent right total shoulder replacement  Presented with worsening shoulder pain, concern for possible early PJI  Joint aspiration completed on 07/26, WBC noted to be greater than 7000  Discussed with Ortho and infectious disease, planning for incision and drainage, washout and antibiotic beads with revision  N.p.o. we will plan for surgery today      Chronic combined systolic (congestive) and diastolic (congestive) heart failure ([facility])  Assessment & Plan  Wt Readings from Last 3 Encounters:   07/26/23 104 kg (228 lb 9.9 oz)   06/07/23 108 kg (237 lb 7 oz)   05/24/23 108 kg (239 lb)     Continue metoprolol and oral Lasix 40 mg daily  Currently appears euvolemic        PMR (polymyalgia rheumatica) ([facility])  Assessment & Plan  Maintained on 5mg prednisone daily    Type 2 diabetes mellitus without complication, without long-term current use of insulin ([facility])  Assessment & Plan  Lab Results   Component Value Date    HGBA1C 6.6 (H) 05/04/2023     • Previous A1c reviewed, currently at goal  • Continue to monitor on sliding scale    Acute back pain  Assessment & Plan  Patient reportedly had worsening acute on chronic back pain  Patient without red flag symptoms  CT lumbar spine completed, showing multi level degenerative changes, moderate foraminal stenosis.   No acute fracture  Recommend conservative management with multimodal pain approach    Stage 3b chronic kidney disease ([address])  Assessment & Plan  Lab Results   Component Value Date    EGFR 34 2023    EGFR 30 2023    EGFR 32 2023    CREATININE 1.43 (H) 2023    CREATININE 1.57 (H) 2023    CREATININE 1.49 (H) 2023     In the setting of diabetic/hypertensive nephropathy   • Renal functions currently stable at baseline of 1.5    Anemia  Assessment & Plan  Hemoglobin currently stable, no evidence of bleeding             VTE Pharmacologic Prophylaxis:   Pharmacologic: Enoxaparin (Lovenox)  Mechanical VTE Prophylaxis in Place: Yes    Patient Centered Rounds: I have performed bedside rounds with nursing staff today. Discussions with Specialists or Other Care Team Provider: ID, Ortho    Education and Discussions with Family / Patient: sister    Current Length of Stay: 3 day(s)    Current Patient Status: Inpatient   Certification Statement: The patient will continue to require additional inpatient hospital stay due to pending surgery    Discharge Plan: pending    Code Status: Level 1 - Full Code      Subjective:   No events overnight. Denies fevers or chills. Awaiting surgery. Objective:     Vitals:   Temp (24hrs), Av.8 °F (36.6 °C), Min:97.5 °F (36.4 °C), Max:98 °F (36.7 °C)    Temp:  [97.5 °F (36.4 °C)-98 °F (36.7 °C)] 97.5 °F (36.4 °C)  HR:  [64-90] 64  Resp:  [18] 18  BP: (114-143)/(55-69) 114/55  SpO2:  [92 %-98 %] 92 %  Body mass index is 35.81 kg/m². Input and Output Summary (last 24 hours): Intake/Output Summary (Last 24 hours) at 2023 1406  Last data filed at 2023 1400  Gross per 24 hour   Intake 650 ml   Output 1450 ml   Net -800 ml       Physical Exam:     Physical Exam  Vitals and nursing note reviewed. Constitutional:       Appearance: She is obese. HENT:      Head: Normocephalic. Eyes:      General: No scleral icterus. Conjunctiva/sclera: Conjunctivae normal.   Cardiovascular:      Rate and Rhythm: Normal rate.    Pulmonary:      Effort: Pulmonary effort is normal. No respiratory distress. Abdominal:      Palpations: Abdomen is soft. Musculoskeletal:         General: Tenderness ( lower back) present. No swelling. Right lower leg: No edema. Left lower leg: No edema. Skin:     General: Skin is warm. Neurological:      General: No focal deficit present. Mental Status: She is alert. Additional Data:     Labs:    Results from last 7 days   Lab Units 07/26/23  0438   WBC Thousand/uL 10.03   HEMOGLOBIN g/dL 10.2*   HEMATOCRIT % 33.2*   PLATELETS Thousands/uL 164   NEUTROS PCT % 71   LYMPHS PCT % 19   MONOS PCT % 7   EOS PCT % 2     Results from last 7 days   Lab Units 07/26/23  0615 07/25/23  0501 07/24/23  2224   SODIUM mmol/L 138   < > 136   POTASSIUM mmol/L 3.5   < > 4.0   CHLORIDE mmol/L 101   < > 99   CO2 mmol/L 31   < > 27   BUN mg/dL 27*   < > 32*   CREATININE mg/dL 1.43*   < > 1.49*   ANION GAP mmol/L 6   < > 10   CALCIUM mg/dL 8.8   < > 9.0   ALBUMIN g/dL  --   --  4.0   TOTAL BILIRUBIN mg/dL  --   --  0.49   ALK PHOS U/L  --   --  52   ALT U/L  --   --  3*   AST U/L  --   --  17   GLUCOSE RANDOM mg/dL 94   < > 144*    < > = values in this interval not displayed. Results from last 7 days   Lab Units 07/24/23  2224   INR  0.97     Results from last 7 days   Lab Units 07/27/23  1116 07/27/23  0618 07/26/23  2104 07/26/23  1605 07/26/23  1102 07/26/23  0617 07/25/23  2039 07/25/23  1543 07/25/23  1058 07/25/23  0634   POC GLUCOSE mg/dl 111 120 135 222* 144* 100 112 183* 126 148*         Results from last 7 days   Lab Units 07/26/23  0438 07/24/23  2224   LACTIC ACID mmol/L  --  1.7   PROCALCITONIN ng/ml 0.11 0.07           * I Have Reviewed All Lab Data Listed Above. * Additional Pertinent Lab Tests Reviewed: [address] [place] [address] Admission Reviewed    Imaging:    CT upper extremity w contrast right    Result Date: 7/27/2023  Impression: No acute osseous abnormality status post reverse total shoulder arthroplasty.  Workstation performed: [id number]     CT spine lumbar wo contrast    Result Date: 7/26/2023  Impression: Diffuse osteopenia of the bony structures. Multilevel degenerative change within the lower thoracic spine and throughout the lumbar spine. Partial fusion of the posterior elements somewhat diffusely with fusion of the L5 and S1 endplates. Mild to moderate canal stenosis and foraminal narrowing. No acute osseous abnormality. Workstation performed: [id number]     FL guided needle plac bx/asp/inj    Result Date: 7/26/2023  Impression: Technically successful right artificial glenohumeral joint aspiration yielding 3 mL of bloody fluid. All aspirate obtained was sent for laboratory testing as ordered. The above findings and procedure were reviewed with Dr. Florence June. Procedure was performed by Rupal Williamson PA-C under the direct supervision of Dr. Florence June. Workstation performed: [id number]     XR shoulder 2+ views RIGHT    Result Date: 7/25/2023  Impression: Unremarkable appearance of reverse total shoulder arthroplasty. Workstation performed: [id number]       Recent Cultures (last 7 days):     Results from last 7 days   Lab Units 07/26/23  0744 07/24/23  2237 07/24/23  2224   BLOOD CULTURE   --  No Growth at 48 hrs. No Growth at 48 hrs.    GRAM STAIN RESULT  3+ Polys  No bacteria seen  --   --    BODY FLUID CULTURE, STERILE  No growth  --   --        Last 24 Hours Medication List:   Current Facility-Administered Medications   Medication Dose Route Frequency Provider Last Rate   • [MAR Hold] acetaminophen  975 mg Oral Q8H Haydee Rangel MD     • [facility] Hold] allopurinol  100 mg Oral BID Eran Juárez PA-C     • [facility] Hold] aluminum-magnesium hydroxide-simethicone  30 mL Oral Q6H PRN Eran Juárez PA-C     • [facility] Hold] citalopram  40 mg Oral QAM Eran Juárez PA-C     • [facility] Hold] enoxaparin  40 mg Subcutaneous Daily Eran Juárez PA-C     • [facility] Hold] furosemide  40 mg Oral Daily Jon Singh MD     • [facility] Hold] gabapentin  100 mg Oral BID Dagoberto Cam PA-C     • [facility] Hold] insulin lispro  1-6 Units Subcutaneous TID AC Dagoberto Cam PA-C     • [facility] Hold] iohexol  50 mL Other Once in imaging Corey Dancer, PA-C     • [facility] Hold] levothyroxine  50 mcg Oral Early Morning Dagobertoamy Cam PA-C     • [facility] Hold] lidocaine  1 patch Topical Daily Hermes Hinton MD     • [facility] Hold] lidocaine (PF)  7 mL Injection Once in imaging Corey Dancer, PA-C     • [facility] Hold] loratadine  10 mg Oral Daily Dagobertoamy Cam PA-C     • [facility] Hold] methocarbamol  500 mg Oral Q6H PRN Hermes Hinton MD     • [facility] Hold] morphine injection  2 mg Intravenous Q6H PRN Hermes Hinton MD     • multi-electrolyte  75 mL/hr Intravenous Continuous Hermes Hinton MD 75 mL/hr (07/26/23 1815)   • [MAR Hold] ondansetron  4 mg Intravenous Q6H PRN Dagoberto Cam PA-C     • [facility] Hold] oxyCODONE  5 mg Oral Q4H PRN Rimma Rousseau MD     • [facility] Hold] oxyCODONE  2.5 mg Oral Q6H PRN Dagoberto Cam PA-C     • [facility] Hold] polyethylene glycol  17 g Oral Daily Dagoberto Cam PA-C     • [facility] Hold] predniSONE  5 mg Oral QAM Dagoberto Cam PA-C          Today, Patient Was Seen By: Hermes Hinton MD    ** Please Note: Dictation voice to text software may have been used in the creation of this document.  **

## 2023-07-27 NOTE — NURSING NOTE
Right shoulder deep tissue specimen sent for pathology and placed in formalin. Notified Ulises Robles PA-C about the specimen. Ulises Robles PA-C confirmed tissue to be sent for culture.

## 2023-07-27 NOTE — PLAN OF CARE
Problem: PAIN - ADULT  Goal: Verbalizes/displays adequate comfort level or baseline comfort level  Description: Interventions:  - Encourage patient to monitor pain and request assistance  - Assess pain using appropriate pain scale  - Administer analgesics based on type and severity of pain and evaluate response  - Implement non-pharmacological measures as appropriate and evaluate response  - Consider cultural and social influences on pain and pain management  - Notify physician/advanced practitioner if interventions unsuccessful or patient reports new pain  Outcome: Progressing     Problem: INFECTION - ADULT  Goal: Absence or prevention of progression during hospitalization  Description: INTERVENTIONS:  - Assess and monitor for signs and symptoms of infection  - Monitor lab/diagnostic results  - Monitor all insertion sites, i.e. indwelling lines, tubes, and drains  - Monitor endotracheal if appropriate and nasal secretions for changes in amount and color  - [place] appropriate cooling/warming therapies per order  - Administer medications as ordered  - Instruct and encourage patient and family to use good hand hygiene technique  - Identify and instruct in appropriate isolation precautions for identified infection/condition  Outcome: Progressing  Goal: Absence of fever/infection during neutropenic period  Description: INTERVENTIONS:  - Monitor WBC    Outcome: Progressing

## 2023-07-27 NOTE — CASE MANAGEMENT
Case Management Discharge Planning Note    Patient name Harshad Acosta  Location [address] [place] [address] /E2 MS 63-* MRN [id number]  : 1945 Date 2023       Current Admission Date: 2023  Current Admission Diagnosis:SIRS (systemic inflammatory response syndrome) [facility])   Patient Active Problem List    Diagnosis Date Noted   • Obesity (BMI 35.0-39.9 without comorbidity) 2023   • Rotator cuff tear arthropathy of right shoulder 2023   • Chronic combined systolic (congestive) and diastolic (congestive) heart failure ([address]) 2023   • PMR (polymyalgia rheumatica) ([address]) 2022   • SIRS (systemic inflammatory response syndrome) ([address]) 2020   • Impaired vision 2020   • Bilateral hearing loss 2020   • Idiopathic thrombocytopenic purpura ([address]) 2020   • Post menopausal syndrome 2020   • Restless legs syndrome 2020   • Ambulatory dysfunction with recurrent falls 2020   • Nephrosclerosis, stage 1-4 or unspecified chronic kidney disease 2020   • Arthritis 2019   • Type 2 diabetes mellitus without complication, without long-term current use of insulin ([address]) 2019   • Idiopathic chronic gout with tophus 2019   • Osteoarthritis of multiple joints 2019   • Symptomatic varicose veins of both lower extremities 10/15/2018   • Intermittent claudication ([address]) 10/15/2018   • Stage 3b chronic kidney disease ([address]) 10/12/2017   • Acute back pain 06/15/2017   • Combined form of senile cataract of left eye 2016   • Fibromyalgia 2016   • Vitamin D deficiency 2015   • Chronic gout 2015   • Primary osteoarthritis of both hands 2015   • Homocysteinemia 2014   • Anemia 2013   • Hypothyroidism 2013   • Depression, recurrent ([address]) 2012   • Hyperlipidemia 2012      LOS (days): 3  Geometric Mean LOS (GMLOS) (days): 3.50  Days to GMLOS:1     OBJECTIVE:  Risk of Unplanned Readmission Score: 17.97 Current admission status: Inpatient   Preferred Pharmacy:   [facility], [place] [address]  [address] [facility] [address]  Phone: [phone] Fax: [phone]    Primary Care Provider: Pavithra Burton DO    Primary Insurance: [address] [facility] REP  Secondary Insurance:     DISCHARGE DETAILS:     Additional Comments: Patient reviewed during care coordination rounds with Dr. Sally Domínguez who informed that pt is to go to the OR for a washout today, cultures pending. [facility] at [facility] reserved via [facility], pt will require insurance auth prior to discharge. CM department to follow.

## 2023-07-27 NOTE — ASSESSMENT & PLAN NOTE
Recent right total shoulder replacement  Presented with worsening shoulder pain, concern for possible early PJI  Joint aspiration completed on 07/26, WBC noted to be greater than 7000  Discussed with Ortho and infectious disease, planning for incision and drainage, washout and antibiotic beads with revision  N.p.o. we will plan for surgery today

## 2023-07-27 NOTE — PROGRESS NOTES
Progress Note - Infectious Disease   [facility] 78 y.o. female MRN: [id number]  Unit/Bed#: E2 -01 Encounter: [id number]    Impression/Plan:  1. R shoulder pain. In setting of recent reverse shoulder arthroplasty. Concern for PJI. R shoulder xray showed unremarkable appearance of surgical site without hardware complication, unremarkable soft tissues, and no osseous lesions. Follow up CT imaging is pending. Orthopedic surgery is following. IR aspirated yesterday with 3ml bloody aspirate. Fluid WBC = 7552 with 83% neutrophils, RBC = 878,000. Patient is scheduled for washout in the OR today. Given patient's clinical stability, antibiotic treatment has been on hold. This will improve quality of operative cultures. This morning she remains clinically stable and nontoxic.  -monitor patient off antibiotics prior to surgery  -anticipate start of cefazolin intra-operatively   -monitor CBCD and BMP  -follow up IR fluid culture  -follow up CT with contrast  -follow up operative findings  -serial R shoulder exams  -continue follow up with orthopedic surgery     2. SIRS vs sepsis. Present on admission. Fever and leukocytosis. Consider possible R shoulder infection. Consider inflammatory response to R shoulder injury not detected on xray. No other clear source appreciated. No acute respiratory or  complaints. Low suspicion for GI source despite her recent diarrhea. Blood cultures are negative >24 hours. Recommend monitoring patient off additional antibiotics for now.  -monitor patient off antibiotics while clinically stable  -anticipate start of IV cefazolin intra-opertively   -monitor CBCD and BMP  -follow up blood cultures  -monitor vitals  -R shoulder work up as above  -supportive care      3. Recent R reverse total shoulder replacement. In patient with severe OA and rotator cuff repair. Now with acute onset pain and change in ROM as above.  -continue follow up with orthopedic surgery     4.  Type 2 diabetes mellitus without long term insulin use. Patient's last HbA1c was 6.6% on 2023. Elevated blood glucose is risk factor for infection. Recommend tight glycemic control.  -blood glucose management per primary service     5. CKD stage 3b. Upon review of patient's medical record it appears her baseline creatinine is approximately 1.5-1.7. Creatinine has remained within or below baseline during this hospitalization. -monitor creatinine  -dose adjust antibiotics for renal function as needed  -avoid nephrotoxins     6. Obesity. BMI = 35.81. This can effect antibiotic dosing.  -monitor patient weight and dose adjust antibiotics as needed     Above plan was discussed in detail with patient at the bedside. Above plan was discussed in detail with SLIM. Antibiotics:  No current antibiotic use    Subjective:  Patient reports she's very eager to get to the OR, cannot take the pain in her shoulder any longer. Still has minimal movement in the R shoulder/upper arm and pain with moving her neck side to side. She has no fever, chills, sweats, shakes; no nausea, vomiting, abdominal pain, diarrhea, or dysuria; no cough, shortness of breath, or chest pain. No new symptoms. Objective:  Vitals:  Temp:  [97.1 °F (36.2 °C)-98 °F (36.7 °C)] 97.8 °F (36.6 °C)  HR:  [78-90] 89  Resp:  [18] 18  BP: (114-143)/(60-85) 114/60  SpO2:  [94 %-98 %] 98 %  Temp (24hrs), Av.6 °F (36.4 °C), Min:97.1 °F (36.2 °C), Max:98 °F (36.7 °C)  Current: Temperature: 97.8 °F (36.6 °C)    Physical Exam:   General Appearance:  Alert, interactive, nontoxic, no acute distress. She appears comfortable sitting up in bed. Throat: Oropharynx moist without lesions. Lungs:   Clear to auscultation bilaterally; no wheezes, rhonchi or rales; respirations unlabored on room air. Heart:  RRR; no murmur, rub or gallop. Abdomen:   Soft, obese, non-tender, non-distended, positive bowel sounds. Extremities: No clubbing or cyanosis.  R shoulder without overlying erythema or edema, no warmth to touch, very tender to palpation especially around the scapular region, incision remains well healed without active drainage/bleeding. Skin: No new rashes, lesions, or draining wounds noted on exposed skin. Labs, Imaging, & Other studies:   All pertinent labs and imaging studies were personally reviewed  Results from last 7 days   Lab Units 07/26/23  0438 07/25/23  0501 07/24/23 2224   WBC Thousand/uL 10.03 12.17* 11.24*   HEMOGLOBIN g/dL 10.2* 10.4* 11.0*   PLATELETS Thousands/uL 164 191 194     Results from last 7 days   Lab Units 07/26/23  0615 07/25/23  0501 07/24/23 2224   POTASSIUM mmol/L 3.5   < > 4.0   CHLORIDE mmol/L 101   < > 99   CO2 mmol/L 31   < > 27   BUN mg/dL 27*   < > 32*   CREATININE mg/dL 1.43*   < > 1.49*   EGFR ml/min/1.73sq m 34   < > 32   CALCIUM mg/dL 8.8   < > 9.0   AST U/L  --   --  17   ALT U/L  --   --  3*   ALK PHOS U/L  --   --  52    < > = values in this interval not displayed. Results from last 7 days   Lab Units 07/24/23 2237 07/24/23 2224   BLOOD CULTURE  No Growth at 24 hrs. No Growth at 24 hrs.      Results from last 7 days   Lab Units 07/26/23  0438 07/24/23 2224   PROCALCITONIN ng/ml 0.11 0.07     Results from last 7 days   Lab Units 07/24/23  2224   CRP mg/L 73.3*

## 2023-07-27 NOTE — ANESTHESIA POSTPROCEDURE EVALUATION
Post-Op Assessment Note    CV Status:  Stable  Pain Score: 1    Pain management: adequate     Mental Status:  Alert and awake   Hydration Status:  Euvolemic   PONV Controlled:  Controlled   Airway Patency:  Patent      Post Op Vitals Reviewed: Yes      Staff: Anesthesiologist         No notable events documented.     /63 (07/27/23 1626)    Temp      Pulse 66 (07/27/23 1626)   Resp 18 (07/27/23 1626)    SpO2 93 % (07/27/23 1626)

## 2023-07-27 NOTE — ASSESSMENT & PLAN NOTE
Presented with fevers, leukocytosis concerning for possible prosthetic joint infection with recent right shoulder replacement  Hold further antibiotics, per infectious disease

## 2023-07-28 LAB
ALBUMIN SERPL BCP-MCNC: 3.3 G/DL (ref 3.5–5)
ALP SERPL-CCNC: 41 U/L (ref 34–104)
ALT SERPL W P-5'-P-CCNC: <3 U/L (ref 7–52)
ANION GAP SERPL CALCULATED.3IONS-SCNC: 9 MMOL/L
AST SERPL W P-5'-P-CCNC: 13 U/L (ref 13–39)
BASOPHILS # BLD AUTO: 0.04 THOUSANDS/ÂΜL (ref 0–0.1)
BASOPHILS NFR BLD AUTO: 0 % (ref 0–1)
BILIRUB SERPL-MCNC: 0.37 MG/DL (ref 0.2–1)
BUN SERPL-MCNC: 24 MG/DL (ref 5–25)
CALCIUM ALBUM COR SERPL-MCNC: 9.9 MG/DL (ref 8.3–10.1)
CALCIUM SERPL-MCNC: 9.3 MG/DL (ref 8.4–10.2)
CHLORIDE SERPL-SCNC: 102 MMOL/L (ref 96–108)
CO2 SERPL-SCNC: 28 MMOL/L (ref 21–32)
CREAT SERPL-MCNC: 1.47 MG/DL (ref 0.6–1.3)
EOSINOPHIL # BLD AUTO: 0.2 THOUSAND/ÂΜL (ref 0–0.61)
EOSINOPHIL NFR BLD AUTO: 2 % (ref 0–6)
ERYTHROCYTE [DISTWIDTH] IN BLOOD BY AUTOMATED COUNT: 14.3 % (ref 11.6–15.1)
GFR SERPL CREATININE-BSD FRML MDRD: 33 ML/MIN/1.73SQ M
GLUCOSE SERPL-MCNC: 104 MG/DL (ref 65–140)
GLUCOSE SERPL-MCNC: 156 MG/DL (ref 65–140)
GLUCOSE SERPL-MCNC: 162 MG/DL (ref 65–140)
GLUCOSE SERPL-MCNC: 175 MG/DL (ref 65–140)
GLUCOSE SERPL-MCNC: 93 MG/DL (ref 65–140)
HCT VFR BLD AUTO: 30.5 % (ref 34.8–46.1)
HGB BLD-MCNC: 9.3 G/DL (ref 11.5–15.4)
IMM GRANULOCYTES # BLD AUTO: 0.05 THOUSAND/UL (ref 0–0.2)
IMM GRANULOCYTES NFR BLD AUTO: 1 % (ref 0–2)
LYMPHOCYTES # BLD AUTO: 1.3 THOUSANDS/ÂΜL (ref 0.6–4.47)
LYMPHOCYTES NFR BLD AUTO: 15 % (ref 14–44)
MCH RBC QN AUTO: 31 PG (ref 26.8–34.3)
MCHC RBC AUTO-ENTMCNC: 30.5 G/DL (ref 31.4–37.4)
MCV RBC AUTO: 102 FL (ref 82–98)
MONOCYTES # BLD AUTO: 0.55 THOUSAND/ÂΜL (ref 0.17–1.22)
MONOCYTES NFR BLD AUTO: 6 % (ref 4–12)
NEUTROPHILS # BLD AUTO: 6.82 THOUSANDS/ÂΜL (ref 1.85–7.62)
NEUTS SEG NFR BLD AUTO: 76 % (ref 43–75)
NRBC BLD AUTO-RTO: 0 /100 WBCS
PLATELET # BLD AUTO: 186 THOUSANDS/UL (ref 149–390)
PMV BLD AUTO: 11 FL (ref 8.9–12.7)
POTASSIUM SERPL-SCNC: 3.7 MMOL/L (ref 3.5–5.3)
PROT SERPL-MCNC: 5.7 G/DL (ref 6.4–8.4)
RBC # BLD AUTO: 3 MILLION/UL (ref 3.81–5.12)
SODIUM SERPL-SCNC: 139 MMOL/L (ref 135–147)
WBC # BLD AUTO: 8.96 THOUSAND/UL (ref 4.31–10.16)

## 2023-07-28 PROCEDURE — 82948 REAGENT STRIP/BLOOD GLUCOSE: CPT

## 2023-07-28 PROCEDURE — 97168 OT RE-EVAL EST PLAN CARE: CPT

## 2023-07-28 PROCEDURE — 85025 COMPLETE CBC W/AUTO DIFF WBC: CPT | Performed by: STUDENT IN AN ORGANIZED HEALTH CARE EDUCATION/TRAINING PROGRAM

## 2023-07-28 PROCEDURE — 99232 SBSQ HOSP IP/OBS MODERATE 35: CPT | Performed by: INTERNAL MEDICINE

## 2023-07-28 PROCEDURE — 97110 THERAPEUTIC EXERCISES: CPT

## 2023-07-28 PROCEDURE — 99232 SBSQ HOSP IP/OBS MODERATE 35: CPT | Performed by: STUDENT IN AN ORGANIZED HEALTH CARE EDUCATION/TRAINING PROGRAM

## 2023-07-28 PROCEDURE — 97164 PT RE-EVAL EST PLAN CARE: CPT

## 2023-07-28 PROCEDURE — 80053 COMPREHEN METABOLIC PANEL: CPT | Performed by: STUDENT IN AN ORGANIZED HEALTH CARE EDUCATION/TRAINING PROGRAM

## 2023-07-28 RX ORDER — SODIUM CHLORIDE, SODIUM LACTATE, POTASSIUM CHLORIDE, CALCIUM CHLORIDE 600; 310; 30; 20 MG/100ML; MG/100ML; MG/100ML; MG/100ML
100 INJECTION, SOLUTION INTRAVENOUS CONTINUOUS
Status: DISCONTINUED | OUTPATIENT
Start: 2023-07-28 | End: 2023-07-28

## 2023-07-28 RX ADMIN — DOCUSATE SODIUM 100 MG: 100 CAPSULE, LIQUID FILLED ORAL at 11:38

## 2023-07-28 RX ADMIN — ACETAMINOPHEN 325MG 975 MG: 325 TABLET ORAL at 22:00

## 2023-07-28 RX ADMIN — ACETAMINOPHEN 325MG 975 MG: 325 TABLET ORAL at 06:08

## 2023-07-28 RX ADMIN — FUROSEMIDE 40 MG: 40 TABLET ORAL at 11:39

## 2023-07-28 RX ADMIN — GABAPENTIN 100 MG: 100 CAPSULE ORAL at 11:39

## 2023-07-28 RX ADMIN — GABAPENTIN 100 MG: 100 CAPSULE ORAL at 17:45

## 2023-07-28 RX ADMIN — POLYETHYLENE GLYCOL 3350 17 G: 17 POWDER, FOR SOLUTION ORAL at 11:38

## 2023-07-28 RX ADMIN — DOCUSATE SODIUM 100 MG: 100 CAPSULE, LIQUID FILLED ORAL at 17:45

## 2023-07-28 RX ADMIN — SENNOSIDES 8.6 MG: 8.6 TABLET, FILM COATED ORAL at 11:39

## 2023-07-28 RX ADMIN — ALLOPURINOL 100 MG: 100 TABLET ORAL at 17:45

## 2023-07-28 RX ADMIN — PREDNISONE 5 MG: 5 TABLET ORAL at 11:39

## 2023-07-28 RX ADMIN — OXYCODONE HYDROCHLORIDE 10 MG: 10 TABLET ORAL at 13:07

## 2023-07-28 RX ADMIN — ALLOPURINOL 100 MG: 100 TABLET ORAL at 11:39

## 2023-07-28 RX ADMIN — LORATADINE 10 MG: 10 TABLET ORAL at 11:39

## 2023-07-28 RX ADMIN — OXYCODONE HYDROCHLORIDE 10 MG: 10 TABLET ORAL at 20:17

## 2023-07-28 RX ADMIN — OXYCODONE HYDROCHLORIDE 10 MG: 10 TABLET ORAL at 06:08

## 2023-07-28 RX ADMIN — CEFAZOLIN SODIUM 1000 MG: 1 SOLUTION INTRAVENOUS at 22:00

## 2023-07-28 RX ADMIN — LIDOCAINE 1 PATCH: 50 PATCH CUTANEOUS at 11:39

## 2023-07-28 RX ADMIN — ASPIRIN 325 MG ORAL TABLET 325 MG: 325 PILL ORAL at 11:39

## 2023-07-28 RX ADMIN — INSULIN LISPRO 1 UNITS: 100 INJECTION, SOLUTION INTRAVENOUS; SUBCUTANEOUS at 16:30

## 2023-07-28 RX ADMIN — LEVOTHYROXINE SODIUM 50 MCG: 50 TABLET ORAL at 06:08

## 2023-07-28 RX ADMIN — CEFAZOLIN SODIUM 1000 MG: 1 SOLUTION INTRAVENOUS at 06:09

## 2023-07-28 RX ADMIN — CEFAZOLIN SODIUM 1000 MG: 1 SOLUTION INTRAVENOUS at 15:33

## 2023-07-28 RX ADMIN — CITALOPRAM HYDROBROMIDE 40 MG: 20 TABLET ORAL at 11:38

## 2023-07-28 RX ADMIN — ACETAMINOPHEN 325MG 975 MG: 325 TABLET ORAL at 13:08

## 2023-07-28 NOTE — PROGRESS NOTES
picc consult: plan of care discused with Kt Calixto PA-C. As per nephrology IR consulted for tunneled picc.

## 2023-07-28 NOTE — PLAN OF CARE
Problem: Potential for Falls  Goal: Patient will remain free of falls  Description: INTERVENTIONS:  - Educate patient/family on patient safety including physical limitations  - Instruct patient to call for assistance with activity   - Consult OT/PT to assist with strengthening/mobility   - Keep Call bell within reach  - Keep bed low and locked with side rails adjusted as appropriate  - Keep care items and personal belongings within reach  - Initiate and maintain comfort rounds  - Make Fall Risk Sign visible to staff  - Offer Toileting every  Hours, in advance of need  - Initiate/Maintain bed alarm  - Obtain necessary fall risk management equipment  - Apply yellow socks and bracelet for high fall risk patients  - Consider moving patient to room near nurses station  Outcome: Progressing     Problem: MOBILITY - ADULT  Goal: Maintain or return to baseline ADL function  Description: INTERVENTIONS:  -  Assess patient's ability to carry out ADLs; assess patient's baseline for ADL function and identify physical deficits which impact ability to perform ADLs (bathing, care of mouth/teeth, toileting, grooming, dressing, etc.)  - Assess/evaluate cause of self-care deficits   - Assess range of motion  - Assess patient's mobility; develop plan if impaired  - Assess patient's need for assistive devices and provide as appropriate  - Encourage maximum independence but intervene and supervise when necessary  - Involve family in performance of ADLs  - Assess for home care needs following discharge   - Consider OT consult to assist with ADL evaluation and planning for discharge  - Provide patient education as appropriate  Outcome: Progressing  Goal: Maintains/Returns to pre admission functional level  Description: INTERVENTIONS:  - Perform BMAT or MOVE assessment daily.   - Set and communicate daily mobility goal to care team and patient/family/caregiver.    - Collaborate with rehabilitation services on mobility goals if consulted  - Perform Range of Motion 3 times a day. - Reposition patient every 2 hours.   - Dangle patient 3 times a day  - Stand patient 3 times a day  - Ambulate patient 3 times a day  - Out of bed to chair 3 times a day   - Out of bed for meals 3 times a day  - Out of bed for toileting  - Record patient progress and toleration of activity level   Outcome: Progressing     Problem: PAIN - ADULT  Goal: Verbalizes/displays adequate comfort level or baseline comfort level  Description: Interventions:  - Encourage patient to monitor pain and request assistance  - Assess pain using appropriate pain scale  - Administer analgesics based on type and severity of pain and evaluate response  - Implement non-pharmacological measures as appropriate and evaluate response  - Consider cultural and social influences on pain and pain management  - Notify physician/advanced practitioner if interventions unsuccessful or patient reports new pain  Outcome: Progressing     Problem: INFECTION - ADULT  Goal: Absence or prevention of progression during hospitalization  Description: INTERVENTIONS:  - Assess and monitor for signs and symptoms of infection  - Monitor lab/diagnostic results  - Monitor all insertion sites, i.e. indwelling lines, tubes, and drains  - Monitor endotracheal if appropriate and nasal secretions for changes in amount and color  - [place] appropriate cooling/warming therapies per order  - Administer medications as ordered  - Instruct and encourage patient and family to use good hand hygiene technique  - Identify and instruct in appropriate isolation precautions for identified infection/condition  Outcome: Progressing  Goal: Absence of fever/infection during neutropenic period  Description: INTERVENTIONS:  - Monitor WBC    Outcome: Progressing     Problem: SAFETY ADULT  Goal: Patient will remain free of falls  Description: INTERVENTIONS:  - Educate patient/family on patient safety including physical limitations  - Instruct patient to call for assistance with activity   - Consult OT/PT to assist with strengthening/mobility   - Keep Call bell within reach  - Keep bed low and locked with side rails adjusted as appropriate  - Keep care items and personal belongings within reach  - Initiate and maintain comfort rounds  - Make Fall Risk Sign visible to staff  - Offer Toileting every 2 Hours, in advance of need  - Initiate/Maintain bed alarm  - Obtain necessary fall risk management equipment  - Apply yellow socks and bracelet for high fall risk patients  - Consider moving patient to room near nurses station  Outcome: Progressing  Goal: Maintain or return to baseline ADL function  Description: INTERVENTIONS:  -  Assess patient's ability to carry out ADLs; assess patient's baseline for ADL function and identify physical deficits which impact ability to perform ADLs (bathing, care of mouth/teeth, toileting, grooming, dressing, etc.)  - Assess/evaluate cause of self-care deficits   - Assess range of motion  - Assess patient's mobility; develop plan if impaired  - Assess patient's need for assistive devices and provide as appropriate  - Encourage maximum independence but intervene and supervise when necessary  - Involve family in performance of ADLs  - Assess for home care needs following discharge   - Consider OT consult to assist with ADL evaluation and planning for discharge  - Provide patient education as appropriate  Outcome: Progressing  Goal: Maintains/Returns to pre admission functional level  Description: INTERVENTIONS:  - Perform BMAT or MOVE assessment daily.   - Set and communicate daily mobility goal to care team and patient/family/caregiver. - Collaborate with rehabilitation services on mobility goals if consulted  - Perform Range of Motion 3 times a day. - Reposition patient every 2 hours.   - Dangle patient 3 times a day  - Stand patient 3 times a day  - Ambulate patient 3 times a day  - Out of bed to chair 3 times a day   - Out of bed for meals 3 times a day  - Out of bed for toileting  - Record patient progress and toleration of activity level   Outcome: Progressing     Problem: DISCHARGE PLANNING  Goal: Discharge to home or other facility with appropriate resources  Description: INTERVENTIONS:  - Identify barriers to discharge w/patient and caregiver  - Arrange for needed discharge resources and transportation as appropriate  - Identify discharge learning needs (meds, wound care, etc.)  - Arrange for interpretive services to assist at discharge as needed  - Refer to Case Management Department for coordinating discharge planning if the patient needs post-hospital services based on physician/advanced practitioner order or complex needs related to functional status, cognitive ability, or social support system  Outcome: Progressing     Problem: Knowledge Deficit  Goal: Patient/family/caregiver demonstrates understanding of disease process, treatment plan, medications, and discharge instructions  Description: Complete learning assessment and assess knowledge base.   Interventions:  - Provide teaching at level of understanding  - Provide teaching via preferred learning methods  Outcome: Progressing     Problem: SKIN/TISSUE INTEGRITY - ADULT  Goal: Skin Integrity remains intact(Skin Breakdown Prevention)  Description: Assess:  -Perform Idris assessment every   -Clean and moisturize skin every   -Inspect skin when repositioning, toileting, and assisting with ADLS  -Assess under medical devices such as  every   -Assess extremities for adequate circulation and sensation     Bed Management:  -Have minimal linens on bed & keep smooth, unwrinkled  -Change linens as needed when moist or perspiring  -Avoid sitting or lying in one position for more than  hours while in bed  -Keep HOB at degrees     Toileting:  -Offer bedside commode  -Assess for incontinence every   -Use incontinent care products after each incontinent episode such as     Activity:  -Mobilize patient  times a day  -Encourage activity and walks on unit  -Encourage or provide ROM exercises   -Turn and reposition patient every  Hours  -Use appropriate equipment to lift or move patient in bed  -Instruct/ Assist with weight shifting every  when out of bed in chair  -Consider limitation of chair time  hour intervals    Skin Care:  -Avoid use of baby powder, tape, friction and shearing, hot water or constrictive clothing  -Relieve pressure over bony prominences using   -Do not massage red bony areas    Next Steps:  -Teach patient strategies to minimize risks such as   -Consider consults to  interdisciplinary teams such as   Outcome: Progressing     Problem: Prexisting or High Potential for Compromised Skin Integrity  Goal: Skin integrity is maintained or improved  Description: INTERVENTIONS:  - Identify patients at risk for skin breakdown  - Assess and monitor skin integrity  - Assess and monitor nutrition and hydration status  - Monitor labs   - Assess for incontinence   - Turn and reposition patient  - Assist with mobility/ambulation  - Relieve pressure over bony prominences  - Avoid friction and shearing  - Provide appropriate hygiene as needed including keeping skin clean and dry  - Evaluate need for skin moisturizer/barrier cream  - Collaborate with interdisciplinary team   - Patient/family teaching  - Consider wound care consult   Outcome: Progressing

## 2023-07-28 NOTE — PLAN OF CARE
Problem: PHYSICAL THERAPY ADULT  Goal: Performs mobility at highest level of function for planned discharge setting. See evaluation for individualized goals. Description: Treatment/Interventions: Functional transfer training, LE strengthening/ROM, Elevations, Therapeutic exercise, Endurance training, Patient/family training, Bed mobility, Gait training, Spoke to nursing, OT  Equipment Recommended:  (TBD)       See flowsheet documentation for full assessment, interventions and recommendations. 7/28/2023 1524 by Hallie Rashid PT  Outcome: Progressing  Note: Prognosis: Fair  Problem List: Decreased strength, Decreased endurance, Impaired balance, Decreased range of motion, Decreased mobility, Obesity, Pain, Orthopedic restrictions  Assessment: Pt s/p Right - INCISION AND DRAINAGE (I&D) SHOULDER ANTIBIOTIC STIMULAN BEADS (VANCOMYCIN AND TOBRAMYCIN) on 7/27/23 Pt seen for post-op PT re-eval. Improved mobility & activity tolerance noted this tx session. Pt require modAx1 for transfers & minAx1 for amb w/ SPC + cues for techniques & safety. Gait deviations as above, slow & unsteady w/ dec foot clearance & strides but no gross LOB noted. Dec amb tolerance 2* to pain, weakness & fatigue. Pt tolerated above mentioned thera. ex well, BLE AROM. Pt require cues for proper exercise techniques and performance. Please see flowsheet above for objective findings & levels of assistance required for all functional tasks during this tx session. Nsg staff most recent vital signs as follows: /63 (BP Location: Left arm)   Pulse 75   Temp 98 °F (36.7 °C) (Temporal)   Resp 20   Ht 5' 7" (1.702 m)   Wt 104 kg (229 lb 11.5 oz)   SpO2 93%   BMI 35.98 kg/m² . Will continue PT per POC. At end of session, pt OOB in chair in stable condition, call bell & phone in reach, chair alarm activated. Fall precautions reinforced w/ good understanding. The patient's AM-PAC Basic Mobility Inpatient Short Form Raw Score is 14.  A Raw score of less than or equal to 16 suggests the patient may benefit from discharge to post-acute rehabilitation services. Please also refer to the recommendation of the Physical Therapist for safe discharge planning. From PT standpoint, will continue to recommend inpt rehab at D/C pending progress. CM to follow. Nsg staff to continue to mobilized pt (OOB in chair for all meals & ambulate in room/unit) as tolerated to prevent decline in function. Nsg notified. Co-tx was necessary to complete this PT tx session for the pt's best interest given pt's medical acuity & complexity. Barriers to Discharge: Inaccessible home environment  Barriers to Discharge Comments: [place]    PT Discharge Recommendation: Post acute rehabilitation services    See flowsheet documentation for full assessment.

## 2023-07-28 NOTE — ASSESSMENT & PLAN NOTE
Wt Readings from Last 3 Encounters:   07/28/23 104 kg (229 lb 11.5 oz)   06/07/23 108 kg (237 lb 7 oz)   05/24/23 108 kg (239 lb)     Continue metoprolol and oral Lasix 40 mg daily  Currently appears euvolemic

## 2023-07-28 NOTE — OCCUPATIONAL THERAPY NOTE
Occupational Therapy Re-Evaluation     Patient Name: Mae ARAIZA Date: 7/28/2023  Problem List  Principal Problem:    SIRS (systemic inflammatory response syndrome) ([facility])  Active Problems:    Anemia    Stage 3b chronic kidney disease ([address])    Acute back pain    Type 2 diabetes mellitus without complication, without long-term current use of insulin ([facility])    PMR (polymyalgia rheumatica) ([facility])    Chronic combined systolic (congestive) and diastolic (congestive) heart failure ([address])    Rotator cuff tear arthropathy of right shoulder    Past Medical History  Past Medical History:   Diagnosis Date    Abnormal blood chemistry     last assessed 11/13/2013    Angina pectoris ([address])     last assessed 11/012/13    Arthritis     last assessed 11/13/2013    Asthma     Athlete's foot     resolved 10/11/17    Cataract     last assessed 03/18/2016    Chronic kidney disease     Stage III    Diabetes mellitus ([address])     type 2    Diabetes mellitus with kidney disease ([address])     last assessed 10/03/17    Disease of thyroid gland     Eczema     Gait disorder     last assessed 11/12/2013    Gout     Hammer toe     Hyperlipidemia     Hypertension     Idiopathic thrombocytopenia purpura ([address])     last assessed 08/06/15    Overactive bladder     Post menopausal syndrome     resolved 10/11/17    Restless legs syndrome     last assessed 10/24/2013    Subclinical hypothyroidism     last assessed 12/11/17    Urinary frequency     resolved 12/18/2014     Past Surgical History  Past Surgical History:   Procedure Laterality Date    ACHILLES TENDON REPAIR Right     FL GUIDED NEEDLE PLAC BX/ASP/INJ  7/26/2023    AK ARTHROPLASTY GLENOHUMERAL JOINT TOTAL SHOULDER Right 6/7/2023    Procedure: REVERSE TOTAL SHOULDER REPLACEMENT;  Surgeon: Ángela Puga MD;  Location: [facility];  Service: Orthopedics    REPLACEMENT TOTAL KNEE Bilateral            07/28/23 1020   Note Type   Note type Re-Evaluation   Pain Assessment   Pain Assessment Tool 0-10   Pain Score 2   Pain Location/Orientation Orientation: Right;Location: Shoulder   Restrictions/Precautions   Weight Bearing Precautions Per Order Yes   RUE Weight Bearing Per Order NWB   Braces or Orthoses Sling  (sling for comfort)   Other Precautions   (per ortho(i.e.PA-Karmen); sling for comfort, allowed pendulum(when appropriate); elbow-distal ROM, R UE)   Home Living   Type of [address] [place] [address]  One level   Bathroom Shower/Tub Walk-in shower   Bathroom Toilet Raised   Home Equipment Walker;Cane   Prior Function   Lives With Other (Comment)  (sister)   Subjective   Subjective "I sometimes get this pain when my fibromyalgia gets bad."   ADL   Where Assessed Edge of bed   Eating Assistance 5  Supervision/Setup   Grooming Assistance 5  Supervision/Setup   UB Bathing Assistance 3  Moderate Assistance   LB Bathing Assistance 4  Minimal [place] 3  Moderate Assistance   LB Dressing Assistance 3  Moderate Assistance   Toileting Assistance  4  Minimal Assistance   Bed Mobility   Rolling R 4  Minimal assistance   Additional items Assist x 1; Increased time required;Verbal cues;LE management   Rolling L 4  Minimal assistance   Additional items Assist x 1; Increased time required;Verbal cues;LE management   Supine to Sit 4  Minimal assistance   Additional items Assist x 1; Increased time required;Verbal cues;LE management   Transfers   Sit to Stand 3  Moderate assistance   Additional items Assist x 1; Increased time required;Verbal cues   Stand to Sit 4  Minimal assistance   Additional items Assist x 1; Increased time required;Verbal cues   Additional Comments bp's=142/70(EOB)   Functional Mobility   Functional Mobility 4  Minimal assistance   Additional Comments x1   Additional items SPC   Balance   Static Sitting Fair +   Dynamic Sitting Fair   Static Standing Fair -   Dynamic Standing Poor +   Activity Tolerance   Activity Tolerance Patient limited by fatigue;Patient limited by pain RUE Assessment   RUE Assessment X  (shr=NT, elbow-distal=WFLs)   RUE Strength   RUE Overall Strength   (shr=NT, elbow-distal=3/5)   LUE Assessment   LUE Assessment WFL  (improve AROM since initial eval)   LUE Strength   LUE Overall Strength Within Functional Limits - able to perform ADL tasks with strength  (4/5 throughout)   Hand Function   Gross Motor Coordination Functional   Fine Motor Coordination Functional   Sensation   Light Touch No apparent deficits   Proprioception   Proprioception No apparent deficits   Vision-Basic Assessment   Current Vision   (glasses)   Vision - Complex Assessment   Acuity Able to read clock/calendar on wall without difficulty   Psychosocial   Psychosocial (WDL) WDL   Perception   Inattention/Neglect Appears intact   Cognition   Overall Cognitive Status WFL   Arousal/Participation Alert   Attention Within functional limits   Orientation Level Oriented X4   Memory Within functional limits   Following Commands Follows one step commands without difficulty   Assessment   Limitation Decreased ADL status; Decreased UE ROM; Decreased UE strength;Decreased Safe judgement during ADL;Decreased endurance;Decreased high-level ADLs   Prognosis Good   Assessment Pt initially evaluated on 7/25 2* dx sepsis. Since then, pt is a s/p INCISION AND DRAINAGE (I&D) R SHOULDER ANTIBIOTIC STIMULAN BEADS(7/27). Per ortho(i.eEdmond), pt is NWB with her R UE, sling for comfort, allowed pendulum(as appropriate) and elbow to distal ROM. Currently, pt continues to demonstrate deficits with her functional balance, functional mobility, ADL status, transfer safety, R UE ROM/strength, and activity tolerance. Pt would benefit from continued OT tx for the above deficits. Goals stated on initial eval remain appropriate. Will continue. The patient's raw score on the AM-PAC Daily Activity Inpatient Short Form is 15.  A raw score of less than 19 suggests the patient may benefit from discharge to post-acute rehabilitation services. Please refer to the recommendation of the Occupational Therapist for safe discharge planning. Goals   Patient Goals "to be able to go home"   Plan   Treatment Interventions ADL retraining;Functional transfer training;UE strengthening/ROM; Endurance training;Patient/family training;Equipment evaluation/education; Compensatory technique education;Continued evaluation   Goal Expiration Date 08/10/23   OT Treatment Day 1   OT Frequency 3-5x/wk   Recommendation   OT Discharge Recommendation Post acute rehabilitation services   AM-PAC Daily Activity Inpatient   Lower Body Dressing 3   Bathing 2   Toileting 2   Upper Body Dressing 2   Grooming 3   Eating 3   Daily Activity Raw Score 15   Daily Activity Standardized Score (Calc for Raw Score >=11) 34.69   AM-PAC Applied Cognition Inpatient   Following a Speech/Presentation 4   Understanding Ordinary Conversation 4   Taking Medications 4   Remembering Where Things Are Placed or Put Away 3   Remembering List of 4-5 Errands 3   Taking Care of Complicated Tasks 3   Applied Cognition Raw Score 21   Applied Cognition Standardized Score 44.3   Jese Del Castillo

## 2023-07-28 NOTE — QUICK NOTE
IR was initially consulted for PICC placement for Yolande. Unfortunately, she has CKD and a GFR <45. I spoke with nephrologist Dr. Sara Chambers about this patient. Recommendation for tunneled IJ. Orders placed. Consent: Written consent obtained. Risks include but not limited to lid/brow ptosis, bruising, swelling, diplopia, temporary effect, incomplete chemical denervation.

## 2023-07-28 NOTE — PROGRESS NOTES
[facility]  Progress Note  Name: Dora Baziz  MRN: [id number]  Unit/Bed#: E2 -01 I Date of Admission: 7/24/2023   Date of Service: 7/28/2023 I Hospital Day: 4    Assessment/Plan   * SIRS (systemic inflammatory response syndrome) ([facility])  Assessment & Plan  Presented with fevers, leukocytosis concerning for possible prosthetic joint infection with recent right shoulder replacement  Possible prosthetic joint infection, treatment as below    Rotator cuff tear arthropathy of right shoulder  Assessment & Plan  Recent right total shoulder replacement  Presented with worsening shoulder pain, concern for possible early PJI  Joint aspiration completed on 07/26, WBC noted to be greater than 7000  S/p right shoulder washout on 07/27, findings with inflammatory tissue without pus or purulence  Currently on Ancef, await cultures  Hold off on PICC line at this time, currently with peripheral IV  Plan to monitor over the weekend, no fevers, pain controlled      Chronic combined systolic (congestive) and diastolic (congestive) heart failure ([facility])  Assessment & Plan  Wt Readings from Last 3 Encounters:   07/28/23 104 kg (229 lb 11.5 oz)   06/07/23 108 kg (237 lb 7 oz)   05/24/23 108 kg (239 lb)     Continue metoprolol and oral Lasix 40 mg daily  Currently appears euvolemic        PMR (polymyalgia rheumatica) ([facility])  Assessment & Plan  Maintained on 5mg prednisone daily    Type 2 diabetes mellitus without complication, without long-term current use of insulin ([facility])  Assessment & Plan  Lab Results   Component Value Date    HGBA1C 6.6 (H) 05/04/2023     • Previous A1c reviewed, currently at goal  • Continue to monitor on sliding scale    Acute back pain  Assessment & Plan  Patient reportedly had worsening acute on chronic back pain  Patient without red flag symptoms  CT lumbar spine completed, showing multi level degenerative changes, moderate foraminal stenosis.   No acute fracture  Recommend conservative management with multimodal pain approach    Stage 3b chronic kidney disease [facility])  Assessment & Plan  Lab Results   Component Value Date    EGFR 33 2023    EGFR 34 2023    EGFR 30 2023    CREATININE 1.47 (H) 2023    CREATININE 1.43 (H) 2023    CREATININE 1.57 (H) 2023     In the setting of diabetic/hypertensive nephropathy   • Renal functions currently stable at baseline of 1.5    Anemia  Assessment & Plan  Hemoglobin 9.3, slight decrease likely postoperatively  Monitor H&H             VTE Pharmacologic Prophylaxis:   Pharmacologic: Aspirin per Ortho  Mechanical VTE Prophylaxis in Place: Yes    Patient Centered Rounds: I have performed bedside rounds with nursing staff today. Discussions with Specialists or Other Care Team Provider: Ortho, infectious disease    Current Length of Stay: 4 day(s)    Current Patient Status: Inpatient   Certification Statement: The patient will continue to require additional inpatient hospital stay due to Pending cultures, IV antibiotics    Discharge Plan: Greater than 72 hours    Code Status: Level 1 - Full Code      Subjective:   Patient reports doing well, continues to have some shoulder discomfort but improved from day prior. Denies any nausea or vomiting or fevers. Objective:     Vitals:   Temp (24hrs), Av.7 °F (36.5 °C), Min:97.4 °F (36.3 °C), Max:98.2 °F (36.8 °C)    Temp:  [97.4 °F (36.3 °C)-98.2 °F (36.8 °C)] 98 °F (36.7 °C)  HR:  [66-77] 75  Resp:  [12-20] 20  BP: (117-154)/(57-78) 126/63  SpO2:  [92 %-98 %] 93 %  Body mass index is 35.98 kg/m². Input and Output Summary (last 24 hours): Intake/Output Summary (Last 24 hours) at 2023 1446  Last data filed at 2023 0725  Gross per 24 hour   Intake 373.33 ml   Output 580 ml   Net -206.67 ml       Physical Exam:     Physical Exam  Vitals and nursing note reviewed. Constitutional:       Appearance: She is obese.    Eyes:      General: No scleral icterus. Conjunctiva/sclera: Conjunctivae normal.   Cardiovascular:      Rate and Rhythm: Normal rate. Pulmonary:      Effort: Pulmonary effort is normal. No respiratory distress. Abdominal:      Palpations: Abdomen is soft. Musculoskeletal:      Right lower leg: No edema. Left lower leg: No edema. Neurological:      General: No focal deficit present. Mental Status: She is alert. Additional Data:     Labs:    Results from last 7 days   Lab Units 07/28/23  0529   WBC Thousand/uL 8.96   HEMOGLOBIN g/dL 9.3*   HEMATOCRIT % 30.5*   PLATELETS Thousands/uL 186   NEUTROS PCT % 76*   LYMPHS PCT % 15   MONOS PCT % 6   EOS PCT % 2     Results from last 7 days   Lab Units 07/28/23  0529   SODIUM mmol/L 139   POTASSIUM mmol/L 3.7   CHLORIDE mmol/L 102   CO2 mmol/L 28   BUN mg/dL 24   CREATININE mg/dL 1.47*   ANION GAP mmol/L 9   CALCIUM mg/dL 9.3   ALBUMIN g/dL 3.3*   TOTAL BILIRUBIN mg/dL 0.37   ALK PHOS U/L 41   ALT U/L <3*   AST U/L 13   GLUCOSE RANDOM mg/dL 93     Results from last 7 days   Lab Units 07/24/23  2224   INR  0.97     Results from last 7 days   Lab Units 07/28/23  1050 07/28/23  0626 07/27/23  2201 07/27/23  1615 07/27/23  1116 07/27/23  0618 07/26/23  2104 07/26/23  1605 07/26/23  1102 07/26/23  0617 07/25/23  2039 07/25/23  1543   POC GLUCOSE mg/dl 156* 104 169* 169* 111 120 135 222* 144* 100 112 183*         Results from last 7 days   Lab Units 07/26/23  0438 07/24/23  2224   LACTIC ACID mmol/L  --  1.7   PROCALCITONIN ng/ml 0.11 0.07           * I Have Reviewed All Lab Data Listed Above. * Additional Pertinent Lab Tests Reviewed: All Labs For Current Hospital Admission Reviewed    Imaging:    XR shoulder right 1 view    Result Date: 7/28/2023  Impression: Unremarkable appearance of reverse shoulder arthroplasty. No evidence of hardware complication. Antibiotic beads surrounding the right shoulder joint.  Workstation performed: [id number]     CT upper extremity w contrast right    Result Date: 7/27/2023  Impression: No acute osseous abnormality status post reverse total shoulder arthroplasty. Workstation performed: [id number]     CT spine lumbar wo contrast    Result Date: 7/26/2023  Impression: Diffuse osteopenia of the bony structures. Multilevel degenerative change within the lower thoracic spine and throughout the lumbar spine. Partial fusion of the posterior elements somewhat diffusely with fusion of the L5 and S1 endplates. Mild to moderate canal stenosis and foraminal narrowing. No acute osseous abnormality. Workstation performed: [id number]     FL guided needle plac bx/asp/inj    Result Date: 7/26/2023  Impression: Technically successful right artificial glenohumeral joint aspiration yielding 3 mL of bloody fluid. All aspirate obtained was sent for laboratory testing as ordered. The above findings and procedure were reviewed with Dr. Tahira Marshall. Procedure was performed by Ángela Santos. SOULEYMANE Williamson under the direct supervision of Dr. Tahira Marshall. Workstation performed: [id number]     XR shoulder 2+ views RIGHT    Result Date: 7/25/2023  Impression: Unremarkable appearance of reverse total shoulder arthroplasty. Workstation performed: [id number]       Recent Cultures (last 7 days):     Results from last 7 days   Lab Units 07/27/23  1530 07/26/23  0744 07/24/23  2237 07/24/23  2224   BLOOD CULTURE   --   --  No Growth at 72 hrs. No Growth at 72 hrs.    GRAM STAIN RESULT  No Polys or Bacteria seen 3+ Polys  No bacteria seen  --   --    WOUND CULTURE  No growth  --   --   --    BODY FLUID CULTURE, STERILE   --  No growth  --   --        Last 24 Hours Medication List:   Current Facility-Administered Medications   Medication Dose Route Frequency Provider Last Rate   • acetaminophen  650 mg Oral Q6H PRN NIKOLAS Rangel     • acetaminophen  975 mg Oral [facility] NIKOLAS Rangel     • allopurinol  100 mg Oral BID NIKOLAS Rangel     • aluminum-magnesium hydroxide-simethicone  30 mL Oral Q6H PRN NIKOLAS Cee     • aspirin  325 mg Oral Daily NIKOLAS Cee     • cefazolin  1,000 mg Intravenous Q8H NIKOLAS Cee 1,000 mg (07/28/23 7229)   • citalopram  40 mg Oral QAM NIKOLAS Cee     • docusate sodium  100 mg Oral BID NIKOLAS Cee     • furosemide  40 mg Oral Daily NIKOLAS Cee     • gabapentin  100 mg Oral BID NIKOLAS Cee     • insulin lispro  1-6 Units Subcutaneous TID AC NIKOLAS Cee     • iohexol  50 mL Other Once in [address] [place] [address], CARYLNP     • lactated ringers  100 mL/hr Intravenous Continuous NIKOLAS Cee Stopped (07/28/23 0778)   • levothyroxine  50 mcg Oral Early Morning NIKOLAS Cee     • lidocaine  1 patch Topical Daily NIKOLAS Cee     • lidocaine (PF)  7 mL Injection Once in [address] [place] [address], CARYLNP     • loratadine  10 mg Oral Daily NIKOLAS Cee     • methocarbamol  500 mg Oral Q6H PRN NIKOLAS Cee     • morphine injection  2 mg Intravenous Q6H PRN NIKOLAS Cee     • ondansetron  4 mg Intravenous Q6H PRN NIKOLAS Cee     • oxyCODONE  10 mg Oral Q6H PRN NIKOLAS Cee     • oxyCODONE  5 mg Oral Q4H PRN NIKOLAS Cee     • polyethylene glycol  17 g Oral Daily NIKOLAS Cee     • predniSONE  5 mg Oral QAM NIKOLAS Cee     • senna  1 tablet Oral Daily NIKOLAS Cee          Today, Patient Was Seen By: Lety Zhong MD    ** Please Note: Dictation voice to text software may have been used in the creation of this document.  **

## 2023-07-28 NOTE — CASE MANAGEMENT
Case Management Discharge Planning Note    Patient name Enrique Moore formerly [facility] 2 /E2 MS 63-* MRN [id number]  : 1945 Date 2023       Current Admission Date: 2023  Current Admission Diagnosis:SIRS (systemic inflammatory response syndrome) [facility])   Patient Active Problem List    Diagnosis Date Noted   • Obesity (BMI 35.0-39.9 without comorbidity) 2023   • Rotator cuff tear arthropathy of right shoulder 2023   • Chronic combined systolic (congestive) and diastolic (congestive) heart failure ([address]) 2023   • PMR (polymyalgia rheumatica) ([address]) 2022   • SIRS (systemic inflammatory response syndrome) ([address]) 2020   • Impaired vision 2020   • Bilateral hearing loss 2020   • Idiopathic thrombocytopenic purpura ([address]) 2020   • Post menopausal syndrome 2020   • Restless legs syndrome 2020   • Ambulatory dysfunction with recurrent falls 2020   • Nephrosclerosis, stage 1-4 or unspecified chronic kidney disease 2020   • Arthritis 2019   • Type 2 diabetes mellitus without complication, without long-term current use of insulin ([address]) 2019   • Idiopathic chronic gout with tophus 2019   • Osteoarthritis of multiple joints 2019   • Symptomatic varicose veins of both lower extremities 10/15/2018   • Intermittent claudication ([address]) 10/15/2018   • Stage 3b chronic kidney disease ([address]) 10/12/2017   • Acute back pain 06/15/2017   • Combined form of senile cataract of left eye 2016   • Fibromyalgia 2016   • Vitamin D deficiency 2015   • Chronic gout 2015   • Primary osteoarthritis of both hands 2015   • Homocysteinemia 2014   • Anemia 2013   • Hypothyroidism 2013   • Depression, recurrent ([address]) 2012   • Hyperlipidemia 2012      LOS (days): 4  Geometric Mean LOS (GMLOS) (days): 3.70  Days to GMLOS:0.2     OBJECTIVE:  Risk of Unplanned Readmission Score: 15.8 Current admission status: Inpatient   Preferred Pharmacy:   [facility] [id number]  [address] [facility] [address]  Phone: [phone] Fax: [phone]    Primary Care Provider: Geovanni Andrade DO    Primary Insurance: [address] [employer]  Secondary Insurance:     DISCHARGE DETAILS:    Additional Comments: Patient reviewed during care coordination rounds with Dr. Abril Barnes who informed that pt will likely remain inpatient throughout the weekend. Continuing to work with ID to identify if pt has an active infection and a plan for that. Discussions being had about the possibility of being new start HD but no decisions made at this time. If pt were to need a Picc it would likely not be placed until Monday vs Tuesday. CM informed [facility] and will continue to update with progress. CM department to follow.

## 2023-07-28 NOTE — ASSESSMENT & PLAN NOTE
Recent right total shoulder replacement  Presented with worsening shoulder pain, concern for possible early PJI  Joint aspiration completed on 07/26, WBC noted to be greater than 7000  S/p right shoulder washout on 07/27, findings with inflammatory tissue without pus or purulence  Currently on Ancef, await cultures  Hold off on PICC line at this time, currently with peripheral IV  Plan to monitor over the weekend, no fevers, pain controlled

## 2023-07-28 NOTE — PROGRESS NOTES
Progress Note - Infectious Disease   Stuart Zarate 78 y.o. female MRN: [id number]  Unit/Bed#: E2 -01 Encounter: [id number]      Impression/Plan:  1. R shoulder pain. In setting of recent reverse shoulder arthroplasty. Consider early PJI. R shoulder xray and CT both rather unremarkable. IR aspirated 7/26 with 3ml bloody aspirate. Fluid WBC = 7552 with 83% neutrophils, RBC = 878,000. Patient is status post washout in the OR 7/27. There was some inflammatory tissue seen about the prosthesis, but no purulent fluid and the prosthesis was noted to be stable without any loosening of hardware. Based on OP findings and relatively low inflammatory synovial fluid counts, unclear that this is definitively an infection. Unsure as to the significance of the inflammatory tissue. Alternative causes may include flare of polymyalgia rheumatica vs. joint Injury with reactive synovial fluid. -follow up IR fluid culture  7/26  -follow up OR cultures; will ask that these be held for 21 days to look for slow growers such as Cutibacterium acnes  - follow up pathology of inflammatory tissue from [address] [place] [address] to continue empiric Cefazolin for now  -by Monday, if cultures still negative, then we will discuss with Orthopedic surgery regarding level of concern for early infection and decide on need for antibiotics on discharge     2. SIRS vs sepsis. Present on admission. Fever and leukocytosis. Consider possible R shoulder infection. Consider inflammatory response to R shoulder injury not detected on xray. No other clear source appreciated. No acute respiratory or  complaints. Low suspicion for GI source despite her recent diarrhea. Blood cultures are negative >24 hours. Recommend monitoring patient off additional antibiotics for now.  -monitor CBCD and BMP  -monitor vitals  -R shoulder work up as above  -supportive care      3. Recent R reverse total shoulder replacement. In patient with severe OA and rotator cuff repair.  Now with acute onset pain and change in ROM as above.  -continue follow up with orthopedic surgery     4. Type 2 diabetes mellitus without long term insulin use. Patient's last HbA1c was 6.6% on 2023. Elevated blood glucose is risk factor for infection. Recommend tight glycemic control.  -blood glucose management per primary service     5. CKD stage 3b. Upon review of patient's medical record it appears her baseline creatinine is approximately 1.5-1.7. Creatinine has remained within or below baseline during this hospitalization. -monitor creatinine  -dose adjust antibiotics for renal function as needed  -avoid nephrotoxins     6. Obesity. BMI = 35.81. This can effect antibiotic dosing.  -monitor patient weight and dose adjust antibiotics as needed     7. Polymyalgia Rheumatica: Patient on chronic steroids for this.     Above plan was discussed in detail with patient at the bedside. Above plan was discussed in detail with [facility]IM. Antibiotics:  Cefazolin    Subjective:  Patient today denies fever or chills. She does not have much shoulder pain currently. She denies any known tick exposure, is not outdoor much and when she is she is not in heavily wooded areas. No pets at home to bring in ticks. Objective:  Vitals:  Temp:  [97.4 °F (36.3 °C)-98.2 °F (36.8 °C)] 98.2 °F (36.8 °C)  HR:  [66-77] 77  Resp:  [12-20] 20  BP: (117-154)/(57-78) 117/57  SpO2:  [92 %-98 %] 93 %  Temp (24hrs), Av.7 °F (36.5 °C), Min:97.4 °F (36.3 °C), Max:98.2 °F (36.8 °C)  Current: Temperature: 98.2 °F (36.8 °C)    Physical Exam:   General Appearance:  Alert, interactive, nontoxic, no acute distress. Throat: Oropharynx moist without lesions. Lungs:   Clear to auscultation bilaterally; no wheezes, rhonchi or rales; respirations unlabored   Heart:  RRR; no murmur, rub or gallop   Abdomen:   Soft, non-tender. Extremities: Right arm in sling   Skin: No new rashes or lesions. No draining wounds noted. Labs:    All pertinent labs and imaging studies were personally reviewed  Results from last 7 days   Lab Units 07/28/23  0529 07/26/23  0438 07/25/23  0501   WBC Thousand/uL 8.96 10.03 12.17*   HEMOGLOBIN g/dL 9.3* 10.2* 10.4*   PLATELETS Thousands/uL 186 164 191     Results from last 7 days   Lab Units 07/28/23  0529 07/26/23  0615 07/25/23  0501 07/24/23  2224   SODIUM mmol/L 139 138 136 136   POTASSIUM mmol/L 3.7 3.5 3.3* 4.0   CHLORIDE mmol/L 102 101 101 99   CO2 mmol/L 28 31 27 27   BUN mg/dL 24 27* 33* 32*   CREATININE mg/dL 1.47* 1.43* 1.57* 1.49*   EGFR ml/min/1.73sq m 33 34 30 32   CALCIUM mg/dL 9.3 8.8 8.7 9.0   AST U/L 13  --   --  17   ALT U/L <3*  --   --  3*   ALK PHOS U/L 41  --   --  52     Results from last 7 days   Lab Units 07/26/23  0438 07/24/23  2224   PROCALCITONIN ng/ml 0.11 0.07     Results from last 7 days   Lab Units 07/24/23  2224   CRP mg/L 73.3*               Micro:  Results from last 7 days   Lab Units 07/27/23  1530 07/26/23  0744 07/24/23  2237 07/24/23  2224   BLOOD CULTURE   --   --  No Growth at 48 hrs. No Growth at 48 hrs.    GRAM STAIN RESULT  No Polys or Bacteria seen 3+ Polys  No bacteria seen  --   --    BODY FLUID CULTURE, STERILE   --  No growth  --   --        Imaging:          Fifi Womack MD  [facility]

## 2023-07-28 NOTE — ASSESSMENT & PLAN NOTE
Lab Results   Component Value Date    EGFR 33 07/28/2023    EGFR 34 07/26/2023    EGFR 30 07/25/2023    CREATININE 1.47 (H) 07/28/2023    CREATININE 1.43 (H) 07/26/2023    CREATININE 1.57 (H) 07/25/2023     In the setting of diabetic/hypertensive nephropathy   • Renal functions currently stable at baseline of 1.5

## 2023-07-28 NOTE — PLAN OF CARE
Problem: MOBILITY - ADULT  Goal: Maintain or return to baseline ADL function  Description: INTERVENTIONS:  -  Assess patient's ability to carry out ADLs; assess patient's baseline for ADL function and identify physical deficits which impact ability to perform ADLs (bathing, care of mouth/teeth, toileting, grooming, dressing, etc.)  - Assess/evaluate cause of self-care deficits   - Assess range of motion  - Assess patient's mobility; develop plan if impaired  - Assess patient's need for assistive devices and provide as appropriate  - Encourage maximum independence but intervene and supervise when necessary  - Involve family in performance of ADLs  - Assess for home care needs following discharge   - Consider OT consult to assist with ADL evaluation and planning for discharge  - Provide patient education as appropriate  Outcome: Progressing    Problem: PAIN - ADULT  Goal: Verbalizes/displays adequate comfort level or baseline comfort level  Description: Interventions:  - Encourage patient to monitor pain and request assistance  - Assess pain using appropriate pain scale  - Administer analgesics based on type and severity of pain and evaluate response  - Implement non-pharmacological measures as appropriate and evaluate response  - Consider cultural and social influences on pain and pain management  - Notify physician/advanced practitioner if interventions unsuccessful or patient reports new pain  Outcome: Progressing     Problem: INFECTION - ADULT  Goal: Absence or prevention of progression during hospitalization  Description: INTERVENTIONS:  - Assess and monitor for signs and symptoms of infection  - Monitor lab/diagnostic results  - Monitor all insertion sites, i.e. indwelling lines, tubes, and drains  - Monitor endotracheal if appropriate and nasal secretions for changes in amount and color  - [place] appropriate cooling/warming therapies per order  - Administer medications as ordered  - Instruct and encourage patient and family to use good hand hygiene technique  - Identify and instruct in appropriate isolation precautions for identified infection/condition  Outcome: Progressing  Goal: Absence of fever/infection during neutropenic period  Description: INTERVENTIONS:  - Monitor WBC    Outcome: Progressing

## 2023-07-28 NOTE — PROGRESS NOTES
Orthopedic Progress Note  ([facility] - Dr Cherelle Teran)    SUBJECTIVE:    LOS: 4 days   RIGHT reverse total shoulder arthroplasty and POD #1 I&D. Admitted with fever, shoulder pain and body aches. Today she is feeling better since surgery. Pre op Aspiration of the right shoulder demonstrated 7K WBC's. Cultures and blood cultures pending. No gross pus at time of surgery. Post op cultures pending. Presently on Ancef per ID since surgery    Systemic or Specific Complaints: Patient sitting up in bed. Medications covering pain. Pt has been OOB and to bathroom on own. OBJECTIVE:  Vital signs in last 24 hours:  Temp:  [97.4 °F (36.3 °C)-98.2 °F (36.8 °C)] 98.2 °F (36.8 °C)  HR:  [66-77] 77  Resp:  [12-20] 20  BP: (117-154)/(57-78) 117/57  General: Alert, appears stated age,cooperative   Neurovascular: Neurvascular intact Axillary, Radial, Ulnar and Median Nerves  Radial pulse Plus 2. Wound: Incision looks good, No erythema, No drainage. ++ tender. Range of Motion: ++ pain with motion. Data Review:  CBC:   Lab Results   Component Value Date    WBC 8.96 07/28/2023    WBC 8.92 08/06/2015    RBC 3.00 (L) 07/28/2023    RBC 4.46 08/06/2015    HGB 9.3 (L) 07/28/2023    HGB 13.6 08/06/2015    HCT 30.5 (L) 07/28/2023    HCT 40.5 08/06/2015     07/28/2023     08/06/2015 07/24/2023 2224 07/26/2023 1301 Blood culture #1 [[id number]]   Blood from Arm, Left    Preliminary result Component Value   Blood Culture No Growth at 24 hrs. P          07/24/2023 2237 07/26/2023 1301 Blood culture #2 [[id number]]   Blood from Arm, Left    Preliminary result Component Value   Blood Culture No Growth at 24 hrs.  P          07/26/2023 0744 07/26/2023 1247 Synovial Fluid Diff [[id number]]   Synovial Fluid from Joint, Right Shoulder    Final result Component Value Units   Total Counted 100    Neutrophil % Synovial 83 %   Lymph % Synovial 6 %   Monocyte % Synovial 8 %   Histiocyte % Synovial 3 %          07/26/2023 2643 07/26/2023 1216 Synovial fluid white cell count w/ diff [[id number]]   (Abnormal)   Synovial Fluid from Joint, Right Shoulder    Final result Component Value Units   Site Synovial    Color, Fluid Bloody    Clarity, Fluid Cloudy Abnormal     WBC, Fluid 7,552 High              ASSESSMENT & PLAN:  Status post- RIGHT total shoulder arthroplasty: New onset pain and fever. Septic RIGHT reverse TSR. Pain Relief: Pain well controled with oral medications. Plan:    Ancef 1 g every 8 hours per ID    Got consent  for a PICC line from patient and placed order for PICC in [facility]. Need official okay from nephrology and ID and can likely get done today. If not completed today then it will not get done until Monday. Nephrology is recommending a tunneled IJ  Instead of a PICC. Her GFR is <45 and they try to avoid arms in case of need for a future fistual creation for dialysis. Okay for DC when cleared by ID    Patient will follow up with Dr Tamanna Song on August 8th at 10 am for ortho follow up. Sister is aware and wrote down the time and date. No growth from pre op cultures or Blood cultures. Awaiting results from Intra op Cultures. No bacteria seen.          Howard Encinas PA-C  Date: 7/28/2023  Time: 9:52 AM

## 2023-07-28 NOTE — PLAN OF CARE
Problem: OCCUPATIONAL THERAPY ADULT  Goal: Performs self-care activities at highest level of function for planned discharge setting. See evaluation for individualized goals. Description: Treatment Interventions: ADL retraining, Functional transfer training, UE strengthening/ROM, Endurance training, Patient/family training, Equipment evaluation/education, Compensatory technique education, Continued evaluation          See flowsheet documentation for full assessment, interventions and recommendations. Outcome: Progressing  Note: Limitation: Decreased ADL status, Decreased UE ROM, Decreased UE strength, Decreased Safe judgement during ADL, Decreased endurance, Decreased high-level ADLs  Prognosis: Good  Assessment: Pt initially evaluated on 7/25 2* dx sepsis. Since then, pt is a s/p INCISION AND DRAINAGE (I&D) R SHOULDER ANTIBIOTIC STIMULAN BEADS(7/27). Per ortho(iAdriano), pt is NWB with her R UE, sling for comfort, allowed pendulum(as appropriate) and elbow to distal ROM. Currently, pt continues to demonstrate deficits with her functional balance, functional mobility, ADL status, transfer safety, R UE ROM/strength, and activity tolerance. Pt would benefit from continued OT tx for the above deficits. Goals stated on initial eval remain appropriate. Will continue. The patient's raw score on the AM-PAC Daily Activity Inpatient Short Form is 15. A raw score of less than 19 suggests the patient may benefit from discharge to post-acute rehabilitation services. Please refer to the recommendation of the Occupational Therapist for safe discharge planning.      OT Discharge Recommendation: Post acute rehabilitation services

## 2023-07-28 NOTE — PHYSICAL THERAPY NOTE
PT RE-EVALUATION/PROGRESS NOTE    Name: Vikas Lomax  AGE: 78 y.o. MRN: [id number]  LENGTH OF STAY: 4    Admitting Dx: Shoulder pain [M25.519]  SIRS (systemic inflammatory response syndrome) ([facility]) [R65.10]      Patient Active Problem List   Diagnosis    Anemia    Chronic gout    Stage 3b chronic kidney disease ([address])    Combined form of senile cataract of left eye    Depression, recurrent ([address])    Fibromyalgia    Homocysteinemia    Hyperlipidemia    Hypothyroidism    Acute back pain    Primary osteoarthritis of both hands    Vitamin D deficiency    Symptomatic varicose veins of both lower extremities    Intermittent claudication ([address])    Type 2 diabetes mellitus without complication, without long-term current use of insulin ([facility])    Nephrosclerosis, stage 1-4 or unspecified chronic kidney disease    Arthritis    Idiopathic chronic gout with tophus    Idiopathic thrombocytopenic purpura ([facility])    Osteoarthritis of multiple joints    Post menopausal syndrome    Restless legs syndrome    Ambulatory dysfunction with recurrent falls    SIRS (systemic inflammatory response syndrome) ([facility])    Impaired vision    Bilateral hearing loss    PMR (polymyalgia rheumatica) ([facility])    Chronic combined systolic (congestive) and diastolic (congestive) heart failure ([facility])    Obesity (BMI 35.0-39.9 without comorbidity)    Rotator cuff tear arthropathy of right shoulder          07/28/23 1056   PT Last Visit   PT Visit Date 07/28/23   Note Type   Note type Re-Evaluation  (& treatment)   Pain Assessment   Pain Score 4   Pain Location/Orientation Orientation: Right;Location: Shoulder   [facility] Pain Intervention(s) Repositioned; Ambulation/increased activity; Emotional support; Rest   Restrictions/Precautions   RUE Weight Bearing Per Order NWB   Braces or Orthoses Sling  (for comfort)   Other Precautions Chair Alarm; Bed Alarm; Fall Risk;Pain  (per ortho(i.e.PA-Karmen); sling for comfort, allowed pendulum(when appropriate); elbow-distal ROM, R UE)   Home Living   Type of Home House   Home Layout One level   Bathroom Shower/Tub Walk-in shower   Bathroom Toilet Raised   Bathroom Equipment Grab bars in shower;Grab bars around toilet; Shower chair   Home Equipment Walker;Cane   Prior Function   Level of [place] Independent with functional mobility; Independent with ADLs; Needs assistance with IADLS  (w/ RW household distances & w/ SPC when out in the community)   Lives With Other (Comment)  (sister)   Receives Help From [facility] in the last 6 months 1 to 4  (1x)   Comments (+) ; receiving OPPT PTA   General   Additional Pertinent History sepsis, recent R shoulder total replacement in June 2023   Family/Caregiver Present Yes  (sister at bedside)   Cognition   Overall Cognitive Status WFL   Arousal/Participation Alert   Attention Within functional limits   Orientation Level Oriented X4   Following Commands Follows one step commands without difficulty   Comments pleasant & cooperative   Subjective   Subjective Pt agreeable to PT/OT interventions. RUE Assessment   RUE Assessment   (refer to OT)   LUE Assessment   LUE Assessment   (refer to OT)   RLE Assessment   RLE Assessment WFL  (4-/5 grossly except hip 3+/5)   LLE Assessment   LLE Assessment WFL  (4-/5 grossly except hip 3+/5)   Bed Mobility   Supine to Sit Unable to assess   Sit to Supine Unable to assess   Additional Comments pt seated EOB w/ OT upon my arrival & OOB in chair at end of session   Transfers   Sit to Stand 3  Moderate assistance   Additional items Assist x 1; Increased time required;Verbal cues   Stand to Sit 3  Moderate assistance   Additional items Assist x 1; Increased time required;Verbal cues   Additional Comments cues for techniques & safety; BP /70   Ambulation/Elevation   Gait pattern Poor UE support; Wide GABY; Decreased foot clearance;Shuffling; Short stride; Excessively slow   Gait Assistance 4  Minimal assist   Additional items Assist x 1;Verbal cues; Tactile cues   Assistive Device Straight cane   Distance 40'x1   Ambulation/Elevation Additional Comments unsteady gait but no gross LOB noted; dec amb tolerance 2* to pain, weakness & fatigue   Balance   Static Sitting Fair +   Dynamic Sitting Fair   Static Standing Fair -   Dynamic Standing Poor +   Ambulatory Poor +   Endurance Deficit   Endurance Deficit Yes   Endurance Deficit Description pain; weakness; fatigue   Activity Tolerance   Activity Tolerance Patient limited by fatigue;Patient limited by pain;Treatment limited secondary to medical complications (Comment)   Medical Staff Made Aware OTR Adin   Nurse Made Aware KAREN Chau   Assessment   Prognosis Fair   Problem List Decreased strength;Decreased endurance; Impaired balance;Decreased range of motion;Decreased mobility;Obesity;Pain;Orthopedic restrictions   Assessment Pt s/p Right - INCISION AND DRAINAGE (I&D) SHOULDER ANTIBIOTIC STIMULAN BEADS (VANCOMYCIN AND TOBRAMYCIN) on 7/27/23 Pt seen for post-op PT re-eval. Improved mobility & activity tolerance noted this tx session. Pt require modAx1 for transfers & minAx1 for amb w/ SPC + cues for techniques & safety. Gait deviations as above, slow & unsteady w/ dec foot clearance & strides but no gross LOB noted. Dec amb tolerance 2* to pain, weakness & fatigue. Pt tolerated above mentioned thera. ex well, BLE AROM. Pt require cues for proper exercise techniques and performance. Please see flowsheet above for objective findings & levels of assistance required for all functional tasks during this tx session. Nsg staff most recent vital signs as follows: /63 (BP Location: Left arm)   Pulse 75   Temp 98 °F (36.7 °C) (Temporal)   Resp 20   Ht 5' 7" (1.702 m)   Wt 104 kg (229 lb 11.5 oz)   SpO2 93%   BMI 35.98 kg/m² . Will continue PT per POC. At end of session, pt OOB in chair in stable condition, call bell & phone in reach, chair alarm activated. Fall precautions reinforced w/ good understanding.  The patient's AM-PAC Basic Mobility Inpatient Short Form Raw Score is 14. A Raw score of less than or equal to 16 suggests the patient may benefit from discharge to post-acute rehabilitation services. Please also refer to the recommendation of the Physical Therapist for safe discharge planning. From PT standpoint, will continue to recommend inpt rehab at D/C pending progress. CM to follow. Nsg staff to continue to mobilized pt (OOB in chair for all meals & ambulate in room/unit) as tolerated to prevent decline in function. Nsg notified. Co-tx was necessary to complete this PT tx session for the pt's best interest given pt's medical acuity & complexity. Barriers to Discharge Inaccessible home environment   Barriers to Discharge Comments [place]   Goals   Patient Goals to get better   STG Expiration Date 08/04/23   PT Treatment Day 1   Plan   Treatment/Interventions Functional transfer training;LE strengthening/ROM; Elevations; Therapeutic exercise; Endurance training;Patient/family training;Bed mobility;Gait training;Spoke to nursing;OT   PT Frequency 3-5x/wk   Recommendation   PT Discharge Recommendation Post acute rehabilitation services   AM-PAC Basic Mobility Inpatient   Turning in Flat Bed Without Bedrails 2   Lying on Back to Sitting on Edge of Flat Bed Without Bedrails 2   Moving Bed to Chair 3   Standing Up From Chair Using Arms 2   Walk in Room 3   Climb 3-5 Stairs With Railing 2   Basic Mobility Inpatient Raw Score 14   Basic Mobility Standardized Score 35.55   Highest Level Of Mobility   JH-HLM Goal 4: Move to chair/commode   JH-HLM Achieved 4: Move to chair/commode   Exercises   Hip Flexion Sitting;10 reps;AROM; Bilateral   Hip Abduction Sitting;10 reps;AROM; Bilateral   Hip Adduction Sitting;10 reps;AROM; Bilateral   Knee AROM Long Arc Quad Sitting;10 reps;AROM; Bilateral   Ankle Pumps Sitting;10 reps;AROM; Bilateral   End of Consult   Patient Position at End of Consult Bedside chair;Bed/Chair alarm activated; All needs within reach   End of Consult Comments Pt in stable condition. All needs in reach. Chair alarm activated.    Pati Comfort

## 2023-07-28 NOTE — QUICK NOTE
Placement of tunneled IJ line pending formal abx plan from ID and coordination with SLIM and Nephro. Please consult IR for vascular access once plan is determined.

## 2023-07-28 NOTE — ASSESSMENT & PLAN NOTE
Presented with fevers, leukocytosis concerning for possible prosthetic joint infection with recent right shoulder replacement  Possible prosthetic joint infection, treatment as below

## 2023-07-29 LAB
ANION GAP SERPL CALCULATED.3IONS-SCNC: 9 MMOL/L
BACTERIA SPEC BFLD CULT: NO GROWTH
BUN SERPL-MCNC: 29 MG/DL (ref 5–25)
CALCIUM SERPL-MCNC: 10 MG/DL (ref 8.4–10.2)
CHLORIDE SERPL-SCNC: 101 MMOL/L (ref 96–108)
CO2 SERPL-SCNC: 29 MMOL/L (ref 21–32)
CREAT SERPL-MCNC: 1.88 MG/DL (ref 0.6–1.3)
ERYTHROCYTE [DISTWIDTH] IN BLOOD BY AUTOMATED COUNT: 14 % (ref 11.6–15.1)
GFR SERPL CREATININE-BSD FRML MDRD: 24 ML/MIN/1.73SQ M
GLUCOSE SERPL-MCNC: 126 MG/DL (ref 65–140)
GLUCOSE SERPL-MCNC: 127 MG/DL (ref 65–140)
GLUCOSE SERPL-MCNC: 148 MG/DL (ref 65–140)
GLUCOSE SERPL-MCNC: 149 MG/DL (ref 65–140)
GLUCOSE SERPL-MCNC: 154 MG/DL (ref 65–140)
GRAM STN SPEC: NORMAL
GRAM STN SPEC: NORMAL
HCT VFR BLD AUTO: 30.4 % (ref 34.8–46.1)
HGB BLD-MCNC: 9.4 G/DL (ref 11.5–15.4)
MCH RBC QN AUTO: 31.2 PG (ref 26.8–34.3)
MCHC RBC AUTO-ENTMCNC: 30.9 G/DL (ref 31.4–37.4)
MCV RBC AUTO: 101 FL (ref 82–98)
PLATELET # BLD AUTO: 179 THOUSANDS/UL (ref 149–390)
PMV BLD AUTO: 10.6 FL (ref 8.9–12.7)
POTASSIUM SERPL-SCNC: 3.5 MMOL/L (ref 3.5–5.3)
RBC # BLD AUTO: 3.01 MILLION/UL (ref 3.81–5.12)
SODIUM SERPL-SCNC: 139 MMOL/L (ref 135–147)
WBC # BLD AUTO: 9.42 THOUSAND/UL (ref 4.31–10.16)

## 2023-07-29 PROCEDURE — 85027 COMPLETE CBC AUTOMATED: CPT | Performed by: STUDENT IN AN ORGANIZED HEALTH CARE EDUCATION/TRAINING PROGRAM

## 2023-07-29 PROCEDURE — 82948 REAGENT STRIP/BLOOD GLUCOSE: CPT

## 2023-07-29 PROCEDURE — 99232 SBSQ HOSP IP/OBS MODERATE 35: CPT | Performed by: INTERNAL MEDICINE

## 2023-07-29 PROCEDURE — 80048 BASIC METABOLIC PNL TOTAL CA: CPT | Performed by: NURSE PRACTITIONER

## 2023-07-29 RX ADMIN — DOCUSATE SODIUM 100 MG: 100 CAPSULE, LIQUID FILLED ORAL at 17:22

## 2023-07-29 RX ADMIN — POLYETHYLENE GLYCOL 3350 17 G: 17 POWDER, FOR SOLUTION ORAL at 09:18

## 2023-07-29 RX ADMIN — CEFAZOLIN SODIUM 1000 MG: 1 SOLUTION INTRAVENOUS at 22:49

## 2023-07-29 RX ADMIN — ACETAMINOPHEN 325MG 975 MG: 325 TABLET ORAL at 05:31

## 2023-07-29 RX ADMIN — SENNOSIDES 8.6 MG: 8.6 TABLET, FILM COATED ORAL at 09:19

## 2023-07-29 RX ADMIN — ACETAMINOPHEN 325MG 975 MG: 325 TABLET ORAL at 22:49

## 2023-07-29 RX ADMIN — OXYCODONE HYDROCHLORIDE 5 MG: 5 TABLET ORAL at 05:30

## 2023-07-29 RX ADMIN — CEFAZOLIN SODIUM 1000 MG: 1 SOLUTION INTRAVENOUS at 09:17

## 2023-07-29 RX ADMIN — DOCUSATE SODIUM 100 MG: 100 CAPSULE, LIQUID FILLED ORAL at 09:18

## 2023-07-29 RX ADMIN — GABAPENTIN 100 MG: 100 CAPSULE ORAL at 17:21

## 2023-07-29 RX ADMIN — LEVOTHYROXINE SODIUM 50 MCG: 50 TABLET ORAL at 05:31

## 2023-07-29 RX ADMIN — ASPIRIN 325 MG ORAL TABLET 325 MG: 325 PILL ORAL at 09:19

## 2023-07-29 RX ADMIN — PREDNISONE 5 MG: 5 TABLET ORAL at 09:19

## 2023-07-29 RX ADMIN — ALLOPURINOL 100 MG: 100 TABLET ORAL at 09:19

## 2023-07-29 RX ADMIN — ALLOPURINOL 100 MG: 100 TABLET ORAL at 17:22

## 2023-07-29 RX ADMIN — CITALOPRAM HYDROBROMIDE 40 MG: 20 TABLET ORAL at 09:19

## 2023-07-29 RX ADMIN — OXYCODONE HYDROCHLORIDE 5 MG: 5 TABLET ORAL at 19:22

## 2023-07-29 RX ADMIN — ACETAMINOPHEN 325MG 975 MG: 325 TABLET ORAL at 14:43

## 2023-07-29 RX ADMIN — CEFAZOLIN SODIUM 1000 MG: 1 SOLUTION INTRAVENOUS at 15:40

## 2023-07-29 RX ADMIN — GABAPENTIN 100 MG: 100 CAPSULE ORAL at 09:19

## 2023-07-29 RX ADMIN — LORATADINE 10 MG: 10 TABLET ORAL at 09:19

## 2023-07-29 RX ADMIN — LIDOCAINE 1 PATCH: 50 PATCH CUTANEOUS at 09:20

## 2023-07-29 RX ADMIN — OXYCODONE HYDROCHLORIDE 5 MG: 5 TABLET ORAL at 09:18

## 2023-07-29 NOTE — PLAN OF CARE
Problem: Potential for Falls  Goal: Patient will remain free of falls  Description: INTERVENTIONS:  - Educate patient/family on patient safety including physical limitations  - Instruct patient to call for assistance with activity   - Consult OT/PT to assist with strengthening/mobility   - Keep Call bell within reach  - Keep bed low and locked with side rails adjusted as appropriate  - Keep care items and personal belongings within reach  - Initiate and maintain comfort rounds  - Make Fall Risk Sign visible to staff  - Offer Toileting every 2 Hours, in advance of need  - Initiate/Maintain bed alarm  - Apply yellow socks and bracelet for high fall risk patients  - Consider moving patient to room near nurses station  Outcome: Progressing     Problem: MOBILITY - ADULT  Goal: Maintain or return to baseline ADL function  Description: INTERVENTIONS:  -  Assess patient's ability to carry out ADLs; assess patient's baseline for ADL function and identify physical deficits which impact ability to perform ADLs (bathing, care of mouth/teeth, toileting, grooming, dressing, etc.)  - Assess/evaluate cause of self-care deficits   - Assess range of motion  - Assess patient's mobility; develop plan if impaired  - Assess patient's need for assistive devices and provide as appropriate  - Encourage maximum independence but intervene and supervise when necessary  - Involve family in performance of ADLs  - Assess for home care needs following discharge   - Consider OT consult to assist with ADL evaluation and planning for discharge  - Provide patient education as appropriate  Outcome: Progressing  Goal: Maintains/Returns to pre admission functional level  Description: INTERVENTIONS:  - Perform BMAT or MOVE assessment daily.   - Set and communicate daily mobility goal to care team and patient/family/caregiver.    - Collaborate with rehabilitation services on mobility goals if consulted  - Out of bed for toileting  - Record patient progress and toleration of activity level   Outcome: Progressing     Problem: PAIN - ADULT  Goal: Verbalizes/displays adequate comfort level or baseline comfort level  Description: Interventions:  - Encourage patient to monitor pain and request assistance  - Assess pain using appropriate pain scale  - Administer analgesics based on type and severity of pain and evaluate response  - Implement non-pharmacological measures as appropriate and evaluate response  - Consider cultural and social influences on pain and pain management  - Notify physician/advanced practitioner if interventions unsuccessful or patient reports new pain  Outcome: Progressing     Problem: INFECTION - ADULT  Goal: Absence or prevention of progression during hospitalization  Description: INTERVENTIONS:  - Assess and monitor for signs and symptoms of infection  - Monitor lab/diagnostic results  - Monitor all insertion sites, i.e. indwelling lines, tubes, and drains  - Monitor endotracheal if appropriate and nasal secretions for changes in amount and color  - [place] appropriate cooling/warming therapies per order  - Administer medications as ordered  - Instruct and encourage patient and family to use good hand hygiene technique  - Identify and instruct in appropriate isolation precautions for identified infection/condition  Outcome: Progressing  Goal: Absence of fever/infection during neutropenic period  Description: INTERVENTIONS:  - Monitor WBC    Outcome: Progressing     Problem: SAFETY ADULT  Goal: Patient will remain free of falls  Description: INTERVENTIONS:  - Educate patient/family on patient safety including physical limitations  - Instruct patient to call for assistance with activity   - Consult OT/PT to assist with strengthening/mobility   - Keep Call bell within reach  - Keep bed low and locked with side rails adjusted as appropriate  - Keep care items and personal belongings within reach  - Initiate and maintain comfort rounds  - Make Fall Risk Sign visible to staff  - Offer Toileting every 2 Hours, in advance of need  - Initiate/Maintain bed alarm  - Apply yellow socks and bracelet for high fall risk patients  - Consider moving patient to room near nurses station  Outcome: Progressing  Goal: Maintain or return to baseline ADL function  Description: INTERVENTIONS:  -  Assess patient's ability to carry out ADLs; assess patient's baseline for ADL function and identify physical deficits which impact ability to perform ADLs (bathing, care of mouth/teeth, toileting, grooming, dressing, etc.)  - Assess/evaluate cause of self-care deficits   - Assess range of motion  - Assess patient's mobility; develop plan if impaired  - Assess patient's need for assistive devices and provide as appropriate  - Encourage maximum independence but intervene and supervise when necessary  - Involve family in performance of ADLs  - Assess for home care needs following discharge   - Consider OT consult to assist with ADL evaluation and planning for discharge  - Provide patient education as appropriate  Outcome: Progressing  Goal: Maintains/Returns to pre admission functional level  Description: INTERVENTIONS:  - Perform BMAT or MOVE assessment daily.   - Set and communicate daily mobility goal to care team and patient/family/caregiver.    - Collaborate with rehabilitation services on mobility goals if consulted  - Out of bed for toileting  - Record patient progress and toleration of activity level   Outcome: Progressing     Problem: DISCHARGE PLANNING  Goal: Discharge to home or other facility with appropriate resources  Description: INTERVENTIONS:  - Identify barriers to discharge w/patient and caregiver  - Arrange for needed discharge resources and transportation as appropriate  - Identify discharge learning needs (meds, wound care, etc.)  - Arrange for interpretive services to assist at discharge as needed  - Refer to Case Management Department for coordinating discharge planning if the patient needs post-hospital services based on physician/advanced practitioner order or complex needs related to functional status, cognitive ability, or social support system  Outcome: Progressing     Problem: Knowledge Deficit  Goal: Patient/family/caregiver demonstrates understanding of disease process, treatment plan, medications, and discharge instructions  Description: Complete learning assessment and assess knowledge base.   Interventions:  - Provide teaching at level of understanding  - Provide teaching via preferred learning methods  Outcome: Progressing     Problem: SKIN/TISSUE INTEGRITY - ADULT  Goal: Skin Integrity remains intact(Skin Breakdown Prevention)  Description: Assess:  -Inspect skin when repositioning, toileting, and assisting with ADLS  -Assess extremities for adequate circulation and sensation     Bed Management:  -Have minimal linens on bed & keep smooth, unwrinkled  -Change linens as needed when moist or perspiring    Toileting:  -Offer bedside commode    Activity:  -Encourage activity and walks on unit  -Encourage or provide ROM exercises   -Use appropriate equipment to lift or move patient in bed    Skin Care:  -Avoid use of baby powder, tape, friction and shearing, hot water or constrictive clothing  -Do not massage red bony areas    Next Steps:  Outcome: Progressing     Problem: Prexisting or High Potential for Compromised Skin Integrity  Goal: Skin integrity is maintained or improved  Description: INTERVENTIONS:  - Identify patients at risk for skin breakdown  - Assess and monitor skin integrity  - Assess and monitor nutrition and hydration status  - Monitor labs   - Assess for incontinence   - Turn and reposition patient  - Assist with mobility/ambulation  - Relieve pressure over bony prominences  - Avoid friction and shearing  - Provide appropriate hygiene as needed including keeping skin clean and dry  - Evaluate need for skin moisturizer/barrier cream  - Collaborate with interdisciplinary team   - Patient/family teaching  - Consider wound care consult   Outcome: Progressing

## 2023-07-29 NOTE — PLAN OF CARE
Problem: Potential for Falls  Goal: Patient will remain free of falls  Description: INTERVENTIONS:  - Educate patient/family on patient safety including physical limitations  - Instruct patient to call for assistance with activity   - Consult OT/PT to assist with strengthening/mobility   - Keep Call bell within reach  - Keep bed low and locked with side rails adjusted as appropriate  - Keep care items and personal belongings within reach  - Initiate and maintain comfort rounds  - Make Fall Risk Sign visible to staff  - Offer Toileting every 2 Hours, in advance of need  - Initiate/Maintain bed alarm  - Apply yellow socks and bracelet for high fall risk patients  - Consider moving patient to room near nurses station  Outcome: Progressing     Problem: PAIN - ADULT  Goal: Verbalizes/displays adequate comfort level or baseline comfort level  Description: Interventions:  - Encourage patient to monitor pain and request assistance  - Assess pain using appropriate pain scale  - Administer analgesics based on type and severity of pain and evaluate response  - Implement non-pharmacological measures as appropriate and evaluate response  - Consider cultural and social influences on pain and pain management  - Notify physician/advanced practitioner if interventions unsuccessful or patient reports new pain  Outcome: Progressing     Problem: SAFETY ADULT  Goal: Patient will remain free of falls  Description: INTERVENTIONS:  - Educate patient/family on patient safety including physical limitations  - Instruct patient to call for assistance with activity   - Consult OT/PT to assist with strengthening/mobility   - Keep Call bell within reach  - Keep bed low and locked with side rails adjusted as appropriate  - Keep care items and personal belongings within reach  - Initiate and maintain comfort rounds  - Make Fall Risk Sign visible to staff  - Offer Toileting every 2 Hours, in advance of need  - Initiate/Maintain bed alarm  - Apply yellow socks and bracelet for high fall risk patients  - Consider moving patient to room near nurses station  Outcome: Progressing     Problem: Prexisting or High Potential for Compromised Skin Integrity  Goal: Skin integrity is maintained or improved  Description: INTERVENTIONS:  - Identify patients at risk for skin breakdown  - Assess and monitor skin integrity  - Assess and monitor nutrition and hydration status  - Monitor labs   - Assess for incontinence   - Turn and reposition patient  - Assist with mobility/ambulation  - Relieve pressure over bony prominences  - Avoid friction and shearing  - Provide appropriate hygiene as needed including keeping skin clean and dry  - Evaluate need for skin moisturizer/barrier cream  - Collaborate with interdisciplinary team   - Patient/family teaching  - Consider wound care consult   Outcome: Progressing

## 2023-07-29 NOTE — PROGRESS NOTES
Progress Note - Link Maldonado 78 y.o. female MRN: [id number]    Unit/Bed#: E2 -01 Encounter: [id number]    Assessment/Plan:    SIRS    POA with fever and leukocytosis concern for prosthetic joint infection, now hemodynamically stable    RTSR    recent shoulder replacement in June, right shoulder washout continue Ancef, cultures blood and body fluid negative monitor with orthopedics      Chronic combined HF currently compensated with metoprolol and Lasix    PMR    maintained on 5 mg prednisone    Diabetes   continue sliding scale a.m. glucose 127    CKD 4    creatinine elevated 1.88 we will hold Lasix today and monitor      Anemia   hemoglobin stable 9.4    Subjective:   Shoulder much less pain, denies fevers or chills no nausea vomiting no chest pain or shortness of breath appetite is good      Objective:     Vitals: Blood pressure 139/63, pulse 60, temperature 98 °F (36.7 °C), temperature source Temporal, resp. rate 16, height 5' 7" (1.702 m), weight 105 kg (231 lb 0.7 oz), SpO2 94 %, not currently breastfeeding. ,Body mass index is 36.19 kg/m². Results from last 7 days   Lab Units 07/29/23  0530 07/25/23  0501 07/24/23  2224   WBC Thousand/uL 9.42   < > 11.24*   HEMOGLOBIN g/dL 9.4*   < > 11.0*   HEMATOCRIT % 30.4*   < > 35.6   PLATELETS Thousands/uL 179   < > 194   INR   --   --  0.97    < > = values in this interval not displayed.      Results from last 7 days   Lab Units 07/29/23  0530 07/28/23  0529   POTASSIUM mmol/L 3.5 3.7   CHLORIDE mmol/L 101 102   CO2 mmol/L 29 28   BUN mg/dL 29* 24   CREATININE mg/dL 1.88* 1.47*   CALCIUM mg/dL 10.0 9.3   ALK PHOS U/L  --  41   ALT U/L  --  <3*   AST U/L  --  13       Scheduled Meds:  Current Facility-Administered Medications   Medication Dose Route Frequency Provider Last Rate   • acetaminophen  650 mg Oral Q6H PRN NIKOLAS Rangel     • acetaminophen  975 mg Oral [facility] NIKOLAS Rangel     • allopurinol  100 mg Oral BID [place] Boast Esperanza Medardo     • aluminum-magnesium hydroxide-simethicone  30 mL Oral Q6H PRN NIKOLAS Azul     • aspirin  325 mg Oral Daily NIKOLAS Azul     • cefazolin  1,000 mg Intravenous Q8H NIKOLAS Azul 1,000 mg (07/28/23 2200)   • citalopram  40 mg Oral QAM NIKOLAS Azul     • docusate sodium  100 mg Oral BID NIKOLAS Azul     • furosemide  40 mg Oral Daily NIKOLAS Azul     • gabapentin  100 mg Oral BID NIKOLAS Azul     • insulin lispro  1-6 Units Subcutaneous TID AC NIKOLAS Azul     • iohexol  50 mL Other Once in [address] [place] [address], CARYLNP     • levothyroxine  50 mcg Oral Early Morning NIKOLAS Azul     • lidocaine  1 patch Topical Daily NIKOLAS Azul     • lidocaine (PF)  7 mL Injection Once in [address] [place] [address], CARYLNP     • loratadine  10 mg Oral Daily NIKOLAS Azul     • methocarbamol  500 mg Oral Q6H PRN NIKOLAS Azul     • morphine injection  2 mg Intravenous Q6H PRN NIKOLAS Azul     • ondansetron  4 mg Intravenous Q6H PRN NIKOLAS Azul     • oxyCODONE  10 mg Oral Q6H PRN NIKOLAS Azul     • oxyCODONE  5 mg Oral Q4H PRN NIKOLAS Azul     • polyethylene glycol  17 g Oral Daily NIKOLAS Azul     • predniSONE  5 mg Oral QAM NIKOLAS Azul     • senna  1 tablet Oral Daily NIKOLAS Azul         Continuous Infusions:         Physical exam:  General appearance: Alert no distress interaction appropriate elderly  Head/Eyes: Nonicteric pale  Neck: Supple  Lungs: CTA bilateral no wheezing rhonchi or rales  Heart: normal S1 S2 regular  Abdomen: Soft nontender with bowel sounds  Extremities: no edema right upper extremity sling  Skin: no rash    Invasive Devices     Peripheral Intravenous Line  Duration           Peripheral IV 07/24/23 Left;Ventral (anterior) Forearm 4 days    Peripheral IV 07/26/23 Left Hand 3 days    Peripheral IV 07/27/23 Left Arm 1 day                  Counseling / Coordination of Care  Total floor / unit time spent today 30  minutes. Greater than 50% of total time was spent with the patient and / or family counseling and / or coordination of care.   A description of the counseling / coordination of care:

## 2023-07-29 NOTE — PROGRESS NOTES
POD 2 I&D RIGHT REVERSE TOTAL SHOULDER  PATIENT FEELING BETTER  PAIN CONTROLLED  SHE STATES SHE IS ABLE TO MOVE HER ARM NOW  AFEBRILE  DRESSING AND INCISION CLEAN AND DRY  NO REDNESS NO DRAINAGE  HAND [id number] INTACT  NO POLYS/NO BACTERIA IN SYNOVIAL FLUID FROM OPERATING ROOM  ANAEROBIC CX STILL PENDING  CONT PRESENT TREATMENT AWAITING FINAL CULTURE RESULTS FOR FINAL TREATMENT PLAN

## 2023-07-30 LAB
ANION GAP SERPL CALCULATED.3IONS-SCNC: 8 MMOL/L
BACTERIA BLD CULT: NORMAL
BACTERIA BLD CULT: NORMAL
BUN SERPL-MCNC: 33 MG/DL (ref 5–25)
CALCIUM SERPL-MCNC: 10.4 MG/DL (ref 8.4–10.2)
CHLORIDE SERPL-SCNC: 102 MMOL/L (ref 96–108)
CO2 SERPL-SCNC: 30 MMOL/L (ref 21–32)
CREAT SERPL-MCNC: 1.8 MG/DL (ref 0.6–1.3)
GFR SERPL CREATININE-BSD FRML MDRD: 26 ML/MIN/1.73SQ M
GLUCOSE SERPL-MCNC: 113 MG/DL (ref 65–140)
GLUCOSE SERPL-MCNC: 116 MG/DL (ref 65–140)
GLUCOSE SERPL-MCNC: 151 MG/DL (ref 65–140)
GLUCOSE SERPL-MCNC: 157 MG/DL (ref 65–140)
GLUCOSE SERPL-MCNC: 163 MG/DL (ref 65–140)
POTASSIUM SERPL-SCNC: 3.8 MMOL/L (ref 3.5–5.3)
SODIUM SERPL-SCNC: 140 MMOL/L (ref 135–147)

## 2023-07-30 PROCEDURE — 80048 BASIC METABOLIC PNL TOTAL CA: CPT | Performed by: INTERNAL MEDICINE

## 2023-07-30 PROCEDURE — 82948 REAGENT STRIP/BLOOD GLUCOSE: CPT

## 2023-07-30 PROCEDURE — 97110 THERAPEUTIC EXERCISES: CPT

## 2023-07-30 PROCEDURE — 99232 SBSQ HOSP IP/OBS MODERATE 35: CPT | Performed by: INTERNAL MEDICINE

## 2023-07-30 PROCEDURE — 97116 GAIT TRAINING THERAPY: CPT

## 2023-07-30 RX ADMIN — POLYETHYLENE GLYCOL 3350 17 G: 17 POWDER, FOR SOLUTION ORAL at 08:20

## 2023-07-30 RX ADMIN — ACETAMINOPHEN 325MG 975 MG: 325 TABLET ORAL at 21:33

## 2023-07-30 RX ADMIN — CEFAZOLIN SODIUM 1000 MG: 1 SOLUTION INTRAVENOUS at 15:36

## 2023-07-30 RX ADMIN — CITALOPRAM HYDROBROMIDE 40 MG: 20 TABLET ORAL at 08:20

## 2023-07-30 RX ADMIN — PREDNISONE 5 MG: 5 TABLET ORAL at 08:20

## 2023-07-30 RX ADMIN — OXYCODONE HYDROCHLORIDE 5 MG: 5 TABLET ORAL at 17:13

## 2023-07-30 RX ADMIN — LORATADINE 10 MG: 10 TABLET ORAL at 08:20

## 2023-07-30 RX ADMIN — ALLOPURINOL 100 MG: 100 TABLET ORAL at 17:10

## 2023-07-30 RX ADMIN — ASPIRIN 325 MG ORAL TABLET 325 MG: 325 PILL ORAL at 08:20

## 2023-07-30 RX ADMIN — GABAPENTIN 100 MG: 100 CAPSULE ORAL at 17:10

## 2023-07-30 RX ADMIN — ACETAMINOPHEN 325MG 975 MG: 325 TABLET ORAL at 13:11

## 2023-07-30 RX ADMIN — LIDOCAINE 1 PATCH: 50 PATCH CUTANEOUS at 08:26

## 2023-07-30 RX ADMIN — INSULIN LISPRO 1 UNITS: 100 INJECTION, SOLUTION INTRAVENOUS; SUBCUTANEOUS at 12:25

## 2023-07-30 RX ADMIN — CEFAZOLIN SODIUM 1000 MG: 1 SOLUTION INTRAVENOUS at 06:26

## 2023-07-30 RX ADMIN — OXYCODONE HYDROCHLORIDE 5 MG: 5 TABLET ORAL at 21:58

## 2023-07-30 RX ADMIN — DOCUSATE SODIUM 100 MG: 100 CAPSULE, LIQUID FILLED ORAL at 17:10

## 2023-07-30 RX ADMIN — INSULIN LISPRO 1 UNITS: 100 INJECTION, SOLUTION INTRAVENOUS; SUBCUTANEOUS at 17:15

## 2023-07-30 RX ADMIN — ALLOPURINOL 100 MG: 100 TABLET ORAL at 08:20

## 2023-07-30 RX ADMIN — DOCUSATE SODIUM 100 MG: 100 CAPSULE, LIQUID FILLED ORAL at 08:20

## 2023-07-30 RX ADMIN — SENNOSIDES 8.6 MG: 8.6 TABLET, FILM COATED ORAL at 08:20

## 2023-07-30 RX ADMIN — ACETAMINOPHEN 325MG 975 MG: 325 TABLET ORAL at 05:12

## 2023-07-30 RX ADMIN — LEVOTHYROXINE SODIUM 50 MCG: 50 TABLET ORAL at 05:12

## 2023-07-30 RX ADMIN — GABAPENTIN 100 MG: 100 CAPSULE ORAL at 08:20

## 2023-07-30 RX ADMIN — CEFAZOLIN SODIUM 1000 MG: 1 SOLUTION INTRAVENOUS at 22:04

## 2023-07-30 NOTE — PLAN OF CARE
Problem: Potential for Falls  Goal: Patient will remain free of falls  Description: INTERVENTIONS:  - Educate patient/family on patient safety including physical limitations  - Instruct patient to call for assistance with activity   - Consult OT/PT to assist with strengthening/mobility   - Keep Call bell within reach  - Keep bed low and locked with side rails adjusted as appropriate  - Keep care items and personal belongings within reach  - Initiate and maintain comfort rounds  - Make Fall Risk Sign visible to staff  - Offer Toileting every 2 Hours, in advance of need  - Initiate/Maintain bed alarm  - Apply yellow socks and bracelet for high fall risk patients  - Consider moving patient to room near nurses station  Outcome: Progressing     Problem: MOBILITY - ADULT  Goal: Maintain or return to baseline ADL function  Description: INTERVENTIONS:  -  Assess patient's ability to carry out ADLs; assess patient's baseline for ADL function and identify physical deficits which impact ability to perform ADLs (bathing, care of mouth/teeth, toileting, grooming, dressing, etc.)  - Assess/evaluate cause of self-care deficits   - Assess range of motion  - Assess patient's mobility; develop plan if impaired  - Assess patient's need for assistive devices and provide as appropriate  - Encourage maximum independence but intervene and supervise when necessary  - Involve family in performance of ADLs  - Assess for home care needs following discharge   - Consider OT consult to assist with ADL evaluation and planning for discharge  - Provide patient education as appropriate  Outcome: Progressing     Problem: PAIN - ADULT  Goal: Verbalizes/displays adequate comfort level or baseline comfort level  Description: Interventions:  - Encourage patient to monitor pain and request assistance  - Assess pain using appropriate pain scale  - Administer analgesics based on type and severity of pain and evaluate response  - Implement non-pharmacological measures as appropriate and evaluate response  - Consider cultural and social influences on pain and pain management  - Notify physician/advanced practitioner if interventions unsuccessful or patient reports new pain  Outcome: Progressing     Problem: INFECTION - ADULT  Goal: Absence or prevention of progression during hospitalization  Description: INTERVENTIONS:  - Assess and monitor for signs and symptoms of infection  - Monitor lab/diagnostic results  - Monitor all insertion sites  - [place] appropriate cooling/warming therapies per order  - Administer medications as ordered  - Instruct and encourage patient and family to use good hand hygiene technique  Outcome: Progressing     Problem: SAFETY ADULT  Goal: Patient will remain free of falls  Description: INTERVENTIONS:  - Educate patient/family on patient safety including physical limitations  - Instruct patient to call for assistance with activity   - Consult OT/PT to assist with strengthening/mobility   - Keep Call bell within reach  - Keep bed low and locked with side rails adjusted as appropriate  - Keep care items and personal belongings within reach  - Initiate and maintain comfort rounds  - Make Fall Risk Sign visible to staff  - Offer Toileting every 2 Hours, in advance of need  - Initiate/Maintain bed alarm  - Apply yellow socks and bracelet for high fall risk patients  - Consider moving patient to room near nurses station  Outcome: Progressing  Goal: Maintain or return to baseline ADL function  Description: INTERVENTIONS:  -  Assess patient's ability to carry out ADLs; assess patient's baseline for ADL function and identify physical deficits which impact ability to perform ADLs (bathing, care of mouth/teeth, toileting, grooming, dressing, etc.)  - Assess/evaluate cause of self-care deficits   - Assess range of motion  - Assess patient's mobility; develop plan if impaired  - Assess patient's need for assistive devices and provide as appropriate  - Encourage maximum independence but intervene and supervise when necessary  - Involve family in performance of ADLs  - Assess for home care needs following discharge   - Consider OT consult to assist with ADL evaluation and planning for discharge  - Provide patient education as appropriate  Outcome: Progressing     Problem: SKIN/TISSUE INTEGRITY - ADULT  Goal: Skin Integrity remains intact(Skin Breakdown Prevention)  Description: Assess:  -Perform [place] assessment every shift  -Clean and moisturize skin every shift  -Inspect skin when repositioning, toileting, and assisting with ADLS  -Assess under medical devices such as sling every shift  -Assess extremities for adequate circulation and sensation     Bed Management:  -Have minimal linens on bed & keep smooth, unwrinkled  -Change linens as needed when moist or perspiring  -Avoid sitting or lying in one position for more than 2 hours while in bed  -Keep HOB at 30 degrees     Toileting:  -Offer bedside commode  -Assess for incontinence  -Use incontinent care products after each incontinent episode    Activity:  -Mobilize patient 3 times a day  -Encourage activity and walks on unit  -Encourage or provide ROM exercises   -Turn and reposition patient every 2 Hours and weight shift every 15 minutes while in chair  -Consider limitation of chair time 1-2 hour intervals    Skin Care:  -Avoid use of baby powder, tape, friction and shearing, hot water or constrictive clothing  -Relieve pressure over bony prominences using pillows, offload, foam wedge  -Do not massage red bony areas    Outcome: Progressing     Problem: Prexisting or High Potential for Compromised Skin Integrity  Goal: Skin integrity is maintained or improved  Description: INTERVENTIONS:  - Identify patients at risk for skin breakdown  - Assess and monitor skin integrity  - Assess and monitor nutrition and hydration status  - Monitor labs   - Assess for incontinence   - Turn and reposition patient  - Assist with mobility/ambulation  - Relieve pressure over bony prominences  - Avoid friction and shearing  - Provide appropriate hygiene as needed including keeping skin clean and dry  - Evaluate need for skin moisturizer/barrier cream  - Collaborate with interdisciplinary team   - Patient/family teaching  - Consider wound care consult   Outcome: Progressing

## 2023-07-30 NOTE — PROGRESS NOTES
POD 3 I&D RIGHT REVERSE TOTAL SHOULDER  PAIN CONTROLLED  AFEBRILE  DRESSING AND INCISION CLEAN AND DRY  NO REDNESS NO DRAINAGE  HAND NEUROVASC INTACT  ALL CULTURES FROM SURGERY NEGATIVE  CONTINUE PRESENT TREATMENT LIKELY DC HOME TOMORROW ONCE INF. DIS DECIDES IF OUT-PATIENT ABX NEEDED

## 2023-07-30 NOTE — PROGRESS NOTES
Progress Note - Meagan Orozco 78 y.o. female MRN: [id number]    Unit/Bed#: E2 -01 Encounter: [id number]    Assessment/Plan:    POD3    I&D right reverse total shoulder, monitor with orthopedics, continue antibiotics, cultures negative    Chronic combined HF  compensated continue metoprolol     History of PMR  stable prednisone 5 mg    Diabetes   a.m. glucose 116 continue ADA diet and sliding scale    CKD 3,4   creatinine 1.8 likely above baseline 1.5-1.6 continue to monitor, holding Lasix with low-salt diet    Anemia   hemoglobin stable 9.4    Hypo-thyroid   continue Synthroid 50 mcg    Subjective:   Minimal pain of shoulder, otherwise no chest pain no shortness of breath no fevers no nausea vomiting appetite stable      Objective:     Vitals: Blood pressure 154/72, pulse 55, temperature (!) 96.7 °F (35.9 °C), temperature source Temporal, resp. rate 16, height 5' 7" (1.702 m), weight 105 kg (231 lb 11.3 oz), SpO2 97 %, not currently breastfeeding. ,Body mass index is 36.29 kg/m². Results from last 7 days   Lab Units 07/29/23  0530 07/25/23  0501 07/24/23  2224   WBC Thousand/uL 9.42   < > 11.24*   HEMOGLOBIN g/dL 9.4*   < > 11.0*   HEMATOCRIT % 30.4*   < > 35.6   PLATELETS Thousands/uL 179   < > 194   INR   --   --  0.97    < > = values in this interval not displayed. Results from last 7 days   Lab Units 07/30/23  0509 07/29/23  0530 07/28/23  0529   POTASSIUM mmol/L 3.8   < > 3.7   CHLORIDE mmol/L 102   < > 102   CO2 mmol/L 30   < > 28   BUN mg/dL 33*   < > 24   CREATININE mg/dL 1.80*   < > 1.47*   CALCIUM mg/dL 10.4*   < > 9.3   ALK PHOS U/L  --   --  41   ALT U/L  --   --  <3*   AST U/L  --   --  13    < > = values in this interval not displayed.        Scheduled Meds:  Current Facility-Administered Medications   Medication Dose Route Frequency Provider Last Rate   • acetaminophen  650 mg Oral Q6H PRN Angela Glad, CRNP     • acetaminophen  975 mg Oral Pending sale to [facility] NIKOLAS Rodriguez • allopurinol  100 mg Oral BID Davis Hay, CRNP     • aluminum-magnesium hydroxide-simethicone  30 mL Oral Q6H PRN Davis Hay, CRNP     • aspirin  325 mg Oral Daily Davis Hay, CRNP     • cefazolin  1,000 mg Intravenous Q8H Davis Hay, CRNP Stopped (07/30/23 6305)   • citalopram  40 mg Oral QAM Davis Hay, CRNP     • docusate sodium  100 mg Oral BID Davis Hay, CRNP     • gabapentin  100 mg Oral BID Davis Hay, CRNP     • insulin lispro  1-6 Units Subcutaneous TID AC Davis Hay, CRNP     • iohexol  50 mL Other Once in [address] [place] [address], CRNP     • levothyroxine  50 mcg Oral Early Morning Davis Hay, CRNP     • lidocaine  1 patch Topical Daily Davis Hay, CRNP     • lidocaine (PF)  7 mL Injection Once in [address] [place] [address], CRNP     • loratadine  10 mg Oral Daily Davis Hay, CRNP     • methocarbamol  500 mg Oral Q6H PRN Davis Hay, CRNP     • morphine injection  2 mg Intravenous Q6H PRN Davis Hay, CRNP     • ondansetron  4 mg Intravenous Q6H PRN Davis Hay, CRNP     • oxyCODONE  10 mg Oral Q6H PRN Davis Hay, CRNP     • oxyCODONE  5 mg Oral Q4H PRN Davis Hay, CRNP     • polyethylene glycol  17 g Oral Daily Davis Hay, CRNP     • predniSONE  5 mg Oral QAM Davis Hay, CRNP     • senna  1 tablet Oral Daily Davis Hay, CRNP         Continuous Infusions:         Physical exam:  General appearance: Alert no distress interaction appropriate  Head/Eyes: Nonicteric pale EOMI  Neck: Supple  Lungs: Decreased BS bilateral no wheezing rhonchi or rales  Heart: normal S1 S2 regular  Abdomen: Soft nontender with bowel sounds  Extremities: no edema right arm sling  Skin: no rash    Invasive Devices     Peripheral Intravenous Line  Duration           Peripheral IV 07/29/23 Left;Proximal;Ventral (anterior) Forearm <1 day                  Counseling / Coordination of Care  Total floor / unit time spent today 30  minutes. Greater than 50% of total time was spent with the patient and / or family counseling and / or coordination of care.   A description of the counseling / coordination of care:

## 2023-07-30 NOTE — PHYSICAL THERAPY NOTE
PHYSICAL THERAPY NOTE          Patient Name: Damari Lr  VHOVB'A Date: 7/30/2023  09:08-09:31       07/30/23 0908   PT Last Visit   PT Visit Date 07/30/23   Note Type   Note Type Treatment   Pain Assessment   Pain Location/Orientation Orientation: Right;Location: Shoulder   Pain Rating: FLACC (Rest) - Face 0   Pain Rating: FLACC (Rest) - Legs 0   Pain Rating: FLACC (Rest) - Activity 0   Pain Rating: FLACC (Rest) - Cry 1   Pain Rating: FLACC (Rest) - Consolability 0   Score: FLACC (Rest) 1   Pain Rating: FLACC (Activity) - Face 1   Pain Rating: FLACC (Activity) - Legs 0   Pain Rating: FLACC (Activity) - Activity 0   Pain Rating: FLACC (Activity) - Cry 1   Pain Rating: FLACC (Activity) - Consolability 1   Score: FLACC (Activity) 3   Restrictions/Precautions   RUE Weight Bearing Per Order NWB   Braces or Orthoses Sling  (for comfort)   Other Precautions Fall Risk;Pain;WBS  (Max ER to 30, no AROM IR. Pendulums. Sling for comfort)   General   Chart Reviewed Yes   Response to Previous Treatment Patient reporting fatigue but able to participate. Family/Caregiver Present No   Cognition   Overall Cognitive Status WFL   Arousal/Participation Alert   Attention Within functional limits   Orientation Level Oriented X4   Memory Within functional limits   Following Commands Follows one step commands without difficulty   Subjective   Subjective Reports had a terrible night, poor sleep. Bed Mobility   Supine to Sit 4  Minimal assistance   Additional items Assist x 1; Increased time required; Bedrails;HOB elevated   Additional Comments Increased time to complete transitions. Transfers   Sit to Stand 4  Minimal assistance   Additional items Assist x 1; Increased time required;Verbal cues   Stand to Sit 4  Minimal assistance   Additional items Assist x 1; Increased time required;Verbal cues   Toilet transfer 3  Moderate assistance   Additional items Assist x 1; Increased time required;Verbal cues  (for sit to stand. Stand to sit with Anne Marie)   Additional Comments Increased time, cues for technique and to avoid WB RUE with transitions. Ambulation/Elevation   Gait pattern Improper Weight shift;Decreased foot clearance; Inconsistent nixon; Short stride; Excessively slow   Gait Assistance 5  Supervision   Additional items Assist x 1;Verbal cues   Assistive Device Straight cane   Distance 20'x1, 5'x1, 20'x1, then additional 40'x1. Slowed pace. Balance   Static Sitting Fair +   Dynamic Sitting Fair   Static Standing Fair -   Dynamic Standing Poor +   Ambulatory Poor +   Endurance Deficit   Endurance Deficit Yes   Endurance Deficit Description pain, fatigue   Activity Tolerance   Activity Tolerance Patient tolerated treatment well;Patient limited by fatigue;Patient limited by pain   Medical Staff Made Aware Nurse, Ruth   Nurse Made Aware yes   Exercises   UE Exercise 10 reps; Sitting;Right;AROM  (elbow, wrist, hand ROM in all planes x 1)   Pendulum Standing;10 reps;AROM; Right  (CW/CCW, flex/ext, horizontal abd/add. Also shoulder shrugs x 10.)   Assessment   Prognosis Good   Problem List Decreased strength;Decreased range of motion;Decreased endurance; Impaired balance;Decreased mobility;Obesity;Orthopedic restrictions;Pain;Decreased skin integrity   Assessment Fifi Haq is seen for progression of PT POC. Supine upon entry. Increased time but less assistance for bed mobility, transfers and ambulation this session. Requires HOB elevation and bedrail use with min A for bed mobility. Increased time for transfers, assist levels vary depending upon surface. Most difficulty with sit to stand from toilet height, needing mod A. Close S for ambulation with cane. Slowed gait, decreased foot clearance and step length. Fatigue limiting distances. Tolerated pendulum ex in standing with LUE support on bedrail. Overall improving from previous session.   Continue to progress toward goals. The patient's AM-PAC Basic Mobility Inpatient Short Form Raw Score is 17. A Raw score of less than or equal to 16 suggests the patient may benefit from discharge to post-acute rehabilitation services. Please also refer to the recommendation of the Physical Therapist for safe discharge planning. Can continue to monitor disposition with progress. May still benefit from STR in order to achieve functional independence prior to return to home. Barriers to Discharge Inaccessible home environment   Barriers to Discharge Comments [place]   Goals   Patient Goals get better   [facility] Expiration Date 08/04/23   PT Treatment Day 2   Plan   Treatment/Interventions Functional transfer training;LE strengthening/ROM; Elevations; Therapeutic exercise; Endurance training;Patient/family training;Equipment eval/education; Bed mobility;Gait training; Compensatory technique education;Continued evaluation;Spoke to nursing   Progress Progressing toward goals   PT Frequency 3-5x/wk   Recommendation   PT Discharge Recommendation Post acute rehabilitation services  (monitor disposition with progress and ability to achieve IPPT goals. )   AM-PAC Basic Mobility Inpatient   Turning in Flat Bed Without Bedrails 3   Lying on Back to Sitting on Edge of Flat Bed Without Bedrails 3   Moving Bed to Chair 3   Standing Up From Chair Using Arms 3   Walk in Room 3   Climb 3-5 Stairs With Railing 2   Basic Mobility Inpatient Raw Score 17   Basic Mobility Standardized Score 39.67   Highest Level Of Mobility   JH-HLM Goal 5: Stand one or more mins   JH-HLM Achieved 7: Walk 25 feet or more   Education   Education Provided Mobility training;Assistive device;Home exercise program;Precautions for total shoulder replacement (TSA)   Patient Demonstrates acceptance/verbal understanding   End of Consult   Patient Position at End of Consult Bedside chair; All needs within reach   Paras Moss, PT

## 2023-07-30 NOTE — PLAN OF CARE
Problem: Potential for Falls  Goal: Patient will remain free of falls  Description: INTERVENTIONS:  - Educate patient/family on patient safety including physical limitations  - Instruct patient to call for assistance with activity   - Consult OT/PT to assist with strengthening/mobility   - Keep Call bell within reach  - Keep bed low and locked with side rails adjusted as appropriate  - Keep care items and personal belongings within reach  - Initiate and maintain comfort rounds  - Make Fall Risk Sign visible to staff  - Offer Toileting every 2 Hours, in advance of need  - Initiate/Maintain bed alarm  - Apply yellow socks and bracelet for high fall risk patients  - Consider moving patient to room near nurses station  Outcome: Progressing     Problem: MOBILITY - ADULT  Goal: Maintain or return to baseline ADL function  Description: INTERVENTIONS:  -  Assess patient's ability to carry out ADLs; assess patient's baseline for ADL function and identify physical deficits which impact ability to perform ADLs (bathing, care of mouth/teeth, toileting, grooming, dressing, etc.)  - Assess/evaluate cause of self-care deficits   - Assess range of motion  - Assess patient's mobility; develop plan if impaired  - Assess patient's need for assistive devices and provide as appropriate  - Encourage maximum independence but intervene and supervise when necessary  - Involve family in performance of ADLs  - Assess for home care needs following discharge   - Consider OT consult to assist with ADL evaluation and planning for discharge  - Provide patient education as appropriate  Outcome: Progressing  Goal: Maintains/Returns to pre admission functional level  Description: INTERVENTIONS:  - Perform BMAT or MOVE assessment daily.   - Set and communicate daily mobility goal to care team and patient/family/caregiver.    - Collaborate with rehabilitation services on mobility goals if consulted  - Out of bed for toileting  - Record patient progress and toleration of activity level   Outcome: Progressing     Problem: PAIN - ADULT  Goal: Verbalizes/displays adequate comfort level or baseline comfort level  Description: Interventions:  - Encourage patient to monitor pain and request assistance  - Assess pain using appropriate pain scale  - Administer analgesics based on type and severity of pain and evaluate response  - Implement non-pharmacological measures as appropriate and evaluate response  - Consider cultural and social influences on pain and pain management  - Notify physician/advanced practitioner if interventions unsuccessful or patient reports new pain  Outcome: Progressing     Problem: INFECTION - ADULT  Goal: Absence or prevention of progression during hospitalization  Description: INTERVENTIONS:  - Assess and monitor for signs and symptoms of infection  - Monitor lab/diagnostic results  - Monitor all insertion sites  - [place] appropriate cooling/warming therapies per order  - Administer medications as ordered  - Instruct and encourage patient and family to use good hand hygiene technique  Outcome: Progressing     Problem: SAFETY ADULT  Goal: Patient will remain free of falls  Description: INTERVENTIONS:  - Educate patient/family on patient safety including physical limitations  - Instruct patient to call for assistance with activity   - Consult OT/PT to assist with strengthening/mobility   - Keep Call bell within reach  - Keep bed low and locked with side rails adjusted as appropriate  - Keep care items and personal belongings within reach  - Initiate and maintain comfort rounds  - Make Fall Risk Sign visible to staff  - Offer Toileting every 2 Hours, in advance of need  - Initiate/Maintain bed alarm  - Apply yellow socks and bracelet for high fall risk patients  - Consider moving patient to room near nurses station  Outcome: Progressing  Goal: Maintain or return to baseline ADL function  Description: INTERVENTIONS:  -  Assess patient's ability to carry out ADLs; assess patient's baseline for ADL function and identify physical deficits which impact ability to perform ADLs (bathing, care of mouth/teeth, toileting, grooming, dressing, etc.)  - Assess/evaluate cause of self-care deficits   - Assess range of motion  - Assess patient's mobility; develop plan if impaired  - Assess patient's need for assistive devices and provide as appropriate  - Encourage maximum independence but intervene and supervise when necessary  - Involve family in performance of ADLs  - Assess for home care needs following discharge   - Consider OT consult to assist with ADL evaluation and planning for discharge  - Provide patient education as appropriate  Outcome: Progressing  Goal: Maintains/Returns to pre admission functional level  Description: INTERVENTIONS:  - Perform BMAT or MOVE assessment daily.   - Set and communicate daily mobility goal to care team and patient/family/caregiver. - Collaborate with rehabilitation services on mobility goals if consulted  - Record patient progress and toleration of activity level   Outcome: Progressing     Problem: DISCHARGE PLANNING  Goal: Discharge to home or other facility with appropriate resources  Description: INTERVENTIONS:  - Identify barriers to discharge w/patient and caregiver  - Arrange for needed discharge resources and transportation as appropriate  - Identify discharge learning needs  - Refer to Case Management Department for coordinating discharge planning if the patient needs post-hospital services based on physician/advanced practitioner order or complex needs related to functional status, cognitive ability, or social support system  Outcome: Progressing     Problem: Knowledge Deficit  Goal: Patient/family/caregiver demonstrates understanding of disease process, treatment plan, medications, and discharge instructions  Description: Complete learning assessment and assess knowledge base.   Interventions:  - Provide teaching at level of understanding  - Provide teaching via preferred learning methods  Outcome: Progressing     Problem: SKIN/TISSUE INTEGRITY - ADULT  Goal: Skin Integrity remains intact(Skin Breakdown Prevention)  Description: Assess:  -Perform Idris assessment every shift  -Clean and moisturize skin every shift  -Inspect skin when repositioning, toileting, and assisting with ADLS  -Assess under medical devices such as sling every shift  -Assess extremities for adequate circulation and sensation     Bed Management:  -Have minimal linens on bed & keep smooth, unwrinkled  -Change linens as needed when moist or perspiring  -Avoid sitting or lying in one position for more than 2 hours while in bed  -Keep HOB at 30 degrees     Toileting:  -Offer bedside commode  -Assess for incontinence  -Use incontinent care products after each incontinent episode    Activity:  -Mobilize patient 3 times a day  -Encourage activity and walks on unit  -Encourage or provide ROM exercises   -Turn and reposition patient every 2 Hours and weight shift every 15 minutes while in chair  -Consider limitation of chair time 1-2 hour intervals    Skin Care:  -Avoid use of baby powder, tape, friction and shearing, hot water or constrictive clothing  -Relieve pressure over bony prominences using pillows, offload, foam wedge  -Do not massage red bony areas    Outcome: Progressing     Problem: Prexisting or High Potential for Compromised Skin Integrity  Goal: Skin integrity is maintained or improved  Description: INTERVENTIONS:  - Identify patients at risk for skin breakdown  - Assess and monitor skin integrity  - Assess and monitor nutrition and hydration status  - Monitor labs   - Assess for incontinence   - Turn and reposition patient  - Assist with mobility/ambulation  - Relieve pressure over bony prominences  - Avoid friction and shearing  - Provide appropriate hygiene as needed including keeping skin clean and dry  - Evaluate need for skin moisturizer/barrier cream  - Collaborate with interdisciplinary team   - Patient/family teaching  - Consider wound care consult   Outcome: Progressing

## 2023-07-30 NOTE — PLAN OF CARE
Problem: PHYSICAL THERAPY ADULT  Goal: Performs mobility at highest level of function for planned discharge setting. See evaluation for individualized goals. Description: Treatment/Interventions: Functional transfer training, LE strengthening/ROM, Elevations, Therapeutic exercise, Endurance training, Patient/family training, Bed mobility, Gait training, Spoke to nursing, OT  Equipment Recommended:  (TBD)       See flowsheet documentation for full assessment, interventions and recommendations. Outcome: Progressing  Note: Prognosis: Good  Problem List: Decreased strength, Decreased range of motion, Decreased endurance, Impaired balance, Decreased mobility, Obesity, Orthopedic restrictions, Pain, Decreased skin integrity  Assessment: Kerline Coreas is seen for progression of PT POC. Supine upon entry. Increased time but less assistance for bed mobility, transfers and ambulation this session. Requires HOB elevation and bedrail use with min A for bed mobility. Increased time for transfers, assist levels vary depending upon surface. Most difficulty with sit to stand from toilet height, needing mod A. Close S for ambulation with cane. Slowed gait, decreased foot clearance and step length. Fatigue limiting distances. Tolerated pendulum ex in standing with LUE support on bedrail. Overall improving from previous session. Continue to progress toward goals. The patient's AM-PAC Basic Mobility Inpatient Short Form Raw Score is 17. A Raw score of less than or equal to 16 suggests the patient may benefit from discharge to post-acute rehabilitation services. Please also refer to the recommendation of the Physical Therapist for safe discharge planning. Can continue to monitor disposition with progress. May still benefit from STR in order to achieve functional independence prior to return to home.   Barriers to Discharge: Inaccessible home environment  Barriers to Discharge Comments: [place]  PT Discharge Recommendation: Post acute rehabilitation services (monitor disposition with progress and ability to achieve IPPT goals.)    See flowsheet documentation for full assessment.

## 2023-07-31 ENCOUNTER — HOME HEALTH ADMISSION (OUTPATIENT)
Dept: HOME HEALTH SERVICES | Facility: HOME HEALTHCARE | Age: 78
End: 2023-07-31

## 2023-07-31 ENCOUNTER — APPOINTMENT (INPATIENT)
Dept: RADIOLOGY | Facility: HOSPITAL | Age: 78
DRG: 507 | End: 2023-07-31
Attending: RADIOLOGY
Payer: COMMERCIAL

## 2023-07-31 LAB
ANION GAP SERPL CALCULATED.3IONS-SCNC: 5 MMOL/L
BASOPHILS # BLD AUTO: 0.05 THOUSANDS/ÂΜL (ref 0–0.1)
BASOPHILS NFR BLD AUTO: 1 % (ref 0–1)
BUN SERPL-MCNC: 33 MG/DL (ref 5–25)
CALCIUM SERPL-MCNC: 10.2 MG/DL (ref 8.4–10.2)
CHLORIDE SERPL-SCNC: 102 MMOL/L (ref 96–108)
CO2 SERPL-SCNC: 32 MMOL/L (ref 21–32)
CREAT SERPL-MCNC: 1.61 MG/DL (ref 0.6–1.3)
EOSINOPHIL # BLD AUTO: 0.33 THOUSAND/ÂΜL (ref 0–0.61)
EOSINOPHIL NFR BLD AUTO: 4 % (ref 0–6)
ERYTHROCYTE [DISTWIDTH] IN BLOOD BY AUTOMATED COUNT: 13.9 % (ref 11.6–15.1)
GFR SERPL CREATININE-BSD FRML MDRD: 29 ML/MIN/1.73SQ M
GLUCOSE SERPL-MCNC: 102 MG/DL (ref 65–140)
GLUCOSE SERPL-MCNC: 134 MG/DL (ref 65–140)
GLUCOSE SERPL-MCNC: 152 MG/DL (ref 65–140)
GLUCOSE SERPL-MCNC: 192 MG/DL (ref 65–140)
GLUCOSE SERPL-MCNC: 96 MG/DL (ref 65–140)
HCT VFR BLD AUTO: 30.6 % (ref 34.8–46.1)
HGB BLD-MCNC: 9.3 G/DL (ref 11.5–15.4)
IMM GRANULOCYTES # BLD AUTO: 0.03 THOUSAND/UL (ref 0–0.2)
IMM GRANULOCYTES NFR BLD AUTO: 0 % (ref 0–2)
LYMPHOCYTES # BLD AUTO: 2.03 THOUSANDS/ÂΜL (ref 0.6–4.47)
LYMPHOCYTES NFR BLD AUTO: 25 % (ref 14–44)
MCH RBC QN AUTO: 30.6 PG (ref 26.8–34.3)
MCHC RBC AUTO-ENTMCNC: 30.4 G/DL (ref 31.4–37.4)
MCV RBC AUTO: 101 FL (ref 82–98)
MONOCYTES # BLD AUTO: 0.52 THOUSAND/ÂΜL (ref 0.17–1.22)
MONOCYTES NFR BLD AUTO: 6 % (ref 4–12)
NEUTROPHILS # BLD AUTO: 5.16 THOUSANDS/ÂΜL (ref 1.85–7.62)
NEUTS SEG NFR BLD AUTO: 64 % (ref 43–75)
NRBC BLD AUTO-RTO: 0 /100 WBCS
PLATELET # BLD AUTO: 199 THOUSANDS/UL (ref 149–390)
PMV BLD AUTO: 10.4 FL (ref 8.9–12.7)
POTASSIUM SERPL-SCNC: 3.5 MMOL/L (ref 3.5–5.3)
RBC # BLD AUTO: 3.04 MILLION/UL (ref 3.81–5.12)
SODIUM SERPL-SCNC: 139 MMOL/L (ref 135–147)
WBC # BLD AUTO: 8.12 THOUSAND/UL (ref 4.31–10.16)

## 2023-07-31 PROCEDURE — 88307 TISSUE EXAM BY PATHOLOGIST: CPT | Performed by: PATHOLOGY

## 2023-07-31 PROCEDURE — 97116 GAIT TRAINING THERAPY: CPT

## 2023-07-31 PROCEDURE — 97110 THERAPEUTIC EXERCISES: CPT

## 2023-07-31 PROCEDURE — 80048 BASIC METABOLIC PNL TOTAL CA: CPT | Performed by: INTERNAL MEDICINE

## 2023-07-31 PROCEDURE — 82948 REAGENT STRIP/BLOOD GLUCOSE: CPT

## 2023-07-31 PROCEDURE — 76937 US GUIDE VASCULAR ACCESS: CPT

## 2023-07-31 PROCEDURE — 77001 FLUOROGUIDE FOR VEIN DEVICE: CPT | Performed by: RADIOLOGY

## 2023-07-31 PROCEDURE — C1751 CATH, INF, PER/CENT/MIDLINE: HCPCS

## 2023-07-31 PROCEDURE — 76937 US GUIDE VASCULAR ACCESS: CPT | Performed by: RADIOLOGY

## 2023-07-31 PROCEDURE — 02HV33Z INSERTION OF INFUSION DEVICE INTO SUPERIOR VENA CAVA, PERCUTANEOUS APPROACH: ICD-10-PCS | Performed by: RADIOLOGY

## 2023-07-31 PROCEDURE — 99232 SBSQ HOSP IP/OBS MODERATE 35: CPT | Performed by: STUDENT IN AN ORGANIZED HEALTH CARE EDUCATION/TRAINING PROGRAM

## 2023-07-31 PROCEDURE — 99233 SBSQ HOSP IP/OBS HIGH 50: CPT | Performed by: STUDENT IN AN ORGANIZED HEALTH CARE EDUCATION/TRAINING PROGRAM

## 2023-07-31 PROCEDURE — 97535 SELF CARE MNGMENT TRAINING: CPT

## 2023-07-31 PROCEDURE — C1894 INTRO/SHEATH, NON-LASER: HCPCS

## 2023-07-31 PROCEDURE — 36556 INSERT NON-TUNNEL CV CATH: CPT | Performed by: RADIOLOGY

## 2023-07-31 PROCEDURE — 85025 COMPLETE CBC W/AUTO DIFF WBC: CPT | Performed by: INTERNAL MEDICINE

## 2023-07-31 PROCEDURE — NC001 PR NO CHARGE: Performed by: RADIOLOGY

## 2023-07-31 PROCEDURE — 36556 INSERT NON-TUNNEL CV CATH: CPT

## 2023-07-31 RX ORDER — CEFAZOLIN SODIUM 2 G/50ML
2000 SOLUTION INTRAVENOUS EVERY 8 HOURS
Status: DISCONTINUED | OUTPATIENT
Start: 2023-07-31 | End: 2023-07-31

## 2023-07-31 RX ORDER — CEFTRIAXONE 2 G/50ML
2000 INJECTION, SOLUTION INTRAVENOUS EVERY 24 HOURS
Status: DISCONTINUED | OUTPATIENT
Start: 2023-07-31 | End: 2023-08-01 | Stop reason: HOSPADM

## 2023-07-31 RX ORDER — LIDOCAINE WITH 8.4% SOD BICARB 0.9%(10ML)
SYRINGE (ML) INJECTION AS NEEDED
Status: COMPLETED | OUTPATIENT
Start: 2023-07-31 | End: 2023-07-31

## 2023-07-31 RX ADMIN — DOCUSATE SODIUM 100 MG: 100 CAPSULE, LIQUID FILLED ORAL at 16:31

## 2023-07-31 RX ADMIN — ALLOPURINOL 100 MG: 100 TABLET ORAL at 08:12

## 2023-07-31 RX ADMIN — LEVOTHYROXINE SODIUM 50 MCG: 50 TABLET ORAL at 05:22

## 2023-07-31 RX ADMIN — ACETAMINOPHEN 325MG 975 MG: 325 TABLET ORAL at 05:22

## 2023-07-31 RX ADMIN — PREDNISONE 5 MG: 5 TABLET ORAL at 08:12

## 2023-07-31 RX ADMIN — Medication 10 ML: at 15:36

## 2023-07-31 RX ADMIN — ASPIRIN 325 MG ORAL TABLET 325 MG: 325 PILL ORAL at 08:12

## 2023-07-31 RX ADMIN — CEFAZOLIN SODIUM 1000 MG: 1 SOLUTION INTRAVENOUS at 07:21

## 2023-07-31 RX ADMIN — INSULIN LISPRO 1 UNITS: 100 INJECTION, SOLUTION INTRAVENOUS; SUBCUTANEOUS at 16:30

## 2023-07-31 RX ADMIN — OXYCODONE HYDROCHLORIDE 5 MG: 5 TABLET ORAL at 16:44

## 2023-07-31 RX ADMIN — SENNOSIDES 8.6 MG: 8.6 TABLET, FILM COATED ORAL at 08:12

## 2023-07-31 RX ADMIN — LIDOCAINE 1 PATCH: 50 PATCH CUTANEOUS at 08:12

## 2023-07-31 RX ADMIN — CEFTRIAXONE 2000 MG: 2 INJECTION, SOLUTION INTRAVENOUS at 16:31

## 2023-07-31 RX ADMIN — CITALOPRAM HYDROBROMIDE 40 MG: 20 TABLET ORAL at 08:12

## 2023-07-31 RX ADMIN — POLYETHYLENE GLYCOL 3350 17 G: 17 POWDER, FOR SOLUTION ORAL at 08:12

## 2023-07-31 RX ADMIN — DOCUSATE SODIUM 100 MG: 100 CAPSULE, LIQUID FILLED ORAL at 08:12

## 2023-07-31 RX ADMIN — LORATADINE 10 MG: 10 TABLET ORAL at 08:12

## 2023-07-31 RX ADMIN — OXYCODONE HYDROCHLORIDE 5 MG: 5 TABLET ORAL at 03:52

## 2023-07-31 RX ADMIN — ALLOPURINOL 100 MG: 100 TABLET ORAL at 16:30

## 2023-07-31 RX ADMIN — OXYCODONE HYDROCHLORIDE 5 MG: 5 TABLET ORAL at 08:13

## 2023-07-31 RX ADMIN — ACETAMINOPHEN 325MG 975 MG: 325 TABLET ORAL at 16:31

## 2023-07-31 RX ADMIN — GABAPENTIN 100 MG: 100 CAPSULE ORAL at 16:31

## 2023-07-31 RX ADMIN — GABAPENTIN 100 MG: 100 CAPSULE ORAL at 08:12

## 2023-07-31 NOTE — ASSESSMENT & PLAN NOTE
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

## 2023-07-31 NOTE — PROGRESS NOTES
Told by internal medicine, that nephrology feels PICC line should not be placed in the arm.   We will go back to the plan of jugular access

## 2023-07-31 NOTE — CONSULTS
Interventional Radiology  Consultation 7/31/2023     Consults  Shyla Alegria   1945   [id number]      Assessment/Plan:  Infection of arthroplasty  For venous access  Creatinine elevated at 1.6  Plan IJ small bore line.     Medical Problems     Problem List     * (Principal) SIRS (systemic inflammatory response syndrome) (HCC)    Anemia    Chronic gout    Stage 3b chronic kidney disease ([address])    Lab Results   Component Value Date    EGFR 29 07/31/2023    EGFR 26 07/30/2023    EGFR 24 07/29/2023    CREATININE 1.61 (H) 07/31/2023    CREATININE 1.80 (H) 07/30/2023    CREATININE 1.88 (H) 07/29/2023         Combined form of senile cataract of left eye    Depression, recurrent ([address])    Fibromyalgia    Homocysteinemia    Hyperlipidemia    Hypothyroidism    Acute back pain    Primary osteoarthritis of both hands    Vitamin D deficiency    Symptomatic varicose veins of both lower extremities    Intermittent claudication ([address])    Type 2 diabetes mellitus without complication, without long-term current use of insulin ([address])    Overview Deleted 9/7/2019  4:09 PM by Carlita Justice MD            Lab Results   Component Value Date    HGBA1C 6.6 (H) 05/04/2023       Recent Labs     07/30/23  1103 07/30/23  1508 07/30/23  2118 07/31/23  0638   POCGLU 151* 157* 163* 96       Blood Sugar Average: Last 72 hrs:  (P) [id number]        Nephrosclerosis, stage 1-4 or unspecified chronic kidney disease    Lab Results   Component Value Date    EGFR 29 07/31/2023    EGFR 26 07/30/2023    EGFR 24 07/29/2023    CREATININE 1.61 (H) 07/31/2023    CREATININE 1.80 (H) 07/30/2023    CREATININE 1.88 (H) 07/29/2023         Arthritis    Idiopathic chronic gout with tophus    Idiopathic thrombocytopenic purpura (HCC)    Osteoarthritis of multiple joints    Post menopausal syndrome    Restless legs syndrome    Ambulatory dysfunction with recurrent falls (Chronic)    Impaired vision (Chronic)    Bilateral hearing loss (Chronic)    PMR (polymyalgia rheumatica) (HCC)    Chronic combined systolic (congestive) and diastolic (congestive) heart failure (HCC)    Wt Readings from Last 3 Encounters:   07/31/23 106 kg (232 lb 12.9 oz)   06/07/23 108 kg (237 lb 7 oz)   05/24/23 108 kg (239 lb)                 Obesity (BMI 35.0-39.9 without comorbidity)    Rotator cuff tear arthropathy of right shoulder            Subjective:     Patient ID: Shyla Alegria is a 78 y.o. female. History of Present Illness  Infection after shoulder surgery    Review of Systems      Past Medical History:   Diagnosis Date   • Abnormal blood chemistry     last assessed 11/13/2013   • Angina pectoris ([address])     last assessed 11/012/13   • Arthritis     last assessed 11/13/2013   • Asthma    • Athlete's foot     resolved 10/11/17   • Cataract     last assessed 03/18/2016   • Chronic kidney disease     Stage III   • Diabetes mellitus ([address])     type 2   • Diabetes mellitus with kidney disease ([address])     last assessed 10/03/17   • Disease of thyroid gland    • Eczema    • Gait disorder     last assessed 11/12/2013   • Gout    • Hammer toe    • Hyperlipidemia    • Hypertension    • Idiopathic thrombocytopenia purpura ([address])     last assessed 08/06/15   • Overactive bladder    • Post menopausal syndrome     resolved 10/11/17   • Restless legs syndrome     last assessed 10/24/2013   • Subclinical hypothyroidism     last assessed 12/11/17   • Urinary frequency     resolved 12/18/2014        Past Surgical History:   Procedure Laterality Date   • ACHILLES TENDON REPAIR Right    • FL GUIDED NEEDLE PLAC BX/ASP/INJ  7/26/2023   • IN ARTHROPLASTY GLENOHUMERAL JOINT TOTAL SHOULDER Right 6/7/2023    Procedure: REVERSE TOTAL SHOULDER REPLACEMENT;  Surgeon: Apple Kulkarni MD;  Location: [facility];  Service: Orthopedics   • IN I&D SHOULDER DEEP ABSCESS/HEMATOMA Right 7/27/2023    Procedure: INCISION AND DRAINAGE (I&D) SHOULDER ANTIBIOTIC STIMULAN BEADS (VANCOMYCIN AND TOBRAMYCIN) ;   Surgeon: Rex Mansfield MD;  Location: [facility];  Service: Orthopedics   • REPLACEMENT TOTAL KNEE Bilateral         Social History     Tobacco Use   Smoking Status Never   Smokeless Tobacco Never   Tobacco Comments    per allscripts; former smoker        Social History     Substance and Sexual Activity   Alcohol Use Yes    Comment: Socially        Social History     Substance and Sexual Activity   Drug Use Never        Allergies   Allergen Reactions   • Ace Inhibitors Angioedema   • Angiotensin Receptor Blockers Anaphylaxis and Tongue Swelling   • Prochlorperazine Anaphylaxis     lethargy, tongue and throat swelling   • Ramipril Anaphylaxis and Angioedema   • Pollen Extract Hives       Current Facility-Administered Medications   Medication Dose Route Frequency Provider Last Rate Last Admin   • acetaminophen (TYLENOL) tablet 650 mg  650 mg Oral Q6H PRN Tanya Conklin, CRNP       • acetaminophen (TYLENOL) tablet 975 mg  975 mg Oral [facility] Tanya Taylorp, CRNP   975 mg at 07/31/23 0522   • allopurinol (ZYLOPRIM) tablet 100 mg  100 mg Oral BID Janluke Rumorlando, CRNP   100 mg at 07/30/23 1710   • aluminum-magnesium hydroxide-simethicone (MAALOX) oral suspension 30 mL  30 mL Oral Q6H PRN Tanya Conklin, CRNP       • aspirin tablet 325 mg  325 mg Oral Daily Janluke Rumorlando, CRNP   325 mg at 07/30/23 0820   • ceFAZolin (ANCEF) IVPB (premix in dextrose) 1,000 mg 50 mL  1,000 mg Intravenous Q8H Tanya Conklin, CRNP 100 mL/hr at 07/31/23 0721 1,000 mg at 07/31/23 0721   • citalopram (CeleXA) tablet 40 mg  40 mg Oral QAM Tanya Conklin, CRNP   40 mg at 07/30/23 0820   • docusate sodium (COLACE) capsule 100 mg  100 mg Oral BID Janifer Rump, CRNP   100 mg at 07/30/23 1710   • gabapentin (NEURONTIN) capsule 100 mg  100 mg Oral BID Tanya Rump, CRNP   100 mg at 07/30/23 1710   • insulin lispro (HumaLOG) 100 units/mL subcutaneous injection 1-6 Units  1-6 Units Subcutaneous TID AC Tanya Taylorp CRNP   1 Units at 07/30/23 1715   • iohexol (OMNIPAQUE) 300 mg/mL injection 50 mL  50 mL Other Once in [address] [place] [address], CRNP       • levothyroxine tablet 50 mcg  50 mcg Oral Early Morning Augustus Danish, CRNP   50 mcg at 07/31/23 0522   • lidocaine (LIDODERM) 5 % patch 1 patch  1 patch Topical Daily Augustus Danish, CRNP   1 patch at 07/30/23 7012   • lidocaine (PF) (XYLOCAINE-MPF) 1 % injection 7 mL  7 mL Injection Once in [address] [place] [address], CRNP       • loratadine (CLARITIN) tablet 10 mg  10 mg Oral Daily [facility], CRNP   10 mg at 07/30/23 0820   • methocarbamol (ROBAXIN) tablet 500 mg  500 mg Oral Q6H PRN [facility]       • ondansetron [facility]) injection 4 mg  4 mg Intravenous Q6H PRN [facility], CARYLNP       • oxyCODONE (ROXICODONE) IR tablet 5 mg  5 mg Oral Q4H PRN [facility], CRNP   5 mg at 07/31/23 0352   • polyethylene glycol (MIRALAX) packet 17 g  17 g Oral Daily Augustus Danish, CRNP   17 g at 07/30/23 0820   • predniSONE tablet 5 mg  5 mg Oral QAM Augustus Danish, CRNP   5 mg at 07/30/23 0820   • senna (SENOKOT) tablet 8.6 mg  1 tablet Oral Daily Augustus Danish, CRNP   8.6 mg at 07/30/23 0820          Objective:    Vitals:    07/30/23 1500 07/30/23 2204 07/31/23 0549 07/31/23 0700   BP: 169/76 154/83  142/63   BP Location: Left arm   Left arm   Pulse: 58 62  60   Resp: 16 18  20   Temp: (!) 96.9 °F (36.1 °C) (!) 97.1 °F (36.2 °C)  97.9 °F (36.6 °C)   TempSrc: Temporal Temporal  Temporal   SpO2: 97% 96%  94%   Weight:   106 kg (232 lb 12.9 oz)    Height:            Physical Exam      No results found for: "BNP"   Lab Results   Component Value Date    WBC 8.12 07/31/2023    HGB 9.3 (L) 07/31/2023    HCT 30.6 (L) 07/31/2023     (H) 07/31/2023     07/31/2023     Lab Results   Component Value Date    INR 0.97 07/24/2023    INR 1.08 12/02/2020    INR 0.97 09/07/2019    PROTIME 12.9 07/24/2023    PROTIME 14.0 12/02/2020 PROTIME 13.0 09/07/2019     Lab Results   Component Value Date    PTT 23 07/24/2023         I have personally reviewed pertinent imaging and laboratory results. Code Status: Level 1 - Full Code  Advance Directive and Living Will:      Power of :    POLST:      IR has been consulted to evaluate the patient, determine the appropriate procedure, and whether or not a procedure can and should be performed. Thank you for allowing me to participate in the care of Mendoza Ch. Please don't hesitate to call, text, email, or TigerText with any questions. This text is generated with voice recognition software. There may be translation, syntax,  or grammatical errors. If you have any questions, please contact the dictating provider.

## 2023-07-31 NOTE — PLAN OF CARE
Problem: PHYSICAL THERAPY ADULT  Goal: Performs mobility at highest level of function for planned discharge setting. See evaluation for individualized goals. Description: Treatment/Interventions: Functional transfer training, LE strengthening/ROM, Elevations, Therapeutic exercise, Endurance training, Patient/family training, Bed mobility, Gait training, Spoke to nursing, OT  Equipment Recommended:  (TBD)       See flowsheet documentation for full assessment, interventions and recommendations. Outcome: Progressing  Note: Prognosis: Good  Problem List: Decreased strength, Decreased endurance, Impaired balance, Decreased mobility, Obesity, Decreased skin integrity, Orthopedic restrictions, Pain  Assessment: Pt seen for PT treatment session this date with interventions consisting of gait training w/ emphasis on improving pt's ability to ambulate level surfaces x 125 ftx1 with close S provided by therapist with SPC, Therapeutic exercise consisting of: AROM 15 reps B LE in sitting position and therapeutic activity consisting of training: sit<>stand transfers. Pt agreeable to PT treatment session upon arrival, pt found seated OOB in chair, in no apparent distress and responsive. In comparison to previous session, pt with improvements in distance ambulated. Post session: pt returned back to recliner, chair alarm engaged, all needs in reach and RN notified of session findings/recommendations. Continue to recommend post acute rehabilitation services pending progressat time of d/c in order to maximize pt's functional independence and safety w/ mobility. Pt continues to be functioning below baseline level. PT will continue to see pt during current hospitalization in order to address the deficits listed above and provide interventions consistent w/ POC in effort to achieve STGs.   Barriers to Discharge: Inaccessible home environment  Barriers to Discharge Comments: [place]  PT Discharge Recommendation: Post acute rehabilitation services (pending progress)    See flowsheet documentation for full assessment.

## 2023-07-31 NOTE — OCCUPATIONAL THERAPY NOTE
Occupational Therapy Progress Note     Patient Name: Vikas CHATTERJEE Date: 7/31/2023  Problem List  Principal Problem:    SIRS (systemic inflammatory response syndrome) ([facility])  Active Problems:    Anemia    Stage 3b chronic kidney disease ([address])    Acute back pain    Type 2 diabetes mellitus without complication, without long-term current use of insulin ([facility])    PMR (polymyalgia rheumatica) ([facility])    Chronic combined systolic (congestive) and diastolic (congestive) heart failure ([facility])    Rotator cuff tear arthropathy of right shoulder            07/31/23 0905   Note Type   Note Type Treatment   Pain Assessment   Pain Assessment Tool 0-10   Pain Score 3   Pain Location/Orientation Orientation: Right;Location: Shoulder   Pain Rating: FLACC (Rest) - Face 0   Pain Rating: FLACC (Rest) - Legs 0   Pain Rating: FLACC (Rest) - Activity 0   Pain Rating: FLACC (Rest) - Cry 1   Pain Rating: FLACC (Rest) - Consolability 0   Score: FLACC (Rest) 1   Restrictions/Precautions   Weight Bearing Precautions Per Order Yes   RUE Weight Bearing Per Order NWB   Braces or Orthoses Sling  (for comfort)   Other Precautions Fall Risk;Pain;Multiple lines; Chair Alarm   ADL   Where Assessed Chair   Eating Assistance 5  Supervision/Setup   Grooming Assistance 5  Supervision/Setup   UB Bathing Assistance 4  Minimal Assistance   LB Bathing Assistance 4  Minimal Assistance   UB Dressing Assistance 4  Minimal Assistance   LB Dressing Assistance 4  Minimal Assistance   Functional Standing Tolerance   Time 1-2mins   Bed Mobility   Rolling R 5  Supervision   Rolling L 5  Supervision   Supine to Sit 4  Minimal assistance   Additional items Assist x 1; Increased time required   Transfers   Sit to Stand 5  Supervision   Additional items Increased time required;Verbal cues   Stand to Sit 5  Supervision   Additional items Increased time required;Verbal cues   Functional Mobility   Functional Mobility 5  Supervision   Additional items [facility] Therapeutic Exercise - ROM   UE-ROM Yes  (R UE-shr pendulums, elbow-distal AROM)   Subjective   Subjective "My sister can help me at home."   Cognition   Overall Cognitive Status [facility]   Arousal/Participation Alert   Attention Within functional limits   Orientation Level Oriented X4   Memory Within functional limits   Following Commands Follows all commands and directions without difficulty   Activity Tolerance   Activity Tolerance Patient limited by fatigue;Patient limited by pain   Medical Staff Made Aware nsg, P.T. Assessment   Assessment Pt seen for 25min tx session with focus on functional balance, functional mobility, ADL status, transfer safety, R UE ROM, and education. Pt able to tolerate OOB mobility; sitting balance=g/g-, standing balance=f+/f. Pt able to demonstrate improved functional mobility with SPC. Pt demonstrating need for assistance with UE and LE ADLs. Pt reviewed one-handed dressing techniques, allowed R UE exercises(i.e.pendulums, elbow-distal ROM, sling use), and NWB status. Pt able to demonstrate good carryover. Pt voicing no concerns about going directly home; sister able to provide assistance for all needs. Pt demonstrating good cognition(i.e.orientation, memory). Tx tolerated well. Will continue. The patient's raw score on the AM-PAC Daily Activity Inpatient Short Form is 20. A raw score of greater than or equal to 19 suggests the patient may benefit from discharge to home. Please refer to the recommendation of the Occupational Therapist for safe discharge planning. Plan   Treatment Interventions ADL retraining;Functional transfer training;UE strengthening/ROM; Endurance training;Cognitive reorientation;Patient/family training;Equipment evaluation/education; Compensatory technique education   Goal Expiration Date 08/10/23   OT Treatment Day 2   OT Frequency 3-5x/wk   Recommendation   OT Discharge Recommendation Home with outpatient rehabilitation  (when appropriate by ortho)   AM-PAC Daily Activity Inpatient   Lower Body Dressing 3   Bathing 3   Toileting 3   Upper Body Dressing 3   Grooming 4   Eating 4   Daily Activity Raw Score 20   Daily Activity Standardized Score (Calc for Raw Score >=11) 42.03   AM-PAC Applied Cognition Inpatient   Following a Speech/Presentation 4   Understanding Ordinary Conversation 4   Taking Medications 4   Remembering Where Things Are Placed or Put Away 4   Remembering List of 4-5 Errands 4   Taking Care of Complicated Tasks 4   Applied Cognition Raw Score 24   Applied Cognition Standardized Score 62.21   Chloe Luis

## 2023-07-31 NOTE — PHYSICAL THERAPY NOTE
PHYSICAL THERAPY TREATMENT NOTE  NAME:  Candice Mae  DATE: 07/31/23    Length Of Stay: 7  Performed at least 2 patient identifiers during session: Name and ID bracelet    TREATMENT:      07/31/23 1100   PT Last Visit   PT Visit Date 07/31/23   Note Type   Note Type Treatment   Pain Assessment   Pain Assessment Tool 0-10   Pain Score 3   Pain Location/Orientation Orientation: Right;Location: Shoulder   Pain Onset/Description Onset: Ongoing   Patient's Stated Pain Goal No pain   Hospital Pain Intervention(s) Repositioned   Multiple Pain Sites No   Restrictions/Precautions   Weight Bearing Precautions Per Order Yes   RUE Weight Bearing Per Order NWB   Braces or Orthoses Sling  (for comfort)   Other Precautions Chair Alarm; Bed Alarm; Fall Risk;Pain;WBS   General   Chart Reviewed Yes   Family/Caregiver Present No   Cognition   Overall Cognitive Status WFL   Arousal/Participation Alert; Responsive; Cooperative   Attention Within functional limits   Orientation Level Oriented X4   Memory Within functional limits   Following Commands Follows all commands and directions without difficulty   Comments pleasant and cooperative   Bed Mobility   Additional Comments pt OOB on chair upon arrival and remains OOB upon conclusion   Transfers   Sit to Stand 5  Supervision   Additional items Increased time required;Verbal cues   Stand to Sit 5  Supervision   Additional items Increased time required;Verbal cues   Additional Comments verbal cues for proper hand placement and safe technique   Ambulation/Elevation   Gait pattern Improper Weight shift;Decreased foot clearance; Short stride; Excessively slow   Gait Assistance 5  Supervision   Additional items Assist x 1;Verbal cues; Tactile cues   Assistive Device Straight cane   Distance 125 ftx1   Ambulation/Elevation Additional Comments unsteady gait but no gross LOB noted   Balance   Static Sitting Fair +   Dynamic Sitting Fair   Static Standing Fair -   Dynamic Standing Fair - Ambulatory Poor +   Endurance Deficit   Endurance Deficit Yes   Endurance Deficit Description fatigue   Activity Tolerance   Activity Tolerance Patient tolerated treatment well   Nurse Made Aware yes   Exercises   Glute Sets Sitting;15 reps;AROM; Bilateral   Hip Abduction Sitting;15 reps;AROM; Bilateral   Hip Adduction Sitting;15 reps;AROM; Bilateral   Knee AROM Long Arc Quad Sitting;15 reps;AROM; Bilateral   Ankle Pumps Sitting;15 reps;AROM; Bilateral   Marching Sitting;15 reps;AROM; Bilateral   Assessment   Prognosis Good   Problem List Decreased strength;Decreased endurance; Impaired balance;Decreased mobility;Obesity; Decreased skin integrity;Orthopedic restrictions;Pain   Assessment Pt seen for PT treatment session this date with interventions consisting of gait training w/ emphasis on improving pt's ability to ambulate level surfaces x 125 ftx1 with close S provided by therapist with SPC, Therapeutic exercise consisting of: AROM 15 reps B LE in sitting position and therapeutic activity consisting of training: sit<>stand transfers. Pt agreeable to PT treatment session upon arrival, pt found seated OOB in chair, in no apparent distress and responsive. In comparison to previous session, pt with improvements in distance ambulated. Post session: pt returned back to recliner, chair alarm engaged, all needs in reach and RN notified of session findings/recommendations. Continue to recommend post acute rehabilitation services pending progressat time of d/c in order to maximize pt's functional independence and safety w/ mobility. Pt continues to be functioning below baseline level. PT will continue to see pt during current hospitalization in order to address the deficits listed above and provide interventions consistent w/ POC in effort to achieve STGs.    Barriers to Discharge Inaccessible home environment   Barriers to Discharge Comments [place]   Goals   Patient Goals to go home   STG Expiration Date 08/04/23   PT Treatment Day 3   Plan   Treatment/Interventions Functional transfer training;LE strengthening/ROM; Elevations; Therapeutic exercise; Endurance training;Bed mobility;Gait training;Spoke to nursing   Progress Progressing toward goals   PT Frequency 3-5x/wk   Recommendation   PT Discharge Recommendation Post acute rehabilitation services  (pending progress)   AM-PAC Basic Mobility Inpatient   Turning in Flat Bed Without Bedrails 3   Lying on Back to Sitting on Edge of Flat Bed Without Bedrails 3   Moving Bed to Chair 3   Standing Up From Chair Using Arms 3   Walk in Room 3   Climb 3-5 Stairs With Railing 2   Basic Mobility Inpatient Raw Score 17   Basic Mobility Standardized Score 39.67   Highest Level Of Mobility   -HLM Goal 5: Stand one or more mins   JH-HLM Achieved 7: Walk 25 feet or more   Education   Education Provided Mobility training;Assistive device   End of Consult   Patient Position at End of Consult Bedside chair;Bed/Chair alarm activated; All needs within reach       The patient's AM-PAC Basic Mobility Inpatient Short Form Raw Score is 17. A Raw score of greater than 16 suggests the patient may benefit from discharge to home. Please also refer to the recommendation of the Physical Therapist for safe discharge planning.       Caroline Jack, PTA,PTA

## 2023-07-31 NOTE — PLAN OF CARE
Problem: MOBILITY - ADULT  Goal: Maintain or return to baseline ADL function  Description: INTERVENTIONS:  -  Assess patient's ability to carry out ADLs; assess patient's baseline for ADL function and identify physical deficits which impact ability to perform ADLs (bathing, care of mouth/teeth, toileting, grooming, dressing, etc.)  - Assess/evaluate cause of self-care deficits   - Assess range of motion  - Assess patient's mobility; develop plan if impaired  - Assess patient's need for assistive devices and provide as appropriate  - Encourage maximum independence but intervene and supervise when necessary  - Involve family in performance of ADLs  - Assess for home care needs following discharge   - Consider OT consult to assist with ADL evaluation and planning for discharge  - Provide patient education as appropriate  Outcome: Progressing  Goal: Maintains/Returns to pre admission functional level  Description: INTERVENTIONS:  - Perform BMAT or MOVE assessment daily.   - Set and communicate daily mobility goal to care team and patient/family/caregiver.    - Collaborate with rehabilitation services on mobility goals if consulted  - Out of bed for toileting  - Record patient progress and toleration of activity level   Outcome: Progressing

## 2023-07-31 NOTE — PROGRESS NOTES
Progress Note - Infectious Disease   Candice Mae 78 y.o. female MRN: [id number]  Unit/Bed#: E2 -01 Encounter: [id number]      Impression/Plan:    1. Right shoulder pain, presumptive PJI. In setting of recent reverse shoulder arthroplasty. Consider early PJI. R shoulder xray and CT both rather unremarkable. IR aspirated 7/26 with 3ml bloody aspirate. Fluid WBC = 7552 with 83% neutrophils, RBC = 878,000. Patient is status post washout in the OR 7/27. There was some inflammatory tissue seen about the prosthesis, but no purulent fluid and the prosthesis was noted to be stable without any loosening of hardware. Pathology with fibrovascular tissue and skeletal muscle with mild chronic inflammation. Based on Operative findings, unclear that this is definitively an infection but per orthopedic surgery given her age she would be a poor surgical candidate for repeat operative intervention if needed so favor ongoing treatment for prosthetic joint infection. She did receive 1 dose of antibiotics on admission to the ED which may have affected culture growth, especially in an early infection. Cultures show no growth to date. Alternative causes may include flare of polymyalgia rheumatica vs. joint Injury with reactive synovial fluid. -follow up IR fluid culture 7/26  -follow up OR cultures;these will be held for 21 days to look for slow growers such as Cutibacterium acnes  - follow up pathology of inflammatory tissue from OR  - Will stop IV cefazolin and start IV ceftriaxone 2 g every 24 hours for ease of dosing.   Highest concern for strep species or cutibacterium acnes rather than staph which I would expect to grow  -Anticipate a 6-week total course of IV ceftriaxone, through 9/6/2023  -Okay for PICC placement  -Weekly CBC with differential and BMP on IV antibiotics  -If OR cultures show no growth, favor transition to p.o. antibiotics to complete treatment course in order to avoid further complications of PICC line and IV antibiotics (TARA, clot, line infection)     2. SIRS vs sepsis. Present on admission. Fever and leukocytosis. Consider possible R shoulder infection. Consider inflammatory response to R shoulder injury not detected on xray. No other clear source appreciated. No acute respiratory or  complaints. Low suspicion for GI source despite her recent diarrhea. Blood cultures are negative >24 hours. -monitor CBCD and BMP  -monitor vitals  -Antibiotics as above  -supportive care      3. Recent R reverse total shoulder replacement. In patient with severe OA and rotator cuff repair. Now with acute onset pain and change in ROM as above.  -continue follow up with orthopedic surgery     4. Type 2 diabetes mellitus without long term insulin use. Patient's last HbA1c was 6.6% on 5/4/2023. Elevated blood glucose is risk factor for infection. Recommend tight glycemic control.  -blood glucose management per primary service     5. CKD stage 3b. Upon review of patient's medical record it appears her baseline creatinine is approximately 1.5-1.7. Creatinine has remained within or below baseline during this hospitalization. -monitor creatinine  -dose adjust antibiotics for renal function as needed  -avoid nephrotoxins     6. Obesity. BMI = 35.81. This can effect antibiotic dosing.  -monitor patient weight and dose adjust antibiotics as needed      7. Polymyalgia Rheumatica: Patient on chronic steroids for this. Above management plan discussed with Dr Victor Hugo Anderson from orthopedic surgery, [facility], and the patient at bedside. ID will follow. Antibiotics:  Cefazolin day 5  POD 5    Subjective: The patient reports improvement in right shoulder pain since surgery. Denies nausea, vomiting, rash, diarrhea. Tolerating Cefazolin without side effects.     Objective:  Vitals:  Temp:  [96.9 °F (36.1 °C)-97.9 °F (36.6 °C)] 97.9 °F (36.6 °C)  HR:  [58-62] 60  Resp:  [16-20] 20  BP: (142-169)/(63-83) 142/63  SpO2:  [94 %-97 %] 94 %  Temp (24hrs), Av.3 °F (36.3 °C), Min:96.9 °F (36.1 °C), Max:97.9 °F (36.6 °C)  Current: Temperature: 97.9 °F (36.6 °C)    Physical Exam:   General Appearance:  Alert, interactive, nontoxic, no acute distress. Throat: Oropharynx moist without lesions. Lungs:   Clear to auscultation bilaterally; no wheezes, rhonchi or rales; respirations unlabored   Heart:  RRR; no murmur, rub or gallop   Abdomen:   Soft, non-tender, non-distended, positive bowel sounds. Extremities: Right shoulder wrapped, sling in place   Skin: No new rashes or lesions. No draining wounds noted. Labs: All pertinent labs and imaging studies were personally reviewed  Results from last 7 days   Lab Units 23  0519 23  0530 23  0529   WBC Thousand/uL 8.12 9.42 8.96   HEMOGLOBIN g/dL 9.3* 9.4* 9.3*   PLATELETS Thousands/uL 199 179 186     Results from last 7 days   Lab Units 23  0519 23  0509 23  0530 23  0529 23  0501 23  2224   SODIUM mmol/L 139 140 139 139   < > 136   POTASSIUM mmol/L 3.5 3.8 3.5 3.7   < > 4.0   CHLORIDE mmol/L 102 102 101 102   < > 99   CO2 mmol/L 32 30 29 28   < > 27   BUN mg/dL 33* 33* 29* 24   < > 32*   CREATININE mg/dL 1.61* 1.80* 1.88* 1.47*   < > 1.49*   EGFR ml/min/1.73sq m 29 26 24 33   < > 32   CALCIUM mg/dL 10.2 10.4* 10.0 9.3   < > 9.0   AST U/L  --   --   --  13  --  17   ALT U/L  --   --   --  <3*  --  3*   ALK PHOS U/L  --   --   --  41  --  52    < > = values in this interval not displayed. Results from last 7 days   Lab Units 23  0438 07/24/23  2224   PROCALCITONIN ng/ml 0.11 0.07     Results from last 7 days   Lab Units 23  2224   CRP mg/L 73.3*               Micro:  Results from last 7 days   Lab Units 23  1530 23  0744 23  2237 234   BLOOD CULTURE   --   --  No Growth After 5 Days. No Growth After 5 Days.    GRAM STAIN RESULT  No Polys or Bacteria seen 3+ Polys  No bacteria seen  --   --    WOUND CULTURE  No growth  --   --   --    BODY FLUID CULTURE, STERILE   --  No growth  --   --        Imaging:  Shoulder xray: No hardware complication

## 2023-07-31 NOTE — CASE MANAGEMENT
Case Management Discharge Planning Note    Patient name Meagan Orozco  Location [address] [facility] 2 /E2 MS 63-* MRN [id number]  : 1945 Date 2023       Current Admission Date: 2023  Current Admission Diagnosis:SIRS (systemic inflammatory response syndrome) [facility])   Patient Active Problem List    Diagnosis Date Noted   • Obesity (BMI 35.0-39.9 without comorbidity) 2023   • Rotator cuff tear arthropathy of right shoulder 2023   • Chronic combined systolic (congestive) and diastolic (congestive) heart failure ([address]) 2023   • PMR (polymyalgia rheumatica) ([address]) 2022   • SIRS (systemic inflammatory response syndrome) ([address]) 2020   • Impaired vision 2020   • Bilateral hearing loss 2020   • Idiopathic thrombocytopenic purpura ([address]) 2020   • Post menopausal syndrome 2020   • Restless legs syndrome 2020   • Ambulatory dysfunction with recurrent falls 2020   • Nephrosclerosis, stage 1-4 or unspecified chronic kidney disease 2020   • Arthritis 2019   • Type 2 diabetes mellitus without complication, without long-term current use of insulin ([address]) 2019   • Idiopathic chronic gout with tophus 2019   • Osteoarthritis of multiple joints 2019   • Symptomatic varicose veins of both lower extremities 10/15/2018   • Intermittent claudication ([address]) 10/15/2018   • Stage 3b chronic kidney disease ([address]) 10/12/2017   • Acute back pain 06/15/2017   • Combined form of senile cataract of left eye 2016   • Fibromyalgia 2016   • Vitamin D deficiency 2015   • Chronic gout 2015   • Primary osteoarthritis of both hands 2015   • Homocysteinemia 2014   • Anemia 2013   • Hypothyroidism 2013   • Depression, recurrent ([address]) 2012   • Hyperlipidemia 2012      LOS (days): 7  Geometric Mean LOS (GMLOS) (days): 3.70  Days to GMLOS:-3     OBJECTIVE:  Risk of Unplanned Readmission Score: 17.74 Current admission status: Inpatient   Preferred Pharmacy:   [address] - [address]  [facility] [address]  Phone: [phone] Fax: [phone]    Primary Care Provider: Mike Collazo DO    Primary Insurance: [address] [facility] REP  Secondary Insurance:     DISCHARGE DETAILS:  Pt currently refusing Post Acute Rehab. Pt wants to go home with PICC/IV abx. Pt lives with her sister, Marci Gonzales. CM s/w Marci Gonzales, she is agreeable to pt returning home with VNA and home infusion. Marci Gonzales does not have a preference for VNA. [facility] referrals made via [facility]. Referral was also made to [facility] via [facility]. CM will continue to follow for all discharge needs.

## 2023-07-31 NOTE — PROGRESS NOTES
[facility]  Progress Note  Name: Yonatan Gibson  MRN: [id number]  Unit/Bed#: E2 -01 I Date of Admission: 7/24/2023   Date of Service: 7/31/2023 I Hospital Day: 7    Assessment/Plan   * SIRS (systemic inflammatory response syndrome) ([facility])  Assessment & Plan  Presented with fevers, leukocytosis concerning for prosthetic joint infection with recent right shoulder replacement  Discussed with ID, patient will need to complete a 6-week course of IV antibiotics (Rocephin) and will need PICC placement    Rotator cuff tear arthropathy of right shoulder  Assessment & Plan  Recent right total shoulder replacement and presented with worsening shoulder pain, concern for possible early PJI  Joint aspiration completed on 07/26, WBC noted to be greater than 7000  S/p right shoulder washout on 07/27, findings with inflammatory tissue without pus or purulence  S/p Ancef, switched to Rocephin by ID on 7/31, will need to complete 6-week course of IV antibiotics as per ID recommendations as patient may not tolerate repeated washouts as per Ortho  Plan for PICC today (7/31)      Chronic combined systolic (congestive) and diastolic (congestive) heart failure ([facility])  Assessment & Plan  Wt Readings from Last 3 Encounters:   07/31/23 106 kg (232 lb 12.9 oz)   06/07/23 108 kg (237 lb 7 oz)   05/24/23 108 kg (239 lb)     Continue metoprolol and oral Lasix 40 mg daily  Currently appears euvolemic        PMR (polymyalgia rheumatica) ([facility])  Assessment & Plan  Maintained on 5mg prednisone daily    Type 2 diabetes mellitus without complication, without long-term current use of insulin ([facility])  Assessment & Plan  Lab Results   Component Value Date    HGBA1C 6.6 (H) 05/04/2023     • Previous A1c reviewed, currently at goal  • Continue to monitor on sliding scale    Acute back pain  Assessment & Plan  Patient reportedly had worsening acute on chronic back pain  Patient without red flag symptoms  CT lumbar spine completed, showing multi level degenerative changes, moderate foraminal stenosis. No acute fracture  Recommend conservative management with multimodal pain approach    Stage 3b chronic kidney disease [facility])  Assessment & Plan  Lab Results   Component Value Date    EGFR 29 07/31/2023    EGFR 26 07/30/2023    EGFR 24 07/29/2023    CREATININE 1.61 (H) 07/31/2023    CREATININE 1.80 (H) 07/30/2023    CREATININE 1.88 (H) 07/29/2023     In the setting of diabetic/hypertensive nephropathy   • Renal functions currently stable at baseline of 1.5    Anemia  Assessment & Plan  Hemoglobin 9.3, slight decrease likely postoperatively  Monitor H&H               VTE Pharmacologic Prophylaxis: VTE Score: 4 Moderate Risk (Score 3-4) - Pharmacological DVT Prophylaxis Contraindicated. Sequential Compression Devices Ordered. Patient Centered Rounds: I performed bedside rounds with nursing staff today. Discussions with Specialists or Other Care Team Provider: Ortho, ID, IR    Education and Discussions with Family / Patient: Patient declined call to . Total Time Spent on Date of Encounter in care of patient: 45 minutes This time was spent on one or more of the following: performing physical exam; counseling and coordination of care; obtaining or reviewing history; documenting in the medical record; reviewing/ordering tests, medications or procedures; communicating with other healthcare professionals and discussing with patient's family/caregivers. Current Length of Stay: 7 day(s)  Current Patient Status: Inpatient   Certification Statement: The patient will continue to require additional inpatient hospital stay due to pending arrangement to receive home abx infusions  Discharge Plan: Anticipate discharge tomorrow to home with home services. Code Status: Level 1 - Full Code    Subjective:   Seen and examined at bedside. Denies any complaints apart from intermittent right shoulder pain.     Objective:     Vitals: Temp (24hrs), Av.3 °F (36.3 °C), Min:96.9 °F (36.1 °C), Max:97.9 °F (36.6 °C)    Temp:  [96.9 °F (36.1 °C)-97.9 °F (36.6 °C)] 97.9 °F (36.6 °C)  HR:  [58-62] 60  Resp:  [16-20] 20  BP: (142-169)/(63-83) 142/63  SpO2:  [94 %-97 %] 94 %  Body mass index is 36.46 kg/m². Input and Output Summary (last 24 hours): Intake/Output Summary (Last 24 hours) at 2023 1311  Last data filed at 2023 1807  Gross per 24 hour   Intake --   Output 175 ml   Net -175 ml       Physical Exam:   Physical Exam  Vitals and nursing note reviewed. Constitutional:       General: She is not in acute distress. Appearance: She is well-developed. HENT:      Head: Normocephalic and atraumatic. Eyes:      Conjunctiva/sclera: Conjunctivae normal.   Cardiovascular:      Rate and Rhythm: Normal rate and regular rhythm. Heart sounds: No murmur heard. Pulmonary:      Effort: Pulmonary effort is normal. No respiratory distress. Breath sounds: Normal breath sounds. Abdominal:      Palpations: Abdomen is soft. Tenderness: There is no abdominal tenderness. Musculoskeletal:         General: No swelling. Cervical back: Neck supple. Comments: Right arm in sling   Skin:     General: Skin is warm and dry. Capillary Refill: Capillary refill takes less than 2 seconds. Neurological:      Mental Status: She is alert.    Psychiatric:         Mood and Affect: Mood normal.          Additional Data:     Labs:  Results from last 7 days   Lab Units 23  0519   WBC Thousand/uL 8.12   HEMOGLOBIN g/dL 9.3*   HEMATOCRIT % 30.6*   PLATELETS Thousands/uL 199   NEUTROS PCT % 64   LYMPHS PCT % 25   MONOS PCT % 6   EOS PCT % 4     Results from last 7 days   Lab Units 23  0519 23  0530 23  0529   SODIUM mmol/L 139   < > 139   POTASSIUM mmol/L 3.5   < > 3.7   CHLORIDE mmol/L 102   < > 102   CO2 mmol/L 32   < > 28   BUN mg/dL 33*   < > 24   CREATININE mg/dL 1.61*   < > 1.47*   ANION GAP mmol/L 5 < > 9   CALCIUM mg/dL 10.2   < > 9.3   ALBUMIN g/dL  --   --  3.3*   TOTAL BILIRUBIN mg/dL  --   --  0.37   ALK PHOS U/L  --   --  41   ALT U/L  --   --  <3*   AST U/L  --   --  13   GLUCOSE RANDOM mg/dL 102   < > 93    < > = values in this interval not displayed. Results from last 7 days   Lab Units 07/24/23  2224   INR  0.97     Results from last 7 days   Lab Units 07/31/23  1051 07/31/23  0638 07/30/23  2118 07/30/23  1508 07/30/23  1103 07/30/23  5801 07/29/23  2108 07/29/23  1546 07/29/23  1054 07/29/23  0615 07/28/23  2053 07/28/23  1522   POC GLUCOSE mg/dl 134 96 163* 157* 151* 116 154* 148* 149* 127 162* 175*         Results from last 7 days   Lab Units 07/26/23  0438 07/24/23  2224   LACTIC ACID mmol/L  --  1.7   PROCALCITONIN ng/ml 0.11 0.07       Lines/Drains:  Invasive Devices     Peripheral Intravenous Line  Duration           Peripheral IV 07/29/23 Left;Proximal;Ventral (anterior) Forearm 2 days                      Imaging: Reviewed radiology reports from this admission including: xray(s) and CT upper extremity and CT spine    Recent Cultures (last 7 days):   Results from last 7 days   Lab Units 07/27/23  1530 07/26/23  0744 07/24/23  2237 07/24/23  2224   BLOOD CULTURE   --   --  No Growth After 5 Days. No Growth After 5 Days.    GRAM STAIN RESULT  No Polys or Bacteria seen 3+ Polys  No bacteria seen  --   --    WOUND CULTURE  No growth  --   --   --    BODY FLUID CULTURE, STERILE   --  No growth  --   --        Last 24 Hours Medication List:   Current Facility-Administered Medications   Medication Dose Route Frequency Provider Last Rate   • acetaminophen  650 mg Oral Q6H PRN NIKOLAS Degroot     • acetaminophen  975 mg Oral [facility] NIKOLAS Degroot     • allopurinol  100 mg Oral BID NIKOLAS Degroot     • aluminum-magnesium hydroxide-simethicone  30 mL Oral Q6H PRN NIKOLAS Degroot     • aspirin  325 mg Oral Daily NIKOLAS Degroot     • cefTRIAXone  2,000 mg Intravenous Q24H April Colin MD     • citalopram  40 mg Oral QAM Angela Glad, CRNP     • docusate sodium  100 mg Oral BID Angela Glad, CRNP     • gabapentin  100 mg Oral BID Angela Glad, CRNP     • insulin lispro  1-6 Units Subcutaneous TID AC Angela Glad, CRNP     • iohexol  50 mL Other Once in [address] [place] [address] Street, CRNP     • levothyroxine  50 mcg Oral Early Morning Angela Glad, CRNP     • lidocaine  1 patch Topical Daily Angela Glad, CRNP     • lidocaine (PF)  7 mL Injection Once in [address] [place] [address], CRNP     • loratadine  10 mg Oral Daily Angela Glad, CRNP     • methocarbamol  500 mg Oral Q6H PRN Angela Glad, CRNP     • ondansetron  4 mg Intravenous Q6H PRN Angela Glad, CRNP     • oxyCODONE  5 mg Oral Q4H PRN Angela Glad, CRNP     • polyethylene glycol  17 g Oral Daily Angela Glad, CRNP     • predniSONE  5 mg Oral QAM Angela Glad, CRNP     • senna  1 tablet Oral Daily Angela Glad, NIKOLAS          Today, Patient Was Seen By: Xochitl Shah MD    **Please Note: This note may have been constructed using a voice recognition system. **

## 2023-07-31 NOTE — PLAN OF CARE
Problem: OCCUPATIONAL THERAPY ADULT  Goal: Performs self-care activities at highest level of function for planned discharge setting. See evaluation for individualized goals. Description: Treatment Interventions: ADL retraining, Functional transfer training, UE strengthening/ROM, Endurance training, Cognitive reorientation, Patient/family training, Equipment evaluation/education, Compensatory technique education          See flowsheet documentation for full assessment, interventions and recommendations. Note: Limitation: Decreased ADL status, Decreased UE ROM, Decreased UE strength, Decreased Safe judgement during ADL, Decreased endurance, Decreased high-level ADLs  Prognosis: Good  Assessment: Pt seen for 25min tx session with focus on functional balance, functional mobility, ADL status, transfer safety, R UE ROM, and education. Pt able to tolerate OOB mobility; sitting balance=g/g-, standing balance=f+/f. Pt able to demonstrate improved functional mobility with SPC. Pt demonstrating need for assistance with UE and LE ADLs. Pt reviewed one-handed dressing techniques, allowed R UE exercises(i.e.pendulums, elbow-distal ROM, sling use), and NWB status. Pt able to demonstrate good carryover. Pt voicing no concerns about going directly home; sister able to provide assistance for all needs. Pt demonstrating good cognition(i.e.orientation, memory). Tx tolerated well. Will continue. The patient's raw score on the AM-PAC Daily Activity Inpatient Short Form is 20. A raw score of greater than or equal to 19 suggests the patient may benefit from discharge to home. Please refer to the recommendation of the Occupational Therapist for safe discharge planning.      OT Discharge Recommendation: Home with outpatient rehabilitation (when appropriate by ortho)

## 2023-07-31 NOTE — PROCEDURES
Central Line Insertion    Date/Time: 7/31/2023 5:37 PM    Performed by: Kris Michelle MD  Authorized by: Kris Michelle MD    Patient location:  IR  Consent:     Consent obtained:  Written    Consent given by:  Patient    Risks discussed:  Bleeding, infection and pneumothorax    Alternatives discussed:  No treatment and delayed treatment  Universal protocol:     Procedure explained and questions answered to patient or proxy's satisfaction: yes      Relevant documents present and verified: yes      Test results available and properly labeled: yes      Radiology Images displayed and confirmed. If images not available, report reviewed: yes      Required blood products, implants, devices, and special equipment available: yes      Site/side marked: yes      Immediately prior to procedure, a time out was called: yes      Patient identity confirmed:  Verbally with patient and arm band  Pre-procedure details:     Hand hygiene: Hand hygiene performed prior to insertion      Sterile barrier technique: All elements of maximal sterile technique followed      Skin preparation:  2% chlorhexidine    Skin preparation agent: Skin preparation agent completely dried prior to procedure    Indications:     Site selection rationale:  Right shoulder post-op  Sedation:     Sedation type: Moderate (conscious) sedation  Anesthesia (see MAR for exact dosages):      Anesthesia method:  Local infiltration    Local anesthetic:  Lidocaine 1% w/o epi  Procedure details:     Location:  Left internal jugular    Vessel type: vein      Laterality:  Left    Approach: percutaneous technique used      Patient position:  Flat    Catheter type:  Single lumen    Catheter size:  4 Fr    Landmarks identified: yes      Ultrasound guidance: yes      Sterile ultrasound techniques: Sterile gel and sterile probe covers were used      Number of attempts:  1    Successful placement: yes      Vessel of catheter tip end:  SVC  Post-procedure details: Post-procedure:  Dressing applied and line sutured    Assessment:  Blood return through all ports, no pneumothorax on x-ray and placement verified by x-ray    Post-procedure complications: none      Patient tolerance of procedure: Tolerated well, no immediate complications  Comments: This is a tunneled, noncuffed line. If the sutures are removed, this catheter will come out with only traction.

## 2023-07-31 NOTE — ASSESSMENT & PLAN NOTE
Lab Results   Component Value Date    EGFR 29 07/31/2023    EGFR 26 07/30/2023    EGFR 24 07/29/2023    CREATININE 1.61 (H) 07/31/2023    CREATININE 1.80 (H) 07/30/2023    CREATININE 1.88 (H) 07/29/2023     In the setting of diabetic/hypertensive nephropathy   • Renal functions currently stable at baseline of 1.5

## 2023-07-31 NOTE — ASSESSMENT & PLAN NOTE
Presented with fevers, leukocytosis concerning for prosthetic joint infection with recent right shoulder replacement  Discussed with ID, patient will need to complete a 6-week course of IV antibiotics (Rocephin) and will need PICC placement

## 2023-07-31 NOTE — ASSESSMENT & PLAN NOTE
Wt Readings from Last 3 Encounters:   07/31/23 106 kg (232 lb 12.9 oz)   06/07/23 108 kg (237 lb 7 oz)   05/24/23 108 kg (239 lb)     Continue metoprolol and oral Lasix 40 mg daily  Currently appears euvolemic

## 2023-08-01 VITALS
HEIGHT: 67 IN | BODY MASS INDEX: 36.4 KG/M2 | TEMPERATURE: 98.2 F | SYSTOLIC BLOOD PRESSURE: 131 MMHG | RESPIRATION RATE: 18 BRPM | DIASTOLIC BLOOD PRESSURE: 62 MMHG | HEART RATE: 66 BPM | OXYGEN SATURATION: 96 % | WEIGHT: 231.92 LBS

## 2023-08-01 LAB
ANION GAP SERPL CALCULATED.3IONS-SCNC: 6 MMOL/L
BUN SERPL-MCNC: 34 MG/DL (ref 5–25)
CALCIUM SERPL-MCNC: 10.2 MG/DL (ref 8.4–10.2)
CHLORIDE SERPL-SCNC: 102 MMOL/L (ref 96–108)
CO2 SERPL-SCNC: 31 MMOL/L (ref 21–32)
CREAT SERPL-MCNC: 1.53 MG/DL (ref 0.6–1.3)
ERYTHROCYTE [DISTWIDTH] IN BLOOD BY AUTOMATED COUNT: 13.9 % (ref 11.6–15.1)
GFR SERPL CREATININE-BSD FRML MDRD: 31 ML/MIN/1.73SQ M
GLUCOSE SERPL-MCNC: 137 MG/DL (ref 65–140)
GLUCOSE SERPL-MCNC: 94 MG/DL (ref 65–140)
GLUCOSE SERPL-MCNC: 98 MG/DL (ref 65–140)
HCT VFR BLD AUTO: 29.7 % (ref 34.8–46.1)
HGB BLD-MCNC: 8.8 G/DL (ref 11.5–15.4)
MCH RBC QN AUTO: 29.9 PG (ref 26.8–34.3)
MCHC RBC AUTO-ENTMCNC: 29.6 G/DL (ref 31.4–37.4)
MCV RBC AUTO: 101 FL (ref 82–98)
PLATELET # BLD AUTO: 213 THOUSANDS/UL (ref 149–390)
PMV BLD AUTO: 10 FL (ref 8.9–12.7)
POTASSIUM SERPL-SCNC: 3.5 MMOL/L (ref 3.5–5.3)
RBC # BLD AUTO: 2.94 MILLION/UL (ref 3.81–5.12)
SODIUM SERPL-SCNC: 139 MMOL/L (ref 135–147)
WBC # BLD AUTO: 8.22 THOUSAND/UL (ref 4.31–10.16)

## 2023-08-01 PROCEDURE — 82948 REAGENT STRIP/BLOOD GLUCOSE: CPT

## 2023-08-01 PROCEDURE — 99239 HOSP IP/OBS DSCHRG MGMT >30: CPT | Performed by: STUDENT IN AN ORGANIZED HEALTH CARE EDUCATION/TRAINING PROGRAM

## 2023-08-01 PROCEDURE — 80048 BASIC METABOLIC PNL TOTAL CA: CPT | Performed by: STUDENT IN AN ORGANIZED HEALTH CARE EDUCATION/TRAINING PROGRAM

## 2023-08-01 PROCEDURE — 97116 GAIT TRAINING THERAPY: CPT

## 2023-08-01 PROCEDURE — 99233 SBSQ HOSP IP/OBS HIGH 50: CPT | Performed by: STUDENT IN AN ORGANIZED HEALTH CARE EDUCATION/TRAINING PROGRAM

## 2023-08-01 PROCEDURE — 85027 COMPLETE CBC AUTOMATED: CPT | Performed by: STUDENT IN AN ORGANIZED HEALTH CARE EDUCATION/TRAINING PROGRAM

## 2023-08-01 PROCEDURE — 97110 THERAPEUTIC EXERCISES: CPT

## 2023-08-01 RX ORDER — OXYCODONE HYDROCHLORIDE 5 MG/1
5 TABLET ORAL EVERY 4 HOURS PRN
Qty: 15 TABLET | Refills: 0 | Status: SHIPPED | OUTPATIENT
Start: 2023-08-01 | End: 2023-08-11

## 2023-08-01 RX ADMIN — CEFTRIAXONE 2000 MG: 2 INJECTION, SOLUTION INTRAVENOUS at 14:02

## 2023-08-01 RX ADMIN — PREDNISONE 5 MG: 5 TABLET ORAL at 08:45

## 2023-08-01 RX ADMIN — GABAPENTIN 100 MG: 100 CAPSULE ORAL at 08:37

## 2023-08-01 RX ADMIN — CITALOPRAM HYDROBROMIDE 40 MG: 20 TABLET ORAL at 08:37

## 2023-08-01 RX ADMIN — ALLOPURINOL 100 MG: 100 TABLET ORAL at 08:37

## 2023-08-01 RX ADMIN — ASPIRIN 325 MG ORAL TABLET 325 MG: 325 PILL ORAL at 08:36

## 2023-08-01 RX ADMIN — LEVOTHYROXINE SODIUM 50 MCG: 50 TABLET ORAL at 05:51

## 2023-08-01 RX ADMIN — ACETAMINOPHEN 325MG 975 MG: 325 TABLET ORAL at 05:51

## 2023-08-01 RX ADMIN — LORATADINE 10 MG: 10 TABLET ORAL at 08:36

## 2023-08-01 RX ADMIN — ACETAMINOPHEN 325MG 975 MG: 325 TABLET ORAL at 14:02

## 2023-08-01 RX ADMIN — LIDOCAINE 1 PATCH: 50 PATCH CUTANEOUS at 08:37

## 2023-08-01 NOTE — ASSESSMENT & PLAN NOTE
Lab Results   Component Value Date    EGFR 31 08/01/2023    EGFR 29 07/31/2023    EGFR 26 07/30/2023    CREATININE 1.53 (H) 08/01/2023    CREATININE 1.61 (H) 07/31/2023    CREATININE 1.80 (H) 07/30/2023     In the setting of diabetic/hypertensive nephropathy   • Renal functions currently stable at baseline of 1.5

## 2023-08-01 NOTE — PLAN OF CARE
Problem: Potential for Falls  Goal: Patient will remain free of falls  Description: INTERVENTIONS:  - Educate patient/family on patient safety including physical limitations  - Instruct patient to call for assistance with activity   - Consult OT/PT to assist with strengthening/mobility   - Keep Call bell within reach  - Keep bed low and locked with side rails adjusted as appropriate  - Keep care items and personal belongings within reach  - Initiate and maintain comfort rounds  - Make Fall Risk Sign visible to staff  - Offer Toileting every 2 Hours, in advance of need  - Initiate/Maintain bed alarm  - Apply yellow socks and bracelet for high fall risk patients  - Consider moving patient to room near nurses station  Outcome: Progressing     Problem: PAIN - ADULT  Goal: Verbalizes/displays adequate comfort level or baseline comfort level  Description: Interventions:  - Encourage patient to monitor pain and request assistance  - Assess pain using appropriate pain scale  - Administer analgesics based on type and severity of pain and evaluate response  - Implement non-pharmacological measures as appropriate and evaluate response  - Consider cultural and social influences on pain and pain management  - Notify physician/advanced practitioner if interventions unsuccessful or patient reports new pain  Outcome: Progressing     Problem: INFECTION - ADULT  Goal: Absence or prevention of progression during hospitalization  Description: INTERVENTIONS:  - Assess and monitor for signs and symptoms of infection  - Monitor lab/diagnostic results  - Monitor all insertion sites  - [place] appropriate cooling/warming therapies per order  - Administer medications as ordered  - Instruct and encourage patient and family to use good hand hygiene technique  Outcome: Progressing     Problem: SAFETY ADULT  Goal: Patient will remain free of falls  Description: INTERVENTIONS:  - Educate patient/family on patient safety including physical limitations  - Instruct patient to call for assistance with activity   - Consult OT/PT to assist with strengthening/mobility   - Keep Call bell within reach  - Keep bed low and locked with side rails adjusted as appropriate  - Keep care items and personal belongings within reach  - Initiate and maintain comfort rounds  - Make Fall Risk Sign visible to staff  - Offer Toileting every 2 Hours, in advance of need  - Initiate/Maintain bed alarm  - Apply yellow socks and bracelet for high fall risk patients  - Consider moving patient to room near nurses station  Outcome: Progressing

## 2023-08-01 NOTE — PLAN OF CARE
Problem: INFECTION - ADULT  Goal: Absence or prevention of progression during hospitalization  Description: INTERVENTIONS:  - Assess and monitor for signs and symptoms of infection  - Monitor lab/diagnostic results  - Monitor all insertion sites  - [place] appropriate cooling/warming therapies per order  - Administer medications as ordered  - Instruct and encourage patient and family to use good hand hygiene technique  Outcome: Progressing     Problem: Potential for Falls  Goal: Patient will remain free of falls  Description: INTERVENTIONS:  - Educate patient/family on patient safety including physical limitations  - Instruct patient to call for assistance with activity   - Consult OT/PT to assist with strengthening/mobility   - Keep Call bell within reach  - Keep bed low and locked with side rails adjusted as appropriate  - Keep care items and personal belongings within reach  - Initiate and maintain comfort rounds  - Make Fall Risk Sign visible to staff  - Offer Toileting every 2 Hours, in advance of need  - Initiate/Maintain bed alarm  - Apply yellow socks and bracelet for high fall risk patients  - Consider moving patient to room near nurses station  Outcome: Progressing

## 2023-08-01 NOTE — DISCHARGE SUMMARY
[facility]  Discharge- Candice Mae 1945, 78 y.o. female MRN: [id number]  Unit/Bed#: E2 -01 Encounter: [id number]  Primary Care Provider: Gladys Treviño DO   Date and time admitted to hospital: 7/24/2023 10:08 PM    * SIRS (systemic inflammatory response syndrome) [facility])  Assessment & Plan  Presented with fevers, leukocytosis concerning for prosthetic joint infection with recent right shoulder replacement  Discussed with ID, patient will need to complete a 6-week course of IV antibiotics (Rocephin)   S/p IJ by IR on 7/31 for Rocephin q24h via home infusion  Outpatient f/u with ID  BMP and CBC qweekly    Rotator cuff tear arthropathy of right shoulder  Assessment & Plan  Recent right total shoulder replacement and presented with worsening shoulder pain, concern for possible early PJI  Joint aspiration completed on 07/26, WBC noted to be greater than 7000  S/p right shoulder washout on 07/27, findings with inflammatory tissue without pus or purulence  S/p Ancef, switched to Rocephin by ID on 7/31, will need to complete 6-week course of IV antibiotics as per ID recommendations as patient may not tolerate repeated washouts as per Ortho  S/p IJ on 7/31      Chronic combined systolic (congestive) and diastolic (congestive) heart failure (HCC)  Assessment & Plan  Wt Readings from Last 3 Encounters:   08/01/23 105 kg (231 lb 14.8 oz)   06/07/23 108 kg (237 lb 7 oz)   05/24/23 108 kg (239 lb)     Continue metoprolol and oral Lasix 40 mg daily  Currently appears euvolemic        PMR (polymyalgia rheumatica) ([facility])  Assessment & Plan  Maintained on 5mg prednisone daily    Type 2 diabetes mellitus without complication, without long-term current use of insulin ([facility])  Assessment & Plan  Lab Results   Component Value Date    HGBA1C 6.6 (H) 05/04/2023     • Previous A1c reviewed, currently at goal  • Continue to monitor on sliding scale    Acute back pain  Assessment & Plan  Patient reportedly had worsening acute on chronic back pain  Patient without red flag symptoms  CT lumbar spine completed, showing multi level degenerative changes, moderate foraminal stenosis. No acute fracture  Recommend conservative management with multimodal pain approach    Stage 3b chronic kidney disease [facility])  Assessment & Plan  Lab Results   Component Value Date    EGFR 31 08/01/2023    EGFR 29 07/31/2023    EGFR 26 07/30/2023    CREATININE 1.53 (H) 08/01/2023    CREATININE 1.61 (H) 07/31/2023    CREATININE 1.80 (H) 07/30/2023     In the setting of diabetic/hypertensive nephropathy   • Renal functions currently stable at baseline of 1.5    Acute blood loss anemia  Assessment & Plan  hgb decreased from baseline postoperatively  Monitor H&H  No signs of active bleeding at this time  Hgb stable      Medical Problems     Resolved Problems  Date Reviewed: 7/28/2023   None       Discharging Physician / Practitioner: Jenise Bergeron MD  PCP: Arjun Magaña DO  Admission Date:   Admission Orders (From admission, onward)     Ordered        07/24/23 5685 [address]  Once                      Discharge Date: 08/01/23    Consultations During Hospital Stay:  · Orthopedic surgery  · ID    Procedures Performed:   · Right shoulder washout    Significant Findings / Test Results:   IR non-tunneled central line placement   Final Result by Rowdy Goldberg MD (08/01 6025)   Technically successful placement of tunneled venous access catheter. This is the end of the clinically relevant portion of this report. Subsequent information is for compliance, documentation, and coding purposes. In the process of informed consent, information was communicated, verbally, to the patient regarding the procedure.   The patient was informed of; the name of the procedure, nature of the procedure, nature of the condition, risks, benefits, alternatives,    and potential complications, as well as the consequences of not having the procedure. All the patient's questions were answered. Informed consent was signed. Quoted risks included pneumothorax, venous thrombosis, catheter failure and bleeding. Automated exposure control was utilized, and was minimize, in accordance with the application of the ALARA technique. Chlorhexidine and alcohol was used for cleansing and sterile preparation of the skin. This was allowed to dry prior to the application of sterile draping. Maximal sterile barrier technique, according to current guidelines, were utilized. These include, but are not limited to: Hat, mask, and shoe covers for all staff. Sterile gown and gloves for all operators. A sterile cover was used for the ultrasound    probe. The patient was covered, from head to toe, in sterile drapes. Timeout was performed, with all team members present, and in agreement. Confirmation of patient, procedure, laterally, allergies, and all needed equipment was performed. FLUOROSCOPY TIME: 1 minutes   PROCEDURE: Tunneled line placement   PREPROCEDURE DIAGNOSIS: Please see "history ", above   POSTPROCEDURE DIAGNOSIS: Same   ANTIBIOTICS: None   ESTIMATED BLOOD LOSS: Less than 5 mL   SPECIMEN: None   ANESTHESIA: Local and monitored intravenous conscious sedation            Workstation performed: [id number]         XR shoulder right 1 view   Final Result by Essie Arriaga MD (07/28 1227)      Unremarkable appearance of reverse shoulder arthroplasty. No evidence of hardware complication. Antibiotic beads surrounding the right shoulder joint. Workstation performed: [id number]         CT spine lumbar wo contrast   Final Result by Sukhi Ledezma DO (07/26 4055)      Diffuse osteopenia of the bony structures. Multilevel degenerative change within the lower thoracic spine and throughout the lumbar spine.  Partial fusion of the posterior elements somewhat diffusely with fusion of the L5 and S1 endplates. Mild to moderate canal stenosis and foraminal narrowing. No acute osseous abnormality. Workstation performed: [id number]         CT upper extremity w contrast right   Final Result by Arpan Jara MD (07/27 0805)      No acute osseous abnormality status post reverse total shoulder arthroplasty. Workstation performed: [id number]         FL guided needle plac bx/asp/inj   Final Result by Barak Singh MD (07/26 1202)   Technically successful right artificial glenohumeral joint aspiration yielding 3 mL of bloody fluid. All aspirate obtained was sent for laboratory testing as ordered. The above findings and procedure were reviewed with Dr. Barak Singh. Procedure was performed by Juno Williamson PA-C under the direct supervision of Dr. Barak Singh. Workstation performed: [id number]         XR shoulder 2+ views RIGHT   ED Interpretation by Nuria Collins MD (07/24 2309)   No acute osseous abnormality as interpreted by myself. Final Result by Fabio Banda MD (07/25 1008)      Unremarkable appearance of reverse total shoulder arthroplasty. Workstation performed: [id number]         IR tunneled central line placement    (Results Pending)   ·   ·     Incidental Findings:   · none    Test Results Pending at Discharge (will require follow up): · Final wound cultures     Outpatient Tests Requested:  · Weekly BMP and CBC    Complications:  none    Reason for Admission: SIRS    Hospital Course:   Bill Simmons is a 78 y.o. female patient who originally presented to the hospital on 7/24/2023 due to worsening right shoulder pain with fevers and leukocytosis. Given presentation, right shoulder infection from recent replacement was thought to be the source. Orthopedic surgery was consulted on the case and patient underwent washout of shoulder. Cultures obtained. Patient was started on antibiotics and ID was consulted.  On further discussion with ortho and ID, it was deemed that patient may not tolerate repeat washouts and may benefit from a longer course of antibiotics. Patient will complete a 6-week course of IV antibiotics (Rocephin) pending final wound cultures. Patient has received a IJ line on 7/31 by IR. It was recommended that patient be discharged to rehab however the patient refused and prefers to go home to her sister's house. Patient will be going home with [facility]. Please see above list of diagnoses and related plan for additional information. Condition at Discharge: stable    Discharge Day Visit / Exam:   Subjective:  Seen and examined at bedside. No complaints at this time. Vitals: Blood Pressure: 131/62 (08/01/23 0708)  Pulse: 66 (08/01/23 0708)  Temperature: 98.2 °F (36.8 °C) (08/01/23 0708)  Temp Source: Temporal (08/01/23 0708)  Respirations: 18 (08/01/23 0708)  Height: 5' 7" (170.2 cm) (07/25/23 0051)  Weight - Scale: 105 kg (231 lb 14.8 oz) (08/01/23 0600)  SpO2: 96 % (08/01/23 0708)    Exam:   Physical Exam  Vitals and nursing note reviewed. Constitutional:       General: She is not in acute distress. Appearance: She is well-developed. HENT:      Head: Normocephalic and atraumatic. Eyes:      Conjunctiva/sclera: Conjunctivae normal.   Cardiovascular:      Rate and Rhythm: Normal rate and regular rhythm. Heart sounds: No murmur heard. Pulmonary:      Effort: Pulmonary effort is normal. No respiratory distress. Breath sounds: Normal breath sounds. Abdominal:      Palpations: Abdomen is soft. Tenderness: There is no abdominal tenderness. Musculoskeletal:         General: No swelling. Cervical back: Neck supple. Skin:     General: Skin is warm and dry. Capillary Refill: Capillary refill takes less than 2 seconds. Neurological:      Mental Status: She is alert.    Psychiatric:         Mood and Affect: Mood normal.        Discussion with Family: discussed with patient. Discharge instructions/Information to patient and family:   See after visit summary for information provided to patient and family. Provisions for Follow-Up Care:  See after visit summary for information related to follow-up care and any pertinent home health orders. Disposition:   Home with [facility] (Reminder: Complete face to face encounter)    Planned Readmission: none     Discharge Statement:  I spent 60 minutes discharging the patient. This time was spent on the day of discharge. I had direct contact with the patient on the day of discharge. Greater than 50% of the total time was spent examining patient, answering all patient questions, arranging and discussing plan of care with patient as well as directly providing post-discharge instructions. Additional time then spent on discharge activities. Discharge Medications:  See after visit summary for reconciled discharge medications provided to patient and/or family.       **Please Note: This note may have been constructed using a voice recognition system**

## 2023-08-01 NOTE — ASSESSMENT & PLAN NOTE
Recent right total shoulder replacement and presented with worsening shoulder pain, concern for possible early PJI  Joint aspiration completed on 07/26, WBC noted to be greater than 7000  S/p right shoulder washout on 07/27, findings with inflammatory tissue without pus or purulence  S/p Ancef, switched to Rocephin by ID on 7/31, will need to complete 6-week course of IV antibiotics as per ID recommendations as patient may not tolerate repeated washouts as per Ortho  S/p IJ on 7/31

## 2023-08-01 NOTE — ASSESSMENT & PLAN NOTE
Wt Readings from Last 3 Encounters:   08/01/23 105 kg (231 lb 14.8 oz)   06/07/23 108 kg (237 lb 7 oz)   05/24/23 108 kg (239 lb)     Continue metoprolol and oral Lasix 40 mg daily  Currently appears euvolemic

## 2023-08-01 NOTE — ASSESSMENT & PLAN NOTE
Presented with fevers, leukocytosis concerning for prosthetic joint infection with recent right shoulder replacement  Discussed with ID, patient will need to complete a 6-week course of IV antibiotics (Rocephin)   S/p IJ by IR on 7/31 for Rocephin q24h via home infusion  Outpatient f/u with ID  BMP and CBC qweekly

## 2023-08-01 NOTE — PROGRESS NOTES
Progress Note - Infectious Disease   Iledavid Sol 78 y.o. female MRN: [id number]  Unit/Bed#: E2 -01 Encounter: [id number]      Impression/Plan:    1. Right shoulder pain, presumptive PJI. In setting of recent reverse shoulder arthroplasty. Consider early PJI. R shoulder xray and CT both rather unremarkable. IR aspirated 7/26 with 3ml bloody aspirate. Fluid WBC = 7552 with 83% neutrophils, RBC = 878,000. Patient is status post washout in the OR 7/27. There was some inflammatory tissue seen about the prosthesis, but no purulent fluid and the prosthesis was noted to be stable without any loosening of hardware. Pathology with fibrovascular tissue and skeletal muscle with mild chronic inflammation. Based on Operative findings, unclear that this is definitively an infection but per orthopedic surgery given her age she would be a poor surgical candidate for repeat operative intervention if needed so favor ongoing treatment for prosthetic joint infection. She did receive 1 dose of antibiotics on admission to the ED which may have affected culture growth, especially in an early infection. Cultures show growth of Micrococcus luteus in broth only. -follow up OR cultures;these will be held for 21 days to look for additional slow growers such as Cutibacterium acnes  -Requested that lab send out for Micrococcus luteus susceptibilities, will need to follow outpatient  -Continue IV ceftriaxone 2 g every 24 hours   -Recommend a 6-week total course of IV ceftriaxone, through 9/6/2023  -Weekly CBC with differential and BMP on IV antibiotics  -Pending final cultures, anticipate transition to p.o. antibiotics after IV course for an additional 6 weeks (3 months total)  -will arrange ID outpatient follow up 2 weeks after discharge     2. SIRS vs sepsis. Present on admission. Fever and leukocytosis. Likely related to #1 above. No other clear source appreciated. No acute respiratory or  complaints.  Low suspicion for GI source despite her recent diarrhea. Blood cultures are negative >24 hours. -monitor CBCD and BMP  -monitor vitals  -Antibiotics as above  -supportive care      3. Recent R reverse total shoulder replacement. In patient with severe OA and rotator cuff repair. Now with acute onset pain and change in ROM as above.  -continue follow up with orthopedic surgery     4. Type 2 diabetes mellitus without long term insulin use. Patient's last HbA1c was 6.6% on 2023. Elevated blood glucose is risk factor for infection. Recommend tight glycemic control.  -blood glucose management per primary service     5. CKD stage 3b. Upon review of patient's medical record it appears her baseline creatinine is approximately 1.5-1.7. Creatinine has remained within or below baseline during this hospitalization. -monitor creatinine  -dose adjust antibiotics for renal function as needed  -avoid nephrotoxins     6. Obesity. BMI = 35.81. This can effect antibiotic dosing.  -monitor patient weight and dose adjust antibiotics as needed      7. Polymyalgia Rheumatica: Patient on chronic steroids for this. Above management plan discussed with the patient at bedside and primary team.  Okay for discharge from ID perspective once home antibiotics arranged. Antibiotics:  Ceftriaxone day 2  POD 6    Subjective: The patient has minimal pain in the right shoulder, it is overall improved since surgery. She is tolerating antibiotics without nausea, vomiting, rash. No diarrhea. Objective:  Vitals:  Temp:  [96.2 °F (35.7 °C)-98.2 °F (36.8 °C)] 98.2 °F (36.8 °C)  HR:  [60-66] 66  Resp:  [18] 18  BP: (115-131)/(62-68) 131/62  SpO2:  [96 %-97 %] 96 %  Temp (24hrs), Av.4 °F (36.3 °C), Min:96.2 °F (35.7 °C), Max:98.2 °F (36.8 °C)  Current: Temperature: 98.2 °F (36.8 °C)    Physical Exam:   General Appearance:  Alert, interactive, nontoxic, no acute distress. Throat: Oropharynx moist without lesions.     Lungs:   Clear to auscultation bilaterally; no wheezes, rhonchi or rales; respirations unlabored   Heart:  RRR; no murmur, rub or gallop   Abdomen:   Soft, non-tender, non-distended, positive bowel sounds. Extremities: Right shoulder wrapped, sling in place   Skin: No new rashes or lesions. No draining wounds noted. Labs: All pertinent labs and imaging studies were personally reviewed  Results from last 7 days   Lab Units 08/01/23  0554 07/31/23  0519 07/29/23  0530   WBC Thousand/uL 8.22 8.12 9.42   HEMOGLOBIN g/dL 8.8* 9.3* 9.4*   PLATELETS Thousands/uL 213 199 179     Results from last 7 days   Lab Units 08/01/23  0554 07/31/23  0519 07/30/23  0509 07/29/23  0530 07/28/23  0529   SODIUM mmol/L 139 139 140   < > 139   POTASSIUM mmol/L 3.5 3.5 3.8   < > 3.7   CHLORIDE mmol/L 102 102 102   < > 102   CO2 mmol/L 31 32 30   < > 28   BUN mg/dL 34* 33* 33*   < > 24   CREATININE mg/dL 1.53* 1.61* 1.80*   < > 1.47*   EGFR ml/min/1.73sq m 31 29 26   < > 33   CALCIUM mg/dL 10.2 10.2 10.4*   < > 9.3   AST U/L  --   --   --   --  13   ALT U/L  --   --   --   --  <3*   ALK PHOS U/L  --   --   --   --  41    < > = values in this interval not displayed.      Results from last 7 days   Lab Units 07/26/23  0438   PROCALCITONIN ng/ml 0.11                   Micro:  Results from last 7 days   Lab Units 07/27/23  1530 07/26/23  0744   GRAM STAIN RESULT  No Polys or Bacteria seen 3+ Polys  No bacteria seen   WOUND CULTURE  Growth in Broth culture only Micrococcus luteus*  --    BODY FLUID CULTURE, STERILE   --  No growth       Imaging:  Shoulder xray: No hardware complication

## 2023-08-01 NOTE — PHYSICAL THERAPY NOTE
PHYSICAL THERAPY TREATMENT NOTE  NAME:  Ari Elena  DATE: 08/01/23    Length Of Stay: 8  Performed at least 2 patient identifiers during session: Name and ID bracelet    TREATMENT:      08/01/23 1329   PT Last Visit   PT Visit Date 08/01/23   Note Type   Note Type Treatment   Pain Assessment   Pain Assessment Tool 0-10   Pain Score No Pain   Restrictions/Precautions   Weight Bearing Precautions Per Order Yes   RUE Weight Bearing Per Order NWB   Braces or Orthoses Sling  (sling for comfort)   Other Precautions Chair Alarm; Bed Alarm; Fall Risk   General   Chart Reviewed Yes   Family/Caregiver Present No   Cognition   Overall Cognitive Status WFL   Arousal/Participation Alert; Responsive; Cooperative   Attention Within functional limits   Orientation Level Oriented X4   Memory Within functional limits   Following Commands Follows all commands and directions without difficulty   Comments pt pleasant and cooperative   Bed Mobility   Additional Comments pt seated OOB in chair upon my arrival & OOB in chair at end of session   Transfers   Sit to Stand 5  Supervision   Additional items Increased time required;Armrests   Stand to Sit 5  Supervision   Additional items Armrests; Increased time required   Additional Comments verbal cues for proper hand placement and and maintaining NWB on RUE   Ambulation/Elevation   Gait pattern Forward Flexion;Decreased foot clearance; Short stride   Gait Assistance 5  Supervision   Additional items Verbal cues   Assistive Device Straight cane   Distance 150 ftx1   Balance   Static Sitting Good   Dynamic Sitting Fair +   Static Standing Fair   Dynamic Standing Fair -   Ambulatory Fair -   Endurance Deficit   Endurance Deficit No   Activity Tolerance   Activity Tolerance Patient tolerated treatment well   Nurse Made Aware yes   Exercises   Heelslides Sitting;20 reps;AROM; Bilateral   Glute Sets Sitting;20 reps;AROM; Bilateral   Hip Abduction Sitting;20 reps;AROM; Bilateral   Hip Adduction Sitting;20 reps;AROM; Bilateral   Knee AROM Long Arc Quad Sitting;20 reps;AROM; Bilateral   Ankle Pumps Sitting;20 reps;AROM; Bilateral   Marching Sitting;20 reps;AROM; Bilateral   Assessment   Prognosis Good   Problem List Decreased strength;Decreased endurance; Impaired balance;Decreased mobility; Decreased safety awareness;Orthopedic restrictions;Pain   Assessment Pt seen for PT treatment session this date with interventions consisting of gait training w/ emphasis on improving pt's ability to ambulate level surfaces x 150 ftx1 with close S provided by therapist with SPC, Therapeutic exercise consisting of: AROM 20 reps B LE in sitting position and therapeutic activity consisting of training: sit<>stand transfers. Pt agreeable to PT treatment session upon arrival, pt found seated OOB in chair, in no apparent distress, A&O x 4 and responsive. In comparison to previous session, pt with improvements in distance ambulated and amount of supervision required. Post session: pt returned back to recliner, all needs in reach and RN notified of session findings/recommendations. Continue to recommend post acute rehabilitation services vs Home with HHPT pending family support at time of d/c in order to maximize pt's functional independence and safety w/ mobility. Pt continues to be functioning below baseline level. PT will continue to see pt during current hospitalization in order to address the deficits listed above and provide interventions consistent w/ POC in effort to achieve STGs. Barriers to Discharge Inaccessible home environment   Barriers to Discharge Comments [place]   Goals   Patient Goals to go home   STG Expiration Date 08/04/23   PT Treatment Day 4   Plan   Treatment/Interventions Functional transfer training;LE strengthening/ROM; Therapeutic exercise;Elevations; Endurance training;Bed mobility;Gait training;Spoke to nursing   PT Frequency 3-5x/wk   Recommendation   PT Discharge Recommendation Post acute rehabilitation services   AM-PAC Basic Mobility Inpatient   Turning in Flat Bed Without Bedrails 3   Lying on Back to Sitting on Edge of Flat Bed Without Bedrails 3   Moving Bed to Chair 3   Standing Up From Chair Using Arms 3   Walk in Room 3   Climb 3-5 Stairs With Railing 2   Basic Mobility Inpatient Raw Score 17   Basic Mobility Standardized Score 39.67   Highest Level Of Mobility   -HLM Goal 5: Stand one or more mins   JH-HLM Achieved 7: Walk 25 feet or more   End of Consult   Patient Position at End of Consult Bedside chair;Bed/Chair alarm activated; All needs within reach       The patient's AM-PAC Basic Mobility Inpatient Short Form Raw Score is 17. A Raw score of greater than 16 suggests the patient may benefit from discharge to home. Please also refer to the recommendation of the Physical Therapist for safe discharge planning.       Karan Diane, PTA,PTA

## 2023-08-01 NOTE — PLAN OF CARE
Problem: PHYSICAL THERAPY ADULT  Goal: Performs mobility at highest level of function for planned discharge setting. See evaluation for individualized goals. Description: Treatment/Interventions: Functional transfer training, LE strengthening/ROM, Elevations, Therapeutic exercise, Endurance training, Patient/family training, Bed mobility, Gait training, Spoke to nursing, OT  Equipment Recommended:  (TBD)       See flowsheet documentation for full assessment, interventions and recommendations. Outcome: Progressing  Note: Prognosis: Good  Problem List: Decreased strength, Decreased endurance, Impaired balance, Decreased mobility, Decreased safety awareness, Orthopedic restrictions, Pain  Assessment: Pt seen for PT treatment session this date with interventions consisting of gait training w/ emphasis on improving pt's ability to ambulate level surfaces x 150 ftx1 with close S provided by therapist with SPC, Therapeutic exercise consisting of: AROM 20 reps B LE in sitting position and therapeutic activity consisting of training: sit<>stand transfers. Pt agreeable to PT treatment session upon arrival, pt found seated OOB in chair, in no apparent distress, A&O x 4 and responsive. In comparison to previous session, pt with improvements in distance ambulated and amount of supervision required. Post session: pt returned back to recliner, all needs in reach and RN notified of session findings/recommendations. Continue to recommend post acute rehabilitation services vs Home with HHPT pending family support at time of d/c in order to maximize pt's functional independence and safety w/ mobility. Pt continues to be functioning below baseline level. PT will continue to see pt during current hospitalization in order to address the deficits listed above and provide interventions consistent w/ POC in effort to achieve STGs.   Barriers to Discharge: Inaccessible home environment  Barriers to Discharge Comments: [place]  PT Discharge Recommendation: Post acute rehabilitation services    See flowsheet documentation for full assessment.

## 2023-08-01 NOTE — ASSESSMENT & PLAN NOTE
Patient called and is looking to new primary care provider. Patient stated she has a sore throat, fever, headache when getting up, and no energy. Patient stated she got swabbed at [facility] for covid and flu and both were negative. Patient is interested in getting an appointment to be seen. Patient can be reached at [phone].   Patient reportedly had worsening acute on chronic back pain  Patient without red flag symptoms  CT lumbar spine completed, showing multi level degenerative changes, moderate foraminal stenosis.   No acute fracture  Recommend conservative management with multimodal pain approach

## 2023-08-01 NOTE — DISCHARGE INSTR - AVS FIRST PAGE
Please take all your medications as prescribed  You are to have your CBC and BMP taken once a week throughout the duration of your antibiotic course  You have been referred to ID for further follow-up in the outpatient setting

## 2023-08-01 NOTE — CASE MANAGEMENT
Case Management Discharge Planning Note    Patient name Bill Simmons  Location [address] /E2 MS 63-* MRN [id number]  : 1945 Date 2023       Current Admission Date: 2023  Current Admission Diagnosis:SIRS (systemic inflammatory response syndrome) [facility])   Patient Active Problem List    Diagnosis Date Noted   • Obesity (BMI 35.0-39.9 without comorbidity) 2023   • Rotator cuff tear arthropathy of right shoulder 2023   • Chronic combined systolic (congestive) and diastolic (congestive) heart failure ([address]) 2023   • PMR (polymyalgia rheumatica) ([address]) 2022   • SIRS (systemic inflammatory response syndrome) ([address]) 2020   • Impaired vision 2020   • Bilateral hearing loss 2020   • Idiopathic thrombocytopenic purpura ([address]) 2020   • Post menopausal syndrome 2020   • Restless legs syndrome 2020   • Ambulatory dysfunction with recurrent falls 2020   • Nephrosclerosis, stage 1-4 or unspecified chronic kidney disease 2020   • Arthritis 2019   • Type 2 diabetes mellitus without complication, without long-term current use of insulin ([address]) 2019   • Idiopathic chronic gout with tophus 2019   • Osteoarthritis of multiple joints 2019   • Symptomatic varicose veins of both lower extremities 10/15/2018   • Intermittent claudication ([address]) 10/15/2018   • Stage 3b chronic kidney disease ([address]) 10/12/2017   • Acute back pain 06/15/2017   • Combined form of senile cataract of left eye 2016   • Fibromyalgia 2016   • Vitamin D deficiency 2015   • Chronic gout 2015   • Primary osteoarthritis of both hands 2015   • Homocysteinemia 2014   • Acute blood loss anemia 2013   • Hypothyroidism 2013   • Depression, recurrent ([address]) 2012   • Hyperlipidemia 2012      LOS (days): 8  Geometric Mean LOS (GMLOS) (days): 3.70  Days to GMLOS:-3.9     OBJECTIVE:  Risk of Unplanned Readmission Score: 17.95         Current admission status: Inpatient   Preferred Pharmacy:   [id number] [place]   [address] [address]  Phone: [phone] Fax: [phone]    Primary Care Provider: Archana Pickering DO    Primary Insurance: [facility]  Secondary Insurance:     DISCHARGE DETAILS:    Discharge planning discussed with[de-identified] Patient  Freedom of Choice: Yes  Comments - Freedom of Choice: Pt wants to go home with home health. [facility] accepting.  Pt agreeable  CM contacted family/caregiver?: No- see comments (declined at this time)  Were Treatment Team discharge recommendations reviewed with patient/caregiver?: Yes  Did patient/caregiver verbalize understanding of patient care needs?: Yes  Were patient/caregiver advised of the risks associated with not following Treatment Team discharge recommendations?: Yes         Requested [address]         Is the patient interested in [facility] at discharge?: Yes  [facility] requested[de-identified] Physical Therapy, Occupational Therapy, [facility] Name[de-identified] [facility] Provider[de-identified] PCP  Home Health Services Needed[de-identified] Evaluate Functional Status and Safety, Gait/ADL Training, Strengthening/Theraputic Exercises to Improve Function, Administration of IV, IM or SC Medications  Homebound Criteria Met[de-identified] Requires the Assistance of Another Person for Safe Ambulation or to Leave the Home  Supporting Clincal Findings[de-identified] Fatigues Easliy in [employer], Limited Endurance    DME Referral Provided  Referral made for DME?: No    Other Referral/Resources/Interventions Provided:  Interventions: HHC, Home Infusion         Treatment Team Recommendation: Short Term Rehab  Discharge Destination Plan[de-identified] Home with Ilana1 Ion Tucker N[facility] at Discharge : Family                       Accompanied by: Family member     IMM Given (Date):: 08/01/23  IMM Given to[de-identified] Patient   IMM was reviewed with the pt. Pt reports understanding of the ability to appeal discharge if feeling as though not medically stable for discharge. IMM was signed and placed in the scan bin.

## 2023-08-01 NOTE — ASSESSMENT & PLAN NOTE
hgb decreased from baseline postoperatively  Monitor H&H  No signs of active bleeding at this time  Hgb stable

## 2023-08-02 ENCOUNTER — TELEPHONE (OUTPATIENT)
Dept: INFECTIOUS DISEASES | Facility: CLINIC | Age: 78
End: 2023-08-02

## 2023-08-02 ENCOUNTER — HOME CARE VISIT (OUTPATIENT)
Dept: HOME HEALTH SERVICES | Facility: HOME HEALTHCARE | Age: 78
End: 2023-08-02

## 2023-08-02 ENCOUNTER — HOME CARE VISIT (OUTPATIENT)
Dept: HOME HEALTH SERVICES | Facility: HOME HEALTHCARE | Age: 78
End: 2023-08-02
Payer: COMMERCIAL

## 2023-08-02 VITALS
RESPIRATION RATE: 16 BRPM | DIASTOLIC BLOOD PRESSURE: 60 MMHG | TEMPERATURE: 96.2 F | HEART RATE: 52 BPM | SYSTOLIC BLOOD PRESSURE: 118 MMHG | OXYGEN SATURATION: 100 %

## 2023-08-02 DIAGNOSIS — T84.59XA INFECTION ASSOCIATED WITH PROSTHESIS OF RIGHT SHOULDER JOINT (HCC): Primary | ICD-10-CM

## 2023-08-02 DIAGNOSIS — Z96.611 INFECTION ASSOCIATED WITH PROSTHESIS OF RIGHT SHOULDER JOINT (HCC): Primary | ICD-10-CM

## 2023-08-02 PROCEDURE — G0299 HHS/HOSPICE OF RN EA 15 MIN: HCPCS

## 2023-08-02 PROCEDURE — 400013 VN SOC

## 2023-08-02 NOTE — CASE COMMUNICATION
Please fax to PCP Naveed Montaño MD Fax number [phone]  Patient Edwina White  41    Herb SHAIKHA has admitted your patient to [facility] service with the following disciplines:      SN  Response needed, please respond via phone   Primary focus of home health care: PICC care only  Patient stated goals of care: Shoulder healed  Anticipated visit pattern: 1w5 and 6 PRN for catheter complications. and next visit  date: 23 PICC dressing and labs  See medication list - meds in home differ from AVS: Patient has been taking 81 mg ecotrin daily. Educated to take 4 tablets daily as 325 mg daily is ordered. Patient takes gabapentin 200 mg daily in am. Not the 100 every 8 hours as listed on bottle or the 100 mg BID as listed in [facility].  [facility] for verbal order to change to 200 mg daily in am? Taking colace 100 mg daily not BID, ok to change? No taking senna ok to [place]? Significant clinical findings: TC to PCP Naveed Montaño [phone] left message with Vipul Hawk to report nonsymptomatic bradycardia HR 50-52 /60. [address] [place] PCP office 23    Patient is not homebound and needs referral for outpatient PT, patient refuses OT. [facility]NA nursing will not manage any other care other then PICC and labs. No orders for wound care on AVS. Please notify patient of wo und care. Thank you for allowing us to participate in the care of your patient.       National Max Chaudhari RN

## 2023-08-02 NOTE — TELEPHONE ENCOUNTER
LM for pt to CB. Contacted patient to remind patient of follow up. Patient with antibiotic plan and weekly labs. She can call us with any questions or concerns.

## 2023-08-02 NOTE — UTILIZATION REVIEW
NOTIFICATION OF ADMISSION DISCHARGE   This is a Notification of Discharge from [facility]. Please be advised that this patient has been discharge from our facility. Below you will find the admission and discharge date and time including the patient’s disposition. UTILIZATION REVIEW CONTACT:  Maria Del Carmen Montejo MA  Utilization   Network Utilization Review Department  Phone: [phone] x carefully listen to the prompts. All voicemails are confidential.  Email: [email or web address]     ADMISSION INFORMATION  PRESENTATION DATE: 7/24/2023 10:08 PM  OBERVATION ADMISSION DATE:   INPATIENT ADMISSION DATE: 7/24/23 11:55 PM   DISCHARGE DATE: 8/1/2023  3:05 PM   DISPOSITION:Home with Home Health Care    IMPORTANT INFORMATION:  Send all requests for admission clinical reviews, approved or denied determinations and any other requests to dedicated fax number below belonging to the campus where the patient is receiving treatment.  List of dedicated fax numbers:  [place] DENIALS (Administrative/Medical Necessity) [phone] [address] [facility] (Maternity/NICU/Pediatrics) [phone]   [facility] [phone]   [facility] [phone] [address] [facility] [phone]   [address] [place] [address] [phone]   [facility] [phone]   [address] [place] [address] [address] [place] [address] [phone]   [address] [place] [address] [phone] [address] [facility] [phone]   [address] [facility] [address] [facility] [phone]

## 2023-08-02 NOTE — PROGRESS NOTES
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

## 2023-08-02 NOTE — CASE COMMUNICATION
Please fax to PCP Meng Ricketts MD Fax number [phone]   Patient Linnea Flores  41   [facility] has admitted your patient to [facility] service with the following disciplines:   SN   Response needed, please respond via phone   Primary focus of home health care: PICC care only   Patient stated goals of care: Shoulder healed   Anticipated visit pattern: 1w5 2w1 and 6 PRN for catheter complications. and next v isit date: 23 PICC dressing and labs   See medication list - meds in home differ from AVS: Patient has been taking 81 mg ecotrin daily. Educated to take 4 tablets daily as 325 mg daily is ordered. Patient takes gabapentin 200 mg daily in am. Not the 100 every 8 hours as listed on bottle or the 100 mg BID as listed in [facility]. [address] [facility] for verbal order to change to 200 mg daily in am? Taking colace 100 mg daily not BID, ok to [facility]? No taking senna ok to [facility]? Significant clinical findings: TC to PCP Meng Ricketts [phone] left message with Brandyn Flores to report nonsymptomatic bradycardia HR 50-52 /60. [address] [place] PCP office 23   Patient is not homebound and needs referral for outpatient PT, patient refuses OT. A nursing will not manage any other care other then PICC and labs. No orders for wound care on AVS. Please notify patient o f wound care. Thank you for allowing us to participate in the care of your patient.    National Max Chaudhari RN

## 2023-08-04 LAB
BACTERIA WND AEROBE CULT: ABNORMAL
BACTERIA WND AEROBE CULT: ABNORMAL
GRAM STN SPEC: ABNORMAL

## 2023-08-05 LAB — BACTERIA SPEC ANAEROBE CULT: NO GROWTH

## 2023-08-07 ENCOUNTER — APPOINTMENT (OUTPATIENT)
Dept: LAB | Facility: CLINIC | Age: 78
End: 2023-08-07

## 2023-08-07 ENCOUNTER — HOME CARE VISIT (OUTPATIENT)
Dept: HOME HEALTH SERVICES | Facility: HOME HEALTHCARE | Age: 78
End: 2023-08-07

## 2023-08-07 VITALS
OXYGEN SATURATION: 99 % | TEMPERATURE: 97.8 F | HEART RATE: 52 BPM | SYSTOLIC BLOOD PRESSURE: 128 MMHG | DIASTOLIC BLOOD PRESSURE: 60 MMHG | RESPIRATION RATE: 16 BRPM

## 2023-08-07 LAB
ANION GAP SERPL CALCULATED.3IONS-SCNC: 6 MMOL/L
BASOPHILS # BLD AUTO: 0.08 THOUSANDS/ÂΜL (ref 0–0.1)
BASOPHILS NFR BLD AUTO: 1 % (ref 0–1)
BUN SERPL-MCNC: 31 MG/DL (ref 5–25)
CALCIUM SERPL-MCNC: 9 MG/DL (ref 8.3–10.1)
CHLORIDE SERPL-SCNC: 106 MMOL/L (ref 96–108)
CO2 SERPL-SCNC: 27 MMOL/L (ref 21–32)
CREAT SERPL-MCNC: 1.72 MG/DL (ref 0.6–1.3)
EOSINOPHIL # BLD AUTO: 0.23 THOUSAND/ÂΜL (ref 0–0.61)
EOSINOPHIL NFR BLD AUTO: 3 % (ref 0–6)
ERYTHROCYTE [DISTWIDTH] IN BLOOD BY AUTOMATED COUNT: 14.2 % (ref 11.6–15.1)
GFR SERPL CREATININE-BSD FRML MDRD: 27 ML/MIN/1.73SQ M
GLUCOSE P FAST SERPL-MCNC: 112 MG/DL (ref 65–99)
HCT VFR BLD AUTO: 32.8 % (ref 34.8–46.1)
HGB BLD-MCNC: 10.1 G/DL (ref 11.5–15.4)
IMM GRANULOCYTES # BLD AUTO: 0.04 THOUSAND/UL (ref 0–0.2)
IMM GRANULOCYTES NFR BLD AUTO: 1 % (ref 0–2)
LYMPHOCYTES # BLD AUTO: 1.13 THOUSANDS/ÂΜL (ref 0.6–4.47)
LYMPHOCYTES NFR BLD AUTO: 13 % (ref 14–44)
MCH RBC QN AUTO: 31.5 PG (ref 26.8–34.3)
MCHC RBC AUTO-ENTMCNC: 30.8 G/DL (ref 31.4–37.4)
MCV RBC AUTO: 102 FL (ref 82–98)
MONOCYTES # BLD AUTO: 0.5 THOUSAND/ÂΜL (ref 0.17–1.22)
MONOCYTES NFR BLD AUTO: 6 % (ref 4–12)
NEUTROPHILS # BLD AUTO: 6.52 THOUSANDS/ÂΜL (ref 1.85–7.62)
NEUTS SEG NFR BLD AUTO: 76 % (ref 43–75)
NRBC BLD AUTO-RTO: 0 /100 WBCS
PLATELET # BLD AUTO: 264 THOUSANDS/UL (ref 149–390)
PMV BLD AUTO: 10.6 FL (ref 8.9–12.7)
POTASSIUM SERPL-SCNC: 4.1 MMOL/L (ref 3.5–5.3)
RBC # BLD AUTO: 3.21 MILLION/UL (ref 3.81–5.12)
SODIUM SERPL-SCNC: 139 MMOL/L (ref 135–147)
WBC # BLD AUTO: 8.5 THOUSAND/UL (ref 4.31–10.16)

## 2023-08-07 PROCEDURE — 36415 COLL VENOUS BLD VENIPUNCTURE: CPT

## 2023-08-07 PROCEDURE — 80048 BASIC METABOLIC PNL TOTAL CA: CPT

## 2023-08-07 PROCEDURE — G0180 MD CERTIFICATION HHA PATIENT: HCPCS | Performed by: STUDENT IN AN ORGANIZED HEALTH CARE EDUCATION/TRAINING PROGRAM

## 2023-08-07 PROCEDURE — 85025 COMPLETE CBC W/AUTO DIFF WBC: CPT

## 2023-08-07 PROCEDURE — G0299 HHS/HOSPICE OF RN EA 15 MIN: HCPCS

## 2023-08-10 LAB
BACTERIA WND AEROBE CULT: ABNORMAL
BACTERIA WND AEROBE CULT: ABNORMAL
GRAM STN SPEC: ABNORMAL

## 2023-08-14 ENCOUNTER — APPOINTMENT (OUTPATIENT)
Dept: LAB | Facility: CLINIC | Age: 78
End: 2023-08-14
Payer: COMMERCIAL

## 2023-08-14 ENCOUNTER — HOME CARE VISIT (OUTPATIENT)
Dept: HOME HEALTH SERVICES | Facility: HOME HEALTHCARE | Age: 78
End: 2023-08-14

## 2023-08-14 VITALS
DIASTOLIC BLOOD PRESSURE: 72 MMHG | SYSTOLIC BLOOD PRESSURE: 118 MMHG | HEART RATE: 62 BPM | RESPIRATION RATE: 18 BRPM | OXYGEN SATURATION: 98 %

## 2023-08-14 LAB — BACTERIA SPEC ANAEROBE CULT: NO GROWTH

## 2023-08-14 PROCEDURE — G0299 HHS/HOSPICE OF RN EA 15 MIN: HCPCS

## 2023-08-15 ENCOUNTER — OFFICE VISIT (OUTPATIENT)
Dept: INFECTIOUS DISEASES | Facility: CLINIC | Age: 78
End: 2023-08-15
Payer: COMMERCIAL

## 2023-08-15 ENCOUNTER — OFFICE VISIT (OUTPATIENT)
Dept: NEPHROLOGY | Facility: CLINIC | Age: 78
End: 2023-08-15

## 2023-08-15 VITALS
BODY MASS INDEX: 37.51 KG/M2 | DIASTOLIC BLOOD PRESSURE: 68 MMHG | WEIGHT: 239 LBS | HEART RATE: 68 BPM | SYSTOLIC BLOOD PRESSURE: 120 MMHG | HEIGHT: 67 IN

## 2023-08-15 VITALS
DIASTOLIC BLOOD PRESSURE: 70 MMHG | WEIGHT: 239 LBS | BODY MASS INDEX: 37.51 KG/M2 | HEART RATE: 62 BPM | TEMPERATURE: 97.6 F | OXYGEN SATURATION: 94 % | SYSTOLIC BLOOD PRESSURE: 128 MMHG | RESPIRATION RATE: 17 BRPM | HEIGHT: 67 IN

## 2023-08-15 DIAGNOSIS — E11.9 TYPE 2 DIABETES MELLITUS WITHOUT COMPLICATION, WITHOUT LONG-TERM CURRENT USE OF INSULIN (HCC): ICD-10-CM

## 2023-08-15 DIAGNOSIS — I12.9 NEPHROSCLEROSIS, STAGE 1-4 OR UNSPECIFIED CHRONIC KIDNEY DISEASE: Primary | ICD-10-CM

## 2023-08-15 DIAGNOSIS — N18.4 CHRONIC RENAL DISEASE, STAGE IV (HCC): ICD-10-CM

## 2023-08-15 DIAGNOSIS — T84.59XA INFECTION ASSOCIATED WITH PROSTHESIS OF RIGHT SHOULDER JOINT (HCC): Primary | ICD-10-CM

## 2023-08-15 DIAGNOSIS — M35.3 PMR (POLYMYALGIA RHEUMATICA) (HCC): ICD-10-CM

## 2023-08-15 DIAGNOSIS — Z96.611 INFECTION ASSOCIATED WITH PROSTHESIS OF RIGHT SHOULDER JOINT (HCC): Primary | ICD-10-CM

## 2023-08-15 DIAGNOSIS — N18.32 STAGE 3B CHRONIC KIDNEY DISEASE (HCC): ICD-10-CM

## 2023-08-15 DIAGNOSIS — F32.A DEPRESSION, UNSPECIFIED DEPRESSION TYPE: ICD-10-CM

## 2023-08-15 PROCEDURE — 99214 OFFICE O/P EST MOD 30 MIN: CPT | Performed by: INTERNAL MEDICINE

## 2023-08-15 RX ORDER — CITALOPRAM 40 MG/1
TABLET ORAL
Qty: 90 TABLET | Refills: 0 | OUTPATIENT
Start: 2023-08-15

## 2023-08-15 NOTE — TELEPHONE ENCOUNTER
1) it looks like patient has new PCP, please update chart  2) please notify Phan De La Cruz, that refill requests should be forwarded to patients new PCP, Dr Martin Mendes

## 2023-08-15 NOTE — PROGRESS NOTES
Objective:  Vitals:  Temperature: 97.6 °F (36.4 °C)    Physical Exam:     General: Awake, alert, cooperative, no distress. Neck:  Supple. No lymphadenopathy. No mass. Chest:  Left chest wall PICC site clean. No drainage. No erythema/warmth. Nontender. Lungs: Expansion symmetric, no rales, no wheezing, respirations unlabored. Heart:  Regular rate and rhythm, S1 and S2 normal, no murmur. Abdomen: Soft, nondistended, non-tender, bowel sounds active all four quadrants,        no masses, no organomegaly. Extremities: Right shoulder incision healed, with Steri-Strips in place. No drainage. No erythema/warmth. Nontender. Skin:  No rash. Neuro: Moves all extremities. Invasive Devices     Central Venous Catheter Line  Duration           CVC Central Lines 07/31/23 Single Left Internal jugular 14 days                Labs studies:   I have personally reviewed pertinent labs. Results from last 7 days   Lab Units 08/14/23  1318   POTASSIUM mmol/L 4.3   CHLORIDE mmol/L 107   CO2 mmol/L 26   BUN mg/dL 41*   CREATININE mg/dL 1.95*   EGFR ml/min/1.73sq m 23   CALCIUM mg/dL 9.3     Results from last 7 days   Lab Units 08/14/23  1318   WBC Thousand/uL 8.65   HEMOGLOBIN g/dL 9.6*   HEMATOCRIT % 32.0*   PLATELETS Thousands/uL 220   NEUTROS PCT % 79*   MONOS PCT % 6   EOS PCT % 2           Imaging Studies:   I have personally reviewed pertinent imaging study reports and images in PACS. EKG, Pathology, and Other Studies:   I have personally reviewed pertinent reports.

## 2023-08-15 NOTE — PATIENT INSTRUCTIONS
You are here for follow-up you did have your shoulder replacement that was complicated by an infection and you are completing a 6 weeks course of IV antibiotics and then the infection doctors are going to put you on a pill antibiotic for another 6 weeks according to their note. You are kidney function is stable in your baseline range. The latest creatinine was 1.9 and it tends to fluctuate slightly potentially due to the diuretic use. When I looked back at the initial time I saw you in 2018 and the note he commented that your kidney function could range 1.5-2.2 so that tells me there is been no significant worsening and things are stable. There is no protein in the urine which tells me even when you were diabetic it did not do damage to the kidney and in fact most serious kidney diseases cause protein in the urine so you do not have them. You likely have nephrosclerosis or aging for now just continue current medications and observation with no changes.

## 2023-08-15 NOTE — PROGRESS NOTES
NEPHROLOGY PROGRESS NOTE    Norberto Mason 78 y.o. female MRN: [id number]  Unit/Bed#:  Encounter: [id number]  Reason for Consult: Chronic renal insufficiency    The patient is here for routine follow-up she says she is feeling well with no acute complaints for me. She did undergo a shoulder replacement and unfortunately about a month after developed infection in the joint was hospitalized. She underwent I&D has been on IV antibiotics to complete 6 weeks course then will receive oral antibiotics for 6 weeks. Otherwise no complaints. ASSESSMENT/PLAN:  1. Renal    The patient has chronic renal insufficiency in the past she was diabetic but she is no longer diabetic as she is not on medications and she is diet controlled. Urinalysis is always been negative for protein infection he had a urinalysis in July of this year that still showed no protein and no blood. I say this because without proteinuria she does not have any evidence of significant intrinsic disease and certainly diabetes did not affect her kidneys when she was a diabetic. This tells me she likely has nephrosclerosis. Her latest creatinine is 1.9 which is towards the higher end of her baseline range but I did look back at my note when I saw her in 2018 and I made the comment that she had been in this range for even 3 years prior to me seeing her so that tells me she is in her baseline range things have not progressed in many years. The fluctuations could occur due to diuretic and changes in effective volume status. For now continue current medications  Labs and follow-up as scheduled  Continue with blood pressure control which is excellent    I asked her to call me if there is any problems or concerns before next visit. SUBJECTIVE:  Review of Systems   Constitutional: Negative for chills, diaphoresis, fever and night sweats. She feels tired today. HENT: Negative. Eyes: Negative.     Cardiovascular: Negative for chest pain, dyspnea on exertion and orthopnea. She had leg swelling posthospitalization but states that it is come down nicely and is near normal for her. Respiratory: Negative. Negative for cough, shortness of breath, sputum production and wheezing. Gastrointestinal: Negative for abdominal pain, diarrhea, nausea and vomiting. Genitourinary: Negative for dysuria, flank pain, hematuria and incomplete emptying. Neurological: Negative for dizziness, focal weakness, headaches and weakness. Psychiatric/Behavioral: Negative for altered mental status, hallucinations and hypervigilance. OBJECTIVE:  Current Weight: Weight - Scale: 108 kg (239 lb)  [email or web address]:     Blood pressure 120/68, pulse 68, height 5' 7" (1.702 m), weight 108 kg (239 lb), not currently breastfeeding. , Body mass index is 37.43 kg/m². [unfilled]    Physical Exam: /68 (BP Location: Left arm, Patient Position: Sitting, Cuff Size: Standard)   Pulse 68   Ht 5' 7" (1.702 m)   Wt 108 kg (239 lb)   BMI 37.43 kg/m²   Physical Exam  Constitutional:       General: She is not in acute distress. Appearance: She is not toxic-appearing or diaphoretic. HENT:      Head: Normocephalic and atraumatic. Nose: Nose normal.      Mouth/Throat:      Mouth: Mucous membranes are moist.   Eyes:      General: No scleral icterus. Extraocular Movements: Extraocular movements intact. Cardiovascular:      Rate and Rhythm: Normal rate and regular rhythm. Heart sounds: No friction rub. No gallop. Comments: Very mild lower extremity swelling. Pulmonary:      Effort: Pulmonary effort is normal. No respiratory distress. Breath sounds: No wheezing, rhonchi or rales. Abdominal:      General: Bowel sounds are normal. There is no distension. Palpations: Abdomen is soft. Tenderness: There is no abdominal tenderness. There is no rebound.    Musculoskeletal:      Cervical back: Normal range of motion and neck supple. Skin:     Comments: Some bruises on her arm posthospitalization. Neurological:      General: No focal deficit present. Mental Status: She is alert and oriented to person, place, and time. Mental status is at baseline. Psychiatric:         Mood and Affect: Mood normal.         Behavior: Behavior normal.         Thought Content: Thought content normal.         Judgment: Judgment normal.         Medications:    Current Outpatient Medications:   •  acetaminophen (TYLENOL) 325 mg tablet, Take 2 tablets (650 mg total) by mouth every 6 (six) hours as needed for mild pain, Disp: , Rfl: 0  •  albuterol (ProAir HFA) 90 mcg/act inhaler, Inhale 2 puffs every 6 (six) hours as needed for wheezing, Disp: 8.5 g, Rfl: 3  •  allopurinol (ZYLOPRIM) 100 mg tablet, Take 100 mg by mouth 2 (two) times a day, Disp: , Rfl:   •  aspirin 325 mg tablet, Take 1 tablet (325 mg total) by mouth daily, Disp: , Rfl: 0  •  Ceftriaxone Sodium 2 g SOLR, Inject 2 g into a catheter in a vein every 24 hours.  Indications: SHOULDER INFECTION, Disp: , Rfl:   •  cetirizine (ZyrTEC) 10 mg tablet, Take 10 mg by mouth daily, Disp: , Rfl:   •  cholecalciferol (VITAMIN D3) 1,000 units tablet, Take 1,000 Units by mouth daily, Disp: , Rfl:   •  citalopram (CeleXA) 40 mg tablet, TAKE ONE TABLET BY MOUTH EVERY DAY (Patient taking differently: Take 40 mg by mouth every morning), Disp: 90 tablet, Rfl: 0  •  docusate sodium (COLACE) 100 mg capsule, Take 1 capsule (100 mg total) by mouth 2 (two) times a day, Disp: , Rfl: 0  •  furosemide (LASIX) 40 mg tablet, TAKE ONE TABLET BY MOUTH EVERY DAY, Disp: 30 tablet, Rfl: 2  •  gabapentin (NEURONTIN) 100 mg capsule, Take 1 tablet by mouth 2 (two) times a day, Disp: , Rfl:   •  levothyroxine 50 mcg tablet, Take 1 tablet (50 mcg total) by mouth daily (Patient taking differently: Take 50 mcg by mouth every morning), Disp: 90 tablet, Rfl: 1  •  metoprolol tartrate (LOPRESSOR) 25 mg tablet, Take 1 tablet (25 mg total) by mouth in the morning (Patient taking differently: Take 25 mg by mouth every morning), Disp: 30 tablet, Rfl: 5  •  potassium chloride (K-DUR,KLOR-CON) 20 mEq tablet, Take 1 tablet (20 mEq total) by mouth daily (Patient taking differently: Take 20 mEq by mouth every morning), Disp: 30 tablet, Rfl: 5  •  predniSONE 5 mg tablet, Take 5 mg by mouth every morning, Disp: , Rfl:   •  senna (SENOKOT) 8.6 mg, Take 1 tablet (8.6 mg total) by mouth daily, Disp: , Rfl: 0  •  simvastatin (ZOCOR) 10 mg tablet, Take 1 tablet (10 mg total) by mouth every morning, Disp: 90 tablet, Rfl: 1  •  Sodium Chloride Flush (Normal Saline Flush) 0.9 % SOLN, Inject 10 mL into a catheter in a vein in the morning.  Indications: IV flush, Disp: , Rfl:     Laboratory Results:  Lab Results   Component Value Date    WBC 8.65 08/14/2023    HGB 9.6 (L) 08/14/2023    HCT 32.0 (L) 08/14/2023     (H) 08/14/2023     08/14/2023     Lab Results   Component Value Date    SODIUM 140 08/14/2023    K 4.3 08/14/2023     08/14/2023    CO2 26 08/14/2023    BUN 41 (H) 08/14/2023    CREATININE 1.95 (H) 08/14/2023    GLUC 107 08/14/2023    CALCIUM 9.3 08/14/2023     Lab Results   Component Value Date    CALCIUM 9.3 08/14/2023    PHOS 3.0 12/08/2020     No results found for: "LABPROT"

## 2023-08-15 NOTE — LETTER
August 15, 2023     Michelle Prasad, [address] [place] [address]    Patient: Harshad Acosta   YOB: 1945   Date of Visit: 8/15/2023       Dear Dr. Jaymie Castro:    Thank you for referring Harshad Acosta to me for evaluation. Below are my notes for this consultation. If you have questions, please do not hesitate to call me. I look forward to following your patient along with you. Sincerely,        Micha Haywood MD        CC: No Recipients    Micha Haywood MD  8/15/2023  4:24 PM  Sign when Signing Visit  [address] 78 y.o. female MRN: [id number]  Unit/Bed#:  Encounter: [id number]  Reason for Consult: Chronic renal insufficiency    The patient is here for routine follow-up she says she is feeling well with no acute complaints for me. She did undergo a shoulder replacement and unfortunately about a month after developed infection in the joint was hospitalized. She underwent I&D has been on IV antibiotics to complete 6 weeks course then will receive oral antibiotics for 6 weeks. Otherwise no complaints. ASSESSMENT/PLAN:  1. Renal    The patient has chronic renal insufficiency in the past she was diabetic but she is no longer diabetic as she is not on medications and she is diet controlled. Urinalysis is always been negative for protein infection he had a urinalysis in July of this year that still showed no protein and no blood. I say this because without proteinuria she does not have any evidence of significant intrinsic disease and certainly diabetes did not affect her kidneys when she was a diabetic. This tells me she likely has nephrosclerosis.   Her latest creatinine is 1.9 which is towards the higher end of her baseline range but I did look back at my note when I saw her in 2018 and I made the comment that she had been in this range for even 3 years prior to me seeing her so that tells me she is in her baseline range things have not progressed in many years. The fluctuations could occur due to diuretic and changes in effective volume status. For now continue current medications  Labs and follow-up as scheduled  Continue with blood pressure control which is excellent    I asked her to call me if there is any problems or concerns before next visit. SUBJECTIVE:  Review of Systems   Constitutional: Negative for chills, diaphoresis, fever and night sweats. She feels tired today. HENT: Negative. Eyes: Negative. Cardiovascular: Negative for chest pain, dyspnea on exertion and orthopnea. She had leg swelling posthospitalization but states that it is come down nicely and is near normal for her. Respiratory: Negative. Negative for cough, shortness of breath, sputum production and wheezing. Gastrointestinal: Negative for abdominal pain, diarrhea, nausea and vomiting. Genitourinary: Negative for dysuria, flank pain, hematuria and incomplete emptying. Neurological: Negative for dizziness, focal weakness, headaches and weakness. Psychiatric/Behavioral: Negative for altered mental status, hallucinations and hypervigilance. OBJECTIVE:  Current Weight: Weight - Scale: 108 kg (239 lb)  [email or web address]:     Blood pressure 120/68, pulse 68, height 5' 7" (1.702 m), weight 108 kg (239 lb), not currently breastfeeding. , Body mass index is 37.43 kg/m². [unfilled]    Physical Exam: /68 (BP Location: Left arm, Patient Position: Sitting, Cuff Size: Standard)   Pulse 68   Ht 5' 7" (1.702 m)   Wt 108 kg (239 lb)   BMI 37.43 kg/m²   Physical Exam  Constitutional:       General: She is not in acute distress. Appearance: She is not toxic-appearing or diaphoretic. HENT:      Head: Normocephalic and atraumatic. Nose: Nose normal.      Mouth/Throat:      Mouth: Mucous membranes are moist.   Eyes:      General: No scleral icterus.      Extraocular Movements: Extraocular movements intact. Cardiovascular:      Rate and Rhythm: Normal rate and regular rhythm. Heart sounds: No friction rub. No gallop. Comments: Very mild lower extremity swelling. Pulmonary:      Effort: Pulmonary effort is normal. No respiratory distress. Breath sounds: No wheezing, rhonchi or rales. Abdominal:      General: Bowel sounds are normal. There is no distension. Palpations: Abdomen is soft. Tenderness: There is no abdominal tenderness. There is no rebound. Musculoskeletal:      Cervical back: Normal range of motion and neck supple. Skin:     Comments: Some bruises on her arm posthospitalization. Neurological:      General: No focal deficit present. Mental Status: She is alert and oriented to person, place, and time. Mental status is at baseline. Psychiatric:         Mood and Affect: Mood normal.         Behavior: Behavior normal.         Thought Content: Thought content normal.         Judgment: Judgment normal.         Medications:    Current Outpatient Medications:   •  acetaminophen (TYLENOL) 325 mg tablet, Take 2 tablets (650 mg total) by mouth every 6 (six) hours as needed for mild pain, Disp: , Rfl: 0  •  albuterol (ProAir HFA) 90 mcg/act inhaler, Inhale 2 puffs every 6 (six) hours as needed for wheezing, Disp: 8.5 g, Rfl: 3  •  allopurinol (ZYLOPRIM) 100 mg tablet, Take 100 mg by mouth 2 (two) times a day, Disp: , Rfl:   •  aspirin 325 mg tablet, Take 1 tablet (325 mg total) by mouth daily, Disp: , Rfl: 0  •  Ceftriaxone Sodium 2 g SOLR, Inject 2 g into a catheter in a vein every 24 hours.  Indications: SHOULDER INFECTION, Disp: , Rfl:   •  cetirizine (ZyrTEC) 10 mg tablet, Take 10 mg by mouth daily, Disp: , Rfl:   •  cholecalciferol (VITAMIN D3) 1,000 units tablet, Take 1,000 Units by mouth daily, Disp: , Rfl:   •  citalopram (CeleXA) 40 mg tablet, TAKE ONE TABLET BY MOUTH EVERY DAY (Patient taking differently: Take 40 mg by mouth every morning), Disp: 90 tablet, Rfl: 0  •  docusate sodium (COLACE) 100 mg capsule, Take 1 capsule (100 mg total) by mouth 2 (two) times a day, Disp: , Rfl: 0  •  furosemide (LASIX) 40 mg tablet, TAKE ONE TABLET BY MOUTH EVERY DAY, Disp: 30 tablet, Rfl: 2  •  gabapentin (NEURONTIN) 100 mg capsule, Take 1 tablet by mouth 2 (two) times a day, Disp: , Rfl:   •  levothyroxine 50 mcg tablet, Take 1 tablet (50 mcg total) by mouth daily (Patient taking differently: Take 50 mcg by mouth every morning), Disp: 90 tablet, Rfl: 1  •  metoprolol tartrate (LOPRESSOR) 25 mg tablet, Take 1 tablet (25 mg total) by mouth in the morning (Patient taking differently: Take 25 mg by mouth every morning), Disp: 30 tablet, Rfl: 5  •  potassium chloride (K-DUR,KLOR-CON) 20 mEq tablet, Take 1 tablet (20 mEq total) by mouth daily (Patient taking differently: Take 20 mEq by mouth every morning), Disp: 30 tablet, Rfl: 5  •  predniSONE 5 mg tablet, Take 5 mg by mouth every morning, Disp: , Rfl:   •  senna (SENOKOT) 8.6 mg, Take 1 tablet (8.6 mg total) by mouth daily, Disp: , Rfl: 0  •  simvastatin (ZOCOR) 10 mg tablet, Take 1 tablet (10 mg total) by mouth every morning, Disp: 90 tablet, Rfl: 1  •  Sodium Chloride Flush (Normal Saline Flush) 0.9 % SOLN, Inject 10 mL into a catheter in a vein in the morning.  Indications: IV flush, Disp: , Rfl:     Laboratory Results:  Lab Results   Component Value Date    WBC 8.65 08/14/2023    HGB 9.6 (L) 08/14/2023    HCT 32.0 (L) 08/14/2023     (H) 08/14/2023     08/14/2023     Lab Results   Component Value Date    SODIUM 140 08/14/2023    K 4.3 08/14/2023     08/14/2023    CO2 26 08/14/2023    BUN 41 (H) 08/14/2023    CREATININE 1.95 (H) 08/14/2023    GLUC 107 08/14/2023    CALCIUM 9.3 08/14/2023     Lab Results   Component Value Date    CALCIUM 9.3 08/14/2023    PHOS 3.0 12/08/2020     No results found for: "LABPROT"

## 2023-08-16 ENCOUNTER — TELEPHONE (OUTPATIENT)
Dept: FAMILY MEDICINE CLINIC | Facility: CLINIC | Age: 78
End: 2023-08-16

## 2023-08-17 LAB — BACTERIA SPEC ANAEROBE CULT: NO GROWTH

## 2023-08-18 ENCOUNTER — DOCUMENTATION (OUTPATIENT)
Dept: INFECTIOUS DISEASES | Facility: CLINIC | Age: 78
End: 2023-08-18

## 2023-08-18 LAB
BACTERIA WND AEROBE CULT: ABNORMAL
BACTERIA WND AEROBE CULT: ABNORMAL
GRAM STN SPEC: ABNORMAL

## 2023-08-18 NOTE — PROGRESS NOTES
Susceptibilities from the micrococcus isolate returned with resistance to Penicillin and S to vancomycin and clindamycin. No Ceftriaxone susceptibility reported. The patient was seen in our office this week and is clinically doing very well. Given clinical response to Ceftriaxone, suspect micrococcus not the pathogen (or the isolate is susceptible and not tested). Additionally there is high risk is switch to Vancomycin with her CKD and likely to cause more harm than benefit. Will continue current treatment plan outlined in Dr. Laurie Jay note. Patient has follow up with ID 8/29.

## 2023-08-20 DIAGNOSIS — R60.9 EDEMA, UNSPECIFIED TYPE: ICD-10-CM

## 2023-08-21 ENCOUNTER — HOME CARE VISIT (OUTPATIENT)
Dept: HOME HEALTH SERVICES | Facility: HOME HEALTHCARE | Age: 78
End: 2023-08-21

## 2023-08-21 ENCOUNTER — APPOINTMENT (OUTPATIENT)
Dept: LAB | Facility: CLINIC | Age: 78
End: 2023-08-21
Payer: COMMERCIAL

## 2023-08-21 VITALS
HEART RATE: 57 BPM | DIASTOLIC BLOOD PRESSURE: 70 MMHG | SYSTOLIC BLOOD PRESSURE: 118 MMHG | RESPIRATION RATE: 18 BRPM | OXYGEN SATURATION: 97 %

## 2023-08-21 DIAGNOSIS — E13.69 OTHER SPECIFIED DIABETES MELLITUS WITH OTHER SPECIFIED COMPLICATION, UNSPECIFIED WHETHER LONG TERM INSULIN USE (HCC): ICD-10-CM

## 2023-08-21 LAB
EST. AVERAGE GLUCOSE BLD GHB EST-MCNC: 146 MG/DL
HBA1C MFR BLD: 6.7 %

## 2023-08-21 PROCEDURE — 83036 HEMOGLOBIN GLYCOSYLATED A1C: CPT

## 2023-08-21 PROCEDURE — G0299 HHS/HOSPICE OF RN EA 15 MIN: HCPCS

## 2023-08-21 RX ORDER — FUROSEMIDE 40 MG/1
TABLET ORAL
Qty: 30 TABLET | Refills: 0 | Status: SHIPPED | OUTPATIENT
Start: 2023-08-21

## 2023-08-23 ENCOUNTER — DOCUMENTATION (OUTPATIENT)
Dept: INFECTIOUS DISEASES | Facility: CLINIC | Age: 78
End: 2023-08-23

## 2023-08-23 NOTE — PROGRESS NOTES
Patient on Ceftriaxone through 9/6. Routed labs for review to Dr. Matthieu Jimenes. WBC and Neutrophils.

## 2023-08-28 ENCOUNTER — HOME CARE VISIT (OUTPATIENT)
Dept: HOME HEALTH SERVICES | Facility: HOME HEALTHCARE | Age: 78
End: 2023-08-28

## 2023-08-28 ENCOUNTER — APPOINTMENT (OUTPATIENT)
Dept: LAB | Facility: CLINIC | Age: 78
End: 2023-08-28
Payer: COMMERCIAL

## 2023-08-28 VITALS
DIASTOLIC BLOOD PRESSURE: 68 MMHG | RESPIRATION RATE: 18 BRPM | SYSTOLIC BLOOD PRESSURE: 128 MMHG | HEART RATE: 60 BPM | OXYGEN SATURATION: 98 %

## 2023-08-28 PROCEDURE — G0299 HHS/HOSPICE OF RN EA 15 MIN: HCPCS

## 2023-08-29 ENCOUNTER — OFFICE VISIT (OUTPATIENT)
Dept: INFECTIOUS DISEASES | Facility: CLINIC | Age: 78
End: 2023-08-29
Payer: COMMERCIAL

## 2023-08-29 ENCOUNTER — TELEPHONE (OUTPATIENT)
Dept: INFECTIOUS DISEASES | Facility: CLINIC | Age: 78
End: 2023-08-29

## 2023-08-29 ENCOUNTER — PREP FOR PROCEDURE (OUTPATIENT)
Dept: INTERVENTIONAL RADIOLOGY/VASCULAR | Facility: CLINIC | Age: 78
End: 2023-08-29

## 2023-08-29 VITALS
SYSTOLIC BLOOD PRESSURE: 118 MMHG | OXYGEN SATURATION: 96 % | RESPIRATION RATE: 18 BRPM | HEIGHT: 67 IN | BODY MASS INDEX: 37.37 KG/M2 | TEMPERATURE: 97 F | DIASTOLIC BLOOD PRESSURE: 68 MMHG | WEIGHT: 238.1 LBS | HEART RATE: 98 BPM

## 2023-08-29 DIAGNOSIS — Z96.619: Primary | ICD-10-CM

## 2023-08-29 DIAGNOSIS — M12.811 ROTATOR CUFF TEAR ARTHROPATHY OF RIGHT SHOULDER: Primary | ICD-10-CM

## 2023-08-29 DIAGNOSIS — E11.9 DM2 (DIABETES MELLITUS, TYPE 2) (HCC): ICD-10-CM

## 2023-08-29 DIAGNOSIS — M35.3 PMR (POLYMYALGIA RHEUMATICA) (HCC): ICD-10-CM

## 2023-08-29 DIAGNOSIS — T84.59XA: Primary | ICD-10-CM

## 2023-08-29 DIAGNOSIS — M12.811 ROTATOR CUFF TEAR ARTHROPATHY OF RIGHT SHOULDER: ICD-10-CM

## 2023-08-29 DIAGNOSIS — M75.101 ROTATOR CUFF TEAR ARTHROPATHY OF RIGHT SHOULDER: Primary | ICD-10-CM

## 2023-08-29 DIAGNOSIS — N18.30 CKD (CHRONIC KIDNEY DISEASE) STAGE 3, GFR 30-59 ML/MIN (HCC): ICD-10-CM

## 2023-08-29 DIAGNOSIS — M75.101 ROTATOR CUFF TEAR ARTHROPATHY OF RIGHT SHOULDER: ICD-10-CM

## 2023-08-29 PROCEDURE — 99212 OFFICE O/P EST SF 10 MIN: CPT | Performed by: PHYSICIAN ASSISTANT

## 2023-08-29 RX ORDER — CEFDINIR 300 MG/1
300 CAPSULE ORAL EVERY 12 HOURS SCHEDULED
Qty: 84 CAPSULE | Refills: 0 | Status: SHIPPED | OUTPATIENT
Start: 2023-08-29 | End: 2023-10-10

## 2023-08-29 NOTE — PATIENT INSTRUCTIONS
Continue high-dose IV ceftriaxone to complete 6 week course through 9/6/23  D/c PICC after last dose of IV abx -> IR will call you to schedule this   Transition to PO cefdinir 300 q12h 9/7/23 to complete another 6 week course through 10/18/23   Continue weekly CBCD/BMP

## 2023-08-29 NOTE — PROGRESS NOTES
Outpatient Progress Note - [facility] Infectious Disease   Edie Silvestre 78 y.o. female MRN: [id number]  Encounter: [id number]    Assessment/Plan:  1. 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 Patient is status post I&D, with operative culture growing Micrococcus and Dermacoccus, only in broth culture with prolonged incubation. Susceptibilities from micrococcus isolated returned with R to PCN and S to vancomycin and clindamycin. No Ceftriaxone susceptibility reported. SInce she is clinical improving on IV ceftriaxone, suspect micrococcus not the pathogen or possibly that the isolate is S and not tested. She is high risk for worsening renal failure thus switch to vancomycin not pursued. Shoulder incision has healed. She has no pain in her shoulder. She is getting better ROM of shoulder every week and started PT. Blood work reviewed from yesterday with Cr below baseline and stable, mild leukocytosis. Continue high-dose IV ceftriaxone to complete 6 week course through 9/6/23  Transition to PO cefdinir 300 q12h 9/7/23 to complete another 6 week course through 10/18/23   Continue weekly CBCD/BMP  D/c PICC after last dose of abx, IR consulted      2. Status post right reverse total shoulder arthroplasty. Orthopedic surgery follow-up.     3. DM, with hyperglycemia and elevated hemoglobin A1c. Management per PCP.     4. CKD stage III. Creatinine below baseline. Estimated Creatinine Clearance: 36 mL/min (A) (by C-G formula based on SCr of 1.58 mg/dL (H)). if CrCl less than 30 will decrease the Cefdinir dose to q24h. Monitor creatinine.     5. Polymyalgia rheumatica, on chronic steroid. Above assessment and plan discussed in detail with patient and sister during examination. Antibiotics:  IV ceftriaxone     Subjective:  Patient presents to outpatient ID office for ongoing management of PJI. Patient is tolerating the IV ceftriaxone.  SHe has left chest wall tunneled PICC that is functioning properly. Her sister is helping her infuse the abx. Patient denies recent fevers or chills. No n/v/d. Notes incision is well healed and pain improving. She started physical therapy and is compliant with exercises. She is agreeable to IR consult for removal of picc and transition to PO abx. Objective:    Vitals:   Vitals:    08/29/23 1515   BP: 118/68   Pulse: 98   Resp: 18   Temp: (!) 97 °F (36.1 °C)   SpO2: 96%     Physical Exam:     General Appearance:  Alert, interactive, nontoxic, no acute distress. HEENT: Oropharynx moist without lesions. Lungs:   Clear to auscultation bilaterally; no wheezes, rhonchi or rales; respirations unlabored   Heart:  RRR; no murmur   Abdomen:   Soft, non-tender, non-distended, positive bowel sounds. No palpable organomegaly. Extremities: No clubbing, cyanosis or edema   Vascular: PICC line appears to be in proper position in left chest wall. Clean, dry and intact without evidence of surrounding erythema, drainage. Skin: No new rashes or lesions. R shoulder incision well healed and approximated. No draining wounds noted. Labs, Imaging, & Other studies:   All pertinent labs and imaging studies were personally reviewed by me from 8/28/23.     Results from last 7 days   Lab Units 08/28/23  1118   WBC Thousand/uL 11.12*   HEMOGLOBIN g/dL 10.4*   PLATELETS Thousands/uL 191     Results from last 7 days   Lab Units 08/28/23  1118   SODIUM mmol/L 137   POTASSIUM mmol/L 4.3   CHLORIDE mmol/L 100   CO2 mmol/L 28   BUN mg/dL 37*   CREATININE mg/dL 1.58*   EGFR ml/min/1.73sq m 30   CALCIUM mg/dL 9.2                       The following portions of the patient's history were reviewed and updated as appropriate: allergies, current medications, past family history, past medical history, past social history, past surgical history and problem list.

## 2023-08-29 NOTE — TELEPHONE ENCOUNTER
Phone went directly to [facility]. LM for pt to CB.   Would like to see if patient would come in at 2:15 rather than 3:30 PM.

## 2023-08-29 NOTE — TELEPHONE ENCOUNTER
Attempt to call patient to see if she could come early. [facility] [place]

## 2023-09-04 ENCOUNTER — LAB REQUISITION (OUTPATIENT)
Dept: LAB | Facility: HOSPITAL | Age: 78
End: 2023-09-04
Payer: COMMERCIAL

## 2023-09-04 ENCOUNTER — HOME CARE VISIT (OUTPATIENT)
Dept: HOME HEALTH SERVICES | Facility: HOME HEALTHCARE | Age: 78
End: 2023-09-04

## 2023-09-04 VITALS
DIASTOLIC BLOOD PRESSURE: 70 MMHG | TEMPERATURE: 96 F | RESPIRATION RATE: 16 BRPM | SYSTOLIC BLOOD PRESSURE: 128 MMHG | HEART RATE: 63 BPM | OXYGEN SATURATION: 99 %

## 2023-09-04 DIAGNOSIS — Z96.611 PRESENCE OF RIGHT ARTIFICIAL SHOULDER JOINT: ICD-10-CM

## 2023-09-04 DIAGNOSIS — T84.59XA INFECTION AND INFLAMMATORY REACTION DUE TO OTHER INTERNAL JOINT PROSTHESIS, INITIAL ENCOUNTER (HCC): ICD-10-CM

## 2023-09-04 LAB
ANION GAP SERPL CALCULATED.3IONS-SCNC: 7 MMOL/L
BASOPHILS # BLD AUTO: 0.04 THOUSANDS/ÂΜL (ref 0–0.1)
BASOPHILS NFR BLD AUTO: 0 % (ref 0–1)
BUN SERPL-MCNC: 37 MG/DL (ref 5–25)
CALCIUM SERPL-MCNC: 9 MG/DL (ref 8.4–10.2)
CHLORIDE SERPL-SCNC: 102 MMOL/L (ref 96–108)
CO2 SERPL-SCNC: 29 MMOL/L (ref 21–32)
CREAT SERPL-MCNC: 1.68 MG/DL (ref 0.6–1.3)
EOSINOPHIL # BLD AUTO: 0.21 THOUSAND/ÂΜL (ref 0–0.61)
EOSINOPHIL NFR BLD AUTO: 2 % (ref 0–6)
ERYTHROCYTE [DISTWIDTH] IN BLOOD BY AUTOMATED COUNT: 14 % (ref 11.6–15.1)
GFR SERPL CREATININE-BSD FRML MDRD: 28 ML/MIN/1.73SQ M
GLUCOSE SERPL-MCNC: 108 MG/DL (ref 65–140)
HCT VFR BLD AUTO: 32.4 % (ref 34.8–46.1)
HGB BLD-MCNC: 10 G/DL (ref 11.5–15.4)
IMM GRANULOCYTES # BLD AUTO: 0.05 THOUSAND/UL (ref 0–0.2)
IMM GRANULOCYTES NFR BLD AUTO: 1 % (ref 0–2)
LYMPHOCYTES # BLD AUTO: 1.1 THOUSANDS/ÂΜL (ref 0.6–4.47)
LYMPHOCYTES NFR BLD AUTO: 11 % (ref 14–44)
MCH RBC QN AUTO: 30.7 PG (ref 26.8–34.3)
MCHC RBC AUTO-ENTMCNC: 30.9 G/DL (ref 31.4–37.4)
MCV RBC AUTO: 99 FL (ref 82–98)
MONOCYTES # BLD AUTO: 0.57 THOUSAND/ÂΜL (ref 0.17–1.22)
MONOCYTES NFR BLD AUTO: 6 % (ref 4–12)
NEUTROPHILS # BLD AUTO: 8.42 THOUSANDS/ÂΜL (ref 1.85–7.62)
NEUTS SEG NFR BLD AUTO: 80 % (ref 43–75)
NRBC BLD AUTO-RTO: 0 /100 WBCS
PLATELET # BLD AUTO: 211 THOUSANDS/UL (ref 149–390)
PMV BLD AUTO: 10.8 FL (ref 8.9–12.7)
POTASSIUM SERPL-SCNC: 3.9 MMOL/L (ref 3.5–5.3)
RBC # BLD AUTO: 3.26 MILLION/UL (ref 3.81–5.12)
SODIUM SERPL-SCNC: 138 MMOL/L (ref 135–147)
WBC # BLD AUTO: 10.39 THOUSAND/UL (ref 4.31–10.16)

## 2023-09-04 PROCEDURE — 80048 BASIC METABOLIC PNL TOTAL CA: CPT | Performed by: NURSE PRACTITIONER

## 2023-09-04 PROCEDURE — G0299 HHS/HOSPICE OF RN EA 15 MIN: HCPCS

## 2023-09-04 PROCEDURE — 85025 COMPLETE CBC W/AUTO DIFF WBC: CPT | Performed by: NURSE PRACTITIONER

## 2023-09-07 ENCOUNTER — HOSPITAL ENCOUNTER (OUTPATIENT)
Dept: RADIOLOGY | Facility: HOSPITAL | Age: 78
Discharge: HOME | End: 2023-09-07

## 2023-09-07 ENCOUNTER — HOME CARE VISIT (OUTPATIENT)
Dept: HOME HEALTH SERVICES | Facility: HOME HEALTHCARE | Age: 78
End: 2023-09-07

## 2023-09-07 VITALS
HEART RATE: 69 BPM | SYSTOLIC BLOOD PRESSURE: 118 MMHG | OXYGEN SATURATION: 97 % | DIASTOLIC BLOOD PRESSURE: 62 MMHG | RESPIRATION RATE: 20 BRPM

## 2023-09-07 DIAGNOSIS — M12.811 ROTATOR CUFF TEAR ARTHROPATHY OF RIGHT SHOULDER: ICD-10-CM

## 2023-09-07 DIAGNOSIS — M75.101 ROTATOR CUFF TEAR ARTHROPATHY OF RIGHT SHOULDER: ICD-10-CM

## 2023-09-07 PROCEDURE — G0299 HHS/HOSPICE OF RN EA 15 MIN: HCPCS

## 2023-09-07 NOTE — DISCHARGE INSTRUCTIONS
Central line  WHAT YOU NEED TO KNOW:   A catheter placed through a vein into or near your right atrium. Your right atrium is the right upper chamber of your heart. DISCHARGE INSTRUCTIONS:   Call 911 for any of the following: You feel lightheaded, short of breath, and have chest pain. You start bleeding    Contact Interventional Radiology at [phone] [place] PATIENTS: Contact Interventional Radiology at [phone]) Marilu Piter PATIENTS: Contact Interventional Radiology at [phone]) if:  Blood soaks through your bandage. You have new swelling in your arm, neck, face, or chest on your right side. Your bruises or pain get worse. You have a fever or chills. Persistent nausea or vomiting. Your incision is red, swollen, or draining pus. You have questions or concerns about your condition or care. Self-care:   Resume your normal diet. Small sips of flat soda will help with nausea. Keep your dressings dry. Do not get your perma-cath site wet until the incision closes. You may take a tub bath, but do not get your dressings wet. Water in your wound can cause bacteria to grow and cause an infection. If your dressing gets wet, remove the wet dressing and apply a dry bandaid. Keep it covered until the incision closes. This should only take a few days to heal. Do not use soaps or ointments. Immediately after your catheter is removed, no strenuous activity for twenty four hours and stay in an upright sitting position for two hours. Follow up with your healthcare provider as directed: Write down your questions so you remember to ask them during your visits.

## 2023-09-07 NOTE — SEDATION DOCUMENTATION
Pt had a non tunneled sutures central IJ line removed in IR. Pt tolerated well. Manual pressure held for 5 mins,. Steri strips to site. AVS printed and pt dc via wheelchair to [facility] Cazares in stable condition.

## 2023-09-16 DIAGNOSIS — R60.9 EDEMA, UNSPECIFIED TYPE: ICD-10-CM

## 2023-09-18 RX ORDER — FUROSEMIDE 40 MG/1
TABLET ORAL
Qty: 30 TABLET | Refills: 0 | OUTPATIENT
Start: 2023-09-18

## 2023-10-04 ENCOUNTER — OFFICE VISIT (OUTPATIENT)
Dept: PODIATRY | Facility: CLINIC | Age: 78
End: 2023-10-04
Payer: COMMERCIAL

## 2023-10-04 VITALS
DIASTOLIC BLOOD PRESSURE: 70 MMHG | BODY MASS INDEX: 37.35 KG/M2 | WEIGHT: 238 LBS | HEIGHT: 67 IN | SYSTOLIC BLOOD PRESSURE: 120 MMHG | HEART RATE: 70 BPM | RESPIRATION RATE: 18 BRPM

## 2023-10-04 DIAGNOSIS — E11.9 TYPE 2 DIABETES MELLITUS WITHOUT COMPLICATION, WITHOUT LONG-TERM CURRENT USE OF INSULIN (HCC): ICD-10-CM

## 2023-10-04 DIAGNOSIS — I73.9 PERIPHERAL VASCULAR DISEASE, UNSPECIFIED (HCC): ICD-10-CM

## 2023-10-04 DIAGNOSIS — L84 CORNS: Primary | ICD-10-CM

## 2023-10-04 PROCEDURE — 11055 PARING/CUTG B9 HYPRKER LES 1: CPT | Performed by: PODIATRIST

## 2023-10-04 PROCEDURE — G0127 TRIM NAIL(S): HCPCS | Performed by: PODIATRIST

## 2023-10-04 PROCEDURE — 99212 OFFICE O/P EST SF 10 MIN: CPT | Performed by: PODIATRIST

## 2023-10-04 NOTE — PROGRESS NOTES
Established patient with class findings presents for nail and lesion care. Patient no longer on DM meds. Patient has been recuperating from shoulder surgery that became infected. Vascular exam:  DP  0/4 bilateral; PT  0 4 bilateral   Dermatological exam:  Each toenail is thick and  Dystrophic. Hyperkeratotic lesion right hallux. Diagnosis:  PVD; hyperkeratotic lesion right hallux  Treatment: Trimmed multiple dystrophic toenails. Trimmed hyperkeratotic lesion right hallux.     Nail removal    Date/Time: 10/4/2023 9:15 AM    Performed by: Sophie Taylor DPM  Authorized by: Sophie Taylor DPM    Nails trimmed:     Number of nails trimmed:  10  Lesion Destruction    Date/Time: 10/4/2023 9:15 AM    Performed by: Sophie Taylor DPM  Authorized by: Sophie Taylor DPM    Procedure Details - Lesion Destruction:     Number of Lesions:  1  Lesion 1:     Body area:  Lower extremity    Lower extremity location:  R big toe    Malignancy: benign hyperkeratotic lesion      Destruction method: scissors used for extraction

## 2023-10-05 ENCOUNTER — TELEPHONE (OUTPATIENT)
Dept: INFECTIOUS DISEASES | Facility: CLINIC | Age: 78
End: 2023-10-05

## 2023-10-05 NOTE — TELEPHONE ENCOUNTER
Contacted patient with a reminder to have blood work done prior to upcoming follow up in our office. Phone went to [facility]. Left a message for patient to call this office back.

## 2023-10-09 PROBLEM — N18.32 STAGE 3B CHRONIC KIDNEY DISEASE (HCC): Status: ACTIVE | Noted: 2023-10-09

## 2023-10-09 PROBLEM — Z96.619 INFECTION OF PROSTHETIC SHOULDER JOINT: Status: ACTIVE | Noted: 2023-10-09

## 2023-10-09 PROBLEM — T84.59XA INFECTION OF PROSTHETIC SHOULDER JOINT: Status: ACTIVE | Noted: 2023-10-09

## 2023-10-09 PROBLEM — E11.9 TYPE 2 DIABETES MELLITUS (HCC): Status: ACTIVE | Noted: 2021-07-07

## 2023-10-09 NOTE — PROGRESS NOTES
Outpatient Progress Note - [facility] Infectious Disease   Jimenez Borjas 78 y.o. female MRN: [id number]  Encounter: [id number]    Impression/Plan:  1.  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  Patient is status post I&D, with operative culture growing Micrococcus and Dermacoccus, only in broth culture with prolonged incubation. Susceptibilities from micrococcus isolated returned with R to PCN and S to vancomycin and clindamycin. No Ceftriaxone susceptibility reported. Since she is clinical improving on IV ceftriaxone, suspect micrococcus not the pathogen or possibly that the isolate is S and not tested. She is high risk for worsening renal failure thus switch to vancomycin was not pursued. Patient completed 6-weeks of IV treatment and then transitioned to oral Cefdinir for additional 6-week course. She's been tolerating her antibiotic without difficulty. Patient has not completed any follow up lab work since early September, this needs to be collected as soon as possible. Patient agreeable to going to \[facility]\"", provided her with paper. I will continue patient on her Cefdinir as planned through 10/18/2023 for completion of 3 full months of antibiotic treatment.  -continue oral Cefdinir, 300mg every 12 hours, through 10/18/2023  -complete CBCD and creatinine as soon as possible, should be completing weekly while on antibiotic treatment   -continue follow up with orthopedic surgery  -return to the outpatient ID office for follow up as needed      2. S/P right reverse total shoulder arthroplasty. Surgery occurred on 6/7/2023. Now complicated by infection above.  -continue follow up with orthopedic surgery     3. Type 2 diabetes mellitus without long term insulin use. Patient's last HbA1c was 6.7% on 8/21/2023. Elevated blood glucose is risk factor for infection. Recommend tight glycemic control.  -blood glucose management per PCP     4. CKD stage 3b.  Upon review of patient's medical record it appears her baseline creatinine is approximately 1.5-1.7. She has not completed recent serum creatinine and should do this as soon as possible.  -check creatinine as soon as possible  -dose adjust antibiotics for renal function as needed     5. Obesity. BMI = 37.28. This can effect antibiotic dosing.  -monitor patient weight and dose adjust antibiotics as needed      6. Polymyalgia rheumatica, on chronic steroid. This is risk factor for infection and complications with healing. Above plan was discussed in detail with patient in the exam room. Antibiotics:  Cefdinir    Subjective:  Patient presents today for ongoing antibiotic treatment of a presumed R shoulder prosthetic joint infection. Since her last visit to the ID office the patient completed her IV antibiotic treatment and IR removed her tunneled line. She transitioned to ongoing treatment with oral Cefdinir which she has been tolerating without difficulty. Patient denies R shoulder pain and remains engaged with her PT program. She has no fever, chills, sweats, shakes; no nausea, vomiting, abdominal pain, diarrhea, or dysuria; no cough, shortness of breath, or chest pain. No new symptoms. Objective:  Vitals:  HR 65  RR 18  /80    Physical Exam:   General Appearance:  Alert, interactive, nontoxic, no acute distress. She appears comfortable sitting in the exam room. Throat: Oropharynx moist without lesions. Lungs:   Clear to auscultation bilaterally; no wheezes, rhonchi or rales; respirations unlabored on room air. Heart:  RRR; no murmur, rub or gallop. Abdomen:   Soft, non-tender, non-distended, positive bowel sounds. Extremities: R shoulder with surgical scar, no current dressing, no erythema at site or active drainage. Skin: No new rashes or lesions noted on exposed skin. Labs, Imaging, & Other studies:   No recent lab work available to review.

## 2023-10-09 NOTE — PATIENT INSTRUCTIONS
Continue oral Cefdinir, 300mg every 12 hours, through 10/18/2023. Complete lab work (CBCD and creatinine) now, should be completed weekly while on antibiotic treatment. Continue routine follow up with orthopedic surgery. Return to the outpatient infectious disease office for follow up as needed.

## 2023-10-10 ENCOUNTER — OFFICE VISIT (OUTPATIENT)
Dept: INFECTIOUS DISEASES | Facility: CLINIC | Age: 78
End: 2023-10-10
Payer: COMMERCIAL

## 2023-10-10 VITALS
WEIGHT: 230 LBS | HEART RATE: 65 BPM | HEIGHT: 67 IN | BODY MASS INDEX: 36.1 KG/M2 | DIASTOLIC BLOOD PRESSURE: 80 MMHG | RESPIRATION RATE: 18 BRPM | OXYGEN SATURATION: 97 % | SYSTOLIC BLOOD PRESSURE: 134 MMHG

## 2023-10-10 DIAGNOSIS — E66.9 OBESITY (BMI 35.0-39.9 WITHOUT COMORBIDITY): ICD-10-CM

## 2023-10-10 DIAGNOSIS — N18.32 STAGE 3B CHRONIC KIDNEY DISEASE (HCC): ICD-10-CM

## 2023-10-10 DIAGNOSIS — E11.9 TYPE 2 DIABETES MELLITUS (HCC): ICD-10-CM

## 2023-10-10 DIAGNOSIS — Z96.619 INFECTION OF PROSTHETIC SHOULDER JOINT: Primary | ICD-10-CM

## 2023-10-10 DIAGNOSIS — T84.59XA INFECTION OF PROSTHETIC SHOULDER JOINT: Primary | ICD-10-CM

## 2023-10-10 DIAGNOSIS — M35.3 PMR (POLYMYALGIA RHEUMATICA) (HCC): ICD-10-CM

## 2023-10-10 PROCEDURE — 99212 OFFICE O/P EST SF 10 MIN: CPT | Performed by: NURSE PRACTITIONER

## 2023-11-08 ENCOUNTER — TELEPHONE (OUTPATIENT)
Dept: NEPHROLOGY | Facility: CLINIC | Age: 78
End: 2023-11-08

## 2023-11-08 NOTE — TELEPHONE ENCOUNTER
Pt called to cancel follow up appt with Dr Berenice Beckman on 11/15 in the [facility]. She stated she would like to wait until Jan to be seen. Next appt is on 1/30/24 with Dr Berenice Beckman in the [facility].

## 2023-12-23 DIAGNOSIS — E78.2 MIXED HYPERLIPIDEMIA: ICD-10-CM

## 2023-12-26 RX ORDER — SIMVASTATIN 10 MG
10 TABLET ORAL EVERY MORNING
Qty: 90 TABLET | Refills: 0 | OUTPATIENT
Start: 2023-12-26

## 2024-01-03 ENCOUNTER — OFFICE VISIT (OUTPATIENT)
Dept: PODIATRY | Facility: CLINIC | Age: 79
End: 2024-01-03
Payer: COMMERCIAL

## 2024-01-03 VITALS — WEIGHT: 234 LBS | BODY MASS INDEX: 36.73 KG/M2 | RESPIRATION RATE: 18 BRPM | HEIGHT: 67 IN

## 2024-01-03 DIAGNOSIS — E11.9 TYPE 2 DIABETES MELLITUS WITHOUT COMPLICATION, WITHOUT LONG-TERM CURRENT USE OF INSULIN (HCC): ICD-10-CM

## 2024-01-03 DIAGNOSIS — B35.1 ONYCHOMYCOSIS: Primary | ICD-10-CM

## 2024-01-03 PROCEDURE — 11721 DEBRIDE NAIL 6 OR MORE: CPT | Performed by: PODIATRIST

## 2024-01-03 NOTE — PROGRESS NOTES
Established patient with class findings presents for mycotic toenail care.  Vascular exam:  DP 0 4 bilateral; PT 0 4 bilateral  Derm exam:  Each toenail is thick, yellow with subungual debris  Diagnoses:  Mycotic toenails x 10; diabetes mellitus  Treatment:  Debrided each mycotic toenail reducing nails in thickness and removing devitalized tissue and subungual debris.  Electrical and manual debridement performed.

## 2024-01-23 ENCOUNTER — TELEPHONE (OUTPATIENT)
Dept: NEPHROLOGY | Facility: CLINIC | Age: 79
End: 2024-01-23

## 2024-01-23 NOTE — TELEPHONE ENCOUNTER
Pt called to reschedule 1/30/2024 appt with Dr. Quintanilla. Pt said she does not like going to the [place] office. Pt rescheduled to see Dr. Quintanilla in the [facility] in 2/20/24.

## 2024-03-12 ENCOUNTER — TELEPHONE (OUTPATIENT)
Dept: NEPHROLOGY | Facility: CLINIC | Age: 79
End: 2024-03-12

## 2024-03-12 NOTE — TELEPHONE ENCOUNTER
Called patient and left a voicemail asking to please have blood work drawn before the follow up appointment.

## 2024-03-19 ENCOUNTER — OFFICE VISIT (OUTPATIENT)
Dept: NEPHROLOGY | Facility: CLINIC | Age: 79
End: 2024-03-19
Payer: COMMERCIAL

## 2024-03-19 VITALS
DIASTOLIC BLOOD PRESSURE: 72 MMHG | HEART RATE: 68 BPM | BODY MASS INDEX: 36.57 KG/M2 | WEIGHT: 233 LBS | OXYGEN SATURATION: 95 % | HEIGHT: 67 IN | SYSTOLIC BLOOD PRESSURE: 134 MMHG

## 2024-03-19 DIAGNOSIS — I12.9 NEPHROSCLEROSIS, STAGE 1-4 OR UNSPECIFIED CHRONIC KIDNEY DISEASE: ICD-10-CM

## 2024-03-19 DIAGNOSIS — N18.9 CHRONIC KIDNEY DISEASE, UNSPECIFIED CKD STAGE: Primary | ICD-10-CM

## 2024-03-19 DIAGNOSIS — N18.32 STAGE 3B CHRONIC KIDNEY DISEASE (HCC): ICD-10-CM

## 2024-03-19 PROCEDURE — 99214 OFFICE O/P EST MOD 30 MIN: CPT | Performed by: INTERNAL MEDICINE

## 2024-03-19 NOTE — PROGRESS NOTES
NEPHROLOGY PROGRESS NOTE    Yolande Isaac 79 y.o. female MRN: [id number]  Unit/Bed#:  Encounter: [id number]  Reason for Consult: Chronic renal insufficiency    The patient is here for routine follow-up she looks well.  States that she is trying to keep a little busy with her friends.  She did complete her antibiotics and had repeat surgery on her right shoulder and is working on getting improvement although it still sore.  Otherwise no changes in her overall medical state.    ASSESSMENT/PLAN:  1.  Renal    Patient has chronic renal insufficiency due to nephrosclerosis given lack of proteinuria.  Patient has been diabetic in the past no longer is diabetic but she never had significant proteinuria so it does not appear to have affected the kidneys and is not responsible for her chronic kidney disease.  Latest creatinine is 1.7 and it seems to fluctuate between 1.4-1.8 over the years.  I think the fluctuations are related to her diuretics and subtle changes in volume status.  Overall she is doing great blood pressure is well-controlled she has no volume overload renal function is stable.  I informed her there is been no progression so no changes.    New current medications  Monitor with blood pressure control routine health care  Labs and follow-up as scheduled    I told her to call me if there is any problems or concerns before next visit.    I have spent a total time of 30 minutes on 03/19/24 in caring for this patient including Diagnostic results, Prognosis, Impressions, Reviewing / ordering tests, medicine, procedures  , and Obtaining or reviewing history  .         SUBJECTIVE:  Review of Systems   Constitutional: Negative for chills, diaphoresis and fever.   HENT: Negative.     Eyes: Negative.    Cardiovascular:  Negative for chest pain, dyspnea on exertion, leg swelling and orthopnea.   Respiratory:  Negative for cough, shortness of breath, sputum production and wheezing.    Musculoskeletal:  Positive for  "arthritis.        Bilateral shoulder pain.   Gastrointestinal:  Negative for abdominal pain, diarrhea, nausea and vomiting.   Genitourinary: Negative.    Neurological:  Negative for dizziness, focal weakness, headaches and light-headedness.   Psychiatric/Behavioral:  Negative for altered mental status, hallucinations and hypervigilance.        OBJECTIVE:  Current Weight: Weight - Scale: 106 kg (233 lb)  Vitals:@TMAX(24)@Current:     Blood pressure 134/72, pulse 68, height 5' 7\" (1.702 m), weight 106 kg (233 lb), SpO2 95%, not currently breastfeeding. , Body mass index is 36.49 kg/m².    [unfilled]    Physical Exam: /72 (BP Location: Left arm, Patient Position: Sitting, Cuff Size: Standard)   Pulse 68   Ht 5' 7\" (1.702 m)   Wt 106 kg (233 lb)   SpO2 95%   BMI 36.49 kg/m²   Physical Exam  Constitutional:       General: She is not in acute distress.     Appearance: She is not ill-appearing or toxic-appearing.   HENT:      Head: Normocephalic and atraumatic.      Nose: Nose normal.      Mouth/Throat:      Mouth: Mucous membranes are moist.   Eyes:      General: No scleral icterus.     Extraocular Movements: Extraocular movements intact.   Cardiovascular:      Rate and Rhythm: Normal rate and regular rhythm.      Heart sounds:      No friction rub. No gallop.   Pulmonary:      Effort: Pulmonary effort is normal. No respiratory distress.      Breath sounds: No wheezing, rhonchi or rales.   Abdominal:      General: Bowel sounds are normal. There is no distension.      Palpations: Abdomen is soft.      Tenderness: There is no abdominal tenderness. There is no rebound.   Musculoskeletal:      Cervical back: Normal range of motion and neck supple.   Neurological:      Mental Status: She is alert and oriented to person, place, and time. Mental status is at baseline.   Psychiatric:         Mood and Affect: Mood normal.         Behavior: Behavior normal.         Thought Content: Thought content normal. "         Medications:    Current Outpatient Medications:     acetaminophen (TYLENOL) 325 mg tablet, Take 2 tablets (650 mg total) by mouth every 6 (six) hours as needed for mild pain, Disp: , Rfl: 0    albuterol (ProAir HFA) 90 mcg/act inhaler, Inhale 2 puffs every 6 (six) hours as needed for wheezing, Disp: 8.5 g, Rfl: 3    allopurinol (ZYLOPRIM) 100 mg tablet, Take 100 mg by mouth 2 (two) times a day, Disp: , Rfl:     cetirizine (ZyrTEC) 10 mg tablet, Take 10 mg by mouth daily, Disp: , Rfl:     cholecalciferol (VITAMIN D3) 1,000 units tablet, Take 1,000 Units by mouth daily, Disp: , Rfl:     citalopram (CeleXA) 40 mg tablet, TAKE ONE TABLET BY MOUTH EVERY DAY (Patient taking differently: Take 40 mg by mouth every morning), Disp: 90 tablet, Rfl: 0    furosemide (LASIX) 40 mg tablet, TAKE ONE TABLET BY MOUTH EVERY DAY, Disp: 30 tablet, Rfl: 0    gabapentin (NEURONTIN) 100 mg capsule, Take 1 tablet by mouth 2 (two) times a day, Disp: , Rfl:     levothyroxine 50 mcg tablet, Take 1 tablet (50 mcg total) by mouth daily (Patient taking differently: Take 50 mcg by mouth every morning), Disp: 90 tablet, Rfl: 1    metoprolol tartrate (LOPRESSOR) 25 mg tablet, Take 1 tablet (25 mg total) by mouth in the morning (Patient taking differently: Take 25 mg by mouth every morning), Disp: 30 tablet, Rfl: 5    potassium chloride (K-DUR,KLOR-CON) 20 mEq tablet, Take 1 tablet (20 mEq total) by mouth daily (Patient taking differently: Take 20 mEq by mouth every morning), Disp: 30 tablet, Rfl: 5    predniSONE 5 mg tablet, Take 5 mg by mouth every morning, Disp: , Rfl:     simvastatin (ZOCOR) 10 mg tablet, Take 1 tablet (10 mg total) by mouth every morning, Disp: 90 tablet, Rfl: 1    aspirin 325 mg tablet, Take 1 tablet (325 mg total) by mouth daily, Disp: , Rfl: 0    Ceftriaxone Sodium 2 g SOLR, Inject 2 g into a catheter in a vein every 24 hours. Indications: SHOULDER INFECTION, Disp: , Rfl:     docusate sodium (COLACE) 100 mg capsule,  "Take 1 capsule (100 mg total) by mouth 2 (two) times a day, Disp: , Rfl: 0    senna (SENOKOT) 8.6 mg, Take 1 tablet (8.6 mg total) by mouth daily, Disp: , Rfl: 0    Sodium Chloride Flush (Normal Saline Flush) 0.9 % SOLN, Inject 10 mL into a catheter in a vein in the morning. Indications: IV flush, Disp: , Rfl:     Laboratory Results:  Lab Results   Component Value Date    WBC 10.39 (H) 09/04/2023    HGB 10.0 (L) 09/04/2023    HCT 32.4 (L) 09/04/2023    MCV 99 (H) 09/04/2023     09/04/2023     Lab Results   Component Value Date    SODIUM 142 01/04/2024    K 5.0 01/04/2024     01/04/2024    CO2 27 01/04/2024    BUN 40 (H) 01/04/2024    CREATININE 1.79 (H) 01/04/2024    GLUC 96 01/04/2024    CALCIUM 9.5 01/04/2024     Lab Results   Component Value Date    CALCIUM 9.5 01/04/2024    PHOS 3.5 01/04/2024     No results found for: \"LABPROT\"      "

## 2024-03-19 NOTE — PATIENT INSTRUCTIONS
You are here for follow-up you look great I am glad to hear you are through your shoulder surgery and rehab and antibiotics.  I think you really look good your blood pressure was excellent.  Kidney function is stable the creatinine which is the blood test was 1.7 in the past no protein in the urine so that tells me you do not have serious kidney disease.  You likely have aging or nephrosclerosis.  Also I think the fluctuations up and down around that level are due to the diuretic but you need that to prevent swelling so for now there is been no progression things are stable continue with blood pressure control and we will continue to monitor.    Call me if you have any questions or problems before next visit.

## 2024-04-03 ENCOUNTER — OFFICE VISIT (OUTPATIENT)
Dept: PODIATRY | Facility: CLINIC | Age: 79
End: 2024-04-03
Payer: COMMERCIAL

## 2024-04-03 VITALS — SYSTOLIC BLOOD PRESSURE: 130 MMHG | HEART RATE: 64 BPM | DIASTOLIC BLOOD PRESSURE: 74 MMHG

## 2024-04-03 DIAGNOSIS — E11.42 DIABETIC POLYNEUROPATHY ASSOCIATED WITH TYPE 2 DIABETES MELLITUS (HCC): Primary | ICD-10-CM

## 2024-04-03 DIAGNOSIS — B35.1 ONYCHOMYCOSIS: ICD-10-CM

## 2024-04-03 PROCEDURE — 11721 DEBRIDE NAIL 6 OR MORE: CPT | Performed by: PODIATRIST

## 2024-04-03 PROCEDURE — 99213 OFFICE O/P EST LOW 20 MIN: CPT | Performed by: PODIATRIST

## 2024-04-03 NOTE — PROGRESS NOTES
Assessment/Plan:    Discussed principles of diabetic footcare.  There are no palpable pedal pulses.  Sensorium is diminished per [place] monofilament.  Vibratory sense is absent in the left foot.  Proprioception is intact.    Patient has hallux hammertoe left that is irritated with shoe pressure.  Dispensed Silipos pads.    Patient has 10 thick toenails that she has difficulty cutting.  His toenails are mycotic.  Debridement performed reducing nails in thickness and removing devitalized tissue and debris.  Both electrical and manual debridement performed.  No problem-specific Assessment & Plan notes found for this encounter.       Diagnoses and all orders for this visit:    Diabetic polyneuropathy associated with type 2 diabetes mellitus (HCC)    Onychomycosis          Subjective:      Patient ID: Yolande Isaac is a 79 y.o. female.    HPI    Patient, an 79-year-old female presents for a diabetic foot exam.  Patient is a type II diabetic.  Blood sugars under good control.  Chief complaint are thick toenails that she cannot trim.  Patient also has a hallux hammertoe of the left foot.  She has irritation into the interphalangeal joint based on shoe pressure.    I personally reviewed an A1c dated 8/21/2023.  It was 6.7.    I personally reviewed an A1c dated 5/4/2023.  It was 6.6.    The following portions of the patient's history were reviewed and updated as appropriate: allergies, current medications, past family history, past medical history, past social history, past surgical history, and problem list.    Review of Systems   HENT:  Positive for hearing loss.    Genitourinary:         Renal disease   Musculoskeletal:  Positive for arthralgias and back pain.             Objective:      /74 (BP Location: Right arm, Patient Position: Sitting, Cuff Size: Standard)   Pulse 64          Physical Exam  Cardiovascular:      Pulses: Pulses are weak.           Dorsalis pedis pulses are 0 on the right side  and 0 on the left side.        Posterior tibial pulses are 0 on the right side and 0 on the left side.   Feet:      Right foot:      Skin integrity: No ulcer, skin breakdown, erythema, warmth, callus or dry skin.      Left foot:      Skin integrity: Callus present. No ulcer, skin breakdown, erythema, warmth or dry skin.           Diabetic Foot Exam    Patient's shoes and socks removed.    Right Foot/Ankle   Right Foot Inspection  Skin Exam: skin normal and skin intact. No dry skin, no warmth, no callus, no erythema, no maceration, no abnormal color, no pre-ulcer, no ulcer and no callus.     Toe Exam: ROM and strength within normal limits.     Sensory   Vibration: absent  Proprioception: intact  Monofilament testing: intact    Vascular  Capillary refills: < 3 seconds  The right DP pulse is 0. The right PT pulse is 0.     Right Toe  - Comprehensive Exam  Ecchymosis: none  Arch: normal  Hammertoes: absent  Claw Toes: absent  Swelling: none   Tenderness: none       Left Foot/Ankle  Left Foot Inspection  Skin Exam: skin normal, skin intact and callus. No dry skin, no warmth, no erythema, no maceration, normal color, no pre-ulcer and no ulcer.     Toe Exam: left toe deformity.     Sensory   Vibration: diminished  Proprioception: intact  Monofilament testing: diminished    Vascular  Capillary refills: < 3 seconds  The left DP pulse is 0. The left PT pulse is 0.     Left Toe  - Comprehensive Exam  Ecchymosis: none  Arch: normal  Claw toes: absent  Swelling: none   Tenderness: great toe interphalangeal joint       Assign Risk Category  Deformity present  Loss of protective sensation  Weak pulses  Risk: 2

## 2024-07-03 ENCOUNTER — OFFICE VISIT (OUTPATIENT)
Dept: PODIATRY | Facility: CLINIC | Age: 79
End: 2024-07-03
Payer: COMMERCIAL

## 2024-07-03 VITALS — HEIGHT: 67 IN | WEIGHT: 230 LBS | RESPIRATION RATE: 18 BRPM | BODY MASS INDEX: 36.1 KG/M2

## 2024-07-03 DIAGNOSIS — B35.1 ONYCHOMYCOSIS: ICD-10-CM

## 2024-07-03 DIAGNOSIS — E11.42 DIABETIC POLYNEUROPATHY ASSOCIATED WITH TYPE 2 DIABETES MELLITUS (HCC): Primary | ICD-10-CM

## 2024-07-03 PROCEDURE — RECHECK: Performed by: PODIATRIST

## 2024-07-03 PROCEDURE — 11720 DEBRIDE NAIL 1-5: CPT | Performed by: PODIATRIST

## 2024-07-03 NOTE — PROGRESS NOTES
Established patient with class findings presents for mycotic toenail care.  Vascular exam:  DP 0/4 bilateral; PT 0 4 bilateral  Derm exam:  Each great toenail is thickened and yellow with subungual debris  Diagnoses: Mycotic toenails great toe bilateral; diabetes mellitus  Treatment:  Debrided each mycotic toenail reducing nails in thickness and removing devitalized tissue and subungual debris.  Electrical and manual debridement performed.  Recheck trimmed remaining elongated toenails.

## 2024-09-12 ENCOUNTER — TELEPHONE (OUTPATIENT)
Dept: NEPHROLOGY | Facility: CLINIC | Age: 79
End: 2024-09-12

## 2024-10-25 NOTE — TELEPHONE ENCOUNTER
Joanne Yusuf got an RX for Metoprolol Tartrate to fill on this patient for once a day- was not sure this is the dosage she should be on or if she should be taking twice a day or have the succinate for once a day?
supervision

## 2024-11-13 ENCOUNTER — OFFICE VISIT (OUTPATIENT)
Dept: PODIATRY | Facility: CLINIC | Age: 79
End: 2024-11-13
Payer: COMMERCIAL

## 2024-11-13 DIAGNOSIS — B35.1 ONYCHOMYCOSIS: ICD-10-CM

## 2024-11-13 DIAGNOSIS — E11.42 DIABETIC POLYNEUROPATHY ASSOCIATED WITH TYPE 2 DIABETES MELLITUS (HCC): Primary | ICD-10-CM

## 2024-11-13 PROCEDURE — 11720 DEBRIDE NAIL 1-5: CPT | Performed by: PODIATRIST

## 2024-11-13 PROCEDURE — RECHECK: Performed by: PODIATRIST

## 2025-01-07 ENCOUNTER — TELEPHONE (OUTPATIENT)
Dept: NEPHROLOGY | Facility: CLINIC | Age: 80
End: 2025-01-07

## 2025-01-07 ENCOUNTER — OFFICE VISIT (OUTPATIENT)
Dept: NEPHROLOGY | Facility: CLINIC | Age: 80
End: 2025-01-07
Payer: COMMERCIAL

## 2025-01-07 VITALS
HEART RATE: 78 BPM | SYSTOLIC BLOOD PRESSURE: 138 MMHG | HEIGHT: 67 IN | BODY MASS INDEX: 37.83 KG/M2 | DIASTOLIC BLOOD PRESSURE: 78 MMHG | WEIGHT: 241 LBS

## 2025-01-07 DIAGNOSIS — I12.9 NEPHROSCLEROSIS, STAGE 1-4 OR UNSPECIFIED CHRONIC KIDNEY DISEASE: Primary | ICD-10-CM

## 2025-01-07 PROBLEM — E11.42 DIABETIC POLYNEUROPATHY ASSOCIATED WITH TYPE 2 DIABETES MELLITUS (HCC): Status: ACTIVE | Noted: 2025-01-07

## 2025-01-07 PROCEDURE — 99214 OFFICE O/P EST MOD 30 MIN: CPT | Performed by: INTERNAL MEDICINE

## 2025-01-07 NOTE — PROGRESS NOTES
Name: Yolande Isaac      : 1945      MRN: [id number]  Encounter Provider: Adin Quintanilla MD  Encounter Date: 2025   Encounter department[facility]  :  Assessment & Plan  Nephrosclerosis, stage 1-4 or unspecified chronic kidney disease  Lab Results   Component Value Date    EGFR 30 (L) 2024    EGFR 34 (L) 2024    EGFR 28 (L) 2024    CREATININE 1.71 (H) 2024    CREATININE 1.54 (H) 2024    CREATININE 1.79 (H) 2024     The patient is chronic kidney disease with no significant proteinuria and in fact it was negative completely on the most recent evaluation.  Latest creatinine is 1.7 which is stable in her baseline range over the last years of being followed.  There will be fluctuations likely related to diuretic use and subtle changes in her effective volume status.  Overall she is doing well she has had no hospitalizations creatinine remains stable there is no proteinuria.  They are monitoring her blood sugars but she seems to be a diet-controlled diabetic as she is on no specific medications.    I explained to her that given that she does not have proteinuria that is a good prognosis for slow progression related to her nephrosclerosis hopefully with controlling her blood pressure things will remain stable.    Her lipid profiles good her blood pressures under good control.    At this point can just continue to monitor with blood pressure lipid treatment and see her every 6 months with repeat blood work at that time.    I told her to call me if there is any problems or concerns before next visit.    I have spent a total time of 30 minutes in caring for this patient on the day of the visit/encounter including Diagnostic results, Prognosis, Impressions, Reviewing / ordering tests, medicine, procedures  , and Obtaining or reviewing history  .     Orders:    Basic metabolic panel; Future        History of Present Illness   HPI  Yolande  "Marlin is a 79 y.o. female who presents for routine follow-up for chronic renal insufficiency.  Since last seen she has been doing well except she says she has problems with a rotator cuff tear in her left shoulder and received injection and there recently but still cannot abduct her arm greater than 90 degrees.  She will continue to monitor with them otherwise no complaints.  History obtained from: patient    Review of Systems   Constitutional:  Negative for appetite change, chills and diaphoresis.   HENT: Negative.     Eyes: Negative.    Respiratory:  Negative for cough, chest tightness and shortness of breath.    Cardiovascular:  Negative for chest pain, palpitations and leg swelling.   Gastrointestinal:  Negative for abdominal pain, diarrhea, nausea and vomiting.   Genitourinary:  Negative for difficulty urinating, dysuria and hematuria.   Musculoskeletal:         Left shoulder pain.  Dealing with rotator cuff tear.   Neurological:  Negative for dizziness and headaches.   Psychiatric/Behavioral:  Negative for agitation, behavioral problems, confusion and decreased concentration.           Objective   /78 (BP Location: Right arm, Patient Position: Sitting, Cuff Size: Standard)   Pulse 78   Ht 5' 7\" (1.702 m)   Wt 109 kg (241 lb)   BMI 37.75 kg/m²      Physical Exam  Constitutional:       General: She is not in acute distress.     Appearance: She is not toxic-appearing.   HENT:      Head: Normocephalic and atraumatic.      Nose: Nose normal.      Mouth/Throat:      Mouth: Mucous membranes are moist.   Eyes:      General: No scleral icterus.     Extraocular Movements: Extraocular movements intact.   Cardiovascular:      Rate and Rhythm: Normal rate and regular rhythm.      Heart sounds:      No gallop.      Comments: No significant pitting edema.  Pulmonary:      Effort: Pulmonary effort is normal. No respiratory distress.      Breath sounds: No wheezing, rhonchi or rales.   Abdominal:      General: " Bowel sounds are normal. There is no distension.      Palpations: Abdomen is soft.      Tenderness: There is no abdominal tenderness.   Neurological:      Mental Status: She is alert and oriented to person, place, and time. Mental status is at baseline.   Psychiatric:         Mood and Affect: Mood normal.         Behavior: Behavior normal.

## 2025-01-07 NOTE — PATIENT INSTRUCTIONS
You are here for follow-up on glad you are feeling well sorry about your left shoulder but you are seeing a doctor regarding that.  Sounds like otherwise your health is been good.  Your creatinine level is stable at around 1.8 which is your baseline over the last year and longer it fluctuates a little bit but that could be due to the water pill but that is needed to prevent swelling and again for years it has been stable there is no progression.    There is no protein in the urine so you do not have an aggressive intrinsic kidney disease you have a good prognosis and we manage it with really blood pressure control routine health management they are watching your blood sugars off medication and they have been stable.    Call me if you have any questions or problems before next visit.

## 2025-01-07 NOTE — ASSESSMENT & PLAN NOTE
Lab Results   Component Value Date    EGFR 30 (L) 11/13/2024    EGFR 34 (L) 05/24/2024    EGFR 28 (L) 01/04/2024    CREATININE 1.71 (H) 11/13/2024    CREATININE 1.54 (H) 05/24/2024    CREATININE 1.79 (H) 01/04/2024     The patient is chronic kidney disease with no significant proteinuria and in fact it was negative completely on the most recent evaluation.  Latest creatinine is 1.7 which is stable in her baseline range over the last years of being followed.  There will be fluctuations likely related to diuretic use and subtle changes in her effective volume status.  Overall she is doing well she has had no hospitalizations creatinine remains stable there is no proteinuria.  They are monitoring her blood sugars but she seems to be a diet-controlled diabetic as she is on no specific medications.    I explained to her that given that she does not have proteinuria that is a good prognosis for slow progression related to her nephrosclerosis hopefully with controlling her blood pressure things will remain stable.    Her lipid profiles good her blood pressures under good control.    At this point can just continue to monitor with blood pressure lipid treatment and see her every 6 months with repeat blood work at that time.    I told her to call me if there is any problems or concerns before next visit.    I have spent a total time of 30 minutes in caring for this patient on the day of the visit/encounter including Diagnostic results, Prognosis, Impressions, Reviewing / ordering tests, medicine, procedures  , and Obtaining or reviewing history  .     Orders:    Basic metabolic panel; Future

## 2025-01-07 NOTE — TELEPHONE ENCOUNTER
LVM for pt asking if they would like to come in earlier given Dr. Quintanilla had a cancellation today at 2:30 pm. Asked pt to please give us a call back.

## 2025-03-19 ENCOUNTER — OFFICE VISIT (OUTPATIENT)
Dept: PODIATRY | Facility: CLINIC | Age: 80
End: 2025-03-19
Payer: COMMERCIAL

## 2025-03-19 VITALS — BODY MASS INDEX: 37.67 KG/M2 | HEIGHT: 67 IN | WEIGHT: 240 LBS | RESPIRATION RATE: 18 BRPM

## 2025-03-19 DIAGNOSIS — E11.42 DIABETIC POLYNEUROPATHY ASSOCIATED WITH TYPE 2 DIABETES MELLITUS (HCC): Primary | ICD-10-CM

## 2025-03-19 DIAGNOSIS — B35.1 ONYCHOMYCOSIS: ICD-10-CM

## 2025-03-19 PROCEDURE — RECHECK: Performed by: PODIATRIST

## 2025-03-19 PROCEDURE — 11720 DEBRIDE NAIL 1-5: CPT | Performed by: PODIATRIST

## 2025-03-19 NOTE — PROGRESS NOTES
Established patient with class findings presents for mycotic toenail care.  Vascular exam:  DP 0/4 bilateral; PT 0 4 bilateral  Derm exam:  Each great toenail is thickened and yellow with subungual debris  Diagnoses: Mycotic toenails great toe bilateral; diabetes mellitus  Treatment:  Debrided each mycotic toenail reducing nails in thickness and removing devitalized tissue and subungual debris.      Will perform diabetic foot exam next visit.

## 2025-07-11 NOTE — TELEPHONE ENCOUNTER
Pt called stating she is having L leg pain about 10" from ankle x1d  Pt states there is to lumps and c/o tenderness/warmth to the touch  Pt denies redness, pain w/ bending leg and or increased pain  Pt states she took Tramadol and it has not helped w/ the pain  Pt was requesting rx be sent to help w/ pain to [facility]  Wanted to verify it virtual visit would be appropriate for sxs please advise 
Pt was seen in office today
Calm

## 2025-07-30 ENCOUNTER — OFFICE VISIT (OUTPATIENT)
Dept: PODIATRY | Facility: CLINIC | Age: 80
End: 2025-07-30
Payer: COMMERCIAL

## 2025-07-30 VITALS — RESPIRATION RATE: 18 BRPM | WEIGHT: 240 LBS | BODY MASS INDEX: 37.67 KG/M2 | HEIGHT: 67 IN

## 2025-07-30 DIAGNOSIS — B35.1 ONYCHOMYCOSIS: ICD-10-CM

## 2025-07-30 DIAGNOSIS — E11.42 DIABETIC POLYNEUROPATHY ASSOCIATED WITH TYPE 2 DIABETES MELLITUS (HCC): Primary | ICD-10-CM

## 2025-07-30 PROCEDURE — 11720 DEBRIDE NAIL 1-5: CPT | Performed by: PODIATRIST

## 2025-07-30 PROCEDURE — 99213 OFFICE O/P EST LOW 20 MIN: CPT | Performed by: PODIATRIST

## (undated) DEVICE — IMPERVIOUS STOCKINETTE: Brand: DEROYAL

## (undated) DEVICE — GLOVE INDICATOR PI UNDERGLOVE SZ 6.5 BLUE

## (undated) DEVICE — COBAN 6 IN STERILE

## (undated) DEVICE — SCD SEQUENTIAL COMPRESSION COMFORT SLEEVE MEDIUM KNEE LENGTH: Brand: KENDALL SCD

## (undated) DEVICE — BETHLEHEM TOTAL HIP, KIT: Brand: CARDINAL HEALTH

## (undated) DEVICE — SUT ETHIBOND 5 V-37 30 IN MB66G

## (undated) DEVICE — GLOVE SRG BIOGEL 8

## (undated) DEVICE — GLOVE INDICATOR PI UNDERGLOVE SZ 8.5 BLUE

## (undated) DEVICE — GAUZE SPONGES,16 PLY: Brand: CURITY

## (undated) DEVICE — FRAZIER SUCTION INSTRUMENT 18 FR W/OBTURATOR, NO CONTROL VENT: Brand: FRAZIER

## (undated) DEVICE — PENCIL ELECTROSURG E-Z CLEAN -0035H

## (undated) DEVICE — CAPIT SHOULDER REVERSE TOTAL

## (undated) DEVICE — PREP SURGICAL PURPREP 26ML

## (undated) DEVICE — SUT MONOCRYL 3-0 PS-2 27 IN Y427H

## (undated) DEVICE — DRESSING XEROFORM 5 X 9

## (undated) DEVICE — HANDPIECE SET WITH RETRACTABLE COAXIAL FAN SPRAY TIP AND SUCTION TUBE: Brand: INTERPULSE

## (undated) DEVICE — SMARTGOWN SURGICAL GOWN, 3XL, LONG: Brand: CONVERTORS

## (undated) DEVICE — GLOVE SRG BIOGEL 6.5

## (undated) DEVICE — GLOVE INDICATOR PI UNDERGLOVE SZ 7 BLUE

## (undated) DEVICE — CHEST ROLL FOAM POSITIONER: Brand: CARDINAL HEALTH

## (undated) DEVICE — OCCLUSIVE GAUZE STRIP,3% BISMUTH TRIBROMOPHENATE IN PETROLATUM BLEND: Brand: XEROFORM

## (undated) DEVICE — 3000CC GUARDIAN II: Brand: GUARDIAN

## (undated) DEVICE — SUT VICRYL 2-0 CT-1 36 IN J945H

## (undated) DEVICE — POSITIONER OSI BEACH CHAIR FOAM

## (undated) DEVICE — ABDOMINAL PAD: Brand: DERMACEA

## (undated) DEVICE — 3M™ STERI-STRIP™ REINFORCED ADHESIVE SKIN CLOSURES, R1547, 1/2 IN X 4 IN (12 MM X 100 MM), 6 STRIPS/ENVELOPE: Brand: 3M™ STERI-STRIP™

## (undated) DEVICE — 3M™ IOBAN™ 2 ANTIMICROBIAL INCISE DRAPE 6648EZ: Brand: IOBAN™ 2

## (undated) DEVICE — SURGICAL GOWN, XL SMARTSLEEVE: Brand: CONVERTORS

## (undated) DEVICE — SUT FIBERWIRE #2 1/2 CIRCLE T-5 38IN AR-7200

## (undated) DEVICE — INTENDED FOR TISSUE SEPARATION, AND OTHER PROCEDURES THAT REQUIRE A SHARP SURGICAL BLADE TO PUNCTURE OR CUT.: Brand: BARD-PARKER ® CARBON RIB-BACK BLADES

## (undated) DEVICE — SUT VICRYL 2-0 CT-2 27 IN J269H

## (undated) DEVICE — GLOVE INDICATOR PI UNDERGLOVE SZ 8 BLUE

## (undated) DEVICE — 3M™ MICROFOAM™ TAPE 1528-4: Brand: 3M™ MICROFOAM™

## (undated) DEVICE — Device

## (undated) DEVICE — BLADE SAGITTAL 63.0 X 19.5MM